# Patient Record
Sex: MALE | Race: WHITE | URBAN - METROPOLITAN AREA
[De-identification: names, ages, dates, MRNs, and addresses within clinical notes are randomized per-mention and may not be internally consistent; named-entity substitution may affect disease eponyms.]

---

## 2017-01-02 ENCOUNTER — EMERGENCY (EMERGENCY)
Facility: HOSPITAL | Age: 82
LOS: 0 days | Discharge: HOME | End: 2017-01-02
Admitting: EMERGENCY MEDICINE
Payer: MEDICARE

## 2017-01-02 DIAGNOSIS — I10 ESSENTIAL (PRIMARY) HYPERTENSION: ICD-10-CM

## 2017-01-02 DIAGNOSIS — E11.9 TYPE 2 DIABETES MELLITUS WITHOUT COMPLICATIONS: ICD-10-CM

## 2017-01-02 DIAGNOSIS — Z79.899 OTHER LONG TERM (CURRENT) DRUG THERAPY: ICD-10-CM

## 2017-01-02 DIAGNOSIS — N20.0 CALCULUS OF KIDNEY: ICD-10-CM

## 2017-01-02 DIAGNOSIS — E78.5 HYPERLIPIDEMIA, UNSPECIFIED: ICD-10-CM

## 2017-01-02 DIAGNOSIS — I25.10 ATHEROSCLEROTIC HEART DISEASE OF NATIVE CORONARY ARTERY WITHOUT ANGINA PECTORIS: ICD-10-CM

## 2017-01-02 DIAGNOSIS — Z79.4 LONG TERM (CURRENT) USE OF INSULIN: ICD-10-CM

## 2017-01-02 DIAGNOSIS — R10.32 LEFT LOWER QUADRANT PAIN: ICD-10-CM

## 2017-01-02 PROCEDURE — 74177 CT ABD & PELVIS W/CONTRAST: CPT | Mod: 26

## 2017-01-02 PROCEDURE — 99285 EMERGENCY DEPT VISIT HI MDM: CPT

## 2017-04-07 ENCOUNTER — EMERGENCY (EMERGENCY)
Facility: HOSPITAL | Age: 82
LOS: 0 days | Discharge: HOME | End: 2017-04-07
Attending: EMERGENCY MEDICINE | Admitting: EMERGENCY MEDICINE
Payer: MEDICARE

## 2017-04-07 VITALS — WEIGHT: 210.1 LBS | HEIGHT: 72 IN

## 2017-04-07 VITALS
RESPIRATION RATE: 16 BRPM | SYSTOLIC BLOOD PRESSURE: 162 MMHG | HEART RATE: 61 BPM | DIASTOLIC BLOOD PRESSURE: 72 MMHG | TEMPERATURE: 98 F | OXYGEN SATURATION: 98 %

## 2017-04-07 DIAGNOSIS — R10.9 UNSPECIFIED ABDOMINAL PAIN: ICD-10-CM

## 2017-04-07 DIAGNOSIS — E11.9 TYPE 2 DIABETES MELLITUS WITHOUT COMPLICATIONS: ICD-10-CM

## 2017-04-07 DIAGNOSIS — N23 UNSPECIFIED RENAL COLIC: ICD-10-CM

## 2017-04-07 DIAGNOSIS — I10 ESSENTIAL (PRIMARY) HYPERTENSION: ICD-10-CM

## 2017-04-07 LAB
ALBUMIN SERPL ELPH-MCNC: 3.2 G/DL — LOW (ref 3.3–5)
ALP SERPL-CCNC: 90 U/L — SIGNIFICANT CHANGE UP (ref 40–120)
ALT FLD-CCNC: 21 U/L — SIGNIFICANT CHANGE UP (ref 12–78)
ANION GAP SERPL CALC-SCNC: 10 MMOL/L — SIGNIFICANT CHANGE UP (ref 5–17)
APPEARANCE UR: CLEAR — SIGNIFICANT CHANGE UP
APTT BLD: 44.8 SEC — HIGH (ref 27.5–37.4)
AST SERPL-CCNC: 24 U/L — SIGNIFICANT CHANGE UP (ref 15–37)
BACTERIA # UR AUTO: (no result)
BASOPHILS # BLD AUTO: 0.1 K/UL — SIGNIFICANT CHANGE UP (ref 0–0.2)
BASOPHILS NFR BLD AUTO: 1.1 % — SIGNIFICANT CHANGE UP (ref 0–2)
BILIRUB SERPL-MCNC: 0.4 MG/DL — SIGNIFICANT CHANGE UP (ref 0.2–1.2)
BILIRUB UR-MCNC: NEGATIVE — SIGNIFICANT CHANGE UP
BUN SERPL-MCNC: 21 MG/DL — SIGNIFICANT CHANGE UP (ref 7–23)
CALCIUM SERPL-MCNC: 8.9 MG/DL — SIGNIFICANT CHANGE UP (ref 8.5–10.1)
CHLORIDE SERPL-SCNC: 106 MMOL/L — SIGNIFICANT CHANGE UP (ref 96–108)
CO2 SERPL-SCNC: 23 MMOL/L — SIGNIFICANT CHANGE UP (ref 22–31)
COLOR SPEC: YELLOW — SIGNIFICANT CHANGE UP
CREAT SERPL-MCNC: 1.41 MG/DL — HIGH (ref 0.5–1.3)
DIFF PNL FLD: (no result)
EOSINOPHIL # BLD AUTO: 0.1 K/UL — SIGNIFICANT CHANGE UP (ref 0–0.5)
EOSINOPHIL NFR BLD AUTO: 1.9 % — SIGNIFICANT CHANGE UP (ref 0–6)
GLUCOSE SERPL-MCNC: 307 MG/DL — HIGH (ref 70–99)
GLUCOSE UR QL: 1000 MG/DL
HCT VFR BLD CALC: 44.5 % — SIGNIFICANT CHANGE UP (ref 39–50)
HGB BLD-MCNC: 15.4 G/DL — SIGNIFICANT CHANGE UP (ref 13–17)
INR BLD: 2.47 RATIO — HIGH (ref 0.88–1.16)
KETONES UR-MCNC: NEGATIVE — SIGNIFICANT CHANGE UP
LEUKOCYTE ESTERASE UR-ACNC: NEGATIVE — SIGNIFICANT CHANGE UP
LYMPHOCYTES # BLD AUTO: 1.5 K/UL — SIGNIFICANT CHANGE UP (ref 1–3.3)
LYMPHOCYTES # BLD AUTO: 25.4 % — SIGNIFICANT CHANGE UP (ref 13–44)
MCHC RBC-ENTMCNC: 34.6 PG — HIGH (ref 27–34)
MCHC RBC-ENTMCNC: 34.7 GM/DL — SIGNIFICANT CHANGE UP (ref 32–36)
MCV RBC AUTO: 99.7 FL — SIGNIFICANT CHANGE UP (ref 80–100)
MONOCYTES # BLD AUTO: 0.7 K/UL — SIGNIFICANT CHANGE UP (ref 0–0.9)
MONOCYTES NFR BLD AUTO: 11.1 % — SIGNIFICANT CHANGE UP (ref 2–14)
NEUTROPHILS # BLD AUTO: 3.7 K/UL — SIGNIFICANT CHANGE UP (ref 1.8–7.4)
NEUTROPHILS NFR BLD AUTO: 60.6 % — SIGNIFICANT CHANGE UP (ref 43–77)
NITRITE UR-MCNC: NEGATIVE — SIGNIFICANT CHANGE UP
PH UR: 5 — SIGNIFICANT CHANGE UP (ref 4.8–8)
PLATELET # BLD AUTO: 183 K/UL — SIGNIFICANT CHANGE UP (ref 150–400)
POTASSIUM SERPL-MCNC: 4.5 MMOL/L — SIGNIFICANT CHANGE UP (ref 3.5–5.3)
POTASSIUM SERPL-SCNC: 4.5 MMOL/L — SIGNIFICANT CHANGE UP (ref 3.5–5.3)
PROT SERPL-MCNC: 7.1 GM/DL — SIGNIFICANT CHANGE UP (ref 6–8.3)
PROT UR-MCNC: 15 MG/DL
PROTHROM AB SERPL-ACNC: 27.2 SEC — HIGH (ref 9.8–12.7)
RBC # BLD: 4.47 M/UL — SIGNIFICANT CHANGE UP (ref 4.2–5.8)
RBC # FLD: 13.2 % — SIGNIFICANT CHANGE UP (ref 10.3–14.5)
RBC CASTS # UR COMP ASSIST: (no result) /HPF (ref 0–4)
SODIUM SERPL-SCNC: 139 MMOL/L — SIGNIFICANT CHANGE UP (ref 135–145)
SP GR SPEC: 1.01 — SIGNIFICANT CHANGE UP (ref 1.01–1.02)
UROBILINOGEN FLD QL: NEGATIVE MG/DL — SIGNIFICANT CHANGE UP
WBC # BLD: 6.1 K/UL — SIGNIFICANT CHANGE UP (ref 3.8–10.5)
WBC # FLD AUTO: 6.1 K/UL — SIGNIFICANT CHANGE UP (ref 3.8–10.5)
WBC UR QL: SIGNIFICANT CHANGE UP

## 2017-04-07 PROCEDURE — 99284 EMERGENCY DEPT VISIT MOD MDM: CPT

## 2017-04-07 PROCEDURE — 74176 CT ABD & PELVIS W/O CONTRAST: CPT | Mod: 26

## 2017-04-07 RX ORDER — KETOROLAC TROMETHAMINE 30 MG/ML
30 SYRINGE (ML) INJECTION ONCE
Qty: 0 | Refills: 0 | Status: DISCONTINUED | OUTPATIENT
Start: 2017-04-07 | End: 2017-04-07

## 2017-04-07 RX ORDER — OXYCODONE HYDROCHLORIDE 5 MG/1
1 TABLET ORAL
Qty: 16 | Refills: 0 | OUTPATIENT
Start: 2017-04-07 | End: 2017-04-11

## 2017-04-07 RX ORDER — TAMSULOSIN HYDROCHLORIDE 0.4 MG/1
1 CAPSULE ORAL
Qty: 30 | Refills: 0 | OUTPATIENT
Start: 2017-04-07 | End: 2017-05-07

## 2017-04-07 RX ORDER — SODIUM CHLORIDE 9 MG/ML
1000 INJECTION INTRAMUSCULAR; INTRAVENOUS; SUBCUTANEOUS ONCE
Qty: 0 | Refills: 0 | Status: COMPLETED | OUTPATIENT
Start: 2017-04-07 | End: 2017-04-07

## 2017-04-07 RX ORDER — SODIUM CHLORIDE 9 MG/ML
500 INJECTION INTRAMUSCULAR; INTRAVENOUS; SUBCUTANEOUS ONCE
Qty: 0 | Refills: 0 | Status: COMPLETED | OUTPATIENT
Start: 2017-04-07 | End: 2017-04-07

## 2017-04-07 RX ORDER — ACETAMINOPHEN 500 MG
1000 TABLET ORAL ONCE
Qty: 0 | Refills: 0 | Status: COMPLETED | OUTPATIENT
Start: 2017-04-07 | End: 2017-04-07

## 2017-04-07 RX ORDER — ONDANSETRON 8 MG/1
4 TABLET, FILM COATED ORAL ONCE
Qty: 0 | Refills: 0 | Status: COMPLETED | OUTPATIENT
Start: 2017-04-07 | End: 2017-04-07

## 2017-04-07 RX ADMIN — SODIUM CHLORIDE 1000 MILLILITER(S): 9 INJECTION INTRAMUSCULAR; INTRAVENOUS; SUBCUTANEOUS at 13:25

## 2017-04-07 RX ADMIN — Medication 30 MILLIGRAM(S): at 17:13

## 2017-04-07 RX ADMIN — ONDANSETRON 4 MILLIGRAM(S): 8 TABLET, FILM COATED ORAL at 14:10

## 2017-04-07 RX ADMIN — Medication 1000 MILLIGRAM(S): at 15:18

## 2017-04-07 RX ADMIN — Medication 400 MILLIGRAM(S): at 14:10

## 2017-04-07 RX ADMIN — SODIUM CHLORIDE 500 MILLILITER(S): 9 INJECTION INTRAMUSCULAR; INTRAVENOUS; SUBCUTANEOUS at 17:13

## 2017-04-07 NOTE — ED ADULT NURSE REASSESSMENT NOTE - NS ED NURSE REASSESS COMMENT FT1
Pt received alert and oriented x 4. Pt medicated as ordered and will monitor for relief.  Drinking po contrast at this time and awaiting ct scan.  Will cont to monitor.

## 2017-04-07 NOTE — ED PROVIDER NOTE - PROGRESS NOTE DETAILS
pt with non obstructing left 3mm stone, will give toradol as tylenol helped his pain before, pt declined morphine or dilaudid as the morphine gives him hiccups. will d/c tohome with flomax and oxycodone, pt will f/u with his urologist Dr. Oglesby

## 2017-04-07 NOTE — ED STATDOCS - NS ED MD SCRIBE ATTENDING SCRIBE SECTIONS
HISTORY OF PRESENT ILLNESS/RESULTS/DISPOSITION/PHYSICAL EXAM/REVIEW OF SYSTEMS/PROGRESS NOTE/PAST MEDICAL/SURGICAL/SOCIAL HISTORY/VITAL SIGNS( Pullset)

## 2017-04-07 NOTE — ED PROVIDER NOTE - NS ED MD SCRIBE ATTENDING SCRIBE SECTIONS
DISPOSITION/PHYSICAL EXAM/RESULTS/HISTORY OF PRESENT ILLNESS/PAST MEDICAL/SURGICAL/SOCIAL HISTORY/REVIEW OF SYSTEMS/PROGRESS NOTE

## 2017-04-07 NOTE — ED PROVIDER NOTE - DETAILS:
I, Reina Salinas, performed the initial face to face bedside interview with this patient regarding history of present illness, review of symptoms and relevant past medical, social and family history.  I completed an independent physical examination.  I was the initial provider who evaluated this patient. The history, relevant review of systems, past medical and surgical history, medical decision making, and physical examination was documented by the scribe in my presence and I attest to the accuracy of the documentation.

## 2017-04-07 NOTE — ED PROVIDER NOTE - CONSTITUTIONAL, MLM
normal... Well appearing, well nourished, awake, alert, oriented to person, place, time/situation and presenting mild discomfort.

## 2017-04-07 NOTE — ED PROVIDER NOTE - OBJECTIVE STATEMENT
86 y/o M with PMHx of HTN, DM s/p cholecystectomy (few years ago by Dr. Mishra) presents to the ED c/o sharp LLQ pain starting this morning upon wakening. Pt states that the pain is constant and rates as 10/10. Pt states that he had a small BM this morning, currently calm and denies dysuria, fever, CP, SOB, HA, fever, cough or any other acute c/o at this time. PMKAYLA Rizvi.

## 2017-04-07 NOTE — ED STATDOCS - OBJECTIVE STATEMENT
84 y/o male with PMHx of HTN, DM s/p cholecystectomy (few years ago by Dr. Mishra) presents to the ED c/o LLQ pain starting this morning. Rates pain as 10/10. Denies N/V. Small BM this morning. Denies dysuria, fever. Felt fine before this morning. Last colonoscopy few years ago was negative, per pt. PMD Dr. Rizvi

## 2017-04-07 NOTE — ED ADULT NURSE REASSESSMENT NOTE - NS ED NURSE REASSESS COMMENT FT1
Pt reports good relief of symptoms at this time. Resting more comfortably and awaiting ct scan at this time.

## 2017-04-07 NOTE — ED ADULT NURSE REASSESSMENT NOTE - NS ED NURSE REASSESS COMMENT FT1
Pt reports return of pain after temporary relief. Medicated as ordered and will monitor for relief prior to d/c at request.

## 2018-04-24 ENCOUNTER — EMERGENCY (EMERGENCY)
Facility: HOSPITAL | Age: 83
LOS: 0 days | Discharge: HOME | End: 2018-04-24
Attending: EMERGENCY MEDICINE | Admitting: EMERGENCY MEDICINE
Payer: MEDICARE

## 2018-04-24 VITALS
HEART RATE: 56 BPM | SYSTOLIC BLOOD PRESSURE: 166 MMHG | RESPIRATION RATE: 16 BRPM | DIASTOLIC BLOOD PRESSURE: 75 MMHG | OXYGEN SATURATION: 100 %

## 2018-04-24 VITALS — WEIGHT: 210.1 LBS | HEIGHT: 72 IN

## 2018-04-24 DIAGNOSIS — R10.31 RIGHT LOWER QUADRANT PAIN: ICD-10-CM

## 2018-04-24 DIAGNOSIS — I10 ESSENTIAL (PRIMARY) HYPERTENSION: ICD-10-CM

## 2018-04-24 DIAGNOSIS — Z87.442 PERSONAL HISTORY OF URINARY CALCULI: ICD-10-CM

## 2018-04-24 DIAGNOSIS — K57.50 DIVERTICULOSIS OF BOTH SMALL AND LARGE INTESTINE WITHOUT PERFORATION OR ABSCESS WITHOUT BLEEDING: ICD-10-CM

## 2018-04-24 DIAGNOSIS — E11.9 TYPE 2 DIABETES MELLITUS WITHOUT COMPLICATIONS: ICD-10-CM

## 2018-04-24 LAB
ALBUMIN SERPL ELPH-MCNC: 2.9 G/DL — LOW (ref 3.3–5)
ALP SERPL-CCNC: 77 U/L — SIGNIFICANT CHANGE UP (ref 40–120)
ALT FLD-CCNC: 19 U/L — SIGNIFICANT CHANGE UP (ref 12–78)
ANION GAP SERPL CALC-SCNC: 6 MMOL/L — SIGNIFICANT CHANGE UP (ref 5–17)
APPEARANCE UR: CLEAR — SIGNIFICANT CHANGE UP
APTT BLD: 37.7 SEC — HIGH (ref 27.5–37.4)
AST SERPL-CCNC: 12 U/L — LOW (ref 15–37)
BASOPHILS # BLD AUTO: 0.02 K/UL — SIGNIFICANT CHANGE UP (ref 0–0.2)
BASOPHILS NFR BLD AUTO: 0.3 % — SIGNIFICANT CHANGE UP (ref 0–2)
BILIRUB SERPL-MCNC: 0.4 MG/DL — SIGNIFICANT CHANGE UP (ref 0.2–1.2)
BILIRUB UR-MCNC: NEGATIVE — SIGNIFICANT CHANGE UP
BUN SERPL-MCNC: 26 MG/DL — HIGH (ref 7–23)
CALCIUM SERPL-MCNC: 8.7 MG/DL — SIGNIFICANT CHANGE UP (ref 8.5–10.1)
CHLORIDE SERPL-SCNC: 108 MMOL/L — SIGNIFICANT CHANGE UP (ref 96–108)
CO2 SERPL-SCNC: 27 MMOL/L — SIGNIFICANT CHANGE UP (ref 22–31)
COLOR SPEC: YELLOW — SIGNIFICANT CHANGE UP
CREAT SERPL-MCNC: 1.39 MG/DL — HIGH (ref 0.5–1.3)
DIFF PNL FLD: (no result)
EOSINOPHIL # BLD AUTO: 0.11 K/UL — SIGNIFICANT CHANGE UP (ref 0–0.5)
EOSINOPHIL NFR BLD AUTO: 1.7 % — SIGNIFICANT CHANGE UP (ref 0–6)
GLUCOSE SERPL-MCNC: 237 MG/DL — HIGH (ref 70–99)
GLUCOSE UR QL: 1000 MG/DL
HCT VFR BLD CALC: 41.2 % — SIGNIFICANT CHANGE UP (ref 39–50)
HGB BLD-MCNC: 13.9 G/DL — SIGNIFICANT CHANGE UP (ref 13–17)
IMM GRANULOCYTES NFR BLD AUTO: 0.3 % — SIGNIFICANT CHANGE UP (ref 0–1.5)
INR BLD: 2.69 RATIO — HIGH (ref 0.88–1.16)
KETONES UR-MCNC: NEGATIVE — SIGNIFICANT CHANGE UP
LACTATE SERPL-SCNC: 1.3 MMOL/L — SIGNIFICANT CHANGE UP (ref 0.7–2)
LEUKOCYTE ESTERASE UR-ACNC: NEGATIVE — SIGNIFICANT CHANGE UP
LIDOCAIN IGE QN: 31 U/L — LOW (ref 73–393)
LYMPHOCYTES # BLD AUTO: 0.97 K/UL — LOW (ref 1–3.3)
LYMPHOCYTES # BLD AUTO: 14.9 % — SIGNIFICANT CHANGE UP (ref 13–44)
MCHC RBC-ENTMCNC: 33.5 PG — SIGNIFICANT CHANGE UP (ref 27–34)
MCHC RBC-ENTMCNC: 33.7 GM/DL — SIGNIFICANT CHANGE UP (ref 32–36)
MCV RBC AUTO: 99.3 FL — SIGNIFICANT CHANGE UP (ref 80–100)
MONOCYTES # BLD AUTO: 0.59 K/UL — SIGNIFICANT CHANGE UP (ref 0–0.9)
MONOCYTES NFR BLD AUTO: 9 % — SIGNIFICANT CHANGE UP (ref 2–14)
NEUTROPHILS # BLD AUTO: 4.81 K/UL — SIGNIFICANT CHANGE UP (ref 1.8–7.4)
NEUTROPHILS NFR BLD AUTO: 73.8 % — SIGNIFICANT CHANGE UP (ref 43–77)
NITRITE UR-MCNC: NEGATIVE — SIGNIFICANT CHANGE UP
NRBC # BLD: 0 /100 WBCS — SIGNIFICANT CHANGE UP (ref 0–0)
PH UR: 6 — SIGNIFICANT CHANGE UP (ref 5–8)
PLATELET # BLD AUTO: 158 K/UL — SIGNIFICANT CHANGE UP (ref 150–400)
POTASSIUM SERPL-MCNC: 4.4 MMOL/L — SIGNIFICANT CHANGE UP (ref 3.5–5.3)
POTASSIUM SERPL-SCNC: 4.4 MMOL/L — SIGNIFICANT CHANGE UP (ref 3.5–5.3)
PROT SERPL-MCNC: 6.5 GM/DL — SIGNIFICANT CHANGE UP (ref 6–8.3)
PROT UR-MCNC: 30 MG/DL
PROTHROM AB SERPL-ACNC: 29.6 SEC — HIGH (ref 9.8–12.7)
RBC # BLD: 4.15 M/UL — LOW (ref 4.2–5.8)
RBC # FLD: 13.3 % — SIGNIFICANT CHANGE UP (ref 10.3–14.5)
SODIUM SERPL-SCNC: 141 MMOL/L — SIGNIFICANT CHANGE UP (ref 135–145)
SP GR SPEC: 1.01 — SIGNIFICANT CHANGE UP (ref 1.01–1.02)
UROBILINOGEN FLD QL: NEGATIVE MG/DL — SIGNIFICANT CHANGE UP
WBC # BLD: 6.52 K/UL — SIGNIFICANT CHANGE UP (ref 3.8–10.5)
WBC # FLD AUTO: 6.52 K/UL — SIGNIFICANT CHANGE UP (ref 3.8–10.5)

## 2018-04-24 PROCEDURE — 74177 CT ABD & PELVIS W/CONTRAST: CPT | Mod: 26

## 2018-04-24 PROCEDURE — 99285 EMERGENCY DEPT VISIT HI MDM: CPT

## 2018-04-24 RX ORDER — ONDANSETRON 8 MG/1
4 TABLET, FILM COATED ORAL ONCE
Qty: 0 | Refills: 0 | Status: COMPLETED | OUTPATIENT
Start: 2018-04-24 | End: 2018-04-24

## 2018-04-24 RX ORDER — SODIUM CHLORIDE 9 MG/ML
1000 INJECTION INTRAMUSCULAR; INTRAVENOUS; SUBCUTANEOUS ONCE
Qty: 0 | Refills: 0 | Status: COMPLETED | OUTPATIENT
Start: 2018-04-24 | End: 2018-04-24

## 2018-04-24 RX ORDER — ACETAMINOPHEN 500 MG
1000 TABLET ORAL ONCE
Qty: 0 | Refills: 0 | Status: COMPLETED | OUTPATIENT
Start: 2018-04-24 | End: 2018-04-24

## 2018-04-24 RX ADMIN — SODIUM CHLORIDE 1000 MILLILITER(S): 9 INJECTION INTRAMUSCULAR; INTRAVENOUS; SUBCUTANEOUS at 10:35

## 2018-04-24 NOTE — ED ADULT NURSE NOTE - OBJECTIVE STATEMENT
Patient comes to ED for abdominal pain that started about midnight last night. Pt reports nausea, no vomiting or diarrhea. Pt also reports back pain that has been on and off for 1-2 years. Pt is AxOx4. Pt denies any nausea at this time.

## 2018-04-24 NOTE — ED PROVIDER NOTE - MEDICAL DECISION MAKING DETAILS
86M with rlq pain, r/o appendicitis, nephrolithiasis, uti. check labs with ua, urine culture, CT a/p. pain control and zofran for nausea.

## 2018-04-24 NOTE — ED ADULT NURSE REASSESSMENT NOTE - NS ED NURSE REASSESS COMMENT FT1
patient ambulated to bathroom and tolerated PO intake with no complaints. VSS. denies any pain or discomfort at this time.

## 2018-04-24 NOTE — ED PROVIDER NOTE - OBJECTIVE STATEMENT
83M with hx of DM, HTN, CAD Renal stones, p/w RLQ pain. patient states pain is sharp, woke him up from his sleep at 2am. Pain had been constant since. (+) nausea and dry heaves, no vomiting. no dysuria. states pain is dissimilar to when he has had stones in the past.

## 2018-04-25 LAB
CULTURE RESULTS: SIGNIFICANT CHANGE UP
SPECIMEN SOURCE: SIGNIFICANT CHANGE UP

## 2018-09-01 ENCOUNTER — EMERGENCY (EMERGENCY)
Facility: HOSPITAL | Age: 83
LOS: 0 days | Discharge: HOME | End: 2018-09-02
Attending: EMERGENCY MEDICINE
Payer: MEDICARE

## 2018-09-01 VITALS
OXYGEN SATURATION: 98 % | RESPIRATION RATE: 17 BRPM | DIASTOLIC BLOOD PRESSURE: 89 MMHG | HEART RATE: 70 BPM | HEIGHT: 72 IN | TEMPERATURE: 98 F | SYSTOLIC BLOOD PRESSURE: 189 MMHG | WEIGHT: 205.03 LBS

## 2018-09-01 DIAGNOSIS — E11.9 TYPE 2 DIABETES MELLITUS WITHOUT COMPLICATIONS: ICD-10-CM

## 2018-09-01 DIAGNOSIS — E78.5 HYPERLIPIDEMIA, UNSPECIFIED: ICD-10-CM

## 2018-09-01 DIAGNOSIS — R10.31 RIGHT LOWER QUADRANT PAIN: ICD-10-CM

## 2018-09-01 DIAGNOSIS — N13.2 HYDRONEPHROSIS WITH RENAL AND URETERAL CALCULOUS OBSTRUCTION: ICD-10-CM

## 2018-09-01 DIAGNOSIS — I10 ESSENTIAL (PRIMARY) HYPERTENSION: ICD-10-CM

## 2018-09-01 LAB
APPEARANCE UR: CLEAR — SIGNIFICANT CHANGE UP
BASOPHILS # BLD AUTO: 0.01 K/UL — SIGNIFICANT CHANGE UP (ref 0–0.2)
BASOPHILS NFR BLD AUTO: 0.2 % — SIGNIFICANT CHANGE UP (ref 0–2)
BILIRUB UR-MCNC: NEGATIVE — SIGNIFICANT CHANGE UP
COLOR SPEC: YELLOW — SIGNIFICANT CHANGE UP
DIFF PNL FLD: ABNORMAL
EOSINOPHIL # BLD AUTO: 0.01 K/UL — SIGNIFICANT CHANGE UP (ref 0–0.5)
EOSINOPHIL NFR BLD AUTO: 0.2 % — SIGNIFICANT CHANGE UP (ref 0–6)
GLUCOSE UR QL: 1000 MG/DL
HCT VFR BLD CALC: 40.6 % — SIGNIFICANT CHANGE UP (ref 39–50)
HGB BLD-MCNC: 14 G/DL — SIGNIFICANT CHANGE UP (ref 13–17)
IMM GRANULOCYTES NFR BLD AUTO: 0.4 % — SIGNIFICANT CHANGE UP (ref 0–1.5)
KETONES UR-MCNC: NEGATIVE — SIGNIFICANT CHANGE UP
LEUKOCYTE ESTERASE UR-ACNC: ABNORMAL
LYMPHOCYTES # BLD AUTO: 0.51 K/UL — LOW (ref 1–3.3)
LYMPHOCYTES # BLD AUTO: 10.6 % — LOW (ref 13–44)
MCHC RBC-ENTMCNC: 33.5 PG — SIGNIFICANT CHANGE UP (ref 27–34)
MCHC RBC-ENTMCNC: 34.5 GM/DL — SIGNIFICANT CHANGE UP (ref 32–36)
MCV RBC AUTO: 97.1 FL — SIGNIFICANT CHANGE UP (ref 80–100)
MONOCYTES # BLD AUTO: 0.42 K/UL — SIGNIFICANT CHANGE UP (ref 0–0.9)
MONOCYTES NFR BLD AUTO: 8.8 % — SIGNIFICANT CHANGE UP (ref 2–14)
NEUTROPHILS # BLD AUTO: 3.82 K/UL — SIGNIFICANT CHANGE UP (ref 1.8–7.4)
NEUTROPHILS NFR BLD AUTO: 79.8 % — HIGH (ref 43–77)
NITRITE UR-MCNC: NEGATIVE — SIGNIFICANT CHANGE UP
PH UR: 5 — SIGNIFICANT CHANGE UP (ref 5–8)
PLATELET # BLD AUTO: 194 K/UL — SIGNIFICANT CHANGE UP (ref 150–400)
PROT UR-MCNC: 30 MG/DL
RBC # BLD: 4.18 M/UL — LOW (ref 4.2–5.8)
RBC # FLD: 13.6 % — SIGNIFICANT CHANGE UP (ref 10.3–14.5)
SP GR SPEC: 1.02 — SIGNIFICANT CHANGE UP (ref 1.01–1.02)
TYPE + AB SCN PNL BLD: SIGNIFICANT CHANGE UP
UROBILINOGEN FLD QL: NEGATIVE MG/DL — SIGNIFICANT CHANGE UP
WBC # BLD: 4.79 K/UL — SIGNIFICANT CHANGE UP (ref 3.8–10.5)
WBC # FLD AUTO: 4.79 K/UL — SIGNIFICANT CHANGE UP (ref 3.8–10.5)

## 2018-09-01 PROCEDURE — 99285 EMERGENCY DEPT VISIT HI MDM: CPT | Mod: 25

## 2018-09-01 PROCEDURE — 93010 ELECTROCARDIOGRAM REPORT: CPT

## 2018-09-01 RX ORDER — FAMOTIDINE 10 MG/ML
20 INJECTION INTRAVENOUS ONCE
Qty: 0 | Refills: 0 | Status: COMPLETED | OUTPATIENT
Start: 2018-09-01 | End: 2018-09-01

## 2018-09-01 RX ORDER — ONDANSETRON 8 MG/1
4 TABLET, FILM COATED ORAL ONCE
Qty: 0 | Refills: 0 | Status: COMPLETED | OUTPATIENT
Start: 2018-09-01 | End: 2018-09-01

## 2018-09-01 RX ORDER — SODIUM CHLORIDE 9 MG/ML
1000 INJECTION INTRAMUSCULAR; INTRAVENOUS; SUBCUTANEOUS ONCE
Qty: 0 | Refills: 0 | Status: COMPLETED | OUTPATIENT
Start: 2018-09-01 | End: 2018-09-01

## 2018-09-01 RX ADMIN — ONDANSETRON 4 MILLIGRAM(S): 8 TABLET, FILM COATED ORAL at 22:01

## 2018-09-01 RX ADMIN — SODIUM CHLORIDE 1000 MILLILITER(S): 9 INJECTION INTRAMUSCULAR; INTRAVENOUS; SUBCUTANEOUS at 21:58

## 2018-09-01 RX ADMIN — FAMOTIDINE 20 MILLIGRAM(S): 10 INJECTION INTRAVENOUS at 22:01

## 2018-09-01 NOTE — ED PROVIDER NOTE - PROGRESS NOTE DETAILS
85 y/o M with PMH of HTN, HLD, DM presents with RLQ pain which started today after consuming meal of spaghetti and meatballs. reports associated nausea and vomiting. Denies fever, chills, constipation, diarrhea, testicular pain. No change to color of stool. Not passing gas since onset of pain. Pt seen for similar in this ED in April 2018, had negative CT at that time. PE: NAD: HEENT: oropharynx clear. no submandibular/cervical lymphadenopathy. Cardiac: s1/s2, RRR. Lungs: CTAB. Abdomen; NBS x4, soft, +RLQ & LLQ tenderness. No CVAT. A/P: r/o appendicitis, diverticulitis. Labs, zofran, pepcid, IVF, CT abdomen/pelvis. Reassess. - Natanael Salazar PA-C AS:  received pt at shift change from Dr Wu pending CT.  CT shows kidney stone with hydro.  Patient continues to c/o flank pain and nausea.  Will give toradol and zofran.  Will d/c home with urology f/u

## 2018-09-01 NOTE — ED PROVIDER NOTE - PHYSICAL EXAMINATION
Constitutional: mild distress AAOx3  Eyes: PERRLA EOMI  Head: Normocephalic atraumatic  Mouth: MMM  Cardiac: regular rate   Resp: Lungs CTAB  GI: +TTP of RLQ and LLQ. No rebound.  Neuro: CN2-12 intact  Skin: No rashes Constitutional: mild distress AAOx3  Eyes: PERRLA EOMI  Head: Normocephalic atraumatic  Mouth: MMM  Cardiac: bradycardic  Resp: Lungs CTAB  GI: +TTP of RLQ and LLQ. No rebound.  Neuro: CN2-12 intact  Skin: No rashes

## 2018-09-01 NOTE — ED PROVIDER NOTE - OBJECTIVE STATEMENT
85 y/o male with a PMHx of HTN, HLD, and DM presents to the ED c/o abd pain. Pt states pain started late this afternoon shortly after eating. Pain is described as RLQ pain that is constant, rated 8/10 in severity, and associated with nausea. Pt states he had two episodes of vomiting and has had one BM since the pain started. Denies testicular pain or change in BMs. Pt states he has not been passing gas normally, cannot recall last time he did. No other acute complaints at time of eval.

## 2018-09-01 NOTE — ED PROVIDER NOTE - NS ED ROS FT
Constitutional: No fever or chills  Eyes: No visual changes  HEENT: No throat pain  CV: No chest pain  Resp: No SOB no cough  GI: +RLQ pain, +nausea, +vomiting. No diarrhea.  : No dysuria  MSK: No musculoskeletal pain  Skin: No rash  Neuro: No headache

## 2018-09-01 NOTE — ED PROVIDER NOTE - MEDICAL DECISION MAKING DETAILS
85 y/o M with a h/o presents to the ED with abd pain, N/V. Symptoms started earlier this afternoon. Pain is located in the RLQ and LLQ, constant and non-radiating. Exam with TTP in those same regions. Concern for diverticulitis vs. appendicitis. Will obtain labs, CT, and reassess.

## 2018-09-01 NOTE — ED PROVIDER NOTE - NS_ ATTENDINGSCRIBEDETAILS _ED_A_ED_FT
I, Eric Wu MD,  performed the initial face to face bedside interview with this patient regarding history of present illness, review of symptoms and relevant past medical, social and family history.  I completed an independent physical examination.  I was the initial provider who evaluated this patient.  The history, relevant review of systems, past medical and surgical history, medical decision making, and physical examination was documented by the scribe in my presence and I attest to the accuracy of the documentation.

## 2018-09-01 NOTE — ED ADULT NURSE NOTE - NSIMPLEMENTINTERV_GEN_ALL_ED
Implemented All Fall with Harm Risk Interventions:  [place] to call system. Call bell, personal items and telephone within reach. Instruct patient to call for assistance. Room bathroom lighting operational. Non-slip footwear when patient is off stretcher. Physically safe environment: no spills, clutter or unnecessary equipment. Stretcher in lowest position, wheels locked, appropriate side rails in place. Provide visual cue, wrist band, yellow gown, etc. Monitor gait and stability. Monitor for mental status changes and reorient to person, place, and time. Review medications for side effects contributing to fall risk. Reinforce activity limits and safety measures with patient and family. Provide visual clues: red socks.

## 2018-09-02 VITALS
TEMPERATURE: 97 F | RESPIRATION RATE: 18 BRPM | HEART RATE: 71 BPM | OXYGEN SATURATION: 97 % | DIASTOLIC BLOOD PRESSURE: 78 MMHG | SYSTOLIC BLOOD PRESSURE: 159 MMHG

## 2018-09-02 PROCEDURE — 74177 CT ABD & PELVIS W/CONTRAST: CPT | Mod: 26

## 2018-09-02 RX ORDER — TAMSULOSIN HYDROCHLORIDE 0.4 MG/1
1 CAPSULE ORAL
Qty: 7 | Refills: 0
Start: 2018-09-02

## 2018-09-02 RX ORDER — KETOROLAC TROMETHAMINE 30 MG/ML
15 SYRINGE (ML) INJECTION ONCE
Qty: 0 | Refills: 0 | Status: DISCONTINUED | OUTPATIENT
Start: 2018-09-02 | End: 2018-09-02

## 2018-09-02 RX ORDER — ONDANSETRON 8 MG/1
1 TABLET, FILM COATED ORAL
Qty: 6 | Refills: 0
Start: 2018-09-02

## 2018-09-02 RX ORDER — OXYCODONE HYDROCHLORIDE 5 MG/1
1 TABLET ORAL
Qty: 6 | Refills: 0
Start: 2018-09-02

## 2018-09-02 RX ORDER — ONDANSETRON 8 MG/1
4 TABLET, FILM COATED ORAL ONCE
Qty: 0 | Refills: 0 | Status: COMPLETED | OUTPATIENT
Start: 2018-09-02 | End: 2018-09-02

## 2018-09-02 RX ADMIN — ONDANSETRON 4 MILLIGRAM(S): 8 TABLET, FILM COATED ORAL at 02:14

## 2018-09-02 RX ADMIN — Medication 15 MILLIGRAM(S): at 02:14

## 2018-09-03 LAB
CULTURE RESULTS: SIGNIFICANT CHANGE UP
SPECIMEN SOURCE: SIGNIFICANT CHANGE UP

## 2018-09-29 ENCOUNTER — EMERGENCY (EMERGENCY)
Facility: HOSPITAL | Age: 83
LOS: 0 days | Discharge: HOME | End: 2018-09-29
Attending: STUDENT IN AN ORGANIZED HEALTH CARE EDUCATION/TRAINING PROGRAM | Admitting: STUDENT IN AN ORGANIZED HEALTH CARE EDUCATION/TRAINING PROGRAM
Payer: MEDICARE

## 2018-09-29 VITALS
HEART RATE: 111 BPM | TEMPERATURE: 98 F | DIASTOLIC BLOOD PRESSURE: 53 MMHG | SYSTOLIC BLOOD PRESSURE: 156 MMHG | RESPIRATION RATE: 18 BRPM | OXYGEN SATURATION: 100 %

## 2018-09-29 VITALS — WEIGHT: 199.96 LBS | HEIGHT: 72 IN

## 2018-09-29 DIAGNOSIS — E11.9 TYPE 2 DIABETES MELLITUS WITHOUT COMPLICATIONS: ICD-10-CM

## 2018-09-29 DIAGNOSIS — Z79.899 OTHER LONG TERM (CURRENT) DRUG THERAPY: ICD-10-CM

## 2018-09-29 DIAGNOSIS — Z87.442 PERSONAL HISTORY OF URINARY CALCULI: ICD-10-CM

## 2018-09-29 DIAGNOSIS — N23 UNSPECIFIED RENAL COLIC: ICD-10-CM

## 2018-09-29 DIAGNOSIS — E78.5 HYPERLIPIDEMIA, UNSPECIFIED: ICD-10-CM

## 2018-09-29 DIAGNOSIS — R10.31 RIGHT LOWER QUADRANT PAIN: ICD-10-CM

## 2018-09-29 DIAGNOSIS — I10 ESSENTIAL (PRIMARY) HYPERTENSION: ICD-10-CM

## 2018-09-29 PROCEDURE — 99284 EMERGENCY DEPT VISIT MOD MDM: CPT

## 2018-09-29 NOTE — ED STATDOCS - PROGRESS NOTE DETAILS
PA NOTE: Pt seen by intake physician and orders/plan reviewed. 87 y/o M presenting with R flank pain intermittent for a week. Patient was diagnosed with R 2mm UVJ stone x1mo ago here, was told to follow-up with urologist which he has not done. He states that he is still having intermittent pain in his R side of his back. Has not been 'as mobile at home after being diagnosed with kidney stone because of pain.' He denies any urinary retention, dysuria, hematuria, nausea, vomiting, fever, chills. Reports that he has not tried taking anything for pain because 'no one told him what he can take.' He states that he has a urologist to follow-up with, Dr. Oglesby, but has not tried to see him.   PE: GEN: Well Appearing, Nontoxic, NAD. HEENT: NC/AT, Symm Facies. PERRL, EOMI, MMM, posterior pharynx clear. CV: No JVD/Bruits or stridor;  +S1S2, RRR w/o m/g/r. RESP: CTAB w/o w/r/r. ABD: Soft, nt/nd, +BS. No guarding/rebound. No RUQ tender. Mild R mid paraspinal tenderness. No midline tenderness. EXT/MSK: No lower extremity edema or calf tenderness. WWP, palpable pulses. FROMx4. SKIN: No erythema, lesions or rash. Neuro: Grossly intact, AOX3 with normal speech, CN II-XII intact; Sensation intact, motor 5/5 throughout. Gait normal  A/P: Provide instructions on analgesia, follow-up with urology. No need for repeat imaging without any change in symptoms, no fevers, chills urinary symptoms. Stable for d/c.   -Pilar John PA-C

## 2018-09-29 NOTE — ED STATDOCS - ATTENDING CONTRIBUTION TO CARE
I, Diana Phillips DO,  performed the initial face to face bedside interview with this patient regarding history of present illness, review of symptoms and relevant past medical, social and family history.  I completed an independent physical examination.  I was the initial provider who evaluated this patient. I have signed out the follow up of any pending tests (i.e. labs, radiological studies) to the ACP.  I have communicated the patient’s plan of care and disposition with the ACP.  The history, relevant review of systems, past medical and surgical history, medical decision making, and physical examination was documented by the scribe in my presence and I attest to the accuracy of the documentation.

## 2018-09-29 NOTE — ED STATDOCS - OBJECTIVE STATEMENT
87 y/o M with a PMHx of kidney stones; last seen in the ED ~ one month ago for kidney stones; who presents to the ED c/o R sided flank pain. Pain is aggravates from sitting to the standing position and walking. Pt states he was seen about one month ago for kidney stones; similar Sx as before. Denies dysuria, hematuria, fever, N/V/D. Pt has not made an appointment with his Urologist. No medication for pain.

## 2018-12-18 ENCOUNTER — EMERGENCY (EMERGENCY)
Facility: HOSPITAL | Age: 83
LOS: 0 days | Discharge: HOME | End: 2018-12-18
Attending: STUDENT IN AN ORGANIZED HEALTH CARE EDUCATION/TRAINING PROGRAM | Admitting: STUDENT IN AN ORGANIZED HEALTH CARE EDUCATION/TRAINING PROGRAM
Payer: MEDICARE

## 2018-12-18 VITALS
DIASTOLIC BLOOD PRESSURE: 65 MMHG | RESPIRATION RATE: 17 BRPM | OXYGEN SATURATION: 99 % | HEART RATE: 60 BPM | SYSTOLIC BLOOD PRESSURE: 155 MMHG | TEMPERATURE: 98 F

## 2018-12-18 VITALS
HEART RATE: 56 BPM | RESPIRATION RATE: 18 BRPM | OXYGEN SATURATION: 97 % | TEMPERATURE: 97 F | DIASTOLIC BLOOD PRESSURE: 69 MMHG | HEIGHT: 72 IN | SYSTOLIC BLOOD PRESSURE: 185 MMHG | WEIGHT: 199.96 LBS

## 2018-12-18 DIAGNOSIS — N13.2 HYDRONEPHROSIS WITH RENAL AND URETERAL CALCULOUS OBSTRUCTION: ICD-10-CM

## 2018-12-18 DIAGNOSIS — R10.32 LEFT LOWER QUADRANT PAIN: ICD-10-CM

## 2018-12-18 DIAGNOSIS — I10 ESSENTIAL (PRIMARY) HYPERTENSION: ICD-10-CM

## 2018-12-18 DIAGNOSIS — K57.30 DIVERTICULOSIS OF LARGE INTESTINE WITHOUT PERFORATION OR ABSCESS WITHOUT BLEEDING: ICD-10-CM

## 2018-12-18 DIAGNOSIS — Z79.899 OTHER LONG TERM (CURRENT) DRUG THERAPY: ICD-10-CM

## 2018-12-18 DIAGNOSIS — E11.9 TYPE 2 DIABETES MELLITUS WITHOUT COMPLICATIONS: ICD-10-CM

## 2018-12-18 DIAGNOSIS — Z98.890 OTHER SPECIFIED POSTPROCEDURAL STATES: Chronic | ICD-10-CM

## 2018-12-18 DIAGNOSIS — E78.5 HYPERLIPIDEMIA, UNSPECIFIED: ICD-10-CM

## 2018-12-18 DIAGNOSIS — Z96.641 PRESENCE OF RIGHT ARTIFICIAL HIP JOINT: ICD-10-CM

## 2018-12-18 LAB
ALBUMIN SERPL ELPH-MCNC: 3.1 G/DL — LOW (ref 3.3–5)
ALP SERPL-CCNC: 79 U/L — SIGNIFICANT CHANGE UP (ref 40–120)
ALT FLD-CCNC: 25 U/L — SIGNIFICANT CHANGE UP (ref 12–78)
ANION GAP SERPL CALC-SCNC: 7 MMOL/L — SIGNIFICANT CHANGE UP (ref 5–17)
APPEARANCE UR: CLEAR — SIGNIFICANT CHANGE UP
APTT BLD: 35.9 SEC — SIGNIFICANT CHANGE UP (ref 27.5–36.3)
AST SERPL-CCNC: 23 U/L — SIGNIFICANT CHANGE UP (ref 15–37)
BACTERIA # UR AUTO: ABNORMAL
BASOPHILS # BLD AUTO: 0.01 K/UL — SIGNIFICANT CHANGE UP (ref 0–0.2)
BASOPHILS NFR BLD AUTO: 0.1 % — SIGNIFICANT CHANGE UP (ref 0–2)
BILIRUB SERPL-MCNC: 0.4 MG/DL — SIGNIFICANT CHANGE UP (ref 0.2–1.2)
BILIRUB UR-MCNC: NEGATIVE — SIGNIFICANT CHANGE UP
BUN SERPL-MCNC: 28 MG/DL — HIGH (ref 7–23)
CALCIUM SERPL-MCNC: 8.8 MG/DL — SIGNIFICANT CHANGE UP (ref 8.5–10.1)
CHLORIDE SERPL-SCNC: 109 MMOL/L — HIGH (ref 96–108)
CO2 SERPL-SCNC: 22 MMOL/L — SIGNIFICANT CHANGE UP (ref 22–31)
COLOR SPEC: YELLOW — SIGNIFICANT CHANGE UP
CREAT SERPL-MCNC: 1.46 MG/DL — HIGH (ref 0.5–1.3)
DIFF PNL FLD: ABNORMAL
EOSINOPHIL # BLD AUTO: 0 K/UL — SIGNIFICANT CHANGE UP (ref 0–0.5)
EOSINOPHIL NFR BLD AUTO: 0 % — SIGNIFICANT CHANGE UP (ref 0–6)
EPI CELLS # UR: SIGNIFICANT CHANGE UP
GLUCOSE SERPL-MCNC: 221 MG/DL — HIGH (ref 70–99)
GLUCOSE UR QL: 1000 MG/DL
HCT VFR BLD CALC: 44 % — SIGNIFICANT CHANGE UP (ref 39–50)
HGB BLD-MCNC: 14.8 G/DL — SIGNIFICANT CHANGE UP (ref 13–17)
IMM GRANULOCYTES NFR BLD AUTO: 0.2 % — SIGNIFICANT CHANGE UP (ref 0–1.5)
INR BLD: 1.98 RATIO — HIGH (ref 0.88–1.16)
KETONES UR-MCNC: ABNORMAL
LEUKOCYTE ESTERASE UR-ACNC: NEGATIVE — SIGNIFICANT CHANGE UP
LIDOCAIN IGE QN: 42 U/L — LOW (ref 73–393)
LYMPHOCYTES # BLD AUTO: 0.61 K/UL — LOW (ref 1–3.3)
LYMPHOCYTES # BLD AUTO: 7.2 % — LOW (ref 13–44)
MAGNESIUM SERPL-MCNC: 2.3 MG/DL — SIGNIFICANT CHANGE UP (ref 1.6–2.6)
MCHC RBC-ENTMCNC: 33.6 GM/DL — SIGNIFICANT CHANGE UP (ref 32–36)
MCHC RBC-ENTMCNC: 33.6 PG — SIGNIFICANT CHANGE UP (ref 27–34)
MCV RBC AUTO: 100 FL — SIGNIFICANT CHANGE UP (ref 80–100)
MONOCYTES # BLD AUTO: 0.48 K/UL — SIGNIFICANT CHANGE UP (ref 0–0.9)
MONOCYTES NFR BLD AUTO: 5.7 % — SIGNIFICANT CHANGE UP (ref 2–14)
NEUTROPHILS # BLD AUTO: 7.35 K/UL — SIGNIFICANT CHANGE UP (ref 1.8–7.4)
NEUTROPHILS NFR BLD AUTO: 86.8 % — HIGH (ref 43–77)
NITRITE UR-MCNC: NEGATIVE — SIGNIFICANT CHANGE UP
NRBC # BLD: 0 /100 WBCS — SIGNIFICANT CHANGE UP (ref 0–0)
PH UR: 5 — SIGNIFICANT CHANGE UP (ref 5–8)
PLATELET # BLD AUTO: 161 K/UL — SIGNIFICANT CHANGE UP (ref 150–400)
POTASSIUM SERPL-MCNC: 4.8 MMOL/L — SIGNIFICANT CHANGE UP (ref 3.5–5.3)
POTASSIUM SERPL-SCNC: 4.8 MMOL/L — SIGNIFICANT CHANGE UP (ref 3.5–5.3)
PROT SERPL-MCNC: 7 GM/DL — SIGNIFICANT CHANGE UP (ref 6–8.3)
PROT UR-MCNC: 30 MG/DL
PROTHROM AB SERPL-ACNC: 22.4 SEC — HIGH (ref 10–12.9)
RBC # BLD: 4.4 M/UL — SIGNIFICANT CHANGE UP (ref 4.2–5.8)
RBC # FLD: 13.7 % — SIGNIFICANT CHANGE UP (ref 10.3–14.5)
RBC CASTS # UR COMP ASSIST: >50 /HPF (ref 0–4)
SODIUM SERPL-SCNC: 138 MMOL/L — SIGNIFICANT CHANGE UP (ref 135–145)
SP GR SPEC: 1.02 — SIGNIFICANT CHANGE UP (ref 1.01–1.02)
UROBILINOGEN FLD QL: NEGATIVE MG/DL — SIGNIFICANT CHANGE UP
WBC # BLD: 8.47 K/UL — SIGNIFICANT CHANGE UP (ref 3.8–10.5)
WBC # FLD AUTO: 8.47 K/UL — SIGNIFICANT CHANGE UP (ref 3.8–10.5)
WBC UR QL: SIGNIFICANT CHANGE UP

## 2018-12-18 PROCEDURE — 99285 EMERGENCY DEPT VISIT HI MDM: CPT

## 2018-12-18 PROCEDURE — 74177 CT ABD & PELVIS W/CONTRAST: CPT | Mod: 26

## 2018-12-18 PROCEDURE — 93010 ELECTROCARDIOGRAM REPORT: CPT

## 2018-12-18 RX ORDER — ONDANSETRON 8 MG/1
4 TABLET, FILM COATED ORAL ONCE
Qty: 0 | Refills: 0 | Status: COMPLETED | OUTPATIENT
Start: 2018-12-18 | End: 2018-12-18

## 2018-12-18 RX ORDER — SODIUM CHLORIDE 9 MG/ML
1000 INJECTION INTRAMUSCULAR; INTRAVENOUS; SUBCUTANEOUS ONCE
Qty: 0 | Refills: 0 | Status: COMPLETED | OUTPATIENT
Start: 2018-12-18 | End: 2018-12-18

## 2018-12-18 RX ORDER — ACETAMINOPHEN 500 MG
975 TABLET ORAL ONCE
Qty: 0 | Refills: 0 | Status: COMPLETED | OUTPATIENT
Start: 2018-12-18 | End: 2018-12-18

## 2018-12-18 RX ORDER — ACETAMINOPHEN 500 MG
1000 TABLET ORAL ONCE
Qty: 0 | Refills: 0 | Status: DISCONTINUED | OUTPATIENT
Start: 2018-12-18 | End: 2018-12-18

## 2018-12-18 RX ORDER — TAMSULOSIN HYDROCHLORIDE 0.4 MG/1
1 CAPSULE ORAL
Qty: 10 | Refills: 0
Start: 2018-12-18

## 2018-12-18 RX ADMIN — ONDANSETRON 4 MILLIGRAM(S): 8 TABLET, FILM COATED ORAL at 18:11

## 2018-12-18 RX ADMIN — Medication 975 MILLIGRAM(S): at 18:11

## 2018-12-18 RX ADMIN — SODIUM CHLORIDE 1000 MILLILITER(S): 9 INJECTION INTRAMUSCULAR; INTRAVENOUS; SUBCUTANEOUS at 18:06

## 2018-12-18 RX ADMIN — SODIUM CHLORIDE 1000 MILLILITER(S): 9 INJECTION INTRAMUSCULAR; INTRAVENOUS; SUBCUTANEOUS at 19:11

## 2018-12-18 RX ADMIN — Medication 975 MILLIGRAM(S): at 18:41

## 2018-12-18 NOTE — ED PROVIDER NOTE - OBJECTIVE STATEMENT
Pt is an 87 y/o M, with PMHx of HTN, HLD, DM, and s/p left inguinal hernia repair, presenting to the ED with c/o lower abd pain since 1200 today. Pt describes the pain as a deep pain located in the LLQ, which has been constant and progressively worsening. Associated nausea and vomiting. Denies diarrhea, fever, urinary sxs, testicular pain, CP, and SOB. Did not take pain meds prior to arrival.

## 2018-12-18 NOTE — ED PROVIDER NOTE - PROGRESS NOTE DETAILS
87 yo male with a PMH of HTN, DM, L inguinal hernia repair presents with LLQ pain since noon today, constant, worsening, radiating to the back, 10/10 pain. LNBM was yesterday and had a small BM this morning. +n/vx2, NB. Denies f/c/sweating, diarrhea, cp, sob. CT, labs, UA. IVF. Reeval. R/o diverticulitis. -Silvestre Duron PA-C Alan DO: Patient feeling better at this time; urology f/u; instructed to return to ED for worsening pain or any other concerns.

## 2018-12-18 NOTE — ED ADULT NURSE NOTE - OBJECTIVE STATEMENT
Pt alert and oriented x3. PT presents with abd pain nausea vomiting. Denies fever chills. Pt also c/o constipation.

## 2018-12-18 NOTE — ED ADULT NURSE NOTE - NSIMPLEMENTINTERV_GEN_ALL_ED
Implemented All Fall Risk Interventions:  [place] to call system. Call bell, personal items and telephone within reach. Instruct patient to call for assistance. Room bathroom lighting operational. Non-slip footwear when patient is off stretcher. Physically safe environment: no spills, clutter or unnecessary equipment. Stretcher in lowest position, wheels locked, appropriate side rails in place. Provide visual cue, wrist band, yellow gown, etc. Monitor gait and stability. Monitor for mental status changes and reorient to person, place, and time. Review medications for side effects contributing to fall risk. Reinforce activity limits and safety measures with patient and family.

## 2018-12-20 LAB
CULTURE RESULTS: NO GROWTH — SIGNIFICANT CHANGE UP
SPECIMEN SOURCE: SIGNIFICANT CHANGE UP

## 2019-05-15 ENCOUNTER — EMERGENCY (EMERGENCY)
Facility: HOSPITAL | Age: 84
LOS: 1 days | Discharge: HOME | End: 2019-05-15
Attending: EMERGENCY MEDICINE | Admitting: EMERGENCY MEDICINE
Payer: MEDICARE

## 2019-05-15 VITALS — HEIGHT: 72 IN | WEIGHT: 199.08 LBS

## 2019-05-15 DIAGNOSIS — E11.9 TYPE 2 DIABETES MELLITUS WITHOUT COMPLICATIONS: ICD-10-CM

## 2019-05-15 DIAGNOSIS — R10.32 LEFT LOWER QUADRANT PAIN: ICD-10-CM

## 2019-05-15 DIAGNOSIS — I10 ESSENTIAL (PRIMARY) HYPERTENSION: ICD-10-CM

## 2019-05-15 DIAGNOSIS — Z88.5 ALLERGY STATUS TO NARCOTIC AGENT: ICD-10-CM

## 2019-05-15 DIAGNOSIS — Z87.19 PERSONAL HISTORY OF OTHER DISEASES OF THE DIGESTIVE SYSTEM: ICD-10-CM

## 2019-05-15 DIAGNOSIS — N23 UNSPECIFIED RENAL COLIC: ICD-10-CM

## 2019-05-15 DIAGNOSIS — Z98.890 OTHER SPECIFIED POSTPROCEDURAL STATES: Chronic | ICD-10-CM

## 2019-05-15 DIAGNOSIS — E78.5 HYPERLIPIDEMIA, UNSPECIFIED: ICD-10-CM

## 2019-05-15 LAB
BASOPHILS # BLD AUTO: 0.02 K/UL — SIGNIFICANT CHANGE UP (ref 0–0.2)
BASOPHILS NFR BLD AUTO: 0.3 % — SIGNIFICANT CHANGE UP (ref 0–2)
EOSINOPHIL # BLD AUTO: 0.05 K/UL — SIGNIFICANT CHANGE UP (ref 0–0.5)
EOSINOPHIL NFR BLD AUTO: 0.8 % — SIGNIFICANT CHANGE UP (ref 0–6)
HCT VFR BLD CALC: 43.3 % — SIGNIFICANT CHANGE UP (ref 39–50)
HGB BLD-MCNC: 14.5 G/DL — SIGNIFICANT CHANGE UP (ref 13–17)
IMM GRANULOCYTES NFR BLD AUTO: 0.2 % — SIGNIFICANT CHANGE UP (ref 0–1.5)
LACTATE SERPL-SCNC: 1.6 MMOL/L — SIGNIFICANT CHANGE UP (ref 0.7–2)
LYMPHOCYTES # BLD AUTO: 1.13 K/UL — SIGNIFICANT CHANGE UP (ref 1–3.3)
LYMPHOCYTES # BLD AUTO: 17 % — SIGNIFICANT CHANGE UP (ref 13–44)
MCHC RBC-ENTMCNC: 33.3 PG — SIGNIFICANT CHANGE UP (ref 27–34)
MCHC RBC-ENTMCNC: 33.5 GM/DL — SIGNIFICANT CHANGE UP (ref 32–36)
MCV RBC AUTO: 99.3 FL — SIGNIFICANT CHANGE UP (ref 80–100)
MONOCYTES # BLD AUTO: 0.7 K/UL — SIGNIFICANT CHANGE UP (ref 0–0.9)
MONOCYTES NFR BLD AUTO: 10.6 % — SIGNIFICANT CHANGE UP (ref 2–14)
NEUTROPHILS # BLD AUTO: 4.72 K/UL — SIGNIFICANT CHANGE UP (ref 1.8–7.4)
NEUTROPHILS NFR BLD AUTO: 71.1 % — SIGNIFICANT CHANGE UP (ref 43–77)
PLATELET # BLD AUTO: 179 K/UL — SIGNIFICANT CHANGE UP (ref 150–400)
RBC # BLD: 4.36 M/UL — SIGNIFICANT CHANGE UP (ref 4.2–5.8)
RBC # FLD: 13.3 % — SIGNIFICANT CHANGE UP (ref 10.3–14.5)
WBC # BLD: 6.63 K/UL — SIGNIFICANT CHANGE UP (ref 3.8–10.5)
WBC # FLD AUTO: 6.63 K/UL — SIGNIFICANT CHANGE UP (ref 3.8–10.5)

## 2019-05-15 PROCEDURE — 99285 EMERGENCY DEPT VISIT HI MDM: CPT | Mod: 25

## 2019-05-15 RX ORDER — SODIUM CHLORIDE 9 MG/ML
1000 INJECTION INTRAMUSCULAR; INTRAVENOUS; SUBCUTANEOUS ONCE
Refills: 0 | Status: COMPLETED | OUTPATIENT
Start: 2019-05-15 | End: 2019-05-15

## 2019-05-15 RX ORDER — ONDANSETRON 8 MG/1
4 TABLET, FILM COATED ORAL ONCE
Refills: 0 | Status: COMPLETED | OUTPATIENT
Start: 2019-05-15 | End: 2019-05-15

## 2019-05-15 RX ADMIN — SODIUM CHLORIDE 1000 MILLILITER(S): 9 INJECTION INTRAMUSCULAR; INTRAVENOUS; SUBCUTANEOUS at 23:50

## 2019-05-15 NOTE — ED PROVIDER NOTE - OBJECTIVE STATEMENT
88 yo male ambulatory to er pw llq abdominal pain with vomiting nbnb. Pt states he saw his dr lynn, dr zhang for falling down stairs no loc and had a ct of the head that was negative. then during the day he developed llq pain and then started to vomit. pt has ho remote hernia surgery . 88 yo male ambulatory to er pw llq abdominal pain with vomiting nbnb. Pt states he saw his dr today, dr zhang for falling down stairs no loc and had a ct of the head that was negative. then during the day he developed llq pain and then started to vomit. pt has ho remote hernia surgery  and ho kidney stones.

## 2019-05-15 NOTE — ED ADULT NURSE NOTE - NSIMPLEMENTINTERV_GEN_ALL_ED
Implemented All Fall with Harm Risk Interventions:  [place] to call system. Call bell, personal items and telephone within reach. Instruct patient to call for assistance. Room bathroom lighting operational. Non-slip footwear when patient is off stretcher. Physically safe environment: no spills, clutter or unnecessary equipment. Stretcher in lowest position, wheels locked, appropriate side rails in place. Provide visual cue, wrist band, yellow gown, etc. Monitor gait and stability. Monitor for mental status changes and reorient to person, place, and time. Review medications for side effects contributing to fall risk. Reinforce activity limits and safety measures with patient and family. Provide visual clues: red socks.

## 2019-05-15 NOTE — ED PROVIDER NOTE - PROGRESS NOTE DETAILS
pt with kidney stones, has been sleeping will let sleep until the mornining, pt comfortable, ua negative for infection. NIA Heller DO

## 2019-05-15 NOTE — ED PROVIDER NOTE - CARE PROVIDER_API CALL
Jayy Acharya)  Urology  [address], [address] [place] [address]  Phone: [phone]  Fax: [phone]  Follow Up Time:

## 2019-05-15 NOTE — ED ADULT NURSE NOTE - CHIEF COMPLAINT QUOTE
abdominal pain for past couple hours with nausea and vomited once earlier. no bowel movement for 12 hours.

## 2019-05-16 VITALS
TEMPERATURE: 99 F | DIASTOLIC BLOOD PRESSURE: 55 MMHG | OXYGEN SATURATION: 95 % | SYSTOLIC BLOOD PRESSURE: 124 MMHG | HEART RATE: 66 BPM | RESPIRATION RATE: 19 BRPM

## 2019-05-16 LAB
ALBUMIN SERPL ELPH-MCNC: 3.1 G/DL — LOW (ref 3.3–5)
ALP SERPL-CCNC: 86 U/L — SIGNIFICANT CHANGE UP (ref 40–120)
ALT FLD-CCNC: 30 U/L — SIGNIFICANT CHANGE UP (ref 12–78)
ANION GAP SERPL CALC-SCNC: 6 MMOL/L — SIGNIFICANT CHANGE UP (ref 5–17)
APPEARANCE UR: CLEAR — SIGNIFICANT CHANGE UP
AST SERPL-CCNC: 11 U/L — LOW (ref 15–37)
BACTERIA # UR AUTO: ABNORMAL
BILIRUB SERPL-MCNC: 0.3 MG/DL — SIGNIFICANT CHANGE UP (ref 0.2–1.2)
BILIRUB UR-MCNC: NEGATIVE — SIGNIFICANT CHANGE UP
BLD GP AB SCN SERPL QL: SIGNIFICANT CHANGE UP
BUN SERPL-MCNC: 27 MG/DL — HIGH (ref 7–23)
CALCIUM SERPL-MCNC: 8.7 MG/DL — SIGNIFICANT CHANGE UP (ref 8.5–10.1)
CHLORIDE SERPL-SCNC: 107 MMOL/L — SIGNIFICANT CHANGE UP (ref 96–108)
CO2 SERPL-SCNC: 25 MMOL/L — SIGNIFICANT CHANGE UP (ref 22–31)
COLOR SPEC: YELLOW — SIGNIFICANT CHANGE UP
COMMENT - URINE: SIGNIFICANT CHANGE UP
COMMENT - URINE: SIGNIFICANT CHANGE UP
CREAT SERPL-MCNC: 1.48 MG/DL — HIGH (ref 0.5–1.3)
DIFF PNL FLD: ABNORMAL
EPI CELLS # UR: SIGNIFICANT CHANGE UP
GLUCOSE SERPL-MCNC: 264 MG/DL — HIGH (ref 70–99)
GLUCOSE UR QL: 1000 MG/DL
KETONES UR-MCNC: NEGATIVE — SIGNIFICANT CHANGE UP
LEUKOCYTE ESTERASE UR-ACNC: NEGATIVE — SIGNIFICANT CHANGE UP
LIDOCAIN IGE QN: 36 U/L — LOW (ref 73–393)
NITRITE UR-MCNC: NEGATIVE — SIGNIFICANT CHANGE UP
PH UR: 5 — SIGNIFICANT CHANGE UP (ref 5–8)
POTASSIUM SERPL-MCNC: 4.7 MMOL/L — SIGNIFICANT CHANGE UP (ref 3.5–5.3)
POTASSIUM SERPL-SCNC: 4.7 MMOL/L — SIGNIFICANT CHANGE UP (ref 3.5–5.3)
PROT SERPL-MCNC: 6.6 GM/DL — SIGNIFICANT CHANGE UP (ref 6–8.3)
PROT UR-MCNC: 15 MG/DL
RBC CASTS # UR COMP ASSIST: ABNORMAL /HPF (ref 0–4)
SODIUM SERPL-SCNC: 138 MMOL/L — SIGNIFICANT CHANGE UP (ref 135–145)
SP GR SPEC: 1.02 — SIGNIFICANT CHANGE UP (ref 1.01–1.02)
TROPONIN I SERPL-MCNC: <0.015 NG/ML — SIGNIFICANT CHANGE UP (ref 0.01–0.04)
TYPE + AB SCN PNL BLD: SIGNIFICANT CHANGE UP
UROBILINOGEN FLD QL: NEGATIVE MG/DL — SIGNIFICANT CHANGE UP
WBC UR QL: SIGNIFICANT CHANGE UP

## 2019-05-16 PROCEDURE — 93010 ELECTROCARDIOGRAM REPORT: CPT

## 2019-05-16 PROCEDURE — 74177 CT ABD & PELVIS W/CONTRAST: CPT | Mod: 26

## 2019-05-16 RX ORDER — KETOROLAC TROMETHAMINE 30 MG/ML
30 SYRINGE (ML) INJECTION ONCE
Refills: 0 | Status: DISCONTINUED | OUTPATIENT
Start: 2019-05-16 | End: 2019-05-16

## 2019-05-16 RX ORDER — OXYCODONE HYDROCHLORIDE 5 MG/1
1 TABLET ORAL
Qty: 12 | Refills: 0
Start: 2019-05-16 | End: 2019-05-18

## 2019-05-16 RX ORDER — TAMSULOSIN HYDROCHLORIDE 0.4 MG/1
1 CAPSULE ORAL
Qty: 14 | Refills: 0
Start: 2019-05-16 | End: 2019-05-29

## 2019-05-16 RX ORDER — ONDANSETRON 8 MG/1
1 TABLET, FILM COATED ORAL
Qty: 12 | Refills: 0
Start: 2019-05-16 | End: 2019-05-18

## 2019-05-16 RX ADMIN — SODIUM CHLORIDE 1000 MILLILITER(S): 9 INJECTION INTRAMUSCULAR; INTRAVENOUS; SUBCUTANEOUS at 00:55

## 2019-05-16 RX ADMIN — Medication 30 MILLIGRAM(S): at 03:43

## 2019-05-16 RX ADMIN — Medication 30 MILLIGRAM(S): at 03:58

## 2019-05-16 NOTE — ED ADULT NURSE REASSESSMENT NOTE - NS ED NURSE REASSESS COMMENT FT1
Received report from Jeni JALLOH. Received pt awake A&Ox3. Pt is pending CT. Denies abd pain, nausea, vomiting at this time. Will reassess. Safety/fall precautions

## 2019-11-15 NOTE — ED PROVIDER NOTE - NS_EDPROVIDERDISPOUSERTYPE_ED_A_ED
Adequate: hears normal conversation without difficulty
Attending Attestation (For Attendings USE Only)...

## 2020-08-09 ENCOUNTER — EMERGENCY (EMERGENCY)
Facility: HOSPITAL | Age: 85
LOS: 0 days | Discharge: HOME | End: 2020-08-09
Attending: EMERGENCY MEDICINE
Payer: MEDICARE

## 2020-08-09 VITALS
RESPIRATION RATE: 18 BRPM | TEMPERATURE: 99 F | SYSTOLIC BLOOD PRESSURE: 190 MMHG | WEIGHT: 190.04 LBS | HEIGHT: 72 IN | OXYGEN SATURATION: 98 % | DIASTOLIC BLOOD PRESSURE: 81 MMHG | HEART RATE: 59 BPM

## 2020-08-09 DIAGNOSIS — N13.2 HYDRONEPHROSIS WITH RENAL AND URETERAL CALCULOUS OBSTRUCTION: ICD-10-CM

## 2020-08-09 DIAGNOSIS — R11.2 NAUSEA WITH VOMITING, UNSPECIFIED: ICD-10-CM

## 2020-08-09 DIAGNOSIS — Z98.890 OTHER SPECIFIED POSTPROCEDURAL STATES: Chronic | ICD-10-CM

## 2020-08-09 DIAGNOSIS — E11.9 TYPE 2 DIABETES MELLITUS WITHOUT COMPLICATIONS: ICD-10-CM

## 2020-08-09 DIAGNOSIS — I10 ESSENTIAL (PRIMARY) HYPERTENSION: ICD-10-CM

## 2020-08-09 DIAGNOSIS — Z87.442 PERSONAL HISTORY OF URINARY CALCULI: ICD-10-CM

## 2020-08-09 DIAGNOSIS — E78.5 HYPERLIPIDEMIA, UNSPECIFIED: ICD-10-CM

## 2020-08-09 DIAGNOSIS — Z88.5 ALLERGY STATUS TO NARCOTIC AGENT: ICD-10-CM

## 2020-08-09 DIAGNOSIS — R10.32 LEFT LOWER QUADRANT PAIN: ICD-10-CM

## 2020-08-09 LAB
ALBUMIN SERPL ELPH-MCNC: 2.8 G/DL — LOW (ref 3.3–5)
ALP SERPL-CCNC: 80 U/L — SIGNIFICANT CHANGE UP (ref 40–120)
ALT FLD-CCNC: 18 U/L — SIGNIFICANT CHANGE UP (ref 12–78)
ANION GAP SERPL CALC-SCNC: 7 MMOL/L — SIGNIFICANT CHANGE UP (ref 5–17)
APPEARANCE UR: ABNORMAL
APTT BLD: 25.1 SEC — LOW (ref 27.5–35.5)
AST SERPL-CCNC: 20 U/L — SIGNIFICANT CHANGE UP (ref 15–37)
BASOPHILS # BLD AUTO: 0.02 K/UL — SIGNIFICANT CHANGE UP (ref 0–0.2)
BASOPHILS NFR BLD AUTO: 0.2 % — SIGNIFICANT CHANGE UP (ref 0–2)
BILIRUB SERPL-MCNC: 0.3 MG/DL — SIGNIFICANT CHANGE UP (ref 0.2–1.2)
BILIRUB UR-MCNC: NEGATIVE — SIGNIFICANT CHANGE UP
BUN SERPL-MCNC: 32 MG/DL — HIGH (ref 7–23)
CALCIUM SERPL-MCNC: 8.3 MG/DL — LOW (ref 8.5–10.1)
CHLORIDE SERPL-SCNC: 110 MMOL/L — HIGH (ref 96–108)
CO2 SERPL-SCNC: 22 MMOL/L — SIGNIFICANT CHANGE UP (ref 22–31)
COLOR SPEC: YELLOW — SIGNIFICANT CHANGE UP
CREAT SERPL-MCNC: 1.5 MG/DL — HIGH (ref 0.5–1.3)
DIFF PNL FLD: ABNORMAL
EOSINOPHIL # BLD AUTO: 0.01 K/UL — SIGNIFICANT CHANGE UP (ref 0–0.5)
EOSINOPHIL NFR BLD AUTO: 0.1 % — SIGNIFICANT CHANGE UP (ref 0–6)
GLUCOSE SERPL-MCNC: 226 MG/DL — HIGH (ref 70–99)
GLUCOSE UR QL: 1000 MG/DL
HCT VFR BLD CALC: 41.9 % — SIGNIFICANT CHANGE UP (ref 39–50)
HGB BLD-MCNC: 13.6 G/DL — SIGNIFICANT CHANGE UP (ref 13–17)
IMM GRANULOCYTES NFR BLD AUTO: 0.3 % — SIGNIFICANT CHANGE UP (ref 0–1.5)
INR BLD: 1.14 RATIO — SIGNIFICANT CHANGE UP (ref 0.88–1.16)
KETONES UR-MCNC: ABNORMAL
LEUKOCYTE ESTERASE UR-ACNC: NEGATIVE — SIGNIFICANT CHANGE UP
LIDOCAIN IGE QN: 28 U/L — LOW (ref 73–393)
LYMPHOCYTES # BLD AUTO: 0.68 K/UL — LOW (ref 1–3.3)
LYMPHOCYTES # BLD AUTO: 7.2 % — LOW (ref 13–44)
MCHC RBC-ENTMCNC: 32.5 GM/DL — SIGNIFICANT CHANGE UP (ref 32–36)
MCHC RBC-ENTMCNC: 32.6 PG — SIGNIFICANT CHANGE UP (ref 27–34)
MCV RBC AUTO: 100.5 FL — HIGH (ref 80–100)
MONOCYTES # BLD AUTO: 0.76 K/UL — SIGNIFICANT CHANGE UP (ref 0–0.9)
MONOCYTES NFR BLD AUTO: 8.1 % — SIGNIFICANT CHANGE UP (ref 2–14)
NEUTROPHILS # BLD AUTO: 7.92 K/UL — HIGH (ref 1.8–7.4)
NEUTROPHILS NFR BLD AUTO: 84.1 % — HIGH (ref 43–77)
NITRITE UR-MCNC: NEGATIVE — SIGNIFICANT CHANGE UP
PH UR: 5 — SIGNIFICANT CHANGE UP (ref 5–8)
PLATELET # BLD AUTO: 179 K/UL — SIGNIFICANT CHANGE UP (ref 150–400)
POTASSIUM SERPL-MCNC: 4.7 MMOL/L — SIGNIFICANT CHANGE UP (ref 3.5–5.3)
POTASSIUM SERPL-SCNC: 4.7 MMOL/L — SIGNIFICANT CHANGE UP (ref 3.5–5.3)
PROT SERPL-MCNC: 6.8 GM/DL — SIGNIFICANT CHANGE UP (ref 6–8.3)
PROT UR-MCNC: 15 MG/DL
PROTHROM AB SERPL-ACNC: 13.3 SEC — SIGNIFICANT CHANGE UP (ref 10.6–13.6)
RBC # BLD: 4.17 M/UL — LOW (ref 4.2–5.8)
RBC # FLD: 14.1 % — SIGNIFICANT CHANGE UP (ref 10.3–14.5)
SODIUM SERPL-SCNC: 139 MMOL/L — SIGNIFICANT CHANGE UP (ref 135–145)
SP GR SPEC: 1.02 — SIGNIFICANT CHANGE UP (ref 1.01–1.02)
UROBILINOGEN FLD QL: NEGATIVE MG/DL — SIGNIFICANT CHANGE UP
WBC # BLD: 9.42 K/UL — SIGNIFICANT CHANGE UP (ref 3.8–10.5)
WBC # FLD AUTO: 9.42 K/UL — SIGNIFICANT CHANGE UP (ref 3.8–10.5)

## 2020-08-09 PROCEDURE — 83690 ASSAY OF LIPASE: CPT

## 2020-08-09 PROCEDURE — 80053 COMPREHEN METABOLIC PANEL: CPT

## 2020-08-09 PROCEDURE — 81001 URINALYSIS AUTO W/SCOPE: CPT

## 2020-08-09 PROCEDURE — 85610 PROTHROMBIN TIME: CPT

## 2020-08-09 PROCEDURE — 85025 COMPLETE CBC W/AUTO DIFF WBC: CPT

## 2020-08-09 PROCEDURE — 96375 TX/PRO/DX INJ NEW DRUG ADDON: CPT

## 2020-08-09 PROCEDURE — 74176 CT ABD & PELVIS W/O CONTRAST: CPT | Mod: 26

## 2020-08-09 PROCEDURE — 85730 THROMBOPLASTIN TIME PARTIAL: CPT

## 2020-08-09 PROCEDURE — 99285 EMERGENCY DEPT VISIT HI MDM: CPT

## 2020-08-09 PROCEDURE — 99284 EMERGENCY DEPT VISIT MOD MDM: CPT | Mod: 25

## 2020-08-09 PROCEDURE — 87086 URINE CULTURE/COLONY COUNT: CPT

## 2020-08-09 PROCEDURE — 74176 CT ABD & PELVIS W/O CONTRAST: CPT

## 2020-08-09 PROCEDURE — 96374 THER/PROPH/DIAG INJ IV PUSH: CPT

## 2020-08-09 PROCEDURE — 36415 COLL VENOUS BLD VENIPUNCTURE: CPT

## 2020-08-09 RX ORDER — SODIUM CHLORIDE 9 MG/ML
1000 INJECTION INTRAMUSCULAR; INTRAVENOUS; SUBCUTANEOUS ONCE
Refills: 0 | Status: COMPLETED | OUTPATIENT
Start: 2020-08-09 | End: 2020-08-09

## 2020-08-09 RX ORDER — ONDANSETRON 8 MG/1
4 TABLET, FILM COATED ORAL ONCE
Refills: 0 | Status: COMPLETED | OUTPATIENT
Start: 2020-08-09 | End: 2020-08-09

## 2020-08-09 RX ORDER — KETOROLAC TROMETHAMINE 30 MG/ML
15 SYRINGE (ML) INJECTION ONCE
Refills: 0 | Status: DISCONTINUED | OUTPATIENT
Start: 2020-08-09 | End: 2020-08-09

## 2020-08-09 RX ORDER — TRAMADOL HYDROCHLORIDE 50 MG/1
1 TABLET ORAL
Qty: 10 | Refills: 0
Start: 2020-08-09

## 2020-08-09 RX ADMIN — Medication 15 MILLIGRAM(S): at 09:57

## 2020-08-09 RX ADMIN — SODIUM CHLORIDE 1000 MILLILITER(S): 9 INJECTION INTRAMUSCULAR; INTRAVENOUS; SUBCUTANEOUS at 09:57

## 2020-08-09 RX ADMIN — ONDANSETRON 4 MILLIGRAM(S): 8 TABLET, FILM COATED ORAL at 09:57

## 2020-08-09 NOTE — ED ADULT TRIAGE NOTE - CHIEF COMPLAINT QUOTE
pt a/ox3, BIBEMS from home, c/o "waking up at 0400am this morning with acute onset of LLQ pain radiating to flank/back and vomiting". pt reports hx of kidney stones. pt denies pain meds PTA. pt denies CP/sob, dizziness, fever, chills, diarrhea, recent sick contacts.

## 2020-08-09 NOTE — ED PROVIDER NOTE - OBJECTIVE STATEMENT
89 y/o male with PMHx of HTN, HLD, DM, kidney stones, hernia presents to the ED BIBEMS from home c/o LLQ abd pain. Pt reports pain as waking him up at 4am, and has been constant since. Pt endorses +n/v. Pt denies diarrhea. Pt denies Hx of diverticulitis. No other complaints at this time. Allergies: Morphine.

## 2020-08-09 NOTE — ED PROVIDER NOTE - NSFOLLOWUPINSTRUCTIONS_ED_ALL_ED_FT
please follow up with your urologist dr rodgers or dr joyner. return to ed for worsening symptoms at any time

## 2020-08-09 NOTE — ED PROVIDER NOTE - PROGRESS NOTE DETAILS
pt peggy, offered admission but no pain, would like to go home and f/u with his urologist dr rodgers

## 2020-08-09 NOTE — ED PROVIDER NOTE - PATIENT PORTAL LINK FT
You can access the FollowMyHealth Patient Portal offered by [facility] by registering at the following website: [email or web address]. By joining [facility]’s FollowMyHealth portal, you will also be able to view your health information using other applications (apps) compatible with our system.

## 2020-08-09 NOTE — ED PROVIDER NOTE - CARE PROVIDER_API CALL
Zechariah Kaminski  UROLOGY  [address] [place] [address]  Phone: [phone]  Fax: [phone]  Follow Up Time: 1-3 Days

## 2020-08-09 NOTE — ED PROVIDER NOTE - CLINICAL SUMMARY MEDICAL DECISION MAKING FREE TEXT BOX
89 y/o male with acute pain, vomiting, concern for diverticulitis, less likely kidney stone. will obtain ct, blood work, give pain meds and reassess.

## 2020-08-09 NOTE — ED PROVIDER NOTE - CONSTITUTIONAL, MLM
normal... Well appearing, awake, alert, oriented to person, place, time/situation. +elderly male in mild distress

## 2020-08-10 LAB
CULTURE RESULTS: SIGNIFICANT CHANGE UP
SPECIMEN SOURCE: SIGNIFICANT CHANGE UP

## 2020-08-13 ENCOUNTER — INPATIENT (INPATIENT)
Facility: HOSPITAL | Age: 85
LOS: 0 days | Discharge: HOME HEALTH | DRG: 660 | End: 2020-08-14
Attending: INTERNAL MEDICINE | Admitting: FAMILY MEDICINE
Payer: MEDICARE

## 2020-08-13 ENCOUNTER — RESULT REVIEW (OUTPATIENT)
Age: 85
End: 2020-08-13

## 2020-08-13 VITALS — WEIGHT: 199.96 LBS | HEIGHT: 72 IN

## 2020-08-13 DIAGNOSIS — N13.2 HYDRONEPHROSIS WITH RENAL AND URETERAL CALCULOUS OBSTRUCTION: ICD-10-CM

## 2020-08-13 DIAGNOSIS — Z98.890 OTHER SPECIFIED POSTPROCEDURAL STATES: Chronic | ICD-10-CM

## 2020-08-13 DIAGNOSIS — N23 UNSPECIFIED RENAL COLIC: ICD-10-CM

## 2020-08-13 PROBLEM — K46.9 UNSPECIFIED ABDOMINAL HERNIA WITHOUT OBSTRUCTION OR GANGRENE: Chronic | Status: ACTIVE | Noted: 2020-08-09

## 2020-08-13 PROBLEM — N20.0 CALCULUS OF KIDNEY: Chronic | Status: ACTIVE | Noted: 2020-08-09

## 2020-08-13 LAB
APPEARANCE UR: CLEAR — SIGNIFICANT CHANGE UP
APTT BLD: 28.9 SEC — SIGNIFICANT CHANGE UP (ref 27.5–35.5)
BASOPHILS # BLD AUTO: 0.02 K/UL — SIGNIFICANT CHANGE UP (ref 0–0.2)
BASOPHILS NFR BLD AUTO: 0.3 % — SIGNIFICANT CHANGE UP (ref 0–2)
BILIRUB UR-MCNC: NEGATIVE — SIGNIFICANT CHANGE UP
COLOR SPEC: YELLOW — SIGNIFICANT CHANGE UP
DIFF PNL FLD: ABNORMAL
EOSINOPHIL # BLD AUTO: 0.09 K/UL — SIGNIFICANT CHANGE UP (ref 0–0.5)
EOSINOPHIL NFR BLD AUTO: 1.3 % — SIGNIFICANT CHANGE UP (ref 0–6)
GLUCOSE UR QL: 1000 MG/DL
HCT VFR BLD CALC: 36.3 % — LOW (ref 39–50)
HGB BLD-MCNC: 12.2 G/DL — LOW (ref 13–17)
IMM GRANULOCYTES NFR BLD AUTO: 0.4 % — SIGNIFICANT CHANGE UP (ref 0–1.5)
INR BLD: 1.46 RATIO — HIGH (ref 0.88–1.16)
KETONES UR-MCNC: ABNORMAL
LEUKOCYTE ESTERASE UR-ACNC: NEGATIVE — SIGNIFICANT CHANGE UP
LYMPHOCYTES # BLD AUTO: 0.92 K/UL — LOW (ref 1–3.3)
LYMPHOCYTES # BLD AUTO: 13.2 % — SIGNIFICANT CHANGE UP (ref 13–44)
MCHC RBC-ENTMCNC: 33.2 PG — SIGNIFICANT CHANGE UP (ref 27–34)
MCHC RBC-ENTMCNC: 33.6 GM/DL — SIGNIFICANT CHANGE UP (ref 32–36)
MCV RBC AUTO: 98.9 FL — SIGNIFICANT CHANGE UP (ref 80–100)
MONOCYTES # BLD AUTO: 1 K/UL — HIGH (ref 0–0.9)
MONOCYTES NFR BLD AUTO: 14.4 % — HIGH (ref 2–14)
NEUTROPHILS # BLD AUTO: 4.9 K/UL — SIGNIFICANT CHANGE UP (ref 1.8–7.4)
NEUTROPHILS NFR BLD AUTO: 70.4 % — SIGNIFICANT CHANGE UP (ref 43–77)
NITRITE UR-MCNC: NEGATIVE — SIGNIFICANT CHANGE UP
PH UR: 5 — SIGNIFICANT CHANGE UP (ref 5–8)
PLATELET # BLD AUTO: 183 K/UL — SIGNIFICANT CHANGE UP (ref 150–400)
PROT UR-MCNC: 30 MG/DL
PROTHROM AB SERPL-ACNC: 16.8 SEC — HIGH (ref 10.6–13.6)
RBC # BLD: 3.67 M/UL — LOW (ref 4.2–5.8)
RBC # FLD: 14 % — SIGNIFICANT CHANGE UP (ref 10.3–14.5)
SARS-COV-2 RNA SPEC QL NAA+PROBE: SIGNIFICANT CHANGE UP
SP GR SPEC: 1.01 — SIGNIFICANT CHANGE UP (ref 1.01–1.02)
UROBILINOGEN FLD QL: NEGATIVE MG/DL — SIGNIFICANT CHANGE UP
WBC # BLD: 6.96 K/UL — SIGNIFICANT CHANGE UP (ref 3.8–10.5)
WBC # FLD AUTO: 6.96 K/UL — SIGNIFICANT CHANGE UP (ref 3.8–10.5)

## 2020-08-13 PROCEDURE — 72125 CT NECK SPINE W/O DYE: CPT

## 2020-08-13 PROCEDURE — 87040 BLOOD CULTURE FOR BACTERIA: CPT

## 2020-08-13 PROCEDURE — 74176 CT ABD & PELVIS W/O CONTRAST: CPT | Mod: 26

## 2020-08-13 PROCEDURE — 86901 BLOOD TYPING SEROLOGIC RH(D): CPT

## 2020-08-13 PROCEDURE — 87635 SARS-COV-2 COVID-19 AMP PRB: CPT

## 2020-08-13 PROCEDURE — 72125 CT NECK SPINE W/O DYE: CPT | Mod: 26

## 2020-08-13 PROCEDURE — 86769 SARS-COV-2 COVID-19 ANTIBODY: CPT

## 2020-08-13 PROCEDURE — 80048 BASIC METABOLIC PNL TOTAL CA: CPT

## 2020-08-13 PROCEDURE — 36415 COLL VENOUS BLD VENIPUNCTURE: CPT

## 2020-08-13 PROCEDURE — 83036 HEMOGLOBIN GLYCOSYLATED A1C: CPT

## 2020-08-13 PROCEDURE — 73130 X-RAY EXAM OF HAND: CPT | Mod: 26,RT

## 2020-08-13 PROCEDURE — 93010 ELECTROCARDIOGRAM REPORT: CPT

## 2020-08-13 PROCEDURE — 97162 PT EVAL MOD COMPLEX 30 MIN: CPT | Mod: GP

## 2020-08-13 PROCEDURE — 88300 SURGICAL PATH GROSS: CPT | Mod: 26

## 2020-08-13 PROCEDURE — 70450 CT HEAD/BRAIN W/O DYE: CPT | Mod: 26

## 2020-08-13 PROCEDURE — 86850 RBC ANTIBODY SCREEN: CPT

## 2020-08-13 PROCEDURE — 82962 GLUCOSE BLOOD TEST: CPT

## 2020-08-13 PROCEDURE — 70450 CT HEAD/BRAIN W/O DYE: CPT

## 2020-08-13 PROCEDURE — 72131 CT LUMBAR SPINE W/O DYE: CPT

## 2020-08-13 PROCEDURE — C2617: CPT

## 2020-08-13 PROCEDURE — 85730 THROMBOPLASTIN TIME PARTIAL: CPT

## 2020-08-13 PROCEDURE — 76000 FLUOROSCOPY <1 HR PHYS/QHP: CPT

## 2020-08-13 PROCEDURE — 97530 THERAPEUTIC ACTIVITIES: CPT | Mod: GP

## 2020-08-13 PROCEDURE — 72131 CT LUMBAR SPINE W/O DYE: CPT | Mod: 26

## 2020-08-13 PROCEDURE — 73130 X-RAY EXAM OF HAND: CPT | Mod: RT

## 2020-08-13 PROCEDURE — 99222 1ST HOSP IP/OBS MODERATE 55: CPT

## 2020-08-13 PROCEDURE — 93005 ELECTROCARDIOGRAM TRACING: CPT

## 2020-08-13 PROCEDURE — 86900 BLOOD TYPING SEROLOGIC ABO: CPT

## 2020-08-13 PROCEDURE — 85610 PROTHROMBIN TIME: CPT

## 2020-08-13 PROCEDURE — 71045 X-RAY EXAM CHEST 1 VIEW: CPT | Mod: 26

## 2020-08-13 PROCEDURE — 85027 COMPLETE CBC AUTOMATED: CPT

## 2020-08-13 PROCEDURE — 97116 GAIT TRAINING THERAPY: CPT | Mod: GP

## 2020-08-13 RX ORDER — INSULIN LISPRO 100/ML
VIAL (ML) SUBCUTANEOUS
Refills: 0 | Status: DISCONTINUED | OUTPATIENT
Start: 2020-08-13 | End: 2020-08-14

## 2020-08-13 RX ORDER — ACETAMINOPHEN 500 MG
1000 TABLET ORAL ONCE
Refills: 0 | Status: COMPLETED | OUTPATIENT
Start: 2020-08-13 | End: 2020-08-13

## 2020-08-13 RX ORDER — APIXABAN 2.5 MG/1
5 TABLET, FILM COATED ORAL
Refills: 0 | Status: DISCONTINUED | OUTPATIENT
Start: 2020-08-13 | End: 2020-08-14

## 2020-08-13 RX ORDER — ONDANSETRON 8 MG/1
4 TABLET, FILM COATED ORAL ONCE
Refills: 0 | Status: DISCONTINUED | OUTPATIENT
Start: 2020-08-13 | End: 2020-08-14

## 2020-08-13 RX ORDER — DEXTROSE 50 % IN WATER 50 %
12.5 SYRINGE (ML) INTRAVENOUS ONCE
Refills: 0 | Status: DISCONTINUED | OUTPATIENT
Start: 2020-08-13 | End: 2020-08-14

## 2020-08-13 RX ORDER — DEXTROSE 50 % IN WATER 50 %
25 SYRINGE (ML) INTRAVENOUS ONCE
Refills: 0 | Status: DISCONTINUED | OUTPATIENT
Start: 2020-08-13 | End: 2020-08-14

## 2020-08-13 RX ORDER — CEFTRIAXONE 500 MG/1
1000 INJECTION, POWDER, FOR SOLUTION INTRAMUSCULAR; INTRAVENOUS ONCE
Refills: 0 | Status: DISCONTINUED | OUTPATIENT
Start: 2020-08-13 | End: 2020-08-13

## 2020-08-13 RX ORDER — SODIUM CHLORIDE 9 MG/ML
1000 INJECTION, SOLUTION INTRAVENOUS
Refills: 0 | Status: DISCONTINUED | OUTPATIENT
Start: 2020-08-13 | End: 2020-08-14

## 2020-08-13 RX ORDER — OXYCODONE HYDROCHLORIDE 5 MG/1
10 TABLET ORAL ONCE
Refills: 0 | Status: DISCONTINUED | OUTPATIENT
Start: 2020-08-13 | End: 2020-08-13

## 2020-08-13 RX ORDER — INSULIN GLARGINE 100 [IU]/ML
5 INJECTION, SOLUTION SUBCUTANEOUS AT BEDTIME
Refills: 0 | Status: DISCONTINUED | OUTPATIENT
Start: 2020-08-13 | End: 2020-08-14

## 2020-08-13 RX ORDER — SODIUM CHLORIDE 9 MG/ML
1000 INJECTION, SOLUTION INTRAVENOUS
Refills: 0 | Status: DISCONTINUED | OUTPATIENT
Start: 2020-08-13 | End: 2020-08-13

## 2020-08-13 RX ORDER — GLUCAGON INJECTION, SOLUTION 0.5 MG/.1ML
1 INJECTION, SOLUTION SUBCUTANEOUS ONCE
Refills: 0 | Status: DISCONTINUED | OUTPATIENT
Start: 2020-08-13 | End: 2020-08-14

## 2020-08-13 RX ORDER — ONDANSETRON 8 MG/1
4 TABLET, FILM COATED ORAL ONCE
Refills: 0 | Status: DISCONTINUED | OUTPATIENT
Start: 2020-08-13 | End: 2020-08-13

## 2020-08-13 RX ORDER — PANTOPRAZOLE SODIUM 20 MG/1
40 TABLET, DELAYED RELEASE ORAL DAILY
Refills: 0 | Status: DISCONTINUED | OUTPATIENT
Start: 2020-08-13 | End: 2020-08-14

## 2020-08-13 RX ORDER — ACETAMINOPHEN 500 MG
650 TABLET ORAL ONCE
Refills: 0 | Status: COMPLETED | OUTPATIENT
Start: 2020-08-13 | End: 2020-08-14

## 2020-08-13 RX ORDER — METOPROLOL TARTRATE 50 MG
25 TABLET ORAL DAILY
Refills: 0 | Status: DISCONTINUED | OUTPATIENT
Start: 2020-08-13 | End: 2020-08-14

## 2020-08-13 RX ORDER — TAMSULOSIN HYDROCHLORIDE 0.4 MG/1
0.4 CAPSULE ORAL AT BEDTIME
Refills: 0 | Status: DISCONTINUED | OUTPATIENT
Start: 2020-08-13 | End: 2020-08-14

## 2020-08-13 RX ORDER — DEXTROSE 50 % IN WATER 50 %
15 SYRINGE (ML) INTRAVENOUS ONCE
Refills: 0 | Status: DISCONTINUED | OUTPATIENT
Start: 2020-08-13 | End: 2020-08-14

## 2020-08-13 RX ORDER — FENTANYL CITRATE 50 UG/ML
50 INJECTION INTRAVENOUS
Refills: 0 | Status: DISCONTINUED | OUTPATIENT
Start: 2020-08-13 | End: 2020-08-13

## 2020-08-13 RX ORDER — LOPERAMIDE HCL 2 MG
2 TABLET ORAL EVERY 4 HOURS
Refills: 0 | Status: DISCONTINUED | OUTPATIENT
Start: 2020-08-13 | End: 2020-08-14

## 2020-08-13 RX ORDER — CEFTRIAXONE 500 MG/1
1000 INJECTION, POWDER, FOR SOLUTION INTRAMUSCULAR; INTRAVENOUS ONCE
Refills: 0 | Status: COMPLETED | OUTPATIENT
Start: 2020-08-13 | End: 2020-08-13

## 2020-08-13 RX ORDER — FINASTERIDE 5 MG/1
5 TABLET, FILM COATED ORAL DAILY
Refills: 0 | Status: DISCONTINUED | OUTPATIENT
Start: 2020-08-13 | End: 2020-08-14

## 2020-08-13 RX ADMIN — Medication 1000 MILLIGRAM(S): at 21:24

## 2020-08-13 RX ADMIN — FENTANYL CITRATE 50 MICROGRAM(S): 50 INJECTION INTRAVENOUS at 19:15

## 2020-08-13 RX ADMIN — APIXABAN 5 MILLIGRAM(S): 2.5 TABLET, FILM COATED ORAL at 21:09

## 2020-08-13 RX ADMIN — CEFTRIAXONE 1000 MILLIGRAM(S): 500 INJECTION, POWDER, FOR SOLUTION INTRAMUSCULAR; INTRAVENOUS at 17:24

## 2020-08-13 RX ADMIN — FINASTERIDE 5 MILLIGRAM(S): 5 TABLET, FILM COATED ORAL at 20:04

## 2020-08-13 RX ADMIN — Medication 400 MILLIGRAM(S): at 21:09

## 2020-08-13 RX ADMIN — TAMSULOSIN HYDROCHLORIDE 0.4 MILLIGRAM(S): 0.4 CAPSULE ORAL at 21:09

## 2020-08-13 RX ADMIN — Medication 25 MILLIGRAM(S): at 20:04

## 2020-08-13 RX ADMIN — PANTOPRAZOLE SODIUM 40 MILLIGRAM(S): 20 TABLET, DELAYED RELEASE ORAL at 20:03

## 2020-08-13 NOTE — ASU DISCHARGE PLAN (ADULT/PEDIATRIC) - CARE PROVIDER_API CALL
Natanael Oglesby)  Urology  [address] [place] [address]  Phone: [phone]  Fax: [phone]  Follow Up Time:

## 2020-08-13 NOTE — BRIEF OPERATIVE NOTE - NSICDXBRIEFPREOP_GEN_ALL_CORE_FT
PRE-OP DIAGNOSIS:  Bladder stones 13-Aug-2020 19:01:11  Natanael Oglesby  Ureteral stone 13-Aug-2020 19:00:55  Natanael Oglesby

## 2020-08-13 NOTE — PROGRESS NOTE ADULT - PROBLEM SELECTOR PLAN 1
For cysto and placement of left JJ stent for obstructing left ureteral stones causing pt significant pain, as well as inability to eat and drink.

## 2020-08-13 NOTE — H&P ADULT - NEUROLOGICAL DETAILS
sensation intact/cranial nerves intact/alert and oriented x 3/normal strength/responds to pain/responds to verbal commands/deep reflexes intact

## 2020-08-13 NOTE — ED ADULT NURSE REASSESSMENT NOTE - NS ED NURSE REASSESS COMMENT FT1
patient hard stick, unable to obtain blood work, phlebotomy called to draw labs and blood. awaiting phlebotomy. patient awaiting blood culture draw to receive IV antibiotics. patient safety maintained. patient instructed to call using the tcall bell before getting out of bed. will continue to monitor.

## 2020-08-13 NOTE — BRIEF OPERATIVE NOTE - NSICDXBRIEFPOSTOP_GEN_ALL_CORE_FT
POST-OP DIAGNOSIS:  Ureteral stone 13-Aug-2020 19:01:38  Natanael Oglesby  Bladder stones 13-Aug-2020 19:01:27  Natanael Oglesby

## 2020-08-13 NOTE — H&P ADULT - ASSESSMENT
89 yo Male presented with Left groin pain.    A/P:    1.  Left Ureteric stone  Left Hydronephrosis  -s/p Cystoscope. s/p Left Ureter stent  -follow Urology consult  -continue Flomax and Finasteride    2.  Fall  -keep on fall precaution  -follow PT eval    3.  Pleural effusion  -follow Pulmonary consult    4.  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  DM  -on ISS  -follow Dxt    6.  GERD  -on PPI    7.  DVT ppx: on Eliquis    8.  Code status: Full code. 87 yo Male presented with Left groin pain.    A/P:    1.  Left Ureteric stone  Left Hydronephrosis  -s/p Cystoscope. s/p Left Ureter stent  -follow Urology consult  -continue Flomax and Finasteride    2.  ANJANA on CKD stage 3  -hold losartan  -on IVF  -follow Cr    3.  Fall  -keep on fall precaution  -follow PT eval    4.  Pleural effusion  -follow Pulmonary consult    5.  h/o Thromboembolism  -on Eliquis    6.  DM  -on ISS  -follow Dxt    7.  GERD  -on PPI    8.  DVT ppx: on Eliquis    9.  Code status: Full code.

## 2020-08-13 NOTE — H&P ADULT - NSICDXPASTMEDICALHX_GEN_ALL_CORE_FT
PAST MEDICAL HISTORY:  DM (diabetes mellitus)     Hernia     HLD (hyperlipidemia)     HTN (hypertension)     Kidney stone

## 2020-08-13 NOTE — ED ADULT NURSE NOTE - NSIMPLEMENTINTERV_GEN_ALL_ED
Implemented All Universal Safety Interventions:  [place] to call system. Call bell, personal items and telephone within reach. Instruct patient to call for assistance. Room bathroom lighting operational. Non-slip footwear when patient is off stretcher. Physically safe environment: no spills, clutter or unnecessary equipment. Stretcher in lowest position, wheels locked, appropriate side rails in place. Implemented All Fall Risk Interventions:  [place] to call system. Call bell, personal items and telephone within reach. Instruct patient to call for assistance. Room bathroom lighting operational. Non-slip footwear when patient is off stretcher. Physically safe environment: no spills, clutter or unnecessary equipment. Stretcher in lowest position, wheels locked, appropriate side rails in place. Provide visual cue, wrist band, yellow gown, etc. Monitor gait and stability. Monitor for mental status changes and reorient to person, place, and time. Review medications for side effects contributing to fall risk. Reinforce activity limits and safety measures with patient and family. Implemented All Fall with Harm Risk Interventions:  [place] to call system. Call bell, personal items and telephone within reach. Instruct patient to call for assistance. Room bathroom lighting operational. Non-slip footwear when patient is off stretcher. Physically safe environment: no spills, clutter or unnecessary equipment. Stretcher in lowest position, wheels locked, appropriate side rails in place. Provide visual cue, wrist band, yellow gown, etc. Monitor gait and stability. Monitor for mental status changes and reorient to person, place, and time. Review medications for side effects contributing to fall risk. Reinforce activity limits and safety measures with patient and family. Provide visual clues: red socks.

## 2020-08-13 NOTE — PATIENT PROFILE ADULT - VISION (WITH CORRECTIVE LENSES IF THE PATIENT USUALLY WEARS THEM):
Partially impaired: cannot see medication labels or newsprint, but can see obstacles in path, and the surrounding layout; can count fingers at arm's length/1 pair of glasses

## 2020-08-13 NOTE — PROGRESS NOTE ADULT - SUBJECTIVE AND OBJECTIVE BOX
CHIEF COMPLAINT: 88 M with multiple left sided stones including large bladder stones. Pt seen ER 2 days ago and was seen in follow up in office. Pt with continued flank pain and inability to eat.  Therefore is admitted for cysto and placement of left JJ for obstructing left ureteral stones. Stones  were not seen clearly on outpatient KUB, therefore may be comprised of uric acid.    HISTORY OF PRESENT ILLNESS: No reported prior hisotry of stones. Pt seen by cardiologist Dr. Granger this am regarding medical clearance.    PAST MEDICAL & SURGICAL HISTORY:  Hernia  Kidney stone  HLD (hyperlipidemia)  HTN (hypertension)  DM (diabetes mellitus)  H/O left inguinal hernia repair      REVIEW OF SYSTEMS:Same previous  MEDICATIONS  (STANDING):    MEDICATIONS  (PRN):      PE: Left cvat and suprapubic discomfort    Allergies    morphine (Other)    Intolerances        SOCIAL HISTORY:Same previous    FAMILY HISTORY:  No pertinent family history in first degree relatives      Vital Signs Last 24 Hrs  T(C): 36.7 (13 Aug 2020 11:10), Max: 36.7 (13 Aug 2020 11:10)  T(F): 98 (13 Aug 2020 11:10), Max: 98 (13 Aug 2020 11:10)  HR: 68 (13 Aug 2020 11:10) (68 - 68)  BP: 127/58 (13 Aug 2020 11:10) (127/58 - 127/58)  BP(mean): --  RR: 17 (13 Aug 2020 11:10) (17 - 17)  SpO2: 98% (13 Aug 2020 11:10) (98% - 98%)    PHYSICAL EXAM:Same previous    LABS:                        12.2   6.96  )-----------( 183      ( 13 Aug 2020 11:55 )             36.3             Urinalysis Basic - ( 13 Aug 2020 11:55 )    Color: Yellow / Appearance: Clear / S.015 / pH: x  Gluc: x / Ketone: Trace  / Bili: Negative / Urobili: Negative mg/dL   Blood: x / Protein: 30 mg/dL / Nitrite: Negative   Leuk Esterase: Negative / RBC: 11-25 /HPF / WBC 3-5   Sq Epi: x / Non Sq Epi: x / Bacteria: Few      Urine Culture:     RADIOLOGY & ADDITIONAL STUDIES:

## 2020-08-13 NOTE — H&P ADULT - NSHPPHYSICALEXAM_GEN_ALL_CORE
Vital Signs Last 24 Hrs  T(C): 36.7 (13 Aug 2020 23:52), Max: 36.8 (13 Aug 2020 22:04)  T(F): 98.1 (13 Aug 2020 23:52), Max: 98.2 (13 Aug 2020 22:04)  HR: 60 (13 Aug 2020 23:52) (60 - 77)  BP: 150/53 (13 Aug 2020 23:52) (127/58 - 172/64)  BP(mean): 93 (13 Aug 2020 16:25) (93 - 93)  RR: 18 (13 Aug 2020 23:52) (14 - 20)  SpO2: 96% (13 Aug 2020 23:52) (93% - 99%)

## 2020-08-13 NOTE — ED STATDOCS - ATTENDING CONTRIBUTION TO CARE
I, Yvan Dela Cruz, performed the initial face to face bedside interview with this patient regarding history of present illness, review of symptoms and relevant past medical, social and family history.  I completed an independent physical examination.  I was the initial provider who evaluated this patient. I have signed out the follow up of any pending tests (i.e. labs, radiological studies) to the ACP.  I have communicated the patient’s plan of care and disposition with the ACP.  The history, relevant review of systems, past medical and surgical history, medical decision making, and physical examination was documented by the scribe in my presence and I attest to the accuracy of the documentation.

## 2020-08-13 NOTE — H&P ADULT - HISTORY OF PRESENT ILLNESS
88 YOM with pmhx of kidney stones, hernia, HLD, HTN, DM presents to the ED c/o groin pain. Pt states that he is currently not in any pain. The last time he's been in pain was this morning. States that the pain has been gradual been increasing. Endorses constant urination. +vomiting last night x1 Pt states that he has decreased appetite and decreased bowel movements. +back pain. Pt was seen in  on 8/9,. Mild to moderate hydronephrosis on CT. and saw . Had an XR done, found 8mm kidney stones. 87 yo Male with pmhx of kidney stones, hernia, HLD, HTN, DM presents to the ED with complain of groin pain. The last time he's been in pain was this morning. States that the pain has been gradual been increasing. Endorses constant urination. He also had +vomiting last night x1 Patient states that he has decreased appetite and decreased bowel movements due to pain. Patient was seen in  on 8/9 for kidney stone also. He was found to have Mild to moderate hydronephrosis on CT outside of the . and saw . Had an XR done, found 8mm kidney stones. He was sent to hospital for further management. He also had a fall at home yesterday. But he had no LOC. No new extremity weakness.

## 2020-08-13 NOTE — ED STATDOCS - CARE PLAN
Principal Discharge DX:	Ureteral stone with hydronephrosis  Secondary Diagnosis:	Fall, initial encounter  Secondary Diagnosis:	Traumatic injury of head, initial encounter  Secondary Diagnosis:	Low back pain

## 2020-08-13 NOTE — ED STATDOCS - OBJECTIVE STATEMENT
OM with pmhx of kidney stones, hernia, HLD, HTN, DM presents to the ED c/o groin pain. Pt states that he is currently not in any pain. The last time he's been in pain was this morning. States that the pain has been gradual been increasing. Endorses constant urination. +vomiting last night x1 Pt states that he has decreased appetite and decreased bowel movements. +back pain. Pt was seen in  on 8/9,. Mild to moderate hydronephrosis on CT. and saw . Had an XR done, found 8mm kidney stones. 88 YOM with pmhx of kidney stones, hernia, HLD, HTN, DM presents to the ED c/o groin pain. Pt states that he is currently not in any pain. The last time he's been in pain was this morning. States that the pain has been gradual been increasing. Endorses constant urination. +vomiting last night x1 Pt states that he has decreased appetite and decreased bowel movements. +back pain. Pt was seen in  on 8/9,. Mild to moderate hydronephrosis on CT. and saw . Had an XR done, found 8mm kidney stones.

## 2020-08-13 NOTE — ED ADULT NURSE REASSESSMENT NOTE - NS ED NURSE REASSESS COMMENT FT1
patient's belongings brought to security. patient changed into gown and socks. patient stable, in no acute distress. OR transport at bedside to take patient upstairs.

## 2020-08-14 ENCOUNTER — TRANSCRIPTION ENCOUNTER (OUTPATIENT)
Age: 85
End: 2020-08-14

## 2020-08-14 VITALS
OXYGEN SATURATION: 95 % | DIASTOLIC BLOOD PRESSURE: 59 MMHG | TEMPERATURE: 98 F | SYSTOLIC BLOOD PRESSURE: 142 MMHG | HEART RATE: 75 BPM | RESPIRATION RATE: 18 BRPM

## 2020-08-14 LAB
A1C WITH ESTIMATED AVERAGE GLUCOSE RESULT: 8.5 % — HIGH (ref 4–5.6)
ANION GAP SERPL CALC-SCNC: 9 MMOL/L — SIGNIFICANT CHANGE UP (ref 5–17)
BUN SERPL-MCNC: 21 MG/DL — SIGNIFICANT CHANGE UP (ref 7–23)
CALCIUM SERPL-MCNC: 8.3 MG/DL — LOW (ref 8.5–10.1)
CHLORIDE SERPL-SCNC: 109 MMOL/L — HIGH (ref 96–108)
CO2 SERPL-SCNC: 23 MMOL/L — SIGNIFICANT CHANGE UP (ref 22–31)
CREAT SERPL-MCNC: 1.36 MG/DL — HIGH (ref 0.5–1.3)
CULTURE RESULTS: SIGNIFICANT CHANGE UP
ESTIMATED AVERAGE GLUCOSE: 197 MG/DL — HIGH (ref 68–114)
GLUCOSE SERPL-MCNC: 156 MG/DL — HIGH (ref 70–99)
HCT VFR BLD CALC: 32.6 % — LOW (ref 39–50)
HGB BLD-MCNC: 10.9 G/DL — LOW (ref 13–17)
MCHC RBC-ENTMCNC: 32.6 PG — SIGNIFICANT CHANGE UP (ref 27–34)
MCHC RBC-ENTMCNC: 33.4 GM/DL — SIGNIFICANT CHANGE UP (ref 32–36)
MCV RBC AUTO: 97.6 FL — SIGNIFICANT CHANGE UP (ref 80–100)
PLATELET # BLD AUTO: 165 K/UL — SIGNIFICANT CHANGE UP (ref 150–400)
POTASSIUM SERPL-MCNC: 3.9 MMOL/L — SIGNIFICANT CHANGE UP (ref 3.5–5.3)
POTASSIUM SERPL-SCNC: 3.9 MMOL/L — SIGNIFICANT CHANGE UP (ref 3.5–5.3)
RBC # BLD: 3.34 M/UL — LOW (ref 4.2–5.8)
RBC # FLD: 13.7 % — SIGNIFICANT CHANGE UP (ref 10.3–14.5)
SARS-COV-2 IGG SERPL QL IA: NEGATIVE — SIGNIFICANT CHANGE UP
SARS-COV-2 IGM SERPL IA-ACNC: <3.8 AU/ML — SIGNIFICANT CHANGE UP
SODIUM SERPL-SCNC: 141 MMOL/L — SIGNIFICANT CHANGE UP (ref 135–145)
SPECIMEN SOURCE: SIGNIFICANT CHANGE UP
WBC # BLD: 5.46 K/UL — SIGNIFICANT CHANGE UP (ref 3.8–10.5)
WBC # FLD AUTO: 5.46 K/UL — SIGNIFICANT CHANGE UP (ref 3.8–10.5)

## 2020-08-14 PROCEDURE — 99239 HOSP IP/OBS DSCHRG MGMT >30: CPT

## 2020-08-14 RX ORDER — LOPERAMIDE HCL 2 MG
1 TABLET ORAL
Qty: 0 | Refills: 0 | DISCHARGE

## 2020-08-14 RX ORDER — TRAMADOL HYDROCHLORIDE 50 MG/1
50 TABLET ORAL EVERY 6 HOURS
Refills: 0 | Status: DISCONTINUED | OUTPATIENT
Start: 2020-08-14 | End: 2020-08-14

## 2020-08-14 RX ORDER — LANOLIN ALCOHOL/MO/W.PET/CERES
5 CREAM (GRAM) TOPICAL ONCE
Refills: 0 | Status: COMPLETED | OUTPATIENT
Start: 2020-08-14 | End: 2020-08-14

## 2020-08-14 RX ORDER — DICLOFENAC SODIUM 30 MG/G
1 GEL TOPICAL
Qty: 0 | Refills: 0 | DISCHARGE

## 2020-08-14 RX ORDER — TRAMADOL HYDROCHLORIDE 50 MG/1
1 TABLET ORAL
Qty: 20 | Refills: 0
Start: 2020-08-14 | End: 2020-08-18

## 2020-08-14 RX ADMIN — INSULIN GLARGINE 5 UNIT(S): 100 INJECTION, SOLUTION SUBCUTANEOUS at 00:21

## 2020-08-14 RX ADMIN — TRAMADOL HYDROCHLORIDE 50 MILLIGRAM(S): 50 TABLET ORAL at 11:15

## 2020-08-14 RX ADMIN — APIXABAN 5 MILLIGRAM(S): 2.5 TABLET, FILM COATED ORAL at 10:28

## 2020-08-14 RX ADMIN — Medication 5 MILLIGRAM(S): at 01:52

## 2020-08-14 RX ADMIN — Medication 650 MILLIGRAM(S): at 08:26

## 2020-08-14 RX ADMIN — Medication 2: at 12:39

## 2020-08-14 RX ADMIN — PANTOPRAZOLE SODIUM 40 MILLIGRAM(S): 20 TABLET, DELAYED RELEASE ORAL at 10:28

## 2020-08-14 RX ADMIN — Medication 25 MILLIGRAM(S): at 10:28

## 2020-08-14 RX ADMIN — Medication 1: at 08:25

## 2020-08-14 RX ADMIN — TRAMADOL HYDROCHLORIDE 50 MILLIGRAM(S): 50 TABLET ORAL at 10:29

## 2020-08-14 RX ADMIN — Medication 650 MILLIGRAM(S): at 09:10

## 2020-08-14 RX ADMIN — FINASTERIDE 5 MILLIGRAM(S): 5 TABLET, FILM COATED ORAL at 10:27

## 2020-08-14 NOTE — PROGRESS NOTE ADULT - SUBJECTIVE AND OBJECTIVE BOX
PCP    Patient is a 88y old  Male who presents with a chief complaint of complain of abdominal pain (13 Aug 2020 23:26)        HPI:  89 yo Male with pmhx of kidney stones, hernia, HLD, HTN, DM presents to the ED with complain of groin pain. The last time he's been in pain was this morning. States that the pain has been gradual been increasing. Endorses constant urination. He also had +vomiting last night x1 Patient states that he has decreased appetite and decreased bowel movements due to pain. Patient was seen in  on  for kidney stone also. He was found to have Mild to moderate hydronephrosis on CT outside of the . and saw . Had an XR done, found 8mm kidney stones. He was sent to hospital for further management. He also had a fall at home yesterday. But he had no LOC. No new extremity weakness. (13 Aug 2020 23:26)      Review of system- Rest of the review of system are normal excpet mentioned in HPI    SUBJECTIVE & OBJECTIVE:  No new complaint    T(C): 36.6 (20 @ 08:58), Max: 36.8 (20 @ 22:04)  HR: 75 (20 @ 08:58) (60 - 77)  BP: 142/59 (20 @ 08:58) (127/58 - 172/64)  RR: 18 (20 @ 08:58) (14 - 20)  SpO2: 95% (20 @ 08:58) (93% - 99%)  Wt(kg): --          PHYSICAL EXAM:    GENERAL: NAD, well-groomed, well-developed  HEAD:  Atraumatic, Normocephalic  EYES: EOMI, PERRLA, conjunctiva and sclera clear  ENMT: Moist mucous membranes  NECK: Supple, No JVD  NERVOUS SYSTEM:  Alert & Oriented X3, Motor Strength 5/5 B/L upper and lower extremities; DTRs 2+ intact and symmetric  CHEST/LUNG: Clear to auscultation bilaterally; No rales, rhonchi, wheezing, or rubs  HEART: Regular rate and rhythm; No murmurs, rubs, or gallops  ABDOMEN: Soft, Nontender, Nondistended; Bowel sounds present  GENITOURINARY- Voiding, no palpable bladder  EXTREMITIES:  2+ Peripheral Pulses, No clubbing, cyanosis, or edema  MUSCULOSKELTAL- No muscle tenderness, Muscle tone normal, No joint tenderness, no Joint swelling, Joint range of motion-normal  SKIN-no rash, no lesion                    LABS:                        10.9   5.46  )-----------( 165      ( 14 Aug 2020 08:04 )             32.6     08-14    141  |  109<H>  |  21  ----------------------------<  156<H>  3.9   |  23  |  1.36<H>    Ca    8.3<L>      14 Aug 2020 08:04    TPro  7.1  /  Alb  2.7<L>  /  TBili  0.5  /  DBili  x   /  AST  9<L>  /  ALT  13  /  AlkPhos  67  08-13    PT/INR - ( 13 Aug 2020 17:10 )   PT: 16.8 sec;   INR: 1.46 ratio         PTT - ( 13 Aug 2020 17:10 )  PTT:28.9 sec  Urinalysis Basic - ( 13 Aug 2020 11:55 )    Color: Yellow / Appearance: Clear / S.015 / pH: x  Gluc: x / Ketone: Trace  / Bili: Negative / Urobili: Negative mg/dL   Blood: x / Protein: 30 mg/dL / Nitrite: Negative   Leuk Esterase: Negative / RBC: 11-25 /HPF / WBC 3-5   Sq Epi: x / Non Sq Epi: x / Bacteria: Few        CAPILLARY BLOOD GLUCOSE      POCT Blood Glucose.: 167 mg/dL (14 Aug 2020 07:51)  POCT Blood Glucose.: 168 mg/dL (14 Aug 2020 00:20)      CAPILLARY BLOOD GLUCOSE      POCT Blood Glucose.: 167 mg/dL (14 Aug 2020 07:51)  POCT Blood Glucose.: 168 mg/dL (14 Aug 2020 00:20)    CAPILLARY BLOOD GLUCOSE      POCT Blood Glucose.: 167 mg/dL (14 Aug 2020 07:51)            RECENT CULTURES:      RADIOLOGY & ADDITIONAL TESTS:        apixaban 5 milliGRAM(s) Oral two times a day  dextrose 40% Gel 15 Gram(s) Oral once PRN  dextrose 5%. 1000 milliLiter(s) IV Continuous <Continuous>  dextrose 50% Injectable 12.5 Gram(s) IV Push once  dextrose 50% Injectable 25 Gram(s) IV Push once  dextrose 50% Injectable 25 Gram(s) IV Push once  finasteride 5 milliGRAM(s) Oral daily  glucagon  Injectable 1 milliGRAM(s) IntraMuscular once PRN  insulin glargine Injectable (LANTUS) 5 Unit(s) SubCutaneous at bedtime  insulin lispro (HumaLOG) corrective regimen sliding scale   SubCutaneous three times a day before meals  loperamide 2 milliGRAM(s) Oral every 4 hours PRN  metoprolol succinate ER 25 milliGRAM(s) Oral daily  ondansetron Injectable 4 milliGRAM(s) IV Push once PRN  pantoprazole    Tablet 40 milliGRAM(s) Oral daily  tamsulosin 0.4 milliGRAM(s) Oral at bedtime  traMADol 50 milliGRAM(s) Oral every 6 hours PRN PCP    Patient is a 88y old  Male who presents with a chief complaint of complain of abdominal pain (13 Aug 2020 23:26)        HPI:  87 yo Male with pmhx of kidney stones, hernia, HLD, HTN, DM presents to the ED with complain of groin pain. The last time he's been in pain was this morning. States that the pain has been gradual been increasing. Endorses constant urination. He also had +vomiting last night x1 Patient states that he has decreased appetite and decreased bowel movements due to pain. Patient was seen in  on  for kidney stone also. He was found to have Mild to moderate hydronephrosis on CT outside of the . and saw . Had an XR done, found 8mm kidney stones. He was sent to hospital for further management. He also had a fall at home yesterday. But he had no LOC. No new extremity weakness. (13 Aug 2020 23:26)      Review of system- Rest of the review of system are normal excpet mentioned in HPI    SUBJECTIVE & OBJECTIVE:  No new complaint    T(C): 36.6 (20 @ 08:58), Max: 36.8 (20 @ 22:04)  HR: 75 (20 @ 08:58) (60 - 77)  BP: 142/59 (20 @ 08:58) (127/58 - 172/64)  RR: 18 (20 @ 08:58) (14 - 20)  SpO2: 95% (20 @ 08:58) (93% - 99%)  Wt(kg): --          PHYSICAL EXAM:    GENERAL: NAD, well-groomed, well-developed  HEAD:  Atraumatic, Normocephalic  EYES: EOMI, PERRLA, conjunctiva and sclera clear  ENMT: Moist mucous membranes  NECK: Supple, No JVD  NERVOUS SYSTEM:  Alert & Oriented X3, Motor Strength 5/5 B/L upper and lower extremities; DTRs 2+ intact and symmetric  CHEST/LUNG: Clear to auscultation bilaterally; No rales, rhonchi, wheezing, or rubs  HEART: Regular rate and rhythm; No murmurs, rubs, or gallops  ABDOMEN: Soft, Nontender, Nondistended; Bowel sounds present  GENITOURINARY- Voiding, no palpable bladder  EXTREMITIES:  2+ Peripheral Pulses, No clubbing, cyanosis, or edema  MUSCULOSKELTAL- No muscle tenderness, Muscle tone normal, No joint tenderness, no Joint swelling, Joint range of motion-normal  SKIN-no rash, no lesion                    LABS:                        10.9   5.46  )-----------( 165      ( 14 Aug 2020 08:04 )             32.6     08-14    141  |  109<H>  |  21  ----------------------------<  156<H>  3.9   |  23  |  1.36<H>    Ca    8.3<L>      14 Aug 2020 08:04    TPro  7.1  /  Alb  2.7<L>  /  TBili  0.5  /  DBili  x   /  AST  9<L>  /  ALT  13  /  AlkPhos  67  08-13    PT/INR - ( 13 Aug 2020 17:10 )   PT: 16.8 sec;   INR: 1.46 ratio         PTT - ( 13 Aug 2020 17:10 )  PTT:28.9 sec  Urinalysis Basic - ( 13 Aug 2020 11:55 )    Color: Yellow / Appearance: Clear / S.015 / pH: x  Gluc: x / Ketone: Trace  / Bili: Negative / Urobili: Negative mg/dL   Blood: x / Protein: 30 mg/dL / Nitrite: Negative   Leuk Esterase: Negative / RBC: 11-25 /HPF / WBC 3-5   Sq Epi: x / Non Sq Epi: x / Bacteria: Few        CAPILLARY BLOOD GLUCOSE      POCT Blood Glucose.: 167 mg/dL (14 Aug 2020 07:51)  POCT Blood Glucose.: 168 mg/dL (14 Aug 2020 00:20)      CAPILLARY BLOOD GLUCOSE      POCT Blood Glucose.: 167 mg/dL (14 Aug 2020 07:51)  POCT Blood Glucose.: 168 mg/dL (14 Aug 2020 00:20)    CAPILLARY BLOOD GLUCOSE      POCT Blood Glucose.: 167 mg/dL (14 Aug 2020 07:51)            RECENT CULTURES:      RADIOLOGY & ADDITIONAL TESTS:  EXAM:  CT CERVICAL SPINE                          EXAM:  CT BRAIN                            IMPRESSION:    CT head: No acute intracranial hemorrhage or mass effect.    CT cervical spine:  1.  No evidence for acute displaced fracture or malalignment.  2.  2 cm left thyroid nodule.  3.  Left pleural effusion.            apixaban 5 milliGRAM(s) Oral two times a day  dextrose 40% Gel 15 Gram(s) Oral once PRN  dextrose 5%. 1000 milliLiter(s) IV Continuous <Continuous>  dextrose 50% Injectable 12.5 Gram(s) IV Push once  dextrose 50% Injectable 25 Gram(s) IV Push once  dextrose 50% Injectable 25 Gram(s) IV Push once  finasteride 5 milliGRAM(s) Oral daily  glucagon  Injectable 1 milliGRAM(s) IntraMuscular once PRN  insulin glargine Injectable (LANTUS) 5 Unit(s) SubCutaneous at bedtime  insulin lispro (HumaLOG) corrective regimen sliding scale   SubCutaneous three times a day before meals  loperamide 2 milliGRAM(s) Oral every 4 hours PRN  metoprolol succinate ER 25 milliGRAM(s) Oral daily  ondansetron Injectable 4 milliGRAM(s) IV Push once PRN  pantoprazole    Tablet 40 milliGRAM(s) Oral daily  tamsulosin 0.4 milliGRAM(s) Oral at bedtime  traMADol 50 milliGRAM(s) Oral every 6 hours PRN      87 yo Male presented with Left groin pain.    A/P:    1.  Left Ureteric stone  Left Hydronephrosis  -s/p Cystoscope. s/p Left Ureter stent  -follow Urology consult  -continue Flomax and Finasteride    2.  ANJANA on CKD stage 3  -obstructive  CR improving 1.36 now  ok to resume Losartan as out pt  -on IVF      3. urinary retention   str cathed x 2  place almaraz  f/u with URO within 1 week        Fall  -keep on fall precaution  -follow PT eval: appreciated: ambs with rolling walker  recommends home PT    4.  Pleural effusion  -follow Pulmonary consult as out pt    5.  afib  -on Eliquis    6.  DM  -on ISS      7.  GERD  -on PPI    8.  DVT ppx: on Eliquis    9.  Code status: Full code.     Dispo: home with [facility] for Nursing and home PT c/c: abd pain    HPI:  87 yo Male with pmhx of kidney stones, hernia, HLD, HTN, DM presented to ED with groin pain. He also had a fall prior to admission.  He was admitted with symptomatic left ureteral stone/hydro.   Underwent cysto/stent placement with Dr. Oglesby. Was admitted to hospitalist service. Post procedure was unable to void. Eventually had a almaraz catheter placed. HE was also incidentally found to have small left pleural effusion. Seen by pulmonary and recommended outpt f/u.   He was seen by physical therapy and will be discharged home with home care. He will be moving to [facility] in sept.     : pt seen and examined this am. C/o back pain d/t fall. No sob/chest pain. Was able to ambulate. Unable to void fully on his own.     ROS: all 10 systems reviewed and is as above otherwise negative.     VITALS  T(C): 36.6 (20 @ 08:58), Max: 36.8 (20 @ 22:04)  HR: 75 (20 @ 08:58) (60 - 77)  BP: 142/59 (20 @ 08:58) (127/58 - 172/64)  RR: 18 (20 @ 08:58) (14 - 20)  SpO2: 95% (20 @ 08:58) (93% - 99%)    PHYSICAL EXAM:  GENERAL: NAD, well-groomed, well-developed  HEAD:  Atraumatic, Normocephalic  EYES: EOMI, PERRLA, conjunctiva and sclera clear  ENMT: Moist mucous membranes  NECK: Supple, No JVD  NERVOUS SYSTEM:  Alert & Oriented X3, non focal.   CHEST/LUNG: Clear to auscultation bilaterally  HEART: Regular rate and rhythm;   ABDOMEN: Soft, Nontender, Nondistended; Bowel sounds present  GENITOURINARY-+bladder fullness.  EXTREMITIES:  2+ Peripheral Pulses, No clubbing, cyanosis, or edema  MUSCULOSKELETAL- No muscle tenderness, Muscle tone normal, No joint tenderness, no Joint swelling, Joint range of motion-normal  SKIN-no rash, no lesion        LABS:                        10.9   5.46  )-----------( 165      ( 14 Aug 2020 08:04 )             32.6     08-14    141  |  109<H>  |  21  ----------------------------<  156<H>  3.9   |  23  |  1.36<H>    Ca    8.3<L>      14 Aug 2020 08:04    TPro  7.1  /  Alb  2.7<L>  /  TBili  0.5  /  DBili  x   /  AST  9<L>  /  ALT  13  /  AlkPhos  67  08-13    PT/INR - ( 13 Aug 2020 17:10 )   PT: 16.8 sec;   INR: 1.46 ratio         PTT - ( 13 Aug 2020 17:10 )  PTT:28.9 sec  Urinalysis Basic - ( 13 Aug 2020 11:55 )    Color: Yellow / Appearance: Clear / S.015 / pH: x  Gluc: x / Ketone: Trace  / Bili: Negative / Urobili: Negative mg/dL   Blood: x / Protein: 30 mg/dL / Nitrite: Negative   Leuk Esterase: Negative / RBC: 11-25 /HPF / WBC 3-5   Sq Epi: x / Non Sq Epi: x / Bacteria: Few    RADIOLOGY & ADDITIONAL TESTS:  EXAM:  CT CERVICAL SPINE                          EXAM:  CT BRAIN                            IMPRESSION:    CT head: No acute intracranial hemorrhage or mass effect.    CT cervical spine:  1.  No evidence for acute displaced fracture or malalignment.  2.  2 cm left thyroid nodule.  3.  Left pleural effusion.    MEDICATIONS  (STANDING):  apixaban 5 milliGRAM(s) Oral two times a day  dextrose 5%. 1000 milliLiter(s) (50 mL/Hr) IV Continuous <Continuous>  dextrose 50% Injectable 12.5 Gram(s) IV Push once  dextrose 50% Injectable 25 Gram(s) IV Push once  dextrose 50% Injectable 25 Gram(s) IV Push once  finasteride 5 milliGRAM(s) Oral daily  insulin glargine Injectable (LANTUS) 5 Unit(s) SubCutaneous at bedtime  insulin lispro (HumaLOG) corrective regimen sliding scale   SubCutaneous three times a day before meals  metoprolol succinate ER 25 milliGRAM(s) Oral daily  pantoprazole    Tablet 40 milliGRAM(s) Oral daily  tamsulosin 0.4 milliGRAM(s) Oral at bedtime    MEDICATIONS  (PRN):  dextrose 40% Gel 15 Gram(s) Oral once PRN Blood Glucose LESS THAN 70 milliGRAM(s)/deciliter  glucagon  Injectable 1 milliGRAM(s) IntraMuscular once PRN Glucose LESS THAN 70 milligrams/deciliter  loperamide 2 milliGRAM(s) Oral every 4 hours PRN for diarrhea  ondansetron Injectable 4 milliGRAM(s) IV Push once PRN Nausea and/or Vomiting  traMADol 50 milliGRAM(s) Oral every 6 hours PRN Moderate Pain (4 - 6)    A/P: 87 yo Male, pmh as above a/w:      1.  Left Ureteric stone  Left Hydronephrosis  -s/p Cystoscope. s/p Left Ureter stent  -almaraz placed for urinary retention.   -continue Flomax and Finasteride  -outpt f/u with urology.     2. ANJANA on CKD stage 3  -obstructive  CR improving 1.36 now  ok to resume Losartan as out pt  -on IVF    3. mechanical fall  -seen by physical therapy  -recommending home with home PT    4. small Left Pleural effusion  -follow Pulmonary as outpt    5. Afib  -on Eliquis    6. DMII:  -on ISS    7. GERD  -on PPI    8. DVT ppx: on Eliquis    9. Code status: Full code.     Dispo: home with [facility] for Nursing and home PT

## 2020-08-14 NOTE — PHYSICAL THERAPY INITIAL EVALUATION ADULT - PERTINENT HX OF CURRENT PROBLEM, REHAB EVAL
89 yo Male with pmhx of kidney stones, hernia, HLD, HTN, DM presents to the ED with complain of groin pain. The last time he's been in pain was this morning. States that the pain has been gradual been increasing. Endorses constant urination. He also had +vomiting last night x1 Patient states that he has decreased appetite and decreased bowel movements due to pain. Patient was seen in  on 8/9 for kidney stone, discharged home, but found to have 8mm kidney stone outside of , sent back to hosp

## 2020-08-14 NOTE — CONSULT NOTE ADULT - SUBJECTIVE AND OBJECTIVE BOX
Patient is a 88y old  Male who presents with a chief complaint of complain of abdominal pain (14 Aug 2020 11:52)      HPI:  87 yo Male with pmhx of kidney stones, hernia, HLD, HTN, DM presents to the ED with complain of groin pain. The last time he's been in pain was this morning. States that the pain has been gradual been increasing. Endorses constant urination. He also had +vomiting last night x1 Patient states that he has decreased appetite and decreased bowel movements due to pain. Patient was seen in  on  for kidney stone also. He was found to have Mild to moderate hydronephrosis on CT outside of the . and saw . Had an XR done, found 8mm kidney stones. He was sent to hospital for further management. He also had a fall at home yesterday. But he had no LOC. No new extremity weakness. (13 Aug 2020 23:26)      PAST MEDICAL & SURGICAL HISTORY:  Hernia  Kidney stone  HLD (hyperlipidemia)  HTN (hypertension)  DM (diabetes mellitus)  H/O left inguinal hernia repair      PREVIOUS DIAGNOSTIC TESTING:      MEDICATIONS  (STANDING):  apixaban 5 milliGRAM(s) Oral two times a day  dextrose 5%. 1000 milliLiter(s) (50 mL/Hr) IV Continuous <Continuous>  dextrose 50% Injectable 12.5 Gram(s) IV Push once  dextrose 50% Injectable 25 Gram(s) IV Push once  dextrose 50% Injectable 25 Gram(s) IV Push once  finasteride 5 milliGRAM(s) Oral daily  insulin glargine Injectable (LANTUS) 5 Unit(s) SubCutaneous at bedtime  insulin lispro (HumaLOG) corrective regimen sliding scale   SubCutaneous three times a day before meals  metoprolol succinate ER 25 milliGRAM(s) Oral daily  pantoprazole    Tablet 40 milliGRAM(s) Oral daily  tamsulosin 0.4 milliGRAM(s) Oral at bedtime    MEDICATIONS  (PRN):  dextrose 40% Gel 15 Gram(s) Oral once PRN Blood Glucose LESS THAN 70 milliGRAM(s)/deciliter  glucagon  Injectable 1 milliGRAM(s) IntraMuscular once PRN Glucose LESS THAN 70 milligrams/deciliter  loperamide 2 milliGRAM(s) Oral every 4 hours PRN for diarrhea  ondansetron Injectable 4 milliGRAM(s) IV Push once PRN Nausea and/or Vomiting  traMADol 50 milliGRAM(s) Oral every 6 hours PRN Moderate Pain (4 - 6)      FAMILY HISTORY:  No pertinent family history in first degree relatives      SOCIAL HISTORY:  ***    REVIEW OF SYSTEM:  denies dyspnea    Vital Signs Last 24 Hrs  T(C): 36.6 (14 Aug 2020 08:58), Max: 36.8 (13 Aug 2020 22:04)  T(F): 97.8 (14 Aug 2020 08:58), Max: 98.2 (13 Aug 2020 22:04)  HR: 75 (14 Aug 2020 08:58) (60 - 77)  BP: 142/59 (14 Aug 2020 08:58) (142/59 - 172/64)  BP(mean): 81 (14 Aug 2020 08:58) (81 - 93)  RR: 18 (14 Aug 2020 08:58) (14 - 20)  SpO2: 95% (14 Aug 2020 08:58) (93% - 99%)    I&O's Summary    13 Aug 2020 07:01  -  14 Aug 2020 07:00  --------------------------------------------------------  IN: 500 mL / OUT: 800 mL / NET: -300 mL    14 Aug 2020 07:01  -  14 Aug 2020 15:38  --------------------------------------------------------  IN: 0 mL / OUT: 550 mL / NET: -550 mL      PHYSICAL EXAM  General Appearance: cooperative, no acute distress,   HEENT: PERRL, conjunctiva clear, EOM's intact, non injected pharynx, no exudate, TM   normal  Neck: Supple, , no adenopathy, thyroid: not enlarged, no carotid bruit or JVD  Back: Symmetric, no  tenderness,no soft tissue tenderness  Lungs: slightly diminished in the LLL  Heart: Regular rate and rhythm, S1, S2 normal, no murmur, rub or gallop  Abdomen: Soft, non-tender, bowel sounds active , no hepatosplenomegaly  Extremities: no cyanosis or edema, no joint swelling  Skin: Skin color, texture normal, no rashes   Neurologic: Alert and oriented X3 , cranial nerves intact, sensory and motor normal,    ECG:    LABS:                          10.9   5.46  )-----------( 165      ( 14 Aug 2020 08:04 )             32.6     08-14    141  |  109<H>  |  21  ----------------------------<  156<H>  3.9   |  23  |  1.36<H>    Ca    8.3<L>      14 Aug 2020 08:04    TPro  7.1  /  Alb  2.7<L>  /  TBili  0.5  /  DBili  x   /  AST  9<L>  /  ALT  13  /  AlkPhos  67  08-13            PT/INR - ( 13 Aug 2020 17:10 )   PT: 16.8 sec;   INR: 1.46 ratio         PTT - ( 13 Aug 2020 17:10 )  PTT:28.9 sec  Urinalysis Basic - ( 13 Aug 2020 11:55 )    Color: Yellow / Appearance: Clear / S.015 / pH: x  Gluc: x / Ketone: Trace  / Bili: Negative / Urobili: Negative mg/dL   Blood: x / Protein: 30 mg/dL / Nitrite: Negative   Leuk Esterase: Negative / RBC: 11-25 /HPF / WBC 3-5   Sq Epi: x / Non Sq Epi: x / Bacteria: Few            RADIOLOGY & ADDITIONAL STUDIES:

## 2020-08-14 NOTE — PHYSICAL THERAPY INITIAL EVALUATION ADULT - ADDITIONAL COMMENTS
The pt reports having 4 steps to enter his home with rails. The pt reports that he had a trip and fall 1 day prior to admission due to losing balance as he was turning around to sit.

## 2020-08-14 NOTE — PHYSICAL THERAPY INITIAL EVALUATION ADULT - DIAGNOSIS, PT EVAL
88 y.o. male s/p Left Ureteric Stone, Left Hydronephrosis, s/p cystoscope and s/p left Ureter stent.

## 2020-08-14 NOTE — DISCHARGE NOTE PROVIDER - PROVIDER TOKENS
PROVIDER:[TOKEN:[[id number]]] PROVIDER:[TOKEN:[[id number]]],PROVIDER:[TOKEN:[[id number]]]

## 2020-08-14 NOTE — DISCHARGE NOTE NURSING/CASE MANAGEMENT/SOCIAL WORK - PATIENT PORTAL LINK FT
You can access the FollowMyHealth Patient Portal offered by [facility] by registering at the following website: [email or web address]. By joining [facility]’s FollowMyHealth portal, you will also be able to view your health information using other applications (apps) compatible with our system.

## 2020-08-14 NOTE — DISCHARGE NOTE PROVIDER - CARE PROVIDER_API CALL
Natanael Oglesby)  Urology  [address] [place] [address]  Phone: [phone]  Fax: [phone]  Follow Up Time: Natanael Oglesby)  Urology  [address] [place] [address]  Phone: [phone]  Fax: [phone]  Follow Up Time:     Héctor Frankel  INTERNAL MEDICINE  [address] [place] [address]  Phone: [phone]  Fax: [phone]  Follow Up Time:

## 2020-08-14 NOTE — DISCHARGE NOTE PROVIDER - HOSPITAL COURSE
89 yo Male with pmhx of kidney stones, hernia, HLD, HTN, DM presents to the ED with complain of left groin pain assoc with constant urination and N/V.. Patient was seen in  on 8/9 for kidney stone also. He was found to have Mild to moderate hydronephrosis on CT outside of the . and saw . Had an XR done, found 8mm kidney stones. He was sent to hospital for further management. He also had a fall at home yesterday. But he had no LOC. No new extremity weakness. 87 yo Male with pmhx of kidney stones, hernia, HLD, HTN, DM presents to the ED with complain of left groin pain assoc with constant urination and N/V.. Patient was seen in  on 8/9 for kidney stone also. He was found to have Mild to moderate hydronephrosis on CT outside of the . and saw , ON 8/13 Pt presented again to the ER with LLQ pain assoc with N/V, report Ct scan reported Moderate left hydroureteronephrosis with 3 stones at or near the ureterovesical junction measuring 3, 4 and 5 mm.  Seen By Dr Oglesby who performed a cytoscopy with ureteral Stent.  Pain has improved.  PT with BPH on flomax and Proscar, pt needed to Be straight cathed x 2 however urinary retention persisted, almaraz placed.  Pt to f/u with Urology with in 1 week.  PT with ANJANA due to obstruction, now resolving. Pt with small Pulm effusion noted on CXR, asymptomatic,             EXAM:  CT ABDOMEN AND PELVIS                                  PROCEDURE DATE:  08/13/2020                                      10.9     5.46  )-----------( 165      ( 14 Aug 2020 08:04 )               32.6             08-14        141  |  109<H>  |  21    ----------------------------<  156<H>    3.9   |  23  |  1.36<H>        Ca    8.3<L>      14 Aug 2020 08:04        TPro  7.1  /  Alb  2.7<L>  /  TBili  0.5  /  DBili  x   /  AST  9<L>  /  ALT  13  /  AlkPhos  67  08-13            PT/INR - ( 13 Aug 2020 17:10 )   PT: 16.8 sec;   INR: 1.46 ratio               PTT - ( 13 Aug 2020 17:10 )  PTT:28.9 sec                INTERPRETATION:  CLINICAL INFORMATION: Left lower quadrant pain . 8 mm distal ureteral stone.        IMPRESSION:        Moderate left hydroureteronephrosis with 3 stones at or near the ureterovesical junction measuring 3, 4 and 5 mm.            Final Diagnosis        #Renal Calculi placement of Stent, hydronephrosis    #Urinary retention    # ANJANA secondary to obstruction    resolving        PUlm effusion    Asymptomatic    F/U with Pulm as out pt        Afib    Cont Eliquis 89 yo Male with pmhx of kidney stones, hernia, HLD, HTN, DM presents to the ED with complain of left groin pain assoc with constant urination and N/V.. Patient was seen in  on 8/9 for kidney stone also. He was found to have Mild to moderate hydronephrosis on CT outside of the . and saw , ON 8/13 Pt presented again to the ER with LLQ pain assoc with N/V, report Ct scan reported Moderate left hydroureteronephrosis with 3 stones at or near the ureterovesical junction measuring 3, 4 and 5 mm.  Seen By Dr Oglesby who performed a cytoscopy with ureteral Stent.  Pain has improved.  PT with BPH on flomax and Proscar, pt needed to Be straight cathed x 2 however urinary retention persisted, almaraz placed.  Pt to f/u with Urology with in 1 week.  PT with ANJANA due to obstruction, now resolving. Pt with small Pulm effusion noted on CXR, asymptomatic,             EXAM:  CT ABDOMEN AND PELVIS                                  PROCEDURE DATE:  08/13/2020                                      10.9     5.46  )-----------( 165      ( 14 Aug 2020 08:04 )               32.6             08-14        141  |  109<H>  |  21    ----------------------------<  156<H>    3.9   |  23  |  1.36<H>        Ca    8.3<L>      14 Aug 2020 08:04        TPro  7.1  /  Alb  2.7<L>  /  TBili  0.5  /  DBili  x   /  AST  9<L>  /  ALT  13  /  AlkPhos  67  08-13            PT/INR - ( 13 Aug 2020 17:10 )   PT: 16.8 sec;   INR: 1.46 ratio               PTT - ( 13 Aug 2020 17:10 )  PTT:28.9 sec                INTERPRETATION:  CLINICAL INFORMATION: Left lower quadrant pain . 8 mm distal ureteral stone.        IMPRESSION:        Moderate left hydroureteronephrosis with 3 stones at or near the ureterovesical junction measuring 3, 4 and 5 mm.            Final Diagnosis        #Renal Calculi placement of Stent, hydronephrosis    #Urinary retention    # ANJANA secondary to obstruction    resolving        PUlm effusion    Asymptomatic    F/U with Pulm as out pt        Afib    Cont Eliquis            Time for D/C 1 hour    D/W pt    Discharge summary to be faxed to PCP 87 yo Male with pmhx of kidney stones, hernia, HLD, HTN, DM presented to the ED with complain of left groin pain assoc with constant urination and N/V.. Patient was seen in  on 8/9 for kidney stone also. He was found to have Mild to moderate hydronephrosis on CT outside of the . and saw , ON 8/13 Pt presented again to the ER with LLQ pain assoc with N/V,  Ct scan reported Moderate left hydroureteronephrosis with 3 stones at or near the ureterovesical junction measuring 3, 4 and 5 mm.  Seen By Dr Oglesby who performed a cytoscopy with ureteral Stent.  Pain has improved.  PT with BPH on flomax and Proscar, pt needed to Be straight cathed x 2 however urinary retention persisted, almaraz placed.  Pt to f/u with Urology with in 1 week.  PT with ANJANA due to obstruction, now resolving. Pt with small Pleural effusion noted on CXR, asymptomatic, Seen by pulmonary and will f/u as outpt.        For physical exam please see progress note from 8/14.                                    10.9     5.46  )-----------( 165      ( 14 Aug 2020 08:04 )               32.6             08-14        141  |  109<H>  |  21    ----------------------------<  156<H>    3.9   |  23  |  1.36<H>        Ca    8.3<L>      14 Aug 2020 08:04        TPro  7.1  /  Alb  2.7<L>  /  TBili  0.5  /  DBili  x   /  AST  9<L>  /  ALT  13  /  AlkPhos  67  08-13            PT/INR - ( 13 Aug 2020 17:10 )   PT: 16.8 sec;   INR: 1.46 ratio               PTT - ( 13 Aug 2020 17:10 )  PTT:28.9 sec        CT a/p:    IMPRESSION:    Moderate left hydroureteronephrosis with 3 stones at or near the ureterovesical junction measuring 3, 4 and 5 mm.        Final Diagnosis    #Renal Calculi placement of Stent, hydronephrosis    #Urinary retention    # ANJANA secondary to obstruction    resolving    #Pulm effusion-oupt f/u    F/U with Pulm as out pt    # Afib        Time for D/C 1 hour    D/W pt    Discharge summary to be faxed to PCP

## 2020-08-14 NOTE — PHYSICAL THERAPY INITIAL EVALUATION ADULT - GENERAL OBSERVATIONS, REHAB EVAL
The pt was pleasant and cooperative, received on 2N, supine and eager to participate in PT evaluation.  bilateral SCDs in place.

## 2020-08-14 NOTE — DISCHARGE NOTE PROVIDER - NSDCCPCAREPLAN_GEN_ALL_CORE_FT
PRINCIPAL DISCHARGE DIAGNOSIS  Diagnosis: Ureteral stone with hydronephrosis  Assessment and Plan of Treatment: increase fluids  follow up with Urology within 1 week  return  to ER for any Fevers > 101.5  Call Urology for any Blood noted in urine      SECONDARY DISCHARGE DIAGNOSES  Diagnosis: Urinary retention with incomplete bladder emptying  Assessment and Plan of Treatment: continue flomax  continue Proscar  Kaminski to leg bag  Follow up with urology PRINCIPAL DISCHARGE DIAGNOSIS  Diagnosis: Ureteral stone with hydronephrosis  Assessment and Plan of Treatment: increase fluids  follow up with Urology within 1 week  return  to ER for any Fevers > 101.5  Call Urology for any Blood noted in urine      SECONDARY DISCHARGE DIAGNOSES  Diagnosis: Pleural effusion  Assessment and Plan of Treatment: follow up with pulmonary as advised.    Diagnosis: Urinary retention with incomplete bladder emptying  Assessment and Plan of Treatment: continue flomax  continue Proscar  Kaminski to leg bag  Follow up with urology

## 2020-08-14 NOTE — PROGRESS NOTE ADULT - ATTENDING COMMENTS
pt seen and examined this am with NP Ciarra Dotson. Agree with above documentation with changes made where appropriate.

## 2020-08-14 NOTE — CONSULT NOTE ADULT - ASSESSMENT
87 yo Male with pmhx of kidney stones, hernia, HLD, HTN, DM presents to the ED with complain of groin pain. The last time he's been in pain was this morning. States that the pain has been gradual been increasing. Endorses constant urination. He also had +vomiting last night x1 Patient states that he has decreased appetite and decreased bowel movements due to pain. Patient was seen in  on 8/9 for kidney stone also. He was found to have Mild to moderate hydronephrosis on CT outside of the . and saw . Had an XR done, found 8mm kidney stones. He was sent to hospital for further management. He also had a fall at home yesterday. But he had no LOC. No new extremity weakness.   Reviewed CXR  Small left pleural effusion/atelectatic area   This is likely post operative  Will recommend Incentive spirometer, mobilize as tolerated  Follow up outpatient  Plan discussed with nursing staff, Urology and patient.

## 2020-08-16 ENCOUNTER — EMERGENCY (EMERGENCY)
Facility: HOSPITAL | Age: 85
LOS: 0 days | Discharge: HOME | End: 2020-08-17
Attending: EMERGENCY MEDICINE
Payer: MEDICARE

## 2020-08-16 VITALS
OXYGEN SATURATION: 95 % | SYSTOLIC BLOOD PRESSURE: 156 MMHG | DIASTOLIC BLOOD PRESSURE: 60 MMHG | TEMPERATURE: 99 F | RESPIRATION RATE: 17 BRPM | WEIGHT: 184.97 LBS | HEART RATE: 72 BPM | HEIGHT: 72 IN

## 2020-08-16 DIAGNOSIS — R33.8 OTHER RETENTION OF URINE: ICD-10-CM

## 2020-08-16 DIAGNOSIS — Z98.890 OTHER SPECIFIED POSTPROCEDURAL STATES: Chronic | ICD-10-CM

## 2020-08-16 DIAGNOSIS — R26.81 UNSTEADINESS ON FEET: ICD-10-CM

## 2020-08-16 DIAGNOSIS — Z79.01 LONG TERM (CURRENT) USE OF ANTICOAGULANTS: ICD-10-CM

## 2020-08-16 DIAGNOSIS — Z88.5 ALLERGY STATUS TO NARCOTIC AGENT: ICD-10-CM

## 2020-08-16 DIAGNOSIS — Z79.4 LONG TERM (CURRENT) USE OF INSULIN: ICD-10-CM

## 2020-08-16 DIAGNOSIS — Z87.442 PERSONAL HISTORY OF URINARY CALCULI: ICD-10-CM

## 2020-08-16 DIAGNOSIS — E78.5 HYPERLIPIDEMIA, UNSPECIFIED: ICD-10-CM

## 2020-08-16 DIAGNOSIS — T83.028A DISPLACEMENT OF OTHER URINARY CATHETER, INITIAL ENCOUNTER: ICD-10-CM
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
## 2020-08-16 DIAGNOSIS — I10 ESSENTIAL (PRIMARY) HYPERTENSION: ICD-10-CM

## 2020-08-16 DIAGNOSIS — E11.9 TYPE 2 DIABETES MELLITUS WITHOUT COMPLICATIONS: ICD-10-CM

## 2020-08-16 DIAGNOSIS — Z46.6 ENCOUNTER FOR FITTING AND ADJUSTMENT OF URINARY DEVICE: ICD-10-CM

## 2020-08-16 LAB — SARS-COV-2 RNA SPEC QL NAA+PROBE: SIGNIFICANT CHANGE UP

## 2020-08-16 PROCEDURE — 99285 EMERGENCY DEPT VISIT HI MDM: CPT | Mod: 25

## 2020-08-16 PROCEDURE — 51702 INSERT TEMP BLADDER CATH: CPT

## 2020-08-16 PROCEDURE — 99285 EMERGENCY DEPT VISIT HI MDM: CPT

## 2020-08-16 PROCEDURE — 82962 GLUCOSE BLOOD TEST: CPT

## 2020-08-16 PROCEDURE — 87635 SARS-COV-2 COVID-19 AMP PRB: CPT

## 2020-08-16 RX ORDER — ACETAMINOPHEN 500 MG
1000 TABLET ORAL ONCE
Refills: 0 | Status: COMPLETED | OUTPATIENT
Start: 2020-08-16 | End: 2020-08-16

## 2020-08-16 RX ORDER — FINASTERIDE 5 MG/1
5 TABLET, FILM COATED ORAL DAILY
Refills: 0 | Status: DISCONTINUED | OUTPATIENT
Start: 2020-08-16 | End: 2020-08-17

## 2020-08-16 RX ORDER — METOPROLOL TARTRATE 50 MG
25 TABLET ORAL DAILY
Refills: 0 | Status: DISCONTINUED | OUTPATIENT
Start: 2020-08-16 | End: 2020-08-17

## 2020-08-16 RX ORDER — TAMSULOSIN HYDROCHLORIDE 0.4 MG/1
0.4 CAPSULE ORAL AT BEDTIME
Refills: 0 | Status: DISCONTINUED | OUTPATIENT
Start: 2020-08-16 | End: 2020-08-17

## 2020-08-16 RX ORDER — INSULIN GLARGINE 100 [IU]/ML
13 INJECTION, SOLUTION SUBCUTANEOUS ONCE
Refills: 0 | Status: COMPLETED | OUTPATIENT
Start: 2020-08-16 | End: 2020-08-16

## 2020-08-16 RX ORDER — OXYCODONE AND ACETAMINOPHEN 5; 325 MG/1; MG/1
1 TABLET ORAL EVERY 6 HOURS
Refills: 0 | Status: DISCONTINUED | OUTPATIENT
Start: 2020-08-16 | End: 2020-08-17

## 2020-08-16 RX ORDER — OXYCODONE HYDROCHLORIDE 5 MG/1
5 TABLET ORAL ONCE
Refills: 0 | Status: DISCONTINUED | OUTPATIENT
Start: 2020-08-16 | End: 2020-08-16

## 2020-08-16 RX ADMIN — OXYCODONE HYDROCHLORIDE 5 MILLIGRAM(S): 5 TABLET ORAL at 15:36

## 2020-08-16 RX ADMIN — Medication 25 MILLIGRAM(S): at 22:11

## 2020-08-16 RX ADMIN — FINASTERIDE 5 MILLIGRAM(S): 5 TABLET, FILM COATED ORAL at 22:11

## 2020-08-16 RX ADMIN — INSULIN GLARGINE 13 UNIT(S): 100 INJECTION, SOLUTION SUBCUTANEOUS at 22:12

## 2020-08-16 RX ADMIN — Medication 1000 MILLIGRAM(S): at 15:36

## 2020-08-16 RX ADMIN — OXYCODONE HYDROCHLORIDE 5 MILLIGRAM(S): 5 TABLET ORAL at 14:44

## 2020-08-16 RX ADMIN — Medication 1000 MILLIGRAM(S): at 14:44

## 2020-08-16 RX ADMIN — TAMSULOSIN HYDROCHLORIDE 0.4 MILLIGRAM(S): 0.4 CAPSULE ORAL at 22:11

## 2020-08-16 NOTE — ED ADULT NURSE NOTE - ED STAT RN HANDOFF DETAILS
handoff report taken from day RN Johann. pt. noted resting comfortably in stretcher. No distress noted, denies pain, safety maintained. EREN

## 2020-08-16 NOTE — ED ADULT NURSE NOTE - NSIMPLEMENTINTERV_GEN_ALL_ED
Surgery Progress Note Admit Date: 3/20/2019 Subjective:  
 
 Pt complains of poor appetite and would like to go home, sleeping poorly and does not like the food  Pts pain is minimal, resolved . No SOB. No CP. Pt is ambulating. Patient 's current diet is Regular. . Pt reports  no nausea and no vomiting. Pt reports no fever or chills Bowel Movements: Flatus and loose stool this AM  
 
 
 
Objective:  
 
Patient Vitals for the past 8 hrs: 
 BP Temp Pulse Resp SpO2 Weight 04/03/19 0806 146/61 98.2 °F (36.8 °C) 78 16 96 %   
04/03/19 0423      157 lb 6.4 oz (71.4 kg) No intake/output data recorded. 04/01 1901 - 04/03 0700 In: 840 [P.O.:540; I.V.:300] Out: 1195 [TWMGA:1226; Drains:20]ip Physical Exam: 
 
General: alert, cooperative, no distress, up in the chair, drinking contrast  
Cardiac: normal S1 and S2 Lungs: Normal chest wall and respirations. Clear to auscultation. Abdomen: soft, nondistended, normal bowel sounds, nontender, without guarding, without rebound, DEEPAK is think ss output, no purulent output, no succus Wounds:clean dressing right groin, incisions are clean and dry Neuro: alert, oriented x 3, no defects noted in general exam. 
Extremities: no edema CBC:  
Lab Results Component Value Date/Time WBC 7.9 04/03/2019 04:20 AM  
 RBC 3.27 (L) 04/03/2019 04:20 AM  
 HGB 10.0 (L) 04/03/2019 04:20 AM  
 HCT 31.8 (L) 04/03/2019 04:20 AM  
 PLATELET 769 (H) 82/08/5691 04:20 AM  
 
BMP:  
Lab Results Component Value Date/Time Glucose 97 04/01/2019 02:18 AM  
 Sodium 139 04/01/2019 02:18 AM  
 Potassium 3.1 (L) 04/01/2019 02:18 AM  
 Chloride 106 04/01/2019 02:18 AM  
 CO2 25 04/01/2019 02:18 AM  
 BUN 8 04/01/2019 02:18 AM  
 Creatinine 0.66 04/01/2019 02:18 AM  
 Calcium 8.7 04/01/2019 02:18 AM  
 
CMP: 
Lab Results Component Value Date/Time  Glucose 97 04/01/2019 02:18 AM  
 Sodium 139 04/01/2019 02:18 AM  
 Potassium 3.1 (L) 04/01/2019 02:18 AM  
 Chloride 106 04/01/2019 02:18 AM  
 CO2 25 04/01/2019 02:18 AM  
 BUN 8 04/01/2019 02:18 AM  
 Creatinine 0.66 04/01/2019 02:18 AM  
 Calcium 8.7 04/01/2019 02:18 AM  
 Anion gap 8 04/01/2019 02:18 AM  
 BUN/Creatinine ratio 12 04/01/2019 02:18 AM  
 Alk. phosphatase 58 03/21/2019 02:20 AM  
 Protein, total 6.2 (L) 03/21/2019 02:20 AM  
 Albumin 2.2 (L) 03/21/2019 02:20 AM  
 Globulin 4.0 03/21/2019 02:20 AM  
 A-G Ratio 0.6 (L) 03/21/2019 02:20 AM  
 
 
Radiology review: Ct pending Assessment:  
Pt is POD #7 s/p Procedure(s): DIAGNOSTIC LAPAROSCOPY WITH LYSIS OF ADHESIONS AND DRAINAGE OF PELVIC ABSCESS Plan:  
Diet: advance as tolerated Activity: walk in [place] Pain management GI and DVT prophylaxis Drains Deepak is ss. Meds: I offered her some benadryl prn sleep. Labs: no lab studies available for review at time of visit Antibiotics: Zosyn For interval CT scan today, if collections have resolved, ok for DC to home from surgical standpoint-- will need wound care, DEEPAK drain management per Dr. Mary Choudhary Encourage po--she is taking one Ensure daily and 1/4 of her meals, she just does not like the food \"maybe when I get home I can eat more\" abx per primary team  
Further plan per Dr. Mary Cooley Degree PA-C 
 
 
 
 
   
   
  
 
 
 
 
  
   
   
   
   
   
   
   
   
   
   
   
   
   
 
 
 
 
 Implemented All Universal Safety Interventions:  [place] to call system. Call bell, personal items and telephone within reach. Instruct patient to call for assistance. Room bathroom lighting operational. Non-slip footwear when patient is off stretcher. Physically safe environment: no spills, clutter or unnecessary equipment. Stretcher in lowest position, wheels locked, appropriate side rails in place.

## 2020-08-16 NOTE — ED PROVIDER NOTE - PHYSICAL EXAMINATION
*GEN: No acute distress, well appearing   *HEAD: Normocephalic, Atraumatic  *EYES/NOSE: b/l Pupils symmetric & Reactive to ligth, EOMI b/l  *THROAT: airway patent, moist mucous membranes  *NECK: Neck supple  *PULMONARY: No Respiratory distress, symmetric b/l chest rise  *CARDIAC: s1s2, regular rhythm   *ABDOMEN:  +Suprapubic tenderness, Non Distended, soft, no guarding, no rebound, no masses   *: no hematoma  *BACK: no CVA tenderness, No midline vertebral tenderness to palpation   *EXTREMITIES: symmetric pulses, 2+ DP & radial pulses, no cyanosis, no edema   *SKIN: no rash, no bruising   *NEUROLOGIC: alert,  full active & passive ROM in all 4 extremities,   *PSYCH: appropriate concern about symptoms, pleasant *GEN: No acute distress, well appearing , pt AAOx4  *HEAD: Normocephalic, Atraumatic  *EYES/NOSE: b/l Pupils symmetric & Reactive to ligth, EOMI b/l  *THROAT: airway patent, moist mucous membranes  *NECK: Neck supple  *PULMONARY: No Respiratory distress, symmetric b/l chest rise  *CARDIAC: s1s2, regular rhythm   *ABDOMEN:  +Suprapubic tenderness, Non Distended, soft, no guarding, no rebound, no masses   *: circumcised, no testicular mass nor ttp nor abnormal lie, cremastaric intact b/l; no rash; no blood at meatus  *BACK: no CVA tenderness, No midline vertebral tenderness to palpation   *EXTREMITIES: symmetric pulses, 2+ DP & radial pulses, no cyanosis, no edema   *SKIN: no rash, no bruising   *NEUROLOGIC: alert,  full active & passive ROM in all 4 extremities, unsteady gait  *PSYCH: appropriate concern about symptoms, pleasant

## 2020-08-16 NOTE — ED PROVIDER NOTE - CLINICAL SUMMARY MEDICAL DECISION MAKING FREE TEXT BOX
Catheter fell out, bladder with 615 mL of urine. Will replace Kaminski and d/c with outpatient urology follow-up. initial plan: Catheter fell out, bladder with 615 mL of urine. Will replace Kaminski and d/c with outpatient urology follow-up.  final plan: pt lives alone, son allan [phone] & daughter [phone] don't think pt is safe at home. pt has unsteady gait. pt was sent home w/ home care. CAMILLE Esparza saw pt & will try to get pt to [facility] tomorrow morning. will hold overnight in ED initial plan: Catheter fell out, bladder with 615 mL of urine. Will replace Kaminski and d/c with outpatient urology follow-up.  final plan: pt lives alone, son allan [phone] & daughter [phone] don't think pt is safe at home. pt has unsteady gait. pt was sent home w/ home care 2d ago but hasn't been set up. CAMILLE Esparza saw pt & will try to get pt to [facility] tomorrow morning. will hold overnight in ED for rehab placement

## 2020-08-16 NOTE — ED ADULT NURSE NOTE - OBJECTIVE STATEMENT
87 y/o M presents for a almaraz insertion. As per patient he has a procedure and a almaraz placed. Pt states he is not aware of when it fell out.

## 2020-08-16 NOTE — ED PROVIDER NOTE - SHIFT CHANGE DETAILS
CAMILLE Esparza saw pt & [facility]. CAMILLE Esparza saw pt & [facility]. will hold overnight in ED

## 2020-08-16 NOTE — ED PROVIDER NOTE - PATIENT PORTAL LINK FT
You can access the FollowMyHealth Patient Portal offered by [facility] by registering at the following website: [email or web address]. By joining [employer]’s FollowMyHealth portal, you will also be able to view your health information using other applications (apps) compatible with our system.

## 2020-08-16 NOTE — ED ADULT TRIAGE NOTE - CHIEF COMPLAINT QUOTE
pt from home, d/c yesterday from  with kidney stones. family found pt with almaraz catheter on floor.

## 2020-08-16 NOTE — ED PROVIDER NOTE - CARE PROVIDER_API CALL
Natanael Oglesby)  Urology  [address] [place] [address]  Phone: [phone]  Fax: [phone]  Follow Up Time:

## 2020-08-16 NOTE — ED PROVIDER NOTE - NSFOLLOWUPINSTRUCTIONS_ED_ALL_ED_FT
FOLLOW UP WITH PMD & UROLOGIST WITHIN 1-2DAYS, CALL TO MAKE APPOINTMENT  COME BACK TO ED IF YOUR CONDITION WORSENS OR IF YOU DEVELOP FEVER GREATER THAN 100.4F, CHEST PAIN,  SHORTNESS OF BREATH OR ANY OTHER SYMPTOMS CONCERNING TO YOU  TAKE TYLENOL (ACETAMINOPHEN) 650 MG EVERY 6 HOURS AS NEEDED FOR PAIN    IF YOU WERE PRESRCIBED ANY MEDICATIONS FROM TODAY'S VISIT, TAKE THEM AS DIRECTED

## 2020-08-16 NOTE — ED PROVIDER NOTE - NS ED ROS FT
Review of Systems:  	•	CONSTITUTIONAL: no fever +decreased appetite  	•	SKIN: no rash  	•	RESPIRATORY: no shortness of breath  	•	CARDIAC: no chest pain  	•	GI:  +abd pain, no nausea, no vomiting, no diarrhea  	•	GENITO-URINARY:  no dysuria  	•	MUSCULOSKELETAL:  +back pain  	•	NEUROLOGIC: no weakness  	•	ALLERGY: no rhinorrhea  	•	PSYSCHIATRIC: appropriate concern about symptoms

## 2020-08-16 NOTE — ED PROVIDER NOTE - PROGRESS NOTE DETAILS
Artur DA SILVA for ED attending Dr. Aguirre: POC US showing 615 mL of urine in bladder. Scribe Jaclyn Casale for attending Dr Aguirre: pt and family doesn't feel comfortable going back home I Jaclyn Casale attest that this documentation has been prepared under the direction and in the presence of Doctor Aguirre. Scribe Jaclyn Casale for attending Dr Aguirre: case management not there, will hold overnight for rehab placement. Pt has labs from 2 days ago. will get COVID swab. Pam: patient evaluated by SW/CM and physical therapy. will go to [facility] for rehab. will dc now.

## 2020-08-16 NOTE — ED PROVIDER NOTE - OBJECTIVE STATEMENT
Pertinent HPI/PMH/PSH/FHx/SHx and Review of Systems contained within  HPI: 88 M Patient p/w CC Kaminski catheter fell out x today, new onset. Pt had cystoscopy with ureteral stent placed on 08/13 due to 3 kidney stones. Pt then had a Kaminski catheter placed due to urinary retention. Pt presenting today because his Kaminski catheter fell out. Last urination was shortly before transportation to ED via EMS. Pt feels he was unable to completely void bladder. Presently pt feels urinary urgency. Pt also complaining of decreased appetite, mild abd pain and back pain.  PMH/PSH relevant for: DM, HTN, HLD, kidney stone  ROS negative for: fever, chest pain, SOB, nausea, vomiting, diarrhea, dysuria  FamilyHx and SocialHx not otherwise contributory Pertinent HPI/PMH/PSH/FHx/SHx and Review of Systems contained within  HPI: 88 M Patient p/w CC Kaminski catheter fell out x today, new onset. Pt had cystoscopy with ureteral stent placed on 08/13 due to 3 kidney stones. Pt then had a Kaminski catheter placed due to urinary retention. Pt presenting today because his Kaminski catheter fell out & he doesn't know how. . Last urination was shortly before transportation to ED via EMS. Pt feels he was unable to completely void bladder. Presently pt feels urinary urgency. Pt also complaining of decreased appetite, mild suprapubic abd pain   PMH/PSH relevant for: DM, HTN, HLD, kidney stone  ROS negative for: fever, chest pain, SOB, nausea, vomiting, diarrhea, dysuria  FamilyHx and SocialHx not otherwise contributory Pertinent HPI/PMH/PSH/FHx/SHx and Review of Systems contained within  HPI: 88 M bibems after Kaminski catheter spontaneously fell out x today, new onset. Pt had cystoscopy with ureteral stent placed on 08/13 due to 3 kidney stones. Pt then had a Kaminski catheter placed due to urinary retention. Pt presenting today because his Kaminski catheter fell out & he doesn't know how. . Last urination was shortly before transportation to ED via EMS. Pt feels he was unable to completely void bladder. Presently pt feels urinary urgency. Pt also complaining of decreased appetite, mild suprapubic abd pain   PMH/PSH relevant for: DM, HTN, HLD, kidney stone  ROS negative for: fever, chest pain, SOB, nausea, vomiting, diarrhea, dysuria  FamilyHx and SocialHx not otherwise contributory

## 2020-08-17 VITALS
DIASTOLIC BLOOD PRESSURE: 70 MMHG | SYSTOLIC BLOOD PRESSURE: 156 MMHG | RESPIRATION RATE: 17 BRPM | OXYGEN SATURATION: 100 % | HEART RATE: 70 BPM

## 2020-08-17 RX ORDER — VALSARTAN 80 MG/1
160 TABLET ORAL ONCE
Refills: 0 | Status: COMPLETED | OUTPATIENT
Start: 2020-08-17 | End: 2020-08-17

## 2020-08-17 RX ADMIN — Medication 25 MILLIGRAM(S): at 10:32

## 2020-08-17 RX ADMIN — VALSARTAN 160 MILLIGRAM(S): 80 TABLET ORAL at 14:12

## 2020-08-17 RX ADMIN — FINASTERIDE 5 MILLIGRAM(S): 5 TABLET, FILM COATED ORAL at 10:32

## 2020-08-17 NOTE — ED ADULT NURSE REASSESSMENT NOTE - NS ED NURSE REASSESS COMMENT FT1
pt eating breakfast, resting comfortably, awaiting social work dispo pt eating breakfast, resting comfortably, awaiting social work dispo, pt to see PT

## 2020-08-17 NOTE — ED ADULT NURSE REASSESSMENT NOTE - NS ED NURSE REASSESS COMMENT FT1
pt. noted resting comfortably in stretcher, no distress noted. denies pain/discomfort. safety maintained. WCTM.

## 2020-08-17 NOTE — ED ADULT NURSE REASSESSMENT NOTE - NS ED NURSE REASSESS COMMENT FT1
pt bp in 170s systolic, pt did not take valsartan since pt has been in Ed x20 hours. md nicholson spoke with anastasiya at [facility]- pt Is ok to go to facility.

## 2020-08-19 LAB
CULTURE RESULTS: SIGNIFICANT CHANGE UP
CULTURE RESULTS: SIGNIFICANT CHANGE UP
SPECIMEN SOURCE: SIGNIFICANT CHANGE UP
SPECIMEN SOURCE: SIGNIFICANT CHANGE UP

## 2020-08-20 DIAGNOSIS — E11.22 TYPE 2 DIABETES MELLITUS WITH DIABETIC CHRONIC KIDNEY DISEASE: ICD-10-CM

## 2020-08-20 DIAGNOSIS — K21.9 GASTRO-ESOPHAGEAL REFLUX DISEASE WITHOUT ESOPHAGITIS: ICD-10-CM

## 2020-08-20 DIAGNOSIS — Z98.890 OTHER SPECIFIED POSTPROCEDURAL STATES: ICD-10-CM

## 2020-08-20 DIAGNOSIS — I48.91 UNSPECIFIED ATRIAL FIBRILLATION: ICD-10-CM

## 2020-08-20 DIAGNOSIS — N17.9 ACUTE KIDNEY FAILURE, UNSPECIFIED: ICD-10-CM

## 2020-08-20 DIAGNOSIS — I12.9 HYPERTENSIVE CHRONIC KIDNEY DISEASE WITH STAGE 1 THROUGH STAGE 4 CHRONIC KIDNEY DISEASE, OR UNSPECIFIED CHRONIC KIDNEY DISEASE: ICD-10-CM

## 2020-08-20 DIAGNOSIS — Z79.4 LONG TERM (CURRENT) USE OF INSULIN: ICD-10-CM

## 2020-08-20 DIAGNOSIS — N13.2 HYDRONEPHROSIS WITH RENAL AND URETERAL CALCULOUS OBSTRUCTION: ICD-10-CM

## 2020-08-20 DIAGNOSIS — J90 PLEURAL EFFUSION, NOT ELSEWHERE CLASSIFIED: ICD-10-CM

## 2020-08-20 DIAGNOSIS — Z88.5 ALLERGY STATUS TO NARCOTIC AGENT: ICD-10-CM

## 2020-08-20 DIAGNOSIS — N18.3 CHRONIC KIDNEY DISEASE, STAGE 3 (MODERATE): ICD-10-CM

## 2020-08-20 DIAGNOSIS — E78.5 HYPERLIPIDEMIA, UNSPECIFIED: ICD-10-CM

## 2020-09-08 ENCOUNTER — OUTPATIENT (OUTPATIENT)
Dept: INPATIENT UNIT | Facility: HOSPITAL | Age: 85
LOS: 1 days | Discharge: HOME | End: 2020-09-08
Payer: MEDICARE

## 2020-09-08 ENCOUNTER — RESULT REVIEW (OUTPATIENT)
Age: 85
End: 2020-09-08

## 2020-09-08 VITALS
SYSTOLIC BLOOD PRESSURE: 146 MMHG | TEMPERATURE: 98 F | DIASTOLIC BLOOD PRESSURE: 55 MMHG | HEIGHT: 72 IN | HEART RATE: 67 BPM | OXYGEN SATURATION: 100 % | WEIGHT: 195.11 LBS | RESPIRATION RATE: 18 BRPM

## 2020-09-08 DIAGNOSIS — N20.0 CALCULUS OF KIDNEY: ICD-10-CM

## 2020-09-08 DIAGNOSIS — Z98.890 OTHER SPECIFIED POSTPROCEDURAL STATES: Chronic | ICD-10-CM

## 2020-09-08 LAB
APTT BLD: 30.1 SEC — SIGNIFICANT CHANGE UP (ref 27.5–35.5)
INR BLD: 1.07 RATIO — SIGNIFICANT CHANGE UP (ref 0.88–1.16)
PROTHROM AB SERPL-ACNC: 12.4 SEC — SIGNIFICANT CHANGE UP (ref 10.6–13.6)

## 2020-09-08 PROCEDURE — 36415 COLL VENOUS BLD VENIPUNCTURE: CPT

## 2020-09-08 PROCEDURE — C1769: CPT

## 2020-09-08 PROCEDURE — C1889: CPT

## 2020-09-08 PROCEDURE — 82365 CALCULUS SPECTROSCOPY: CPT

## 2020-09-08 PROCEDURE — 76000 FLUOROSCOPY <1 HR PHYS/QHP: CPT

## 2020-09-08 PROCEDURE — 82962 GLUCOSE BLOOD TEST: CPT

## 2020-09-08 PROCEDURE — 88300 SURGICAL PATH GROSS: CPT | Mod: 26

## 2020-09-08 PROCEDURE — 88300 SURGICAL PATH GROSS: CPT

## 2020-09-08 PROCEDURE — 85730 THROMBOPLASTIN TIME PARTIAL: CPT

## 2020-09-08 PROCEDURE — 85610 PROTHROMBIN TIME: CPT

## 2020-09-08 RX ORDER — TAMSULOSIN HYDROCHLORIDE 0.4 MG/1
0.4 CAPSULE ORAL AT BEDTIME
Refills: 0 | Status: DISCONTINUED | OUTPATIENT
Start: 2020-09-08 | End: 2020-09-09

## 2020-09-08 RX ORDER — TRAMADOL HYDROCHLORIDE 50 MG/1
50 TABLET ORAL EVERY 6 HOURS
Refills: 0 | Status: DISCONTINUED | OUTPATIENT
Start: 2020-09-08 | End: 2020-09-09

## 2020-09-08 RX ORDER — ONDANSETRON 8 MG/1
4 TABLET, FILM COATED ORAL ONCE
Refills: 0 | Status: DISCONTINUED | OUTPATIENT
Start: 2020-09-08 | End: 2020-09-08

## 2020-09-08 RX ORDER — PANTOPRAZOLE SODIUM 20 MG/1
40 TABLET, DELAYED RELEASE ORAL DAILY
Refills: 0 | Status: DISCONTINUED | OUTPATIENT
Start: 2020-09-08 | End: 2020-09-09

## 2020-09-08 RX ORDER — ACETAMINOPHEN 500 MG
1000 TABLET ORAL ONCE
Refills: 0 | Status: COMPLETED | OUTPATIENT
Start: 2020-09-08 | End: 2020-09-08

## 2020-09-08 RX ORDER — FENTANYL CITRATE 50 UG/ML
50 INJECTION INTRAVENOUS
Refills: 0 | Status: DISCONTINUED | OUTPATIENT
Start: 2020-09-08 | End: 2020-09-08

## 2020-09-08 RX ORDER — SODIUM CHLORIDE 9 MG/ML
1000 INJECTION, SOLUTION INTRAVENOUS
Refills: 0 | Status: DISCONTINUED | OUTPATIENT
Start: 2020-09-08 | End: 2020-09-08

## 2020-09-08 RX ORDER — METOPROLOL TARTRATE 50 MG
25 TABLET ORAL DAILY
Refills: 0 | Status: DISCONTINUED | OUTPATIENT
Start: 2020-09-08 | End: 2020-09-08

## 2020-09-08 RX ORDER — OXYCODONE HYDROCHLORIDE 5 MG/1
10 TABLET ORAL ONCE
Refills: 0 | Status: DISCONTINUED | OUTPATIENT
Start: 2020-09-08 | End: 2020-09-08

## 2020-09-08 RX ORDER — FINASTERIDE 5 MG/1
5 TABLET, FILM COATED ORAL DAILY
Refills: 0 | Status: DISCONTINUED | OUTPATIENT
Start: 2020-09-08 | End: 2020-09-09

## 2020-09-08 RX ORDER — METOPROLOL TARTRATE 50 MG
25 TABLET ORAL DAILY
Refills: 0 | Status: DISCONTINUED | OUTPATIENT
Start: 2020-09-08 | End: 2020-09-09

## 2020-09-08 RX ORDER — APIXABAN 2.5 MG/1
5 TABLET, FILM COATED ORAL
Refills: 0 | Status: DISCONTINUED | OUTPATIENT
Start: 2020-09-08 | End: 2020-09-09

## 2020-09-08 RX ADMIN — Medication 1000 MILLIGRAM(S): at 21:02

## 2020-09-08 RX ADMIN — APIXABAN 5 MILLIGRAM(S): 2.5 TABLET, FILM COATED ORAL at 22:21

## 2020-09-08 RX ADMIN — Medication 400 MILLIGRAM(S): at 18:15

## 2020-09-08 RX ADMIN — TAMSULOSIN HYDROCHLORIDE 0.4 MILLIGRAM(S): 0.4 CAPSULE ORAL at 22:21

## 2020-09-08 NOTE — ASU DISCHARGE PLAN (ADULT/PEDIATRIC) - CARE PROVIDER_API CALL
Natanael Oglesby)  Urology  [address] [place] [address]  Phone: [phone]  Fax: [phone]  Follow Up Time:

## 2020-09-08 NOTE — BRIEF OPERATIVE NOTE - OPERATION/FINDINGS
status post laser lithotripsy of (2) 1.5 cm bladder stones and left ureteroscopy for ureteral stones, removal of JJ stent

## 2020-09-08 NOTE — BRIEF OPERATIVE NOTE - NSICDXBRIEFPOSTOP_GEN_ALL_CORE_FT
POST-OP DIAGNOSIS:  Bladder stones 08-Sep-2020 18:03:26  Natanael Oglesby  Left ureteral stone 08-Sep-2020 18:03:08  Natanael Oglesby

## 2020-09-08 NOTE — BRIEF OPERATIVE NOTE - NSICDXBRIEFPROCEDURE_GEN_ALL_CORE_FT
PROCEDURES:  Cystoscopy with left ureteroscopy with extraction of calculus using stone retrieval basket 08-Sep-2020 18:02:17  Natanael Oglesby

## 2020-09-08 NOTE — BRIEF OPERATIVE NOTE - NSICDXBRIEFPREOP_GEN_ALL_CORE_FT
PRE-OP DIAGNOSIS:  Bladder stones 08-Sep-2020 18:02:54  Natanael Oglesby  Left ureteral stone 08-Sep-2020 18:02:40  Natnaael Oglesby

## 2020-09-09 VITALS
RESPIRATION RATE: 16 BRPM | OXYGEN SATURATION: 100 % | SYSTOLIC BLOOD PRESSURE: 144 MMHG | HEART RATE: 65 BPM | TEMPERATURE: 98 F | DIASTOLIC BLOOD PRESSURE: 52 MMHG

## 2020-09-09 RX ADMIN — Medication 25 MILLIGRAM(S): at 10:25

## 2020-09-09 RX ADMIN — FINASTERIDE 5 MILLIGRAM(S): 5 TABLET, FILM COATED ORAL at 10:25

## 2020-09-09 RX ADMIN — TRAMADOL HYDROCHLORIDE 50 MILLIGRAM(S): 50 TABLET ORAL at 14:08

## 2020-09-09 RX ADMIN — PANTOPRAZOLE SODIUM 40 MILLIGRAM(S): 20 TABLET, DELAYED RELEASE ORAL at 10:25

## 2020-09-09 RX ADMIN — APIXABAN 5 MILLIGRAM(S): 2.5 TABLET, FILM COATED ORAL at 10:25

## 2020-09-09 RX ADMIN — TRAMADOL HYDROCHLORIDE 50 MILLIGRAM(S): 50 TABLET ORAL at 14:38

## 2020-09-09 NOTE — CHART NOTE - NSCHARTNOTEFT_GEN_A_CORE
Called by nurse who stated that patient had to be straight catheterized earlier today for urinary retention.  He is due to void again at this time but only occasionally voiding 25ml of urine.  Current bladder scan = 448ml.  I called Dr. Oglebsy-- as per Dr. Oglesby, to place a almaraz catheter to gravity and discharge patient back NH with almaraz to gravity.  Dr. Oglesby will follow up with the patient next week and do a trial void then.  D/W Leslye, patient's nurse. Called by nurse who stated that patient had to be straight catheterized earlier today for urinary retention.  He is due to void again at this time but only occasionally voiding 25ml of urine.  Current bladder scan = 448ml.  I called Dr. Oglesby-- as per Dr. Oglesby, to place a almaraz catheter to gravity and discharge patient back NH with almaraz in place.  Dr. Oglesby will follow up with the patient next week and do a trial void then.  D/W Leslye, patient's nurse.

## 2020-09-16 DIAGNOSIS — Z79.4 LONG TERM (CURRENT) USE OF INSULIN: ICD-10-CM

## 2020-09-16 DIAGNOSIS — N40.1 BENIGN PROSTATIC HYPERPLASIA WITH LOWER URINARY TRACT SYMPTOMS: ICD-10-CM

## 2020-09-16 DIAGNOSIS — I10 ESSENTIAL (PRIMARY) HYPERTENSION: ICD-10-CM

## 2020-09-16 DIAGNOSIS — E11.22 TYPE 2 DIABETES MELLITUS WITH DIABETIC CHRONIC KIDNEY DISEASE: ICD-10-CM

## 2020-09-16 DIAGNOSIS — R33.9 RETENTION OF URINE, UNSPECIFIED: ICD-10-CM

## 2020-09-16 DIAGNOSIS — I48.91 UNSPECIFIED ATRIAL FIBRILLATION: ICD-10-CM

## 2020-09-16 DIAGNOSIS — N21.0 CALCULUS IN BLADDER: ICD-10-CM

## 2020-09-16 DIAGNOSIS — E78.5 HYPERLIPIDEMIA, UNSPECIFIED: ICD-10-CM

## 2020-09-16 DIAGNOSIS — I25.10 ATHEROSCLEROTIC HEART DISEASE OF NATIVE CORONARY ARTERY WITHOUT ANGINA PECTORIS: ICD-10-CM

## 2020-09-16 DIAGNOSIS — Z79.01 LONG TERM (CURRENT) USE OF ANTICOAGULANTS: ICD-10-CM

## 2020-12-30 ENCOUNTER — OUTPATIENT (OUTPATIENT)
Dept: EMERGENCY DEPT | Facility: HOSPITAL | Age: 85
LOS: 1 days | Discharge: NURSING, REHAB OR LONG-TERM CARE | End: 2020-12-30
Payer: MEDICARE

## 2020-12-30 VITALS
DIASTOLIC BLOOD PRESSURE: 83 MMHG | RESPIRATION RATE: 16 BRPM | OXYGEN SATURATION: 97 % | HEART RATE: 68 BPM | HEIGHT: 72 IN | WEIGHT: 199.96 LBS | SYSTOLIC BLOOD PRESSURE: 106 MMHG | TEMPERATURE: 98 F

## 2020-12-30 DIAGNOSIS — I12.9 HYPERTENSIVE CHRONIC KIDNEY DISEASE WITH STAGE 1 THROUGH STAGE 4 CHRONIC KIDNEY DISEASE, OR UNSPECIFIED CHRONIC KIDNEY DISEASE: ICD-10-CM

## 2020-12-30 DIAGNOSIS — N40.0 BENIGN PROSTATIC HYPERPLASIA WITHOUT LOWER URINARY TRACT SYMPTOMS: ICD-10-CM

## 2020-12-30 DIAGNOSIS — N18.30 CHRONIC KIDNEY DISEASE, STAGE 3 UNSPECIFIED: ICD-10-CM
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
## 2020-12-30 DIAGNOSIS — R09.02 HYPOXEMIA: ICD-10-CM

## 2020-12-30 DIAGNOSIS — T18.128A FOOD IN ESOPHAGUS CAUSING OTHER INJURY, INITIAL ENCOUNTER: ICD-10-CM

## 2020-12-30 DIAGNOSIS — E78.5 HYPERLIPIDEMIA, UNSPECIFIED: ICD-10-CM

## 2020-12-30 DIAGNOSIS — Z79.4 LONG TERM (CURRENT) USE OF INSULIN: ICD-10-CM
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
## 2020-12-30 DIAGNOSIS — Z87.442 PERSONAL HISTORY OF URINARY CALCULI: ICD-10-CM

## 2020-12-30 DIAGNOSIS — K21.9 GASTRO-ESOPHAGEAL REFLUX DISEASE WITHOUT ESOPHAGITIS: ICD-10-CM

## 2020-12-30 DIAGNOSIS — E11.22 TYPE 2 DIABETES MELLITUS WITH DIABETIC CHRONIC KIDNEY DISEASE: ICD-10-CM

## 2020-12-30 DIAGNOSIS — Z79.01 LONG TERM (CURRENT) USE OF ANTICOAGULANTS: ICD-10-CM

## 2020-12-30 DIAGNOSIS — Z98.890 OTHER SPECIFIED POSTPROCEDURAL STATES: Chronic | ICD-10-CM

## 2020-12-30 DIAGNOSIS — Z88.5 ALLERGY STATUS TO NARCOTIC AGENT: ICD-10-CM

## 2020-12-30 DIAGNOSIS — K20.80 OTHER ESOPHAGITIS WITHOUT BLEEDING: ICD-10-CM

## 2020-12-30 DIAGNOSIS — T17.208A UNSPECIFIED FOREIGN BODY IN PHARYNX CAUSING OTHER INJURY, INITIAL ENCOUNTER: ICD-10-CM

## 2020-12-30 LAB
ANION GAP SERPL CALC-SCNC: 4 MMOL/L — LOW (ref 5–17)
APTT BLD: 30.9 SEC — SIGNIFICANT CHANGE UP (ref 27.5–35.5)
BASOPHILS # BLD AUTO: 0.01 K/UL — SIGNIFICANT CHANGE UP (ref 0–0.2)
BASOPHILS NFR BLD AUTO: 0.1 % — SIGNIFICANT CHANGE UP (ref 0–2)
BUN SERPL-MCNC: 27 MG/DL — HIGH (ref 7–23)
CALCIUM SERPL-MCNC: 8.9 MG/DL — SIGNIFICANT CHANGE UP (ref 8.5–10.1)
CHLORIDE SERPL-SCNC: 111 MMOL/L — HIGH (ref 96–108)
CO2 SERPL-SCNC: 25 MMOL/L — SIGNIFICANT CHANGE UP (ref 22–31)
CREAT SERPL-MCNC: 1.45 MG/DL — HIGH (ref 0.5–1.3)
EOSINOPHIL # BLD AUTO: 0.03 K/UL — SIGNIFICANT CHANGE UP (ref 0–0.5)
EOSINOPHIL NFR BLD AUTO: 0.4 % — SIGNIFICANT CHANGE UP (ref 0–6)
GLUCOSE SERPL-MCNC: 283 MG/DL — HIGH (ref 70–99)
HCT VFR BLD CALC: 40.2 % — SIGNIFICANT CHANGE UP (ref 39–50)
HGB BLD-MCNC: 13.1 G/DL — SIGNIFICANT CHANGE UP (ref 13–17)
IMM GRANULOCYTES NFR BLD AUTO: 0.4 % — SIGNIFICANT CHANGE UP (ref 0–1.5)
INR BLD: 1.23 RATIO — HIGH (ref 0.88–1.16)
LYMPHOCYTES # BLD AUTO: 0.62 K/UL — LOW (ref 1–3.3)
LYMPHOCYTES # BLD AUTO: 8.7 % — LOW (ref 13–44)
MCHC RBC-ENTMCNC: 32.1 PG — SIGNIFICANT CHANGE UP (ref 27–34)
MCHC RBC-ENTMCNC: 32.6 GM/DL — SIGNIFICANT CHANGE UP (ref 32–36)
MCV RBC AUTO: 98.5 FL — SIGNIFICANT CHANGE UP (ref 80–100)
MONOCYTES # BLD AUTO: 0.54 K/UL — SIGNIFICANT CHANGE UP (ref 0–0.9)
MONOCYTES NFR BLD AUTO: 7.6 % — SIGNIFICANT CHANGE UP (ref 2–14)
NEUTROPHILS # BLD AUTO: 5.92 K/UL — SIGNIFICANT CHANGE UP (ref 1.8–7.4)
NEUTROPHILS NFR BLD AUTO: 82.8 % — HIGH (ref 43–77)
PLATELET # BLD AUTO: 202 K/UL — SIGNIFICANT CHANGE UP (ref 150–400)
POTASSIUM SERPL-MCNC: 4.3 MMOL/L — SIGNIFICANT CHANGE UP (ref 3.5–5.3)
POTASSIUM SERPL-SCNC: 4.3 MMOL/L — SIGNIFICANT CHANGE UP (ref 3.5–5.3)
PROTHROM AB SERPL-ACNC: 14.1 SEC — HIGH (ref 10.6–13.6)
RBC # BLD: 4.08 M/UL — LOW (ref 4.2–5.8)
RBC # FLD: 14 % — SIGNIFICANT CHANGE UP (ref 10.3–14.5)
SARS-COV-2 RNA SPEC QL NAA+PROBE: SIGNIFICANT CHANGE UP
SODIUM SERPL-SCNC: 140 MMOL/L — SIGNIFICANT CHANGE UP (ref 135–145)
WBC # BLD: 7.15 K/UL — SIGNIFICANT CHANGE UP (ref 3.8–10.5)
WBC # FLD AUTO: 7.15 K/UL — SIGNIFICANT CHANGE UP (ref 3.8–10.5)

## 2020-12-30 PROCEDURE — 85027 COMPLETE CBC AUTOMATED: CPT

## 2020-12-30 PROCEDURE — 82962 GLUCOSE BLOOD TEST: CPT

## 2020-12-30 PROCEDURE — 83036 HEMOGLOBIN GLYCOSYLATED A1C: CPT

## 2020-12-30 PROCEDURE — 36415 COLL VENOUS BLD VENIPUNCTURE: CPT

## 2020-12-30 PROCEDURE — 71045 X-RAY EXAM CHEST 1 VIEW: CPT | Mod: 26

## 2020-12-30 PROCEDURE — 86769 SARS-COV-2 COVID-19 ANTIBODY: CPT

## 2020-12-30 PROCEDURE — 99204 OFFICE O/P NEW MOD 45 MIN: CPT

## 2020-12-30 PROCEDURE — 93010 ELECTROCARDIOGRAM REPORT: CPT

## 2020-12-30 PROCEDURE — 80048 BASIC METABOLIC PNL TOTAL CA: CPT

## 2020-12-30 RX ORDER — SODIUM CHLORIDE 9 MG/ML
1000 INJECTION, SOLUTION INTRAVENOUS
Refills: 0 | Status: DISCONTINUED | OUTPATIENT
Start: 2020-12-30 | End: 2020-12-31

## 2020-12-30 RX ORDER — GLIMEPIRIDE 1 MG
2 TABLET ORAL
Qty: 0 | Refills: 0 | DISCHARGE

## 2020-12-30 RX ORDER — DEXTROSE 50 % IN WATER 50 %
15 SYRINGE (ML) INTRAVENOUS ONCE
Refills: 0 | Status: DISCONTINUED | OUTPATIENT
Start: 2020-12-30 | End: 2020-12-31

## 2020-12-30 RX ORDER — DEXTROSE 50 % IN WATER 50 %
12.5 SYRINGE (ML) INTRAVENOUS ONCE
Refills: 0 | Status: DISCONTINUED | OUTPATIENT
Start: 2020-12-30 | End: 2020-12-31

## 2020-12-30 RX ORDER — DEXTROSE 50 % IN WATER 50 %
25 SYRINGE (ML) INTRAVENOUS ONCE
Refills: 0 | Status: DISCONTINUED | OUTPATIENT
Start: 2020-12-30 | End: 2020-12-31

## 2020-12-30 RX ORDER — FINASTERIDE 5 MG/1
5 TABLET, FILM COATED ORAL DAILY
Refills: 0 | Status: DISCONTINUED | OUTPATIENT
Start: 2020-12-30 | End: 2020-12-31

## 2020-12-30 RX ORDER — INSULIN LISPRO 100/ML
VIAL (ML) SUBCUTANEOUS
Refills: 0 | Status: DISCONTINUED | OUTPATIENT
Start: 2020-12-30 | End: 2020-12-31

## 2020-12-30 RX ORDER — TAMSULOSIN HYDROCHLORIDE 0.4 MG/1
0.4 CAPSULE ORAL AT BEDTIME
Refills: 0 | Status: DISCONTINUED | OUTPATIENT
Start: 2020-12-30 | End: 2020-12-31

## 2020-12-30 RX ORDER — METOPROLOL TARTRATE 50 MG
25 TABLET ORAL DAILY
Refills: 0 | Status: DISCONTINUED | OUTPATIENT
Start: 2020-12-30 | End: 2020-12-31

## 2020-12-30 RX ORDER — TRAMADOL HYDROCHLORIDE 50 MG/1
50 TABLET ORAL EVERY 6 HOURS
Refills: 0 | Status: DISCONTINUED | OUTPATIENT
Start: 2020-12-30 | End: 2020-12-31

## 2020-12-30 RX ORDER — CEFEPIME 1 G/1
2000 INJECTION, POWDER, FOR SOLUTION INTRAMUSCULAR; INTRAVENOUS EVERY 8 HOURS
Refills: 0 | Status: DISCONTINUED | OUTPATIENT
Start: 2020-12-31 | End: 2020-12-31

## 2020-12-30 RX ORDER — CEFEPIME 1 G/1
2000 INJECTION, POWDER, FOR SOLUTION INTRAMUSCULAR; INTRAVENOUS ONCE
Refills: 0 | Status: COMPLETED | OUTPATIENT
Start: 2020-12-30 | End: 2020-12-30

## 2020-12-30 RX ORDER — SODIUM CHLORIDE 9 MG/ML
1000 INJECTION INTRAMUSCULAR; INTRAVENOUS; SUBCUTANEOUS ONCE
Refills: 0 | Status: COMPLETED | OUTPATIENT
Start: 2020-12-30 | End: 2020-12-30

## 2020-12-30 RX ORDER — APIXABAN 2.5 MG/1
5 TABLET, FILM COATED ORAL
Refills: 0 | Status: DISCONTINUED | OUTPATIENT
Start: 2020-12-30 | End: 2020-12-31

## 2020-12-30 RX ORDER — PANTOPRAZOLE SODIUM 20 MG/1
40 TABLET, DELAYED RELEASE ORAL
Refills: 0 | Status: DISCONTINUED | OUTPATIENT
Start: 2020-12-30 | End: 2020-12-31

## 2020-12-30 RX ORDER — GLUCAGON INJECTION, SOLUTION 0.5 MG/.1ML
1 INJECTION, SOLUTION SUBCUTANEOUS ONCE
Refills: 0 | Status: DISCONTINUED | OUTPATIENT
Start: 2020-12-30 | End: 2020-12-31

## 2020-12-30 RX ORDER — GLUCAGON INJECTION, SOLUTION 0.5 MG/.1ML
1 INJECTION, SOLUTION SUBCUTANEOUS ONCE
Refills: 0 | Status: COMPLETED | OUTPATIENT
Start: 2020-12-30 | End: 2020-12-30

## 2020-12-30 RX ORDER — INSULIN LISPRO 100/ML
0 VIAL (ML) SUBCUTANEOUS
Qty: 0 | Refills: 0 | DISCHARGE

## 2020-12-30 RX ORDER — VALSARTAN 80 MG/1
160 TABLET ORAL DAILY
Refills: 0 | Status: DISCONTINUED | OUTPATIENT
Start: 2020-12-30 | End: 2020-12-31

## 2020-12-30 RX ORDER — CEFEPIME 1 G/1
INJECTION, POWDER, FOR SOLUTION INTRAMUSCULAR; INTRAVENOUS
Refills: 0 | Status: DISCONTINUED | OUTPATIENT
Start: 2020-12-30 | End: 2020-12-31

## 2020-12-30 RX ORDER — INSULIN GLARGINE 100 [IU]/ML
13 INJECTION, SOLUTION SUBCUTANEOUS
Qty: 0 | Refills: 0 | DISCHARGE

## 2020-12-30 RX ADMIN — SODIUM CHLORIDE 1000 MILLILITER(S): 9 INJECTION INTRAMUSCULAR; INTRAVENOUS; SUBCUTANEOUS at 14:36

## 2020-12-30 RX ADMIN — GLUCAGON INJECTION, SOLUTION 1 MILLIGRAM(S): 0.5 INJECTION, SOLUTION SUBCUTANEOUS at 14:36

## 2020-12-30 RX ADMIN — CEFEPIME 100 MILLIGRAM(S): 1 INJECTION, POWDER, FOR SOLUTION INTRAMUSCULAR; INTRAVENOUS at 22:19

## 2020-12-30 RX ADMIN — Medication 4: at 22:19

## 2020-12-30 RX ADMIN — APIXABAN 5 MILLIGRAM(S): 2.5 TABLET, FILM COATED ORAL at 22:49

## 2020-12-30 NOTE — H&P ADULT - NSICDXFAMILYHX_GEN_ALL_CORE_FT
FAMILY HISTORY:  No pertinent family history in first degree relatives, pt cannot recall any history of disease

## 2020-12-30 NOTE — ED PROVIDER NOTE - OBJECTIVE STATEMENT
87 y/o male with a PMHx of HTN, DM, HLD, presents to the ED s/p choking on chicken approximately 1 hour ago. States it was hard to eat chicken because it was dry and felt it stuck and lodged in throat since then was not able to swallow and coughing significantly and feels a foreign body in mid chest. States this happened 1 time prior years ago. 87 y/o male with a PMHx of HTN, DM, HLD, presents to the ED BIBEMS from [facility] s/p choking on chicken approximately 1 hour ago. States it was hard to eat chicken because it was dry and felt it stuck and lodged in throat since then was not able to swallow and coughing significantly and feels a foreign body in mid chest. States this happened 1 time prior years ago.

## 2020-12-30 NOTE — ED ADULT NURSE NOTE - OBJECTIVE STATEMENT
Pt. presents to the ED from [facility] for foreign object in throat. Pt. states he was eating chicken about 1 hour ago and it got stuck in throat. No respiratory distress at this time.

## 2020-12-30 NOTE — ED PROVIDER NOTE - NS_ ATTENDINGSCRIBEDETAILS _ED_A_ED_FT
I, Philippe Rich MD,  performed the initial face to face bedside interview with this patient regarding history of present illness, review of symptoms and relevant past medical, social and family history.  I completed an independent physical examination.    The history, relevant review of systems, past medical and surgical history, medical decision making, and physical examination was documented by the scribe in my presence and I attest to the accuracy of the documentation.

## 2020-12-30 NOTE — ED ADULT TRIAGE NOTE - CHIEF COMPLAINT QUOTE
Pt. to the ED BIBA from [facility] for Foreign Object in Throat. Pt. states he was eating chicken and Hour ago and it got stuck in throat. No respiratory distress at this time. Hx. of HTN, GERD, BPH, DM, Kidney Disease, on Eliquis--

## 2020-12-30 NOTE — ED ADULT NURSE NOTE - BREATH SOUNDS, MLM
Clear General Sunscreen Counseling: I recommended a broad spectrum sunscreen with a SPF of 30 or higher. I explained that SPF 30 sunscreens block approximately 97 percent of the sun's harmful rays. Sunscreens should be applied at least 15 minutes prior to expected sun exposure and then every 2 hours after that as long as sun exposure continues. If swimming or exercising sunscreen should be reapplied every 45 minutes to an hour after getting wet or sweating. One ounce, or the equivalent of a shot glass full of sunscreen, is adequate to protect the skin not covered by a bathing suit. I also recommended a lip balm with a sunscreen as well. Sun protective clothing can be used in lieu of sunscreen but must be worn the entire time you are exposed to the sun. Detail Level: Generalized

## 2020-12-30 NOTE — H&P ADULT - HISTORY OF PRESENT ILLNESS
Pt is a 87 yo male with a pmh/o Hydronephrosis/calculous kidney, HTN, HLD, DMII, HTN kidney disease who presented to ED from [facility] after choking on a piece of chicken. On arrival pt noted to have abnormal CXR with possible consolidation and foreign object sensation at the time for over one hour.     Pt was admitted to GI service and underwent EGD where distal esophageal inflammation noted and food bolus/impaction advanced. Pt recovered in PACU and is now asymptomatic and saturating well on RA.

## 2020-12-30 NOTE — ED ADULT NURSE NOTE - CAS TRG GENERAL NORM CIRC DET
Strong peripheral pulses Complex Repair And Tissue Cultured Epidermal Autograft Text: The defect edges were debeveled with a #15 scalpel blade.  The primary defect was closed partially with a complex linear closure.  Given the location of the defect, shape of the defect and the proximity to free margins an tissue cultured epidermal autograft was deemed most appropriate to repair the remaining defect.  The graft was trimmed to fit the size of the remaining defect.  The graft was then placed in the primary defect, oriented appropriately, and sutured into place.

## 2020-12-30 NOTE — CONSULT NOTE ADULT - ASSESSMENT
Imp:  88 M with esophageal food impaction.    Rec:  EGD. Risks and benefits reviewed. All ? answered

## 2020-12-30 NOTE — ED PROVIDER NOTE - ENMT, MLM
Airway patent, Nasal mucosa clear. Throat has no vesicles, no oropharyngeal exudates and uvula is midline. Actively drooling and spitting out own secretions, able to speak in full sentences without coughing.

## 2020-12-30 NOTE — CONSULT NOTE ADULT - SUBJECTIVE AND OBJECTIVE BOX
HPI:  87 y/o male with a PMHx of HTN, DM, HLD, presents to the ED BIBEMS from [facility] s/p choking on chicken approximately 1 hour ago. States it was hard to eat chicken because it was dry and felt it stuck and lodged in throat since then was not able to swallow and coughing significantly and feels a foreign body in mid chest. States this happened 1 time prior years ago.    PAST MEDICAL & SURGICAL HISTORY:  Hernia    Kidney stone    HLD (hyperlipidemia)    HTN (hypertension)    DM (diabetes mellitus)    H/O left inguinal hernia repair        Home Medications:  Eliquis 5 mg oral tablet: 1 tab(s) orally 2 times a day (08 Sep 2020 12:12)  finasteride 5 mg oral tablet: 1 tab(s) orally once a day (08 Sep 2020 12:12)  glimepiride 1 mg oral tablet: 2 tab(s) orally once a day (08 Sep 2020 12:12)  HumaLOG 100 units/mL injectable solution: injectable twice a day (after a meal)  ****Sliding Scale**** (08 Sep 2020 12:12)  Lantus 100 units/mL subcutaneous solution: 13 unit(s) subcutaneous once a day (08 Sep 2020 12:12)  metoprolol succinate 25 mg oral tablet, extended release: 1 tab(s) orally once a day (08 Sep 2020 12:12)  omeprazole 40 mg oral delayed release capsule: 1 cap(s) orally once a day (08 Sep 2020 12:12)  Tradjenta 5 mg oral tablet: 1 tab(s) orally once a day (08 Sep 2020 12:12)  valsartan 160 mg oral tablet: 1 tab(s) orally once a day (08 Sep 2020 12:12)      MEDICATIONS  (STANDING):    MEDICATIONS  (PRN):      Allergies    morphine (Other)    Intolerances        SOCIAL HISTORY:    FAMILY HISTORY:  No pertinent family history in first degree relatives        ROS  As above  Otherwise unremarkable    Vital Signs Last 24 Hrs  T(C): 36.4 (30 Dec 2020 14:16), Max: 36.4 (30 Dec 2020 14:16)  T(F): 97.5 (30 Dec 2020 14:16), Max: 97.5 (30 Dec 2020 14:16)  HR: 68 (30 Dec 2020 14:16) (68 - 68)  BP: 106/83 (30 Dec 2020 14:16) (106/83 - 106/83)  BP(mean): --  RR: 16 (30 Dec 2020 14:16) (16 - 16)  SpO2: 97% (30 Dec 2020 14:16) (97% - 97%)    Constitutional: NAD, well-developed  Respiratory: CTAB  Cardiovascular: S1 and S2  Gastrointestinal: BS+, soft, NT/ND  Extremities: No peripheral edema  Psychiatric: Normal mood, normal affect  Skin: No rashes    LABS:                        13.1   7.15  )-----------( 202      ( 30 Dec 2020 14:25 )             40.2     12-30    140  |  111<H>  |  27<H>  ----------------------------<  283<H>  4.3   |  25  |  1.45<H>    Ca    8.9      30 Dec 2020 14:25      PT/INR - ( 30 Dec 2020 14:25 )   PT: 14.1 sec;   INR: 1.23 ratio         PTT - ( 30 Dec 2020 14:25 )  PTT:30.9 sec      RADIOLOGY & ADDITIONAL STUDIES:

## 2020-12-30 NOTE — H&P ADULT - ASSESSMENT
87 yo male admitted due to :    Food Impaction complicated by transient hypoxia concerning for aspiration pneumonia  admit to med surg  transfer to medical floor  modified diet  cont home meds  aspiration/fall precautions  cefepime started for broad coverage  cont apixaban for DVT ppx    DMII  ISS  hold oral medications  HbA1c  POC qAC/hs  Carb restriction     HTN/HLD  cont BB  cont valsartan    BPH/CKDIII  cont tamsulosin  cont finasteride  baseline Cr around 1.5  Strict i/o's

## 2020-12-31 ENCOUNTER — TRANSCRIPTION ENCOUNTER (OUTPATIENT)
Age: 85
End: 2020-12-31

## 2020-12-31 VITALS
TEMPERATURE: 99 F | HEART RATE: 73 BPM | SYSTOLIC BLOOD PRESSURE: 135 MMHG | RESPIRATION RATE: 18 BRPM | OXYGEN SATURATION: 96 % | DIASTOLIC BLOOD PRESSURE: 74 MMHG

## 2020-12-31 DIAGNOSIS — R19.8 OTHER SPECIFIED SYMPTOMS AND SIGNS INVOLVING THE DIGESTIVE SYSTEM AND ABDOMEN: Chronic | ICD-10-CM

## 2020-12-31 LAB
A1C WITH ESTIMATED AVERAGE GLUCOSE RESULT: 8.3 % — HIGH (ref 4–5.6)
ANION GAP SERPL CALC-SCNC: 5 MMOL/L — SIGNIFICANT CHANGE UP (ref 5–17)
BUN SERPL-MCNC: 23 MG/DL — SIGNIFICANT CHANGE UP (ref 7–23)
CALCIUM SERPL-MCNC: 8.5 MG/DL — SIGNIFICANT CHANGE UP (ref 8.5–10.1)
CHLORIDE SERPL-SCNC: 114 MMOL/L — HIGH (ref 96–108)
CO2 SERPL-SCNC: 22 MMOL/L — SIGNIFICANT CHANGE UP (ref 22–31)
CREAT SERPL-MCNC: 1.09 MG/DL — SIGNIFICANT CHANGE UP (ref 0.5–1.3)
ESTIMATED AVERAGE GLUCOSE: 192 MG/DL — HIGH (ref 68–114)
GLUCOSE SERPL-MCNC: 186 MG/DL — HIGH (ref 70–99)
HCT VFR BLD CALC: 35.2 % — LOW (ref 39–50)
HGB BLD-MCNC: 11.4 G/DL — LOW (ref 13–17)
MCHC RBC-ENTMCNC: 31.3 PG — SIGNIFICANT CHANGE UP (ref 27–34)
MCHC RBC-ENTMCNC: 32.4 GM/DL — SIGNIFICANT CHANGE UP (ref 32–36)
MCV RBC AUTO: 96.7 FL — SIGNIFICANT CHANGE UP (ref 80–100)
PLATELET # BLD AUTO: 165 K/UL — SIGNIFICANT CHANGE UP (ref 150–400)
POTASSIUM SERPL-MCNC: 3.7 MMOL/L — SIGNIFICANT CHANGE UP (ref 3.5–5.3)
POTASSIUM SERPL-SCNC: 3.7 MMOL/L — SIGNIFICANT CHANGE UP (ref 3.5–5.3)
RBC # BLD: 3.64 M/UL — LOW (ref 4.2–5.8)
RBC # FLD: 14 % — SIGNIFICANT CHANGE UP (ref 10.3–14.5)
SARS-COV-2 IGG SERPL QL IA: NEGATIVE — SIGNIFICANT CHANGE UP
SARS-COV-2 IGM SERPL IA-ACNC: 0.06 INDEX — SIGNIFICANT CHANGE UP
SODIUM SERPL-SCNC: 141 MMOL/L — SIGNIFICANT CHANGE UP (ref 135–145)
WBC # BLD: 7.14 K/UL — SIGNIFICANT CHANGE UP (ref 3.8–10.5)
WBC # FLD AUTO: 7.14 K/UL — SIGNIFICANT CHANGE UP (ref 3.8–10.5)

## 2020-12-31 PROCEDURE — 99239 HOSP IP/OBS DSCHRG MGMT >30: CPT

## 2020-12-31 RX ORDER — INFLUENZA VIRUS VACCINE 15; 15; 15; 15 UG/.5ML; UG/.5ML; UG/.5ML; UG/.5ML
0.5 SUSPENSION INTRAMUSCULAR ONCE
Refills: 0 | Status: DISCONTINUED | OUTPATIENT
Start: 2020-12-31 | End: 2020-12-31

## 2020-12-31 RX ORDER — BENZOCAINE AND MENTHOL 5; 1 G/100ML; G/100ML
1 LIQUID ORAL
Qty: 15 | Refills: 0
Start: 2020-12-31 | End: 2021-01-04

## 2020-12-31 RX ORDER — SODIUM CHLORIDE 9 MG/ML
1000 INJECTION, SOLUTION INTRAVENOUS
Refills: 0 | Status: DISCONTINUED | OUTPATIENT
Start: 2020-12-31 | End: 2020-12-31

## 2020-12-31 RX ORDER — BENZOCAINE AND MENTHOL 5; 1 G/100ML; G/100ML
1 LIQUID ORAL THREE TIMES A DAY
Refills: 0 | Status: DISCONTINUED | OUTPATIENT
Start: 2020-12-31 | End: 2020-12-31

## 2020-12-31 RX ADMIN — CEFEPIME 100 MILLIGRAM(S): 1 INJECTION, POWDER, FOR SOLUTION INTRAMUSCULAR; INTRAVENOUS at 05:05

## 2020-12-31 RX ADMIN — PANTOPRAZOLE SODIUM 40 MILLIGRAM(S): 20 TABLET, DELAYED RELEASE ORAL at 05:05

## 2020-12-31 RX ADMIN — Medication 2: at 08:37

## 2020-12-31 RX ADMIN — Medication 25 MILLIGRAM(S): at 10:52

## 2020-12-31 RX ADMIN — APIXABAN 5 MILLIGRAM(S): 2.5 TABLET, FILM COATED ORAL at 10:52

## 2020-12-31 RX ADMIN — CEFEPIME 100 MILLIGRAM(S): 1 INJECTION, POWDER, FOR SOLUTION INTRAMUSCULAR; INTRAVENOUS at 13:16

## 2020-12-31 RX ADMIN — VALSARTAN 160 MILLIGRAM(S): 80 TABLET ORAL at 10:52

## 2020-12-31 RX ADMIN — Medication 2: at 13:24

## 2020-12-31 RX ADMIN — FINASTERIDE 5 MILLIGRAM(S): 5 TABLET, FILM COATED ORAL at 10:52

## 2020-12-31 NOTE — PATIENT PROFILE ADULT - DO YOU NEED ADDITIONAL SERVICES TO MANAGE ANY OF THESE MEDICAL CONDITIONS AT HOME?
Pt started Lonsurf this week, He is calling to report mild nausea. He hasn't vomited but he is going to [place] in a few days for a week and wants something in case he needs it.  He is also reporting a fingernail size reddened area on his right chest between 2 resection sites. He noticed it 4-5 days ago. Bright red, not getting bigger or smaller. Doesn't hurt or itch.  Looks like the skin was scratched off. He doesn't know if he scratched himself in his sleep.  He sent a pic which was reviewed with Nicky.  Looks like dry skin. Plan is to keep area moist with Eucerin or Lubriderm. Zofran sent to [facility] in [place]. Pt was called back with plan.    no

## 2020-12-31 NOTE — DISCHARGE NOTE NURSING/CASE MANAGEMENT/SOCIAL WORK - PATIENT PORTAL LINK FT
You can access the FollowMyHealth Patient Portal offered by [facility] by registering at the following website: [email or web address]. By joining [facility]’s FollowMyHealth portal, you will also be able to view your health information using other applications (apps) compatible with our system.

## 2020-12-31 NOTE — PROGRESS NOTE ADULT - SUBJECTIVE AND OBJECTIVE BOX
EGD:  Food in esophagus, gently pushed into stomach  Inflammation in distal esophagus, likely from recent food impaction  O/W normal    Rec:  Admit for overnight obs as patient was mildly hypoxic prior to procedure and probably had aspiration pre-procedure.
Patient is a 88y old  Male who presents with a chief complaint of Food Impaction (30 Dec 2020 22:13)      Subective:  Feels good    PAST MEDICAL & SURGICAL HISTORY:  Hernia    Kidney stone    HLD (hyperlipidemia)    HTN (hypertension)    DM (diabetes mellitus)    Endoscopy finding  choked on piece of chiken-had upper endo with clearance of food    H/O left inguinal hernia repair        MEDICATIONS  (STANDING):  apixaban 5 milliGRAM(s) Oral two times a day  cefepime   IVPB      cefepime   IVPB 2000 milliGRAM(s) IV Intermittent every 8 hours  dextrose 40% Gel 15 Gram(s) Oral once  dextrose 5%. 1000 milliLiter(s) (50 mL/Hr) IV Continuous <Continuous>  dextrose 5%. 1000 milliLiter(s) (100 mL/Hr) IV Continuous <Continuous>  dextrose 50% Injectable 25 Gram(s) IV Push once  dextrose 50% Injectable 12.5 Gram(s) IV Push once  dextrose 50% Injectable 25 Gram(s) IV Push once  finasteride 5 milliGRAM(s) Oral daily  glucagon  Injectable 1 milliGRAM(s) IntraMuscular once  influenza   Vaccine 0.5 milliLiter(s) IntraMuscular once  insulin lispro (ADMELOG) corrective regimen sliding scale   SubCutaneous three times a day before meals  lactated ringers. 1000 milliLiter(s) (75 mL/Hr) IV Continuous <Continuous>  metoprolol succinate ER 25 milliGRAM(s) Oral daily  pantoprazole    Tablet 40 milliGRAM(s) Oral before breakfast  tamsulosin 0.4 milliGRAM(s) Oral at bedtime  valsartan 160 milliGRAM(s) Oral daily    MEDICATIONS  (PRN):  traMADol 50 milliGRAM(s) Oral every 6 hours PRN Severe Pain (7 - 10)      REVIEW OF SYSTEMS:    RESPIRATORY: No shortness of breath  CARDIOVASCULAR: No chest pain  All other review of systems is negative unless indicated above.    Vital Signs Last 24 Hrs  T(C): 36.9 (31 Dec 2020 01:23), Max: 36.9 (31 Dec 2020 01:23)  T(F): 98.4 (31 Dec 2020 01:23), Max: 98.4 (31 Dec 2020 01:23)  HR: 94 (31 Dec 2020 01:23) (65 - 94)  BP: 160/71 (31 Dec 2020 01:23) (106/83 - 163/69)  BP(mean): 94 (30 Dec 2020 16:44) (94 - 94)  RR: 18 (31 Dec 2020 01:23) (16 - 20)  SpO2: 97% (31 Dec 2020 01:23) (95% - 98%)    PHYSICAL EXAM:    Constitutional: NAD, well-developed  Respiratory: CTAB  Cardiovascular: S1 and S2, RRR  Gastrointestinal: BS+, soft, NT/ND  Extremities: No peripheral edema  Psychiatric: Normal mood, normal affect    LABS:                        13.1   7.15  )-----------( 202      ( 30 Dec 2020 14:25 )             40.2     12-30    140  |  111<H>  |  27<H>  ----------------------------<  283<H>  4.3   |  25  |  1.45<H>    Ca    8.9      30 Dec 2020 14:25      PT/INR - ( 30 Dec 2020 14:25 )   PT: 14.1 sec;   INR: 1.23 ratio         PTT - ( 30 Dec 2020 14:25 )  PTT:30.9 sec      RADIOLOGY & ADDITIONAL STUDIES:

## 2020-12-31 NOTE — DISCHARGE NOTE PROVIDER - HOSPITAL COURSE
HPI: Pt is a 87 yo male with a pmh/o Hydronephrosis/calculous kidney, HTN, HLD, DMII, HTN kidney disease who presented to ED from [facility] after choking on a piece of chicken. On arrival pt noted to have abnormal CXR with possible consolidation and foreign object sensation at the time for over one hour.       HOSPITAL COURSE:   Pt was admitted and underwent EGD where distal esophageal inflammation noted and food bolus/impaction advanced.   Pt recovered in PACU and is now asymptomatic and saturating well on RA. 87 yo male admitted due to :    Food Impaction complicated by transient hypoxia concerning for aspiration pneumonia  cont home meds  aspiration/fall precautions  cefepime started for broad coverage - switch to Augmentin for 5days as OP   Pt is noted to have a left pleural effusion now and on previous CXRs  recommend follow-up with pulm as OP - he has seen Dr Frankel in the past   cont apixaban  - this is home med - on it for afib? will continue - monitor H&H while on AC     DM  hold oral medications  HbA1c  POC qAC/hs  Carb restriction     HTN/HLD  cont BB  cont valsartan    BPH/CKDIII  cont tamsulosin  cont finasteride  baseline Cr around 1.5  Strict i/o's    OTHER DETAILS  12/31 - stable s/p procedure, some throat pain but able to tolerate po intake  PHYSICAL EXAM:  GENERAL: NAD, sititng up in bed   HEAD:  Atraumatic, Normocephalic  EYES: EOMI, PERRLA, normal sclera  ENT: Moist mucous membranes  NECK: Supple, No JVD, no nuchal rigidity  CHEST/LUNG: Clear to auscultation bilaterally; No rales, rhonchi, wheezing, or rubs. Unlabored respirations  HEART: Regular rate and rhythm; No murmurs, rubs, or gallops  ABDOMEN: Bowel sounds present; Soft, Nontender, Nondistended. No hepatomegaly  EXTREMITIES:  no pitting bilaterally  NERVOUS SYSTEM:  Alert & Oriented X3, speech clear. No focal motor or sensory deficits  MSK: FROM all 4 extremities, full and equal strength  SKIN: No rashes or lesions    LABS: All Labs Reviewed:                     11.4   7.14  )-----------( 165      ( 31 Dec 2020 07:49 )             35.2   141  |  114<H>  |  23  ----------------------------<  186<H>  3.7   |  22  |  1.09  PT/INR - ( 30 Dec 2020 14:25 )   PT: 14.1 sec;   INR: 1.23 ratio      RADIOLOGY/EKG:< from: Xray Chest 1 View-PORTABLE IMMEDIATE (Xray Chest 1 View-PORTABLE IMMEDIATE .) (12.30.20 @ 14:46) >  IMPRESSION:   No radiopaque foreign body.  LEFT lower lobe retrocardiac airspace consolidation and/or effusion..    time spent on discharge - 55 mins

## 2020-12-31 NOTE — PATIENT PROFILE ADULT - VISION (WITH CORRECTIVE LENSES IF THE PATIENT USUALLY WEARS THEM):
1 pair of glasses/Partially impaired: cannot see medication labels or newsprint, but can see obstacles in path, and the surrounding layout; can count fingers at arm's length

## 2020-12-31 NOTE — DISCHARGE NOTE PROVIDER - PROVIDER TOKENS
PROVIDER:[TOKEN:[[id number]],FOLLOWUP:[2 weeks]],FREE:[LAST:[Follow-up with your physician at the NH],PHONE:[(   )    -],FAX:[(   )    -],FOLLOWUP:[1-3 days]]

## 2020-12-31 NOTE — DISCHARGE NOTE PROVIDER - NSDCMRMEDTOKEN_GEN_ALL_CORE_FT
Alogliptin 12.5 mg oral tablet: 1 tab(s) orally once a day  amoxicillin-clavulanate 875 mg-125 mg oral tablet: 1 tab(s) orally every 12 hours for 5 days   Eliquis 5 mg oral tablet: 1 tab(s) orally 2 times a day  finasteride 5 mg oral tablet: 1 tab(s) orally once a day  Flomax 0.4 mg oral capsule: 1 cap(s) orally once a day   glimepiride 1 mg oral tablet: 2 tab(s) orally 2 times a day  menthol-benzocaine 3.6 mg-15 mg mucous membrane lozenge: 1 lozenge mucous membrane 3 times a day   metoprolol succinate 25 mg oral tablet, extended release: 1 tab(s) orally once a day  omeprazole 40 mg oral delayed release capsule: 1 cap(s) orally once a day  Tradjenta 5 mg oral tablet: 1 tab(s) orally once a day  traMADol 50 mg oral tablet: 1 tab(s) orally every 6 hours, As needed, Moderate Pain (4 - 6) MDD:200mg  valsartan 160 mg oral tablet: 1 tab(s) orally once a day

## 2020-12-31 NOTE — DISCHARGE NOTE PROVIDER - CARE PROVIDER_API CALL
Héctor Frankel  INTERNAL MEDICINE  [address] [place] [address]  Phone: [phone]  Fax: [phone]  Follow Up Time: 2 weeks    Follow-up with your physician at the NH,   Phone: (   )    -  Fax: (   )    -  Follow Up Time: 1-3 days

## 2020-12-31 NOTE — PROGRESS NOTE ADULT - ASSESSMENT
Imp:  Esophageal food impaction, doing well now    Rec:  Mechanical soft diet, potentially advance in a few weeks if OK  Still must be cautious long term with meat  D/C today if tolerates POs

## 2020-12-31 NOTE — DISCHARGE NOTE PROVIDER - NSDCCPCAREPLAN_GEN_ALL_CORE_FT
PRINCIPAL DISCHARGE DIAGNOSIS  Diagnosis: Foreign body in throat  Assessment and Plan of Treatment: s/p extraction. Suspected aspiration pneumonia, complete augmentin in 5 days. Take with food.

## 2021-02-22 ENCOUNTER — INPATIENT (INPATIENT)
Facility: HOSPITAL | Age: 86
LOS: 2 days | Discharge: NURSING, REHAB OR LONG-TERM CARE | DRG: 393 | End: 2021-02-25
Attending: INTERNAL MEDICINE | Admitting: HOSPITALIST
Payer: MEDICARE

## 2021-02-22 VITALS
TEMPERATURE: 97 F | OXYGEN SATURATION: 94 % | RESPIRATION RATE: 24 BRPM | SYSTOLIC BLOOD PRESSURE: 170 MMHG | WEIGHT: 315 LBS | DIASTOLIC BLOOD PRESSURE: 62 MMHG | HEART RATE: 96 BPM | HEIGHT: 72 IN

## 2021-02-22 DIAGNOSIS — R19.8 OTHER SPECIFIED SYMPTOMS AND SIGNS INVOLVING THE DIGESTIVE SYSTEM AND ABDOMEN: Chronic | ICD-10-CM

## 2021-02-22 DIAGNOSIS — Z98.890 OTHER SPECIFIED POSTPROCEDURAL STATES: Chronic | ICD-10-CM

## 2021-02-22 DIAGNOSIS — R09.89 OTHER SPECIFIED SYMPTOMS AND SIGNS INVOLVING THE CIRCULATORY AND RESPIRATORY SYSTEMS: ICD-10-CM

## 2021-02-22 LAB
ALBUMIN SERPL ELPH-MCNC: 3.1 G/DL — LOW (ref 3.3–5)
ALP SERPL-CCNC: 97 U/L — SIGNIFICANT CHANGE UP (ref 40–120)
ALT FLD-CCNC: 17 U/L — SIGNIFICANT CHANGE UP (ref 12–78)
ANION GAP SERPL CALC-SCNC: 7 MMOL/L — SIGNIFICANT CHANGE UP (ref 5–17)
APTT BLD: 34.3 SEC — SIGNIFICANT CHANGE UP (ref 27.5–35.5)
AST SERPL-CCNC: 16 U/L — SIGNIFICANT CHANGE UP (ref 15–37)
BASOPHILS # BLD AUTO: 0.02 K/UL — SIGNIFICANT CHANGE UP (ref 0–0.2)
BASOPHILS NFR BLD AUTO: 0.4 % — SIGNIFICANT CHANGE UP (ref 0–2)
BILIRUB SERPL-MCNC: 0.2 MG/DL — SIGNIFICANT CHANGE UP (ref 0.2–1.2)
BUN SERPL-MCNC: 28 MG/DL — HIGH (ref 7–23)
CALCIUM SERPL-MCNC: 9.1 MG/DL — SIGNIFICANT CHANGE UP (ref 8.5–10.1)
CHLORIDE SERPL-SCNC: 108 MMOL/L — SIGNIFICANT CHANGE UP (ref 96–108)
CO2 SERPL-SCNC: 27 MMOL/L — SIGNIFICANT CHANGE UP (ref 22–31)
CREAT SERPL-MCNC: 1.67 MG/DL — HIGH (ref 0.5–1.3)
EOSINOPHIL # BLD AUTO: 0.07 K/UL — SIGNIFICANT CHANGE UP (ref 0–0.5)
EOSINOPHIL NFR BLD AUTO: 1.4 % — SIGNIFICANT CHANGE UP (ref 0–6)
GLUCOSE SERPL-MCNC: 198 MG/DL — HIGH (ref 70–99)
HCT VFR BLD CALC: 38.5 % — LOW (ref 39–50)
HGB BLD-MCNC: 12.3 G/DL — LOW (ref 13–17)
IMM GRANULOCYTES NFR BLD AUTO: 0.4 % — SIGNIFICANT CHANGE UP (ref 0–1.5)
INR BLD: 1.12 RATIO — SIGNIFICANT CHANGE UP (ref 0.88–1.16)
LYMPHOCYTES # BLD AUTO: 0.69 K/UL — LOW (ref 1–3.3)
LYMPHOCYTES # BLD AUTO: 13.8 % — SIGNIFICANT CHANGE UP (ref 13–44)
MCHC RBC-ENTMCNC: 31.6 PG — SIGNIFICANT CHANGE UP (ref 27–34)
MCHC RBC-ENTMCNC: 31.9 GM/DL — LOW (ref 32–36)
MCV RBC AUTO: 99 FL — SIGNIFICANT CHANGE UP (ref 80–100)
MONOCYTES # BLD AUTO: 0.76 K/UL — SIGNIFICANT CHANGE UP (ref 0–0.9)
MONOCYTES NFR BLD AUTO: 15.2 % — HIGH (ref 2–14)
NEUTROPHILS # BLD AUTO: 3.45 K/UL — SIGNIFICANT CHANGE UP (ref 1.8–7.4)
NEUTROPHILS NFR BLD AUTO: 68.8 % — SIGNIFICANT CHANGE UP (ref 43–77)
PLATELET # BLD AUTO: 191 K/UL — SIGNIFICANT CHANGE UP (ref 150–400)
POTASSIUM SERPL-MCNC: 4.5 MMOL/L — SIGNIFICANT CHANGE UP (ref 3.5–5.3)
POTASSIUM SERPL-SCNC: 4.5 MMOL/L — SIGNIFICANT CHANGE UP (ref 3.5–5.3)
PROT SERPL-MCNC: 6.8 GM/DL — SIGNIFICANT CHANGE UP (ref 6–8.3)
PROTHROM AB SERPL-ACNC: 12.9 SEC — SIGNIFICANT CHANGE UP (ref 10.6–13.6)
RBC # BLD: 3.89 M/UL — LOW (ref 4.2–5.8)
RBC # FLD: 14.7 % — HIGH (ref 10.3–14.5)
SODIUM SERPL-SCNC: 142 MMOL/L — SIGNIFICANT CHANGE UP (ref 135–145)
WBC # BLD: 5.01 K/UL — SIGNIFICANT CHANGE UP (ref 3.8–10.5)
WBC # FLD AUTO: 5.01 K/UL — SIGNIFICANT CHANGE UP (ref 3.8–10.5)

## 2021-02-22 PROCEDURE — 82962 GLUCOSE BLOOD TEST: CPT

## 2021-02-22 PROCEDURE — U0005: CPT

## 2021-02-22 PROCEDURE — 80048 BASIC METABOLIC PNL TOTAL CA: CPT

## 2021-02-22 PROCEDURE — 85027 COMPLETE CBC AUTOMATED: CPT

## 2021-02-22 PROCEDURE — G0008: CPT

## 2021-02-22 PROCEDURE — 99223 1ST HOSP IP/OBS HIGH 75: CPT

## 2021-02-22 PROCEDURE — 71045 X-RAY EXAM CHEST 1 VIEW: CPT | Mod: 26

## 2021-02-22 PROCEDURE — 97162 PT EVAL MOD COMPLEX 30 MIN: CPT | Mod: GP

## 2021-02-22 PROCEDURE — U0003: CPT

## 2021-02-22 PROCEDURE — 85025 COMPLETE CBC W/AUTO DIFF WBC: CPT

## 2021-02-22 PROCEDURE — 90686 IIV4 VACC NO PRSV 0.5 ML IM: CPT

## 2021-02-22 PROCEDURE — 92610 EVALUATE SWALLOWING FUNCTION: CPT | Mod: GN

## 2021-02-22 PROCEDURE — C9113: CPT

## 2021-02-22 PROCEDURE — 97116 GAIT TRAINING THERAPY: CPT | Mod: GP

## 2021-02-22 PROCEDURE — 36415 COLL VENOUS BLD VENIPUNCTURE: CPT

## 2021-02-22 PROCEDURE — 93005 ELECTROCARDIOGRAM TRACING: CPT

## 2021-02-22 PROCEDURE — 86769 SARS-COV-2 COVID-19 ANTIBODY: CPT

## 2021-02-22 PROCEDURE — 93010 ELECTROCARDIOGRAM REPORT: CPT

## 2021-02-22 RX ORDER — SODIUM CHLORIDE 9 MG/ML
1000 INJECTION INTRAMUSCULAR; INTRAVENOUS; SUBCUTANEOUS ONCE
Refills: 0 | Status: COMPLETED | OUTPATIENT
Start: 2021-02-22 | End: 2021-02-22

## 2021-02-22 RX ORDER — GLUCAGON INJECTION, SOLUTION 0.5 MG/.1ML
1 INJECTION, SOLUTION SUBCUTANEOUS ONCE
Refills: 0 | Status: COMPLETED | OUTPATIENT
Start: 2021-02-22 | End: 2021-02-22

## 2021-02-22 RX ORDER — LIDOCAINE 4 G/100G
5 CREAM TOPICAL ONCE
Refills: 0 | Status: COMPLETED | OUTPATIENT
Start: 2021-02-22 | End: 2021-02-22

## 2021-02-22 RX ADMIN — GLUCAGON INJECTION, SOLUTION 1 MILLIGRAM(S): 0.5 INJECTION, SOLUTION SUBCUTANEOUS at 20:14

## 2021-02-22 RX ADMIN — SODIUM CHLORIDE 1000 MILLILITER(S): 9 INJECTION INTRAMUSCULAR; INTRAVENOUS; SUBCUTANEOUS at 21:15

## 2021-02-22 RX ADMIN — LIDOCAINE 5 MILLILITER(S): 4 CREAM TOPICAL at 21:43

## 2021-02-22 RX ADMIN — Medication 30 MILLILITER(S): at 21:43

## 2021-02-22 NOTE — ED ADULT NURSE NOTE - OBJECTIVE STATEMENT
Pt presents to the ED A+Ox4 stating that he swallowed a piece of meat with dinner and feels like it is "stuck". Pt feels like piece of beef is in chest and feels slight mid sternal pain with swallowing. Denies SOB and states his breathing "feels good". Pt coughing up phlegm. States that he recently choked on a piece of chicken and had to be "brought to the operating room" to get it out. No respiratory distress noted. Patient speaking in complete sentences. SpO2 97% on room air.

## 2021-02-22 NOTE — ED PROVIDER NOTE - PROGRESS NOTE DETAILS
Patient seen and evaluated in intake with ED Attending,  ED attending note and orders reviewed, will continue with patient follow up and care -Petty Worley PA-C pt feeling better, tolerating po. will dc to f/u with dr hubbard pt spit up gi cocktail and water, gi paged discussed with dr butt, pt tolerating secretions , asked for admission forendoscopy in AM

## 2021-02-22 NOTE — ED PROVIDER NOTE - NSFOLLOWUPINSTRUCTIONS_ED_ALL_ED_FT
PLEASE FOLLOW UP WITH DR THAKUR.  EAT SOFT FOODS UNTIL YOU FOLLOW UP WITH GASTROENTEROLOGY PLEASE FOLLOW UP WITH DR HUBBARD.  EAT SOFT FOODS UNTIL YOU FOLLOW UP WITH GASTROENTEROLOGY    please drink more fluids, your kidney function was elevated, follow up with your primary doctor or dr hubbard

## 2021-02-22 NOTE — ED PROVIDER NOTE - PHYSICAL EXAMINATION
Constitutional: NAD AAOx3  Eyes: PERRLA EOMI  Head: Normocephalic atraumatic  Throat: throat clear, uvula is midline.  Mouth: MMM  Cardiac: regular rate   Resp: Lungs CTAB  GI: Abd s/nt/nd, no rebound or guarding.  Neuro: awake, alert, moving all extremities, cranial nerves 2-12 intact, sensation intact, no dysmetria.  Skin: No rashes

## 2021-02-22 NOTE — ED PROVIDER NOTE - OBJECTIVE STATEMENT
89 y/o male with PMHx of HLD, HTN, DM, kidney stone, hernia presents to the ED c/o food stuck. Pt ate a piece of beef and feels that it is stuck in throat and upper chest area. Pt able to swallow. No drooling, SOB. Pt is able to speak full sentences. Pt was scoped by Dr. Guerrier last week for a piece of chicken stuck in throat.

## 2021-02-22 NOTE — ED ADULT TRIAGE NOTE - CHIEF COMPLAINT QUOTE
Pt presents to er with complaints of choking on beef, pt choked on chicken last week and needed an endoscopy, pt states the piece of meat came up but still feels like something is stuch, pt bringing up phlegm/wretching denies sob at this time, pt is from [facility].

## 2021-02-22 NOTE — ED ADULT NURSE NOTE - NURSING MUSC JOINTS
Patient Information     Patient Name MRN Sex Gilles Munoz III [id number] Male 1969      Progress Notes by Charlie Dubose MD at 2017  2:30 PM     Author:  Charlie Dubose MD Service:  (none) Author Type:  Physician     Filed:  2017  4:35 PM Encounter Date:  2017 Status:  Signed     :  Charlie Dubose MD (Physician)            See H&P         no pain, swelling or deformity of joints

## 2021-02-22 NOTE — ED PROVIDER NOTE - PATIENT PORTAL LINK FT
You can access the FollowMyHealth Patient Portal offered by [facility] by registering at the following website: [email or web address]. By joining [employer]’s FollowMyHealth portal, you will also be able to view your health information using other applications (apps) compatible with our system.

## 2021-02-22 NOTE — ED ADULT NURSE NOTE - CHIEF COMPLAINT QUOTE
Pt presents to er with complaints of choking on beef, pt choked on chicken last week and needed an endoscopy, pt states the piece of meat came up but still feels like something is stuch, pt bringing up phlegm/wretching denies sob at this time, pt is from [facility].

## 2021-02-22 NOTE — ED PROVIDER NOTE - CARE PLAN
Principal Discharge DX:	Globus sensation   Principal Discharge DX:	Globus sensation  Secondary Diagnosis:	Acute kidney injury

## 2021-02-22 NOTE — ED PROVIDER NOTE - PSH
Endoscopy finding  choked on piece of chiken-had upper endo with clearance of food  H/O left inguinal hernia repair

## 2021-02-22 NOTE — ED ADULT NURSE REASSESSMENT NOTE - NS ED NURSE REASSESS COMMENT FT1
After glucagon injection, patient states he "feels better" and that the "food went down". Pt to be discharged. Upon water trial, patient unable to swallow one sip of water and spit the water up. MD made aware.

## 2021-02-22 NOTE — ED PROVIDER NOTE - CARE PROVIDER_API CALL
Mark Guerrier (MD)  Gastroenterology; Internal Medicine  [address] [facility], [address] [place] [address]  Phone: [phone]  Fax: [phone]  Follow Up Time: 1-3 Days

## 2021-02-22 NOTE — ED PROVIDER NOTE - NS ED ROS FT
Constitutional: No fever or chills  Eyes: No visual changes  HEENT: +discomfort sensation in throat/ upper chest area.  CV: No chest pain  Resp: No SOB no cough  GI: No abd pain, nausea or vomiting  : No dysuria  MSK: No musculoskeletal pain  Skin: No rash  Neuro: No headache

## 2021-02-23 LAB
ANION GAP SERPL CALC-SCNC: 10 MMOL/L — SIGNIFICANT CHANGE UP (ref 5–17)
BASOPHILS # BLD AUTO: 0.02 K/UL — SIGNIFICANT CHANGE UP (ref 0–0.2)
BASOPHILS NFR BLD AUTO: 0.2 % — SIGNIFICANT CHANGE UP (ref 0–2)
BUN SERPL-MCNC: 25 MG/DL — HIGH (ref 7–23)
CALCIUM SERPL-MCNC: 8.8 MG/DL — SIGNIFICANT CHANGE UP (ref 8.5–10.1)
CHLORIDE SERPL-SCNC: 113 MMOL/L — HIGH (ref 96–108)
CO2 SERPL-SCNC: 21 MMOL/L — LOW (ref 22–31)
CREAT SERPL-MCNC: 1.21 MG/DL — SIGNIFICANT CHANGE UP (ref 0.5–1.3)
EOSINOPHIL # BLD AUTO: 0.01 K/UL — SIGNIFICANT CHANGE UP (ref 0–0.5)
EOSINOPHIL NFR BLD AUTO: 0.1 % — SIGNIFICANT CHANGE UP (ref 0–6)
GLUCOSE SERPL-MCNC: 188 MG/DL — HIGH (ref 70–99)
HCT VFR BLD CALC: 38.2 % — LOW (ref 39–50)
HGB BLD-MCNC: 12.4 G/DL — LOW (ref 13–17)
IMM GRANULOCYTES NFR BLD AUTO: 0.3 % — SIGNIFICANT CHANGE UP (ref 0–1.5)
LYMPHOCYTES # BLD AUTO: 0.84 K/UL — LOW (ref 1–3.3)
LYMPHOCYTES # BLD AUTO: 9.8 % — LOW (ref 13–44)
MCHC RBC-ENTMCNC: 31.8 PG — SIGNIFICANT CHANGE UP (ref 27–34)
MCHC RBC-ENTMCNC: 32.5 GM/DL — SIGNIFICANT CHANGE UP (ref 32–36)
MCV RBC AUTO: 97.9 FL — SIGNIFICANT CHANGE UP (ref 80–100)
MONOCYTES # BLD AUTO: 0.91 K/UL — HIGH (ref 0–0.9)
MONOCYTES NFR BLD AUTO: 10.6 % — SIGNIFICANT CHANGE UP (ref 2–14)
NEUTROPHILS # BLD AUTO: 6.78 K/UL — SIGNIFICANT CHANGE UP (ref 1.8–7.4)
NEUTROPHILS NFR BLD AUTO: 79 % — HIGH (ref 43–77)
PLATELET # BLD AUTO: 185 K/UL — SIGNIFICANT CHANGE UP (ref 150–400)
POTASSIUM SERPL-MCNC: 4.1 MMOL/L — SIGNIFICANT CHANGE UP (ref 3.5–5.3)
POTASSIUM SERPL-SCNC: 4.1 MMOL/L — SIGNIFICANT CHANGE UP (ref 3.5–5.3)
RBC # BLD: 3.9 M/UL — LOW (ref 4.2–5.8)
RBC # FLD: 14.6 % — HIGH (ref 10.3–14.5)
SARS-COV-2 IGG SERPL QL IA: POSITIVE
SARS-COV-2 IGM SERPL IA-ACNC: 19.2 AU/ML — HIGH
SARS-COV-2 RNA SPEC QL NAA+PROBE: SIGNIFICANT CHANGE UP
SODIUM SERPL-SCNC: 144 MMOL/L — SIGNIFICANT CHANGE UP (ref 135–145)
WBC # BLD: 8.59 K/UL — SIGNIFICANT CHANGE UP (ref 3.8–10.5)
WBC # FLD AUTO: 8.59 K/UL — SIGNIFICANT CHANGE UP (ref 3.8–10.5)

## 2021-02-23 PROCEDURE — 99233 SBSQ HOSP IP/OBS HIGH 50: CPT

## 2021-02-23 RX ORDER — INSULIN GLARGINE 100 [IU]/ML
5 INJECTION, SOLUTION SUBCUTANEOUS EVERY MORNING
Refills: 0 | Status: DISCONTINUED | OUTPATIENT
Start: 2021-02-23 | End: 2021-02-25

## 2021-02-23 RX ORDER — SODIUM CHLORIDE 9 MG/ML
1000 INJECTION, SOLUTION INTRAVENOUS
Refills: 0 | Status: DISCONTINUED | OUTPATIENT
Start: 2021-02-23 | End: 2021-02-23

## 2021-02-23 RX ORDER — SODIUM CHLORIDE 9 MG/ML
1000 INJECTION, SOLUTION INTRAVENOUS
Refills: 0 | Status: DISCONTINUED | OUTPATIENT
Start: 2021-02-23 | End: 2021-02-25

## 2021-02-23 RX ORDER — APIXABAN 2.5 MG/1
5 TABLET, FILM COATED ORAL
Refills: 0 | Status: DISCONTINUED | OUTPATIENT
Start: 2021-02-23 | End: 2021-02-25

## 2021-02-23 RX ORDER — FINASTERIDE 5 MG/1
5 TABLET, FILM COATED ORAL DAILY
Refills: 0 | Status: DISCONTINUED | OUTPATIENT
Start: 2021-02-23 | End: 2021-02-25

## 2021-02-23 RX ORDER — FENTANYL CITRATE 50 UG/ML
50 INJECTION INTRAVENOUS
Refills: 0 | Status: DISCONTINUED | OUTPATIENT
Start: 2021-02-23 | End: 2021-02-23

## 2021-02-23 RX ORDER — INFLUENZA VIRUS VACCINE 15; 15; 15; 15 UG/.5ML; UG/.5ML; UG/.5ML; UG/.5ML
0.5 SUSPENSION INTRAMUSCULAR ONCE
Refills: 0 | Status: COMPLETED | OUTPATIENT
Start: 2021-02-23 | End: 2021-02-24

## 2021-02-23 RX ORDER — METOPROLOL TARTRATE 50 MG
25 TABLET ORAL DAILY
Refills: 0 | Status: DISCONTINUED | OUTPATIENT
Start: 2021-02-23 | End: 2021-02-25

## 2021-02-23 RX ORDER — GLUCAGON INJECTION, SOLUTION 0.5 MG/.1ML
1 INJECTION, SOLUTION SUBCUTANEOUS ONCE
Refills: 0 | Status: DISCONTINUED | OUTPATIENT
Start: 2021-02-23 | End: 2021-02-25

## 2021-02-23 RX ORDER — INSULIN LISPRO 100/ML
2 VIAL (ML) SUBCUTANEOUS ONCE
Refills: 0 | Status: COMPLETED | OUTPATIENT
Start: 2021-02-23 | End: 2021-02-23

## 2021-02-23 RX ORDER — DEXTROSE 50 % IN WATER 50 %
12.5 SYRINGE (ML) INTRAVENOUS ONCE
Refills: 0 | Status: DISCONTINUED | OUTPATIENT
Start: 2021-02-23 | End: 2021-02-25

## 2021-02-23 RX ORDER — DEXTROSE 50 % IN WATER 50 %
25 SYRINGE (ML) INTRAVENOUS ONCE
Refills: 0 | Status: DISCONTINUED | OUTPATIENT
Start: 2021-02-23 | End: 2021-02-25

## 2021-02-23 RX ORDER — PANTOPRAZOLE SODIUM 20 MG/1
40 TABLET, DELAYED RELEASE ORAL
Refills: 0 | Status: DISCONTINUED | OUTPATIENT
Start: 2021-02-23 | End: 2021-02-25

## 2021-02-23 RX ORDER — INSULIN LISPRO 100/ML
VIAL (ML) SUBCUTANEOUS AT BEDTIME
Refills: 0 | Status: DISCONTINUED | OUTPATIENT
Start: 2021-02-23 | End: 2021-02-25

## 2021-02-23 RX ORDER — HEPARIN SODIUM 5000 [USP'U]/ML
5000 INJECTION INTRAVENOUS; SUBCUTANEOUS EVERY 12 HOURS
Refills: 0 | Status: DISCONTINUED | OUTPATIENT
Start: 2021-02-23 | End: 2021-02-23

## 2021-02-23 RX ORDER — INSULIN GLARGINE 100 [IU]/ML
5 INJECTION, SOLUTION SUBCUTANEOUS AT BEDTIME
Refills: 0 | Status: DISCONTINUED | OUTPATIENT
Start: 2021-02-23 | End: 2021-02-25

## 2021-02-23 RX ORDER — VALSARTAN 80 MG/1
160 TABLET ORAL DAILY
Refills: 0 | Status: DISCONTINUED | OUTPATIENT
Start: 2021-02-23 | End: 2021-02-25

## 2021-02-23 RX ORDER — OXYCODONE HYDROCHLORIDE 5 MG/1
5 TABLET ORAL ONCE
Refills: 0 | Status: DISCONTINUED | OUTPATIENT
Start: 2021-02-23 | End: 2021-02-23

## 2021-02-23 RX ORDER — INSULIN LISPRO 100/ML
VIAL (ML) SUBCUTANEOUS
Refills: 0 | Status: DISCONTINUED | OUTPATIENT
Start: 2021-02-23 | End: 2021-02-25

## 2021-02-23 RX ORDER — TRAMADOL HYDROCHLORIDE 50 MG/1
50 TABLET ORAL EVERY 6 HOURS
Refills: 0 | Status: DISCONTINUED | OUTPATIENT
Start: 2021-02-23 | End: 2021-02-25

## 2021-02-23 RX ORDER — ONDANSETRON 8 MG/1
4 TABLET, FILM COATED ORAL ONCE
Refills: 0 | Status: DISCONTINUED | OUTPATIENT
Start: 2021-02-23 | End: 2021-02-23

## 2021-02-23 RX ORDER — TAMSULOSIN HYDROCHLORIDE 0.4 MG/1
0.4 CAPSULE ORAL AT BEDTIME
Refills: 0 | Status: DISCONTINUED | OUTPATIENT
Start: 2021-02-23 | End: 2021-02-25

## 2021-02-23 RX ORDER — DEXTROSE 50 % IN WATER 50 %
15 SYRINGE (ML) INTRAVENOUS ONCE
Refills: 0 | Status: DISCONTINUED | OUTPATIENT
Start: 2021-02-23 | End: 2021-02-25

## 2021-02-23 RX ORDER — SODIUM CHLORIDE 9 MG/ML
1000 INJECTION INTRAMUSCULAR; INTRAVENOUS; SUBCUTANEOUS
Refills: 0 | Status: DISCONTINUED | OUTPATIENT
Start: 2021-02-23 | End: 2021-02-23

## 2021-02-23 RX ORDER — PANTOPRAZOLE SODIUM 20 MG/1
40 TABLET, DELAYED RELEASE ORAL DAILY
Refills: 0 | Status: DISCONTINUED | OUTPATIENT
Start: 2021-02-23 | End: 2021-02-23

## 2021-02-23 RX ORDER — ALOGLIPTIN 12.5 MG/1
1 TABLET, FILM COATED ORAL
Qty: 0 | Refills: 0 | DISCHARGE

## 2021-02-23 RX ORDER — HYDRALAZINE HCL 50 MG
5 TABLET ORAL
Refills: 0 | Status: DISCONTINUED | OUTPATIENT
Start: 2021-02-23 | End: 2021-02-23

## 2021-02-23 RX ADMIN — HEPARIN SODIUM 5000 UNIT(S): 5000 INJECTION INTRAVENOUS; SUBCUTANEOUS at 01:36

## 2021-02-23 RX ADMIN — TAMSULOSIN HYDROCHLORIDE 0.4 MILLIGRAM(S): 0.4 CAPSULE ORAL at 21:58

## 2021-02-23 RX ADMIN — Medication 2 UNIT(S): at 08:38

## 2021-02-23 RX ADMIN — Medication 5 MILLIGRAM(S): at 12:55

## 2021-02-23 RX ADMIN — INSULIN GLARGINE 5 UNIT(S): 100 INJECTION, SOLUTION SUBCUTANEOUS at 21:57

## 2021-02-23 RX ADMIN — TRAMADOL HYDROCHLORIDE 50 MILLIGRAM(S): 50 TABLET ORAL at 22:03

## 2021-02-23 RX ADMIN — Medication 5 MILLIGRAM(S): at 03:03

## 2021-02-23 RX ADMIN — PANTOPRAZOLE SODIUM 40 MILLIGRAM(S): 20 TABLET, DELAYED RELEASE ORAL at 12:55

## 2021-02-23 RX ADMIN — SODIUM CHLORIDE 75 MILLILITER(S): 9 INJECTION, SOLUTION INTRAVENOUS at 10:03

## 2021-02-23 RX ADMIN — Medication 25 MILLIGRAM(S): at 19:00

## 2021-02-23 RX ADMIN — SODIUM CHLORIDE 70 MILLILITER(S): 9 INJECTION INTRAMUSCULAR; INTRAVENOUS; SUBCUTANEOUS at 05:20

## 2021-02-23 RX ADMIN — Medication 2: at 22:02

## 2021-02-23 RX ADMIN — Medication 2: at 13:00

## 2021-02-23 RX ADMIN — APIXABAN 5 MILLIGRAM(S): 2.5 TABLET, FILM COATED ORAL at 21:58

## 2021-02-23 NOTE — H&P ADULT - HISTORY OF PRESENT ILLNESS
Pt is an 89 y/o male with PMHx of HLD, HTN, DM, kidney stone, hernia, prior endoscopy by Dr. Guerrier last week for piece of chicken in his throat who now presents to the ED c/o cough associated with a piece of beef stuck in his throat.  He reports he ate a piece of steak at dinner and  it is stuck in his throat and upper chest area. He reported he can  swallow and denies SOB,  no  drooling.    In the ED , pt was noted to be able to speak in full sentences.      Pt is an 87 y/o male with PMHx of HLD, HTN, DM, DVT hx on eliquis,  kidney stone, hernia, prior endoscopy by Dr. Guerrier last week for piece of chicken in his throat who now presents to the ED c/o cough associated with a piece of beef stuck in his throat.  He reports he ate a piece of steak at dinner and  it is stuck in his throat and upper chest area. He reported he can  swallow and denies SOB,  no  drooling.    In the ED , pt was noted to be able to speak in full sentences.     He denies resp complaints, no abd pain, no urinary complaints or rash.  Pt denies COVID-19 contacts and has had the COVID-19 vaccine.

## 2021-02-23 NOTE — PROGRESS NOTE ADULT - SUBJECTIVE AND OBJECTIVE BOX
Alpha1-Antitrypsin Test  WHY AM I HAVING THIS TEST?  This test looks for deficiency of the alpha1-antitrypsin (AAT) protein. Deficiencies of AAT are associated with early onset of a disease that damages the lungs (emphysema). It is also associated with cirrhosis and other liver diseases in children. AAT may be elevated due to inflammation, infection, or certain cancers.  This test checks for a variety of findings. Blood levels of AAT may help your health care provider to determine:  · If you are deficient in AAT.  · If your AAT deficiency is inherited or acquired.  · The nature of an inherited AAT deficiency.  Your health care provider may perform this test if you have a family history of emphysema.  WHAT KIND OF SAMPLE IS TAKEN?  A blood sample is required for this test. It is usually collected by inserting a needle into a vein.  HOW DO I PREPARE FOR THE TEST?  There is no preparation required for this test.  WHAT ARE THE REFERENCES RANGES?  Reference ranges are considered healthy ranges established after testing a large group of healthy people. Reference ranges may vary among different people, labs, and hospitals. It is your responsibility to obtain your test results. Ask the lab or department performing the test when and how you will get your results.  The reference range for alpha1-antitrypsin is:  ·  mg/dL or 0.85-2.13 g/L (SI units).  WHAT DO THE RESULTS MEAN?  Generally, increased levels of AAT may indicate:  · Inflammation.  · Stress.  · Infection, such as a thyroid infection.  Decreased levels of AAT may indicate:  · Early onset of emphysema.  · Cirrhosis in children.  · Low blood protein due to malnutrition, cancer, certain syndromes, or liver failure.  This test can produce many different results. Therefore, it is important to talk with your health care provider about your results, treatment options, and if necessary, the need for more tests. Talk with your health care provider if you have any  questions about your results.     This information is not intended to replace advice given to you by your health care provider. Make sure you discuss any questions you have with your health care provider.     Document Released: 01/09/2006 Document Revised: 01/08/2016 Document Reviewed: 06/19/2015  [employer] Interactive Patient Education ©2017 [employer]  Marfan Syndrome  Marfan syndrome is a connective tissue disorder. Connective tissue is found throughout your body, and it helps to support your tissues and body organs. Marfan syndrome most commonly affects your bones, joints, eyes, heart, and blood vessels. Marfan syndrome can lead to serious complications that are related to the heart, blood vessels, eyes, and lungs.  CAUSES  Marfan syndrome is caused by a gene mutation. The gene can be passed down from your parents (inherited), or the mutation can happen on its own.  RISK FACTORS  The main risk factor for Marfan syndrome is having a family history of Marfan syndrome.   SIGNS AND SYMPTOMS  Symptoms of Marfan syndrome may vary from mild to severe. Common signs and symptoms may include:  · Long arms and legs.  · Long, thin fingers.  · Curvature of the spine (scoliosis).  · Nearsightedness and other vision problems.  · A chest that sticks out (pectus carinatum) or looks sunken in (pectus excavatum).  · Flat feet.  · Crowded teeth.  · Stretch marks on the skin.  · Headaches or pain in the back, leg, or abdomen (due to an enlargement of the lining of the brain or spinal cord).  Severe signs and symptoms may include:  · Heart problems, especially with the large blood vessel that comes from the heart (aorta) and with the heart valves.  · Eye problems, including a dislocated lens, cataracts, glaucoma, and retinal detachment.  · The collapse of a lung (spontaneous pneumothorax).  DIAGNOSIS  Marfan syndrome may be diagnosed by:  · Medical history and physical exam.  · Blood tests, which often include genetic  EGD:  Food in distal esophagus, cleared    Rec:  Mechanical soft diet  NO MEAT!!!!!!!! testing.  · MRI and CT scans.  · Eye examination. This may include a slit-lamp examination. This checks to see if your eye lenses are out of place.  · Heart examination. This may include an:    Echocardiogram (ECG). This creates an image of your heart, its valves, and the largest artery that carries blood from your heart to your body (aorta).    Electrocardiogram. This checks your heart rate and rhythm.  TREATMENT  There is no cure for Marfan syndrome. Treatment will focus on managing your symptoms and preventing or treating any heart or vision conditions that you may have. This may include a team of health care providers. Treatment may involve:  · Medicines:    To lower your blood pressure.    To reduce the strain on your heart.    To reduce your pain.  · Eyeglasses, contact lenses, or eye surgery.  · Watching and monitoring for any heart changes. Heart surgery may be required.  · A back brace. If your scoliosis is severe, you may need surgery.  · Surgery to correct the formation of your chest.  · Placing a tube in your chest or doing surgery to treat a collapsed lung.  HOME CARE INSTRUCTIONS  · Take medicines only as directed by your health care provider.  · Keep all follow-up visits as directed by your health care provider. This is important.  · A support group for Marfan syndrome may help you to cope with any stress or issues that are related to your condition. Ask your health care provider for more information.  · Before you have any medical or dental procedure done, tell all of your health care providers including dentists if you have had heart valve surgery.  · Do not use any tobacco products, including cigarettes, chewing tobacco, or electronic cigarettes. If you need help quitting, ask your health care provider.  · If you are planning on getting pregnant, talk with your health care provider.  · Exercise as directed and as allowed by your health care provider. Do not participate in high-risk or contact  sports or strenuous activities, such as football or soccer.    · Wear your back brace, if necessary, as directed by your health care provider.  · Wear supportive shoes or inserts for your feet if directed by your health care provider.  SEEK MEDICAL CARE IF:  · You have new or worsening symptoms.  · Your legs are numb or painful.  · You have a headache.  · You have fatigue.  · You are snoring more than usual, or you are having difficulty sleeping.  SEEK IMMEDIATE MEDICAL CARE IF:  · You have sudden or severe pain in your chest, back, or abdomen.  · You have trouble breathing or have shortness of breath.  · You pass out.  · You have changes in your vision, such as bright flashes, blurred vision, or blindness.  · Your heart is beating rapidly or unevenly, or you have heart palpitations.  · You have sudden pain on one side of your body.  These symptoms may represent a serious problem that is an emergency. Do not wait to see if the symptoms will go away. Get medical help right away. Call your local emergency services (911 in the [place] Do not drive yourself to the hospital.     This information is not intended to replace advice given to you by your health care provider. Make sure you discuss any questions you have with your health care provider.     Document Released: 03/26/2002 Document Revised: 01/08/2016 Document Reviewed: 07/29/2015  [employer] Interactive Patient Education ©2017 [employer]

## 2021-02-23 NOTE — ED ADULT NURSE REASSESSMENT NOTE - NS ED NURSE REASSESS COMMENT FT1
Pt with . Pt states he normally takes lantus insulin at night but does not want to take it tonight due to the fact it may drop his sugar too low. Pt NPO after midnight.

## 2021-02-23 NOTE — H&P ADULT - NSICDXPASTSURGICALHX_GEN_ALL_CORE_FT
PAST SURGICAL HISTORY:  Endoscopy finding choked on piece of chiken-had upper endo with clearance of food    H/O left inguinal hernia repair

## 2021-02-23 NOTE — H&P ADULT - REASON FOR ADMISSION
Esophogeal dysphagia  Cough  Dehydration  ANJANA Esophogeal dysphagia  Cough w/food impaction  Dehydration  ANJANA

## 2021-02-23 NOTE — H&P ADULT - ASSESSMENT
Pt is admitted w/  Esophogeal dysphagia  Cough  Dehydration  ANJANA DDX ATN, pre-renal azotemia  Hx of DM and HTN  - s/p IVF 1 L NS  - hold valsartan  - GI consult Dr. oChen  - NPO for endoscopy in the AM by Dr. Cohen  - hold po meds tonight  - Insulin sliding scale as needed  - DVT prophylaxis: pt on eliquis  - IMPROVE VTE Individual Risk Assessment    RISK                                                                Points    [  ] Previous VTE                                                  3    [  ] Thrombophilia                                               2    [  ] Lower limb paralysis                                      2        (unable to hold up >15 seconds)      [  ] Current Cancer                                              2         (within 6 months)    [x  ] Immobilization > 24 hrs                                1    [  ] ICU/CCU stay > 24 hours                              1    [x  ] Age > 60                                                      1    IMPROVE VTE Score _____2____    IMPROVE Score 0-1: Low Risk, No VTE prophylaxis required for most patients, encourage ambulation.   IMPROVE Score 2-3: At risk, pharmacologic VTE prophylaxis is indicated for most patients (in the absence of a contraindication)  IMPROVE Score > or = 4: High Risk, pharmacologic VTE prophylaxis is indicated for most patients (in the absence of a contraindication)   Pt is admitted w/  Esophogeal dysphagia  Cough  Dehydration  ANJANA DDX ATN, pre-renal azotemia  Hx of DM and HTN  - s/p Maalox, glucagon and viscous lidocaine in the ED  - s/p IVF 1 L NS  - asp precautions  - hold valsartan  - GI consult Dr. Cohen  - NPO for endoscopy in the AM by Dr. Cohen  - hold po meds tonight  - Insulin sliding scale as needed  - DVT prophylaxis: pt on eliquis  - IMPROVE VTE Individual Risk Assessment    RISK                                                                Points    [  ] Previous VTE                                                  3    [  ] Thrombophilia                                               2    [  ] Lower limb paralysis                                      2        (unable to hold up >15 seconds)      [  ] Current Cancer                                              2         (within 6 months)    [x  ] Immobilization > 24 hrs                                1    [  ] ICU/CCU stay > 24 hours                              1    [x  ] Age > 60                                                      1    IMPROVE VTE Score _____2____    IMPROVE Score 0-1: Low Risk, No VTE prophylaxis required for most patients, encourage ambulation.   IMPROVE Score 2-3: At risk, pharmacologic VTE prophylaxis is indicated for most patients (in the absence of a contraindication)  IMPROVE Score > or = 4: High Risk, pharmacologic VTE prophylaxis is indicated for most patients (in the absence of a contraindication)   Pt is admitted w/  Esophogeal dysphagia  Food impaction w/Cough  Dehydration  ANJANA DDX ATN, pre-renal azotemia  Hx of DM and HTN  - s/p Maalox which pt vomited in ED, glucagon and viscous lidocaine in the ED  - s/p IVF 1 L NS  - asp precautions  - hold valsartan  - GI consult Dr. Cohen  - NPO for endoscopy in the AM by Dr. Cohen  - hold po meds tonight  - Insulin sliding scale as needed  - DVT prophylaxis: pt on BID eliquis for DVT hx.  Holding po medicine tonight, will give heparin Q12hr.  Resume eliquis tomorrow.   - IMPROVE VTE Individual Risk Assessment    RISK                                                                Points    [  ] Previous VTE                                                  3    [  ] Thrombophilia                                               2    [  ] Lower limb paralysis                                      2        (unable to hold up >15 seconds)      [  ] Current Cancer                                              2         (within 6 months)    [x  ] Immobilization > 24 hrs                                1    [  ] ICU/CCU stay > 24 hours                              1    [x  ] Age > 60                                                      1    IMPROVE VTE Score _____2____    IMPROVE Score 0-1: Low Risk, No VTE prophylaxis required for most patients, encourage ambulation.   IMPROVE Score 2-3: At risk, pharmacologic VTE prophylaxis is indicated for most patients (in the absence of a contraindication)  IMPROVE Score > or = 4: High Risk, pharmacologic VTE prophylaxis is indicated for most patients (in the absence of a contraindication)

## 2021-02-23 NOTE — CONSULT NOTE ADULT - ASSESSMENT
Imp:  Esophageal food impaction    Rec:  EGD in OR today  Risks and benefits reviewed with patient  Emphasized to patient that he can no longer eat meat.

## 2021-02-23 NOTE — PROGRESS NOTE ADULT - SUBJECTIVE AND OBJECTIVE BOX
c/c: food impaction      HPI:  Pt is an 89 y/o male with PMHx of HLD, HTN, DM, DVT hx on eliquis,  kidney stone, hernia, prior endoscopy by Dr. Guerrier last week for piece of chicken in his throat who now presents to the ED c/o cough associated with a piece of beef stuck in his throat.  He reports he ate a piece of steak at dinner and  it is stuck in his throat and upper chest area. He reported he can  swallow and denies SOB,  no  drooling.    In the ED , pt was noted to be able to speak in full sentences.   He was admitted for further mx.     .2/23: pt seen and examined after endoscopy. FEels better. Hasn't eaten yet. Requiring 2L nc  post procedure. Does not feel sob. No chest pain    Review of system- All 10 systems reviewed and is as per HPI otherwise negative.     VITALS  T(C): 36.8 (02-23-21 @ 12:27), Max: 36.9 (02-23-21 @ 09:53)  HR: 101 (02-23-21 @ 12:27) (72 - 101)  BP: 177/69 (02-23-21 @ 12:27) (120/80 - 187/63)  RR: 18 (02-23-21 @ 12:27) (14 - 24)  SpO2: 95% (02-23-21 @ 12:27) (94% - 100%)      PHYSICAL EXAM:    GENERAL: Comfortable, no acute distress  HEAD:  Atraumatic, Normocephalic  EYES: EOMI, PERRLA  HEENT: Moist mucous membranes  NECK: Supple, No JVD  NERVOUS SYSTEM:  Alert & Oriented X3, non focal  CHEST/LUNG: Clear to auscultation bilaterally, decrased bs at bases  HEART: Regular rate and rhythm  ABDOMEN: Soft, Nontender, Nondistended; Bowel sounds present  GENITOURINARY- Voiding, no palpable bladder  EXTREMITIES:  No clubbing, cyanosis, or edema  MUSCULOSKELETAL- No muscle tenderness, No joint tenderness  SKIN-no rash        LABS:                        12.4   8.59  )-----------( 185      ( 23 Feb 2021 07:39 )             38.2     02-23    144  |  113<H>  |  25<H>  ----------------------------<  188<H>  4.1   |  21<L>  |  1.21    Ca    8.8      23 Feb 2021 07:39    TPro  6.8  /  Alb  3.1<L>  /  TBili  0.2  /  DBili  x   /  AST  16  /  ALT  17  /  AlkPhos  97  02-22    PT/INR - ( 22 Feb 2021 20:08 )   PT: 12.9 sec;   INR: 1.12 ratio         PTT - ( 22 Feb 2021 20:08 )  PTT:34.3 sec          MEDS  aluminum hydroxide/magnesium hydroxide/simethicone Suspension 30 milliLiter(s) Oral every 4 hours PRN  dextrose 40% Gel 15 Gram(s) Oral once  dextrose 5%. 1000 milliLiter(s) IV Continuous <Continuous>  dextrose 5%. 1000 milliLiter(s) IV Continuous <Continuous>  dextrose 50% Injectable 25 Gram(s) IV Push once  dextrose 50% Injectable 12.5 Gram(s) IV Push once  dextrose 50% Injectable 25 Gram(s) IV Push once  glucagon  Injectable 1 milliGRAM(s) IntraMuscular once  heparin   Injectable 5000 Unit(s) SubCutaneous every 12 hours  hydrALAZINE Injectable 5 milliGRAM(s) IV Push four times a day  influenza   Vaccine 0.5 milliLiter(s) IntraMuscular once  insulin glargine Injectable (LANTUS) 5 Unit(s) SubCutaneous every morning  insulin glargine Injectable (LANTUS) 5 Unit(s) SubCutaneous at bedtime  insulin lispro (ADMELOG) corrective regimen sliding scale   SubCutaneous three times a day before meals  insulin lispro (ADMELOG) corrective regimen sliding scale   SubCutaneous at bedtime  pantoprazole  Injectable 40 milliGRAM(s) IV Push daily      ASSESSMENT AND PLAN:  88M, PMH as above a/w:    1. Food impaction:  -s/p removal via EGD  -advised not to eat meat anymore.   -mechanical soft diet.     2. Acute hypoxic resp failure:  -?d/t sedation   -keep nasal cannula for now.   -encourage incentive spirometry    3. HTN:  -resume arb, bb    4. HLD:  -not on statin    5. DMII:  -lantus/ss    6. h/o DVT:  -resume eliquis.     7. GERD:  -PPI    8. Dispo:  advance diet  dc to assisted living tomorrow if hypoxia resolved.             c/c: food impaction      HPI:  Pt is an 89 y/o male with PMHx of HLD, HTN, DM, DVT hx on eliquis,  kidney stone, hernia, prior endoscopy by Dr. Guerrier last week for piece of chicken in his throat who now presents to the ED c/o cough associated with a piece of beef stuck in his throat.  He reports he ate a piece of steak at dinner and  it is stuck in his throat and upper chest area. He reported he can  swallow and denies SOB,  no  drooling.    In the ED , pt was noted to be able to speak in full sentences.   He was admitted for further mx.     .2/23: pt seen and examined after endoscopy. FEels better. Hasn't eaten yet. Requiring 2L nc  post procedure. Does not feel sob. No chest pain    Review of system- All 10 systems reviewed and is as per HPI otherwise negative.     VITALS  T(C): 36.8 (02-23-21 @ 12:27), Max: 36.9 (02-23-21 @ 09:53)  HR: 101 (02-23-21 @ 12:27) (72 - 101)  BP: 177/69 (02-23-21 @ 12:27) (120/80 - 187/63)  RR: 18 (02-23-21 @ 12:27) (14 - 24)  SpO2: 95% (02-23-21 @ 12:27) (94% - 100%)      PHYSICAL EXAM:    GENERAL: Comfortable, no acute distress  HEAD:  Atraumatic, Normocephalic  EYES: EOMI, PERRLA  HEENT: Moist mucous membranes  NECK: Supple, No JVD  NERVOUS SYSTEM:  Alert & Oriented X3, non focal  CHEST/LUNG: Clear to auscultation bilaterally, decrased bs at bases  HEART: Regular rate and rhythm  ABDOMEN: Soft, Nontender, Nondistended; Bowel sounds present  GENITOURINARY- Voiding, no palpable bladder  EXTREMITIES:  No clubbing, cyanosis, or edema  MUSCULOSKELETAL- No muscle tenderness, No joint tenderness  SKIN-no rash        LABS:                        12.4   8.59  )-----------( 185      ( 23 Feb 2021 07:39 )             38.2     02-23    144  |  113<H>  |  25<H>  ----------------------------<  188<H>  4.1   |  21<L>  |  1.21    Ca    8.8      23 Feb 2021 07:39    TPro  6.8  /  Alb  3.1<L>  /  TBili  0.2  /  DBili  x   /  AST  16  /  ALT  17  /  AlkPhos  97  02-22    PT/INR - ( 22 Feb 2021 20:08 )   PT: 12.9 sec;   INR: 1.12 ratio         PTT - ( 22 Feb 2021 20:08 )  PTT:34.3 sec          MEDS  aluminum hydroxide/magnesium hydroxide/simethicone Suspension 30 milliLiter(s) Oral every 4 hours PRN  dextrose 40% Gel 15 Gram(s) Oral once  dextrose 5%. 1000 milliLiter(s) IV Continuous <Continuous>  dextrose 5%. 1000 milliLiter(s) IV Continuous <Continuous>  dextrose 50% Injectable 25 Gram(s) IV Push once  dextrose 50% Injectable 12.5 Gram(s) IV Push once  dextrose 50% Injectable 25 Gram(s) IV Push once  glucagon  Injectable 1 milliGRAM(s) IntraMuscular once  heparin   Injectable 5000 Unit(s) SubCutaneous every 12 hours  hydrALAZINE Injectable 5 milliGRAM(s) IV Push four times a day  influenza   Vaccine 0.5 milliLiter(s) IntraMuscular once  insulin glargine Injectable (LANTUS) 5 Unit(s) SubCutaneous every morning  insulin glargine Injectable (LANTUS) 5 Unit(s) SubCutaneous at bedtime  insulin lispro (ADMELOG) corrective regimen sliding scale   SubCutaneous three times a day before meals  insulin lispro (ADMELOG) corrective regimen sliding scale   SubCutaneous at bedtime  pantoprazole  Injectable 40 milliGRAM(s) IV Push daily      ASSESSMENT AND PLAN:  88M, PMH as above a/w:    1. Food impaction:  -s/p removal via EGD  -advised not to eat meat anymore.   -mechanical soft diet.     2. Acute hypoxic resp failure:  -?d/t sedation   -keep nasal cannula for now.   -encourage incentive spirometry    3. ANJANA:  -prerenal  -resolved    4. HTN:  -resume arb, bb    5. HLD:  -not on statin    6. DMII:  -lantus/ss    7. h/o DVT:  -resume eliquis.     8. GERD:  -PPI    9. Dispo:  advance diet  dc to assisted living tomorrow if hypoxia resolved.

## 2021-02-23 NOTE — H&P ADULT - NSHPPHYSICALEXAM_GEN_ALL_CORE
ICU Vital Signs Last 24 Hrs    T(F): 98 (22 Feb 2021 23:30), Max: 98 (22 Feb 2021 23:30)  HR: 72 (22 Feb 2021 23:30) (72 - 96)  BP: 159/74 (22 Feb 2021 23:30) (159/74 - 170/62)    RR: 20 (22 Feb 2021 23:30) (20 - 24)  SpO2: 95% (22 Feb 2021 23:30) (94% - 95%)

## 2021-02-24 LAB — SARS-COV-2 RNA SPEC QL NAA+PROBE: SIGNIFICANT CHANGE UP

## 2021-02-24 PROCEDURE — 99232 SBSQ HOSP IP/OBS MODERATE 35: CPT

## 2021-02-24 PROCEDURE — 93010 ELECTROCARDIOGRAM REPORT: CPT

## 2021-02-24 RX ADMIN — Medication 4: at 18:52

## 2021-02-24 RX ADMIN — Medication 25 MILLIGRAM(S): at 11:10

## 2021-02-24 RX ADMIN — INSULIN GLARGINE 5 UNIT(S): 100 INJECTION, SOLUTION SUBCUTANEOUS at 08:44

## 2021-02-24 RX ADMIN — INSULIN GLARGINE 5 UNIT(S): 100 INJECTION, SOLUTION SUBCUTANEOUS at 22:22

## 2021-02-24 RX ADMIN — FINASTERIDE 5 MILLIGRAM(S): 5 TABLET, FILM COATED ORAL at 11:10

## 2021-02-24 RX ADMIN — INFLUENZA VIRUS VACCINE 0.5 MILLILITER(S): 15; 15; 15; 15 SUSPENSION INTRAMUSCULAR at 06:19

## 2021-02-24 RX ADMIN — APIXABAN 5 MILLIGRAM(S): 2.5 TABLET, FILM COATED ORAL at 22:23

## 2021-02-24 RX ADMIN — APIXABAN 5 MILLIGRAM(S): 2.5 TABLET, FILM COATED ORAL at 11:10

## 2021-02-24 RX ADMIN — PANTOPRAZOLE SODIUM 40 MILLIGRAM(S): 20 TABLET, DELAYED RELEASE ORAL at 06:19

## 2021-02-24 RX ADMIN — TAMSULOSIN HYDROCHLORIDE 0.4 MILLIGRAM(S): 0.4 CAPSULE ORAL at 22:23

## 2021-02-24 RX ADMIN — Medication 2: at 12:47

## 2021-02-24 RX ADMIN — VALSARTAN 160 MILLIGRAM(S): 80 TABLET ORAL at 11:11

## 2021-02-24 NOTE — PHYSICAL THERAPY INITIAL EVALUATION ADULT - DIAGNOSIS, PT EVAL
esophageal food impaction,dysphagia s/p EGD/disimpaction esophageal food impaction, dysphagia s/p EGD/disimpaction,lower back pain ,

## 2021-02-24 NOTE — SWALLOW BEDSIDE ASSESSMENT ADULT - NS SPL SWALLOW CLINIC TRIAL FT
Pt has oral-pharyngeal swallow skills within age-appropriate functional limits for regular consistency diet. .  Disc with pt eating precautions in this setting of esophageal dysphagia.  See above. Pt verbalized understanding.    Service will not folow.

## 2021-02-24 NOTE — PHYSICAL THERAPY INITIAL EVALUATION ADULT - ADDITIONAL COMMENTS
Patient/Caregiver provided printed discharge information.
pt has no stairs inside/outside assisted living facility,usually an independent ambulator without device but adds he has used a cane in the past

## 2021-02-24 NOTE — SWALLOW BEDSIDE ASSESSMENT ADULT - SWALLOW EVAL: RECOMMENDED DIET
maintain regular consistency Dm. regular liquids. Alternate liquid and solids, upright for meals and for 20-30 minutes after.  Swallow first , then talk. Do not rush eating

## 2021-02-24 NOTE — PHYSICAL THERAPY INITIAL EVALUATION ADULT - GENERAL OBSERVATIONS, REHAB EVAL
resting supine in bed,appears tall,wears eyeglasses,awake,alert,Ox4 ,reports feels pretty good today ,discussed with patient events leading to hospitalization

## 2021-02-24 NOTE — PROGRESS NOTE ADULT - SUBJECTIVE AND OBJECTIVE BOX
chief complaint of Esophogeal dysphagia  Cough w/food impaction  Dehydration  ANJANA     HPI:  Pt is an 89 y/o male with PMHx of HLD, HTN, DM, DVT hx on eliquis,  kidney stone, hernia, prior endoscopy by Dr. Guerrier last week for piece of chicken in his throat who now presents to the ED c/o cough associated with a piece of beef stuck in his throat.  He reports he ate a piece of steak at dinner and  it is stuck in his throat and upper chest area. He reported he can  swallow and denies SOB,  no  drooling.    In the ED , pt was noted to be able to speak in full sentences.   He denies resp complaints, no abd pain, no urinary complaints or rash.  Pt denies COVID-19 contacts and has had the COVID-19 vaccine.     2/24/21- no acute events.     Review of system- All 10 systems reviewed and is as per HPI otherwise negative.     T(C): 36.3 (02-24-21 @ 08:45), Max: 36.9 (02-24-21 @ 00:06)  HR: 68 (02-24-21 @ 11:08) (68 - 108)  BP: 131/50 (02-24-21 @ 11:08) (109/52 - 133/56)  RR: 18 (02-24-21 @ 11:08) (17 - 20)  SpO2: 96% (02-24-21 @ 11:08) (92% - 96%)  Wt(kg): --    LABS:                        12.4   8.59  )-----------( 185      ( 23 Feb 2021 07:39 )             38.2     144  |  113<H>  |  25<H>  ----------------------------<  188<H>  4.1   |  21<L>  |  1.21    Ca    8.8      23 Feb 2021 07:39    TPro  6.8  /  Alb  3.1<L>  /  TBili  0.2  /  DBili  x   /  AST  16  /  ALT  17  /  AlkPhos  97  02-22    PT/INR - ( 22 Feb 2021 20:08 )   PT: 12.9 sec;   INR: 1.12 ratio       PTT - ( 22 Feb 2021 20:08 )  PTT:34.3 sec    CAPILLARY BLOOD GLUCOSE  POCT Blood Glucose.: 187 mg/dL (24 Feb 2021 12:39)  POCT Blood Glucose.: 147 mg/dL (24 Feb 2021 07:43)  POCT Blood Glucose.: 295 mg/dL (23 Feb 2021 21:54)  POCT Blood Glucose.: 149 mg/dL (23 Feb 2021 17:53)    RADIOLOGY & ADDITIONAL TESTS:      PHYSICAL EXAM:  GENERAL: NAD, well-groomed, well-developed  HEAD:  Atraumatic, Normocephalic  EYES: EOMI, PERRLA, conjunctiva and sclera clear  HEENT: Moist mucous membranes  NECK: Supple, No JVD  NERVOUS SYSTEM:  Alert & Oriented X3, Motor Strength 5/5 B/L upper and lower extremities; DTRs 2+ intact and symmetric  CHEST/LUNG: Clear to auscultation bilaterally; No rales, rhonchi, wheezing, or rubs  HEART: Regular rate and rhythm; No murmurs, rubs, or gallops  ABDOMEN: Soft, Nontender, Nondistended; Bowel sounds present  GENITOURINARY- Voiding, no palpable bladder  EXTREMITIES:  2+ Peripheral Pulses, No clubbing, cyanosis, or edema  MUSCULOSKELTAL- No muscle tenderness, Muscle tone normal, No joint tenderness, no Joint swelling, Joint range of motion-normal  SKIN-no rash, no lesion  CNS- alert, oriented X3, non focal     MEDICATIONS  (STANDING):  apixaban 5 milliGRAM(s) Oral two times a day  dextrose 40% Gel 15 Gram(s) Oral once  dextrose 5%. 1000 milliLiter(s) (50 mL/Hr) IV Continuous <Continuous>  dextrose 5%. 1000 milliLiter(s) (100 mL/Hr) IV Continuous <Continuous>  dextrose 50% Injectable 25 Gram(s) IV Push once  dextrose 50% Injectable 12.5 Gram(s) IV Push once  dextrose 50% Injectable 25 Gram(s) IV Push once  finasteride 5 milliGRAM(s) Oral daily  glucagon  Injectable 1 milliGRAM(s) IntraMuscular once  insulin glargine Injectable (LANTUS) 5 Unit(s) SubCutaneous every morning  insulin glargine Injectable (LANTUS) 5 Unit(s) SubCutaneous at bedtime  insulin lispro (ADMELOG) corrective regimen sliding scale   SubCutaneous three times a day before meals  insulin lispro (ADMELOG) corrective regimen sliding scale   SubCutaneous at bedtime  metoprolol succinate ER 25 milliGRAM(s) Oral daily  pantoprazole    Tablet 40 milliGRAM(s) Oral before breakfast  tamsulosin 0.4 milliGRAM(s) Oral at bedtime  valsartan 160 milliGRAM(s) Oral daily    MEDICATIONS  (PRN):  aluminum hydroxide/magnesium hydroxide/simethicone Suspension 30 milliLiter(s) Oral every 4 hours PRN Dyspepsia  traMADol 50 milliGRAM(s) Oral every 6 hours PRN Moderate Pain (4 - 6)    Assessment/Plan  #Esophogeal dysphagia  #Food impaction w/Cough  GI eval appreciated  S/p EGD- disimpaction resolved  Soft diet  Swallow eval    #Dehydration/ ARF resolved  Doubt [facility]    #HTN  Stable    #Diabetes  BGM with ISS    #COVID- repeat swab for placement    #DVT proph- Eliquis    #Dispo- PT eval, swallow eval. Assisted living vs [facility] tomorrow. D/w pt

## 2021-02-24 NOTE — PHYSICAL THERAPY INITIAL EVALUATION ADULT - GAIT DEVIATIONS NOTED, PT EVAL
mild occasional  stagger x1-2 episodes ,decreased arm swing,upper back hunched mildly (tall stature)

## 2021-02-24 NOTE — SWALLOW BEDSIDE ASSESSMENT ADULT - SLP GENERAL OBSERVATIONS
seated in his chair, eating his lunch:  immediately iverbally interactive.  appropriate, able to maintain a simple conversations. able to explain reason for admission. stated "I swallowed it but it got stuck, pointing to his sternum in verification of esophageal stasis, rather than food getting "stuck in his throat".

## 2021-02-24 NOTE — SWALLOW BEDSIDE ASSESSMENT ADULT - COMMENTS
89 y/o male with PMHx of HLD, HTN, DM, DVT hx on eliquis,  kidney stone, hernia, prior endoscopy by Dr. Guerrier last week for piece of chicken in his throat who now presents to the ED c/o cough associated with a piece of beef stuck in his throat.  He reports he ate a piece of steak at dinner and  it is stuck in his throat and upper chest area. He reported he can  swallow and denies SOB,  no  drooling.    In the ED , pt was noted to be able to speak in full sentences.   pt has esophagal dysphagia. service is asked to assess oral-phayrngeal function    He denies resp complaints, no abd pain, no urinary complaints or rash.  Pt denies COVID-19 contacts and has had the COVID-19 vaccine.

## 2021-02-24 NOTE — PHYSICAL THERAPY INITIAL EVALUATION ADULT - LIVES WITH, PROFILE
resident of [facility] in [place] resident of [facility] in [place] past 3 months,previously resided in [place] on [address] [place] [address] he reports

## 2021-02-24 NOTE — PHYSICAL THERAPY INITIAL EVALUATION ADULT - PERTINENT HX OF CURRENT PROBLEM, REHAB EVAL
recurrent food impaction in esophagus ,had EGD last week with Dr Guerrier for a stuck piece of chicken ,now c/o having a piece of steak stuck in esophagus

## 2021-02-24 NOTE — SWALLOW BEDSIDE ASSESSMENT ADULT - SWALLOW EVAL: DIAGNOSIS
oral-pharyngeal swallow function within normal age-appropriate limits for regular consistency diet.  Mild modifications in manner of eating, speed and eating behaviors to accommodate esophageal dysfunction. Disc with Pt. n.

## 2021-02-24 NOTE — SWALLOW BEDSIDE ASSESSMENT ADULT - SWALLOW EVAL: RECOMMENDED FEEDING/EATING TECHNIQUES
allow for swallow between intakes/alternate food with liquid/hard swallow w/ each bite or sip/maintain upright posture during/after eating for 30 mins/position upright (90 degrees)

## 2021-02-24 NOTE — PHYSICAL THERAPY INITIAL EVALUATION ADULT - LEVEL OF INDEPENDENCE: SIT/SUPINE, REHAB EVAL
preferred to lie down after encounter due to c/o back pain which limits standing/ambulation endurance to 15 mins per patient/contact guard/stand-by assist

## 2021-02-25 ENCOUNTER — TRANSCRIPTION ENCOUNTER (OUTPATIENT)
Age: 86
End: 2021-02-25

## 2021-02-25 VITALS
RESPIRATION RATE: 16 BRPM | HEART RATE: 79 BPM | TEMPERATURE: 98 F | SYSTOLIC BLOOD PRESSURE: 137 MMHG | OXYGEN SATURATION: 96 % | DIASTOLIC BLOOD PRESSURE: 64 MMHG

## 2021-02-25 LAB
ANION GAP SERPL CALC-SCNC: 6 MMOL/L — SIGNIFICANT CHANGE UP (ref 5–17)
BUN SERPL-MCNC: 26 MG/DL — HIGH (ref 7–23)
CALCIUM SERPL-MCNC: 8.8 MG/DL — SIGNIFICANT CHANGE UP (ref 8.5–10.1)
CHLORIDE SERPL-SCNC: 111 MMOL/L — HIGH (ref 96–108)
CO2 SERPL-SCNC: 25 MMOL/L — SIGNIFICANT CHANGE UP (ref 22–31)
CREAT SERPL-MCNC: 1.06 MG/DL — SIGNIFICANT CHANGE UP (ref 0.5–1.3)
GLUCOSE SERPL-MCNC: 165 MG/DL — HIGH (ref 70–99)
HCT VFR BLD CALC: 31.6 % — LOW (ref 39–50)
HGB BLD-MCNC: 10.3 G/DL — LOW (ref 13–17)
MCHC RBC-ENTMCNC: 31.7 PG — SIGNIFICANT CHANGE UP (ref 27–34)
MCHC RBC-ENTMCNC: 32.6 GM/DL — SIGNIFICANT CHANGE UP (ref 32–36)
MCV RBC AUTO: 97.2 FL — SIGNIFICANT CHANGE UP (ref 80–100)
PLATELET # BLD AUTO: 152 K/UL — SIGNIFICANT CHANGE UP (ref 150–400)
POTASSIUM SERPL-MCNC: 3.6 MMOL/L — SIGNIFICANT CHANGE UP (ref 3.5–5.3)
POTASSIUM SERPL-SCNC: 3.6 MMOL/L — SIGNIFICANT CHANGE UP (ref 3.5–5.3)
RBC # BLD: 3.25 M/UL — LOW (ref 4.2–5.8)
RBC # FLD: 14.7 % — HIGH (ref 10.3–14.5)
SODIUM SERPL-SCNC: 142 MMOL/L — SIGNIFICANT CHANGE UP (ref 135–145)
WBC # BLD: 8.49 K/UL — SIGNIFICANT CHANGE UP (ref 3.8–10.5)
WBC # FLD AUTO: 8.49 K/UL — SIGNIFICANT CHANGE UP (ref 3.8–10.5)

## 2021-02-25 PROCEDURE — 99239 HOSP IP/OBS DSCHRG MGMT >30: CPT

## 2021-02-25 RX ADMIN — Medication 2: at 08:35

## 2021-02-25 RX ADMIN — Medication 2: at 12:00

## 2021-02-25 RX ADMIN — VALSARTAN 160 MILLIGRAM(S): 80 TABLET ORAL at 11:06

## 2021-02-25 RX ADMIN — Medication 25 MILLIGRAM(S): at 11:06

## 2021-02-25 RX ADMIN — INSULIN GLARGINE 5 UNIT(S): 100 INJECTION, SOLUTION SUBCUTANEOUS at 11:06

## 2021-02-25 RX ADMIN — FINASTERIDE 5 MILLIGRAM(S): 5 TABLET, FILM COATED ORAL at 11:06

## 2021-02-25 RX ADMIN — APIXABAN 5 MILLIGRAM(S): 2.5 TABLET, FILM COATED ORAL at 11:06

## 2021-02-25 NOTE — PROGRESS NOTE ADULT - ASSESSMENT
Imp:  S/P EGD for esopahgeal food impaction    Rec:  Mechanical soft diet -- please avoid steak and poultry and other hard foods!!! d/w patient at length

## 2021-02-25 NOTE — DISCHARGE NOTE PROVIDER - HOSPITAL COURSE
Patient presented with esophageal food impaction 2 to piece of steak, he underwent EGD with disimpaction. Seen by swallow eval. Recommended  soft diet and avoid meats. Seen by PT- will need outpatient PT.

## 2021-02-25 NOTE — PROGRESS NOTE ADULT - SUBJECTIVE AND OBJECTIVE BOX
Patient is a 88y old  Male who presents with a chief complaint of Esophogeal dysphagia  Cough w/food impaction  Dehydration  ANJANA (24 Feb 2021 12:41)      Subective:  Feels good  [place] [facility]    PAST MEDICAL & SURGICAL HISTORY:  Hernia    Kidney stone    HLD (hyperlipidemia)    HTN (hypertension)    DM (diabetes mellitus)    Endoscopy finding  choked on piece of chiken-had upper endo with clearance of food    H/O left inguinal hernia repair        MEDICATIONS  (STANDING):  apixaban 5 milliGRAM(s) Oral two times a day  dextrose 40% Gel 15 Gram(s) Oral once  dextrose 5%. 1000 milliLiter(s) (50 mL/Hr) IV Continuous <Continuous>  dextrose 5%. 1000 milliLiter(s) (100 mL/Hr) IV Continuous <Continuous>  dextrose 50% Injectable 25 Gram(s) IV Push once  dextrose 50% Injectable 12.5 Gram(s) IV Push once  dextrose 50% Injectable 25 Gram(s) IV Push once  finasteride 5 milliGRAM(s) Oral daily  glucagon  Injectable 1 milliGRAM(s) IntraMuscular once  insulin glargine Injectable (LANTUS) 5 Unit(s) SubCutaneous every morning  insulin glargine Injectable (LANTUS) 5 Unit(s) SubCutaneous at bedtime  insulin lispro (ADMELOG) corrective regimen sliding scale   SubCutaneous three times a day before meals  insulin lispro (ADMELOG) corrective regimen sliding scale   SubCutaneous at bedtime  metoprolol succinate ER 25 milliGRAM(s) Oral daily  pantoprazole    Tablet 40 milliGRAM(s) Oral before breakfast  tamsulosin 0.4 milliGRAM(s) Oral at bedtime  valsartan 160 milliGRAM(s) Oral daily    MEDICATIONS  (PRN):  aluminum hydroxide/magnesium hydroxide/simethicone Suspension 30 milliLiter(s) Oral every 4 hours PRN Dyspepsia  traMADol 50 milliGRAM(s) Oral every 6 hours PRN Moderate Pain (4 - 6)      REVIEW OF SYSTEMS:    RESPIRATORY: No shortness of breath  CARDIOVASCULAR: No chest pain  All other review of systems is negative unless indicated above.    Vital Signs Last 24 Hrs  T(C): 36.8 (25 Feb 2021 00:05), Max: 36.8 (25 Feb 2021 00:05)  T(F): 98.2 (25 Feb 2021 00:05), Max: 98.2 (25 Feb 2021 00:05)  HR: 84 (25 Feb 2021 00:05) (68 - 92)  BP: 120/52 (25 Feb 2021 00:05) (120/52 - 168/66)  BP(mean): --  RR: 18 (25 Feb 2021 00:05) (18 - 18)  SpO2: 95% (25 Feb 2021 00:05) (95% - 96%)    PHYSICAL EXAM:    Constitutional: NAD, well-developed  Respiratory: CTAB  Cardiovascular: S1 and S2, RRR  Gastrointestinal: BS+, soft, NT/ND  Extremities: No peripheral edema  Psychiatric: Normal mood, normal affect    LABS:                        10.3   8.49  )-----------( 152      ( 25 Feb 2021 06:47 )             31.6     02-25    142  |  111<H>  |  26<H>  ----------------------------<  165<H>  3.6   |  25  |  1.06    Ca    8.8      25 Feb 2021 06:47            RADIOLOGY & ADDITIONAL STUDIES:

## 2021-02-25 NOTE — DISCHARGE NOTE NURSING/CASE MANAGEMENT/SOCIAL WORK - PATIENT PORTAL LINK FT
You can access the FollowMyHealth Patient Portal offered by [facility] by registering at the following website: [email or web address]. By joining [employer]’s FollowMyHealth portal, you will also be able to view your health information using other applications (apps) compatible with our system.

## 2021-02-25 NOTE — PROGRESS NOTE ADULT - REASON FOR ADMISSION
Esophogeal dysphagia  Cough w/food impaction  Dehydration  ANJANA

## 2021-02-25 NOTE — DISCHARGE NOTE PROVIDER - NSDCMRMEDTOKEN_GEN_ALL_CORE_FT
Eliquis 5 mg oral tablet: 1 tab(s) orally 2 times a day  finasteride 5 mg oral tablet: 1 tab(s) orally once a day  Flomax 0.4 mg oral capsule: 1 cap(s) orally once a day   glimepiride 1 mg oral tablet: 2 tab(s) orally 2 times a day  Lantus: 13 unit(s) subcutaneous once a day (at bedtime)  metoprolol succinate 25 mg oral tablet, extended release: 1 tab(s) orally once a day  omeprazole 40 mg oral delayed release capsule: 1 cap(s) orally once a day  Tradjenta 5 mg oral tablet: 1 tab(s) orally once a day  valsartan 160 mg oral tablet: 1 tab(s) orally once a day

## 2021-02-25 NOTE — DISCHARGE NOTE PROVIDER - NSDCCPCAREPLAN_GEN_ALL_CORE_FT
PRINCIPAL DISCHARGE DIAGNOSIS  Diagnosis: Globus sensation  Assessment and Plan of Treatment: S/p EGD with food disimpaction      SECONDARY DISCHARGE DIAGNOSES  Diagnosis: Acute kidney injury  Assessment and Plan of Treatment: resolved

## 2021-03-03 DIAGNOSIS — Z79.2 LONG TERM (CURRENT) USE OF ANTIBIOTICS: ICD-10-CM

## 2021-03-03 DIAGNOSIS — R13.19 OTHER DYSPHAGIA: ICD-10-CM

## 2021-03-03 DIAGNOSIS — Z79.899 OTHER LONG TERM (CURRENT) DRUG THERAPY: ICD-10-CM

## 2021-03-03 DIAGNOSIS — Z86.16 PERSONAL HISTORY OF COVID-19: ICD-10-CM

## 2021-03-03 DIAGNOSIS — E78.5 HYPERLIPIDEMIA, UNSPECIFIED: ICD-10-CM

## 2021-03-03 DIAGNOSIS — Z79.84 LONG TERM (CURRENT) USE OF ORAL HYPOGLYCEMIC DRUGS: ICD-10-CM

## 2021-03-03 DIAGNOSIS — N17.9 ACUTE KIDNEY FAILURE, UNSPECIFIED: ICD-10-CM

## 2021-03-03 DIAGNOSIS — Z79.01 LONG TERM (CURRENT) USE OF ANTICOAGULANTS: ICD-10-CM

## 2021-03-03 DIAGNOSIS — Z88.5 ALLERGY STATUS TO NARCOTIC AGENT: ICD-10-CM

## 2021-03-03 DIAGNOSIS — T18.128A FOOD IN ESOPHAGUS CAUSING OTHER INJURY, INITIAL ENCOUNTER: ICD-10-CM

## 2021-03-03 DIAGNOSIS — E11.9 TYPE 2 DIABETES MELLITUS WITHOUT COMPLICATIONS: ICD-10-CM

## 2021-03-03 DIAGNOSIS — Z87.442 PERSONAL HISTORY OF URINARY CALCULI: ICD-10-CM

## 2021-03-03 DIAGNOSIS — I10 ESSENTIAL (PRIMARY) HYPERTENSION: ICD-10-CM

## 2021-03-03 DIAGNOSIS — J96.01 ACUTE RESPIRATORY FAILURE WITH HYPOXIA: ICD-10-CM
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
## 2021-03-03 DIAGNOSIS — E86.0 DEHYDRATION: ICD-10-CM

## 2021-03-03 DIAGNOSIS — Z86.718 PERSONAL HISTORY OF OTHER VENOUS THROMBOSIS AND EMBOLISM: ICD-10-CM

## 2021-03-03 DIAGNOSIS — K31.7 POLYP OF STOMACH AND DUODENUM: ICD-10-CM

## 2021-04-23 ENCOUNTER — EMERGENCY (EMERGENCY)
Facility: HOSPITAL | Age: 86
LOS: 0 days | Discharge: HOME | End: 2021-04-24
Attending: EMERGENCY MEDICINE
Payer: MEDICARE

## 2021-04-23 VITALS
DIASTOLIC BLOOD PRESSURE: 70 MMHG | OXYGEN SATURATION: 100 % | HEART RATE: 64 BPM | WEIGHT: 169.98 LBS | TEMPERATURE: 97 F | SYSTOLIC BLOOD PRESSURE: 150 MMHG | RESPIRATION RATE: 18 BRPM | HEIGHT: 72 IN

## 2021-04-23 DIAGNOSIS — Z98.890 OTHER SPECIFIED POSTPROCEDURAL STATES: Chronic | ICD-10-CM

## 2021-04-23 DIAGNOSIS — Z79.4 LONG TERM (CURRENT) USE OF INSULIN: ICD-10-CM

## 2021-04-23 DIAGNOSIS — E78.5 HYPERLIPIDEMIA, UNSPECIFIED: ICD-10-CM

## 2021-04-23 DIAGNOSIS — N13.2 HYDRONEPHROSIS WITH RENAL AND URETERAL CALCULOUS OBSTRUCTION: ICD-10-CM

## 2021-04-23 DIAGNOSIS — Z87.442 PERSONAL HISTORY OF URINARY CALCULI: ICD-10-CM

## 2021-04-23 DIAGNOSIS — K46.9 UNSPECIFIED ABDOMINAL HERNIA WITHOUT OBSTRUCTION OR GANGRENE: ICD-10-CM
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
## 2021-04-23 DIAGNOSIS — S50.312A ABRASION OF LEFT ELBOW, INITIAL ENCOUNTER: ICD-10-CM

## 2021-04-23 DIAGNOSIS — Z88.5 ALLERGY STATUS TO NARCOTIC AGENT: ICD-10-CM
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
## 2021-04-23 DIAGNOSIS — I10 ESSENTIAL (PRIMARY) HYPERTENSION: ICD-10-CM

## 2021-04-23 DIAGNOSIS — Z79.01 LONG TERM (CURRENT) USE OF ANTICOAGULANTS: ICD-10-CM

## 2021-04-23 DIAGNOSIS — S20.20XA CONTUSION OF THORAX, UNSPECIFIED, INITIAL ENCOUNTER: ICD-10-CM

## 2021-04-23 DIAGNOSIS — R07.89 OTHER CHEST PAIN: ICD-10-CM

## 2021-04-23 DIAGNOSIS — E11.9 TYPE 2 DIABETES MELLITUS WITHOUT COMPLICATIONS: ICD-10-CM

## 2021-04-23 DIAGNOSIS — J90 PLEURAL EFFUSION, NOT ELSEWHERE CLASSIFIED: ICD-10-CM

## 2021-04-23 DIAGNOSIS — R19.8 OTHER SPECIFIED SYMPTOMS AND SIGNS INVOLVING THE DIGESTIVE SYSTEM AND ABDOMEN: Chronic | ICD-10-CM

## 2021-04-23 LAB
ALBUMIN SERPL ELPH-MCNC: 3 G/DL — LOW (ref 3.3–5)
ALP SERPL-CCNC: 93 U/L — SIGNIFICANT CHANGE UP (ref 40–120)
ALT FLD-CCNC: 13 U/L — SIGNIFICANT CHANGE UP (ref 12–78)
ANION GAP SERPL CALC-SCNC: 5 MMOL/L — SIGNIFICANT CHANGE UP (ref 5–17)
APPEARANCE UR: CLEAR — SIGNIFICANT CHANGE UP
APTT BLD: 31.7 SEC — SIGNIFICANT CHANGE UP (ref 27.5–35.5)
AST SERPL-CCNC: 9 U/L — LOW (ref 15–37)
BASOPHILS # BLD AUTO: 0.02 K/UL — SIGNIFICANT CHANGE UP (ref 0–0.2)
BASOPHILS NFR BLD AUTO: 0.4 % — SIGNIFICANT CHANGE UP (ref 0–2)
BILIRUB SERPL-MCNC: 0.2 MG/DL — SIGNIFICANT CHANGE UP (ref 0.2–1.2)
BILIRUB UR-MCNC: NEGATIVE — SIGNIFICANT CHANGE UP
BUN SERPL-MCNC: 22 MG/DL — SIGNIFICANT CHANGE UP (ref 7–23)
CALCIUM SERPL-MCNC: 9.2 MG/DL — SIGNIFICANT CHANGE UP (ref 8.5–10.1)
CHLORIDE SERPL-SCNC: 109 MMOL/L — HIGH (ref 96–108)
CO2 SERPL-SCNC: 27 MMOL/L — SIGNIFICANT CHANGE UP (ref 22–31)
COLOR SPEC: YELLOW — SIGNIFICANT CHANGE UP
CREAT SERPL-MCNC: 1.31 MG/DL — HIGH (ref 0.5–1.3)
DIFF PNL FLD: NEGATIVE — SIGNIFICANT CHANGE UP
EOSINOPHIL # BLD AUTO: 0.08 K/UL — SIGNIFICANT CHANGE UP (ref 0–0.5)
EOSINOPHIL NFR BLD AUTO: 1.5 % — SIGNIFICANT CHANGE UP (ref 0–6)
GLUCOSE SERPL-MCNC: 59 MG/DL — LOW (ref 70–99)
GLUCOSE UR QL: 1000 MG/DL
HCT VFR BLD CALC: 35.5 % — LOW (ref 39–50)
HGB BLD-MCNC: 11.3 G/DL — LOW (ref 13–17)
IMM GRANULOCYTES NFR BLD AUTO: 0.2 % — SIGNIFICANT CHANGE UP (ref 0–1.5)
INR BLD: 1.02 RATIO — SIGNIFICANT CHANGE UP (ref 0.88–1.16)
KETONES UR-MCNC: NEGATIVE — SIGNIFICANT CHANGE UP
LEUKOCYTE ESTERASE UR-ACNC: NEGATIVE — SIGNIFICANT CHANGE UP
LYMPHOCYTES # BLD AUTO: 1.17 K/UL — SIGNIFICANT CHANGE UP (ref 1–3.3)
LYMPHOCYTES # BLD AUTO: 22.3 % — SIGNIFICANT CHANGE UP (ref 13–44)
MCHC RBC-ENTMCNC: 30.3 PG — SIGNIFICANT CHANGE UP (ref 27–34)
MCHC RBC-ENTMCNC: 31.8 GM/DL — LOW (ref 32–36)
MCV RBC AUTO: 95.2 FL — SIGNIFICANT CHANGE UP (ref 80–100)
MONOCYTES # BLD AUTO: 0.75 K/UL — SIGNIFICANT CHANGE UP (ref 0–0.9)
MONOCYTES NFR BLD AUTO: 14.3 % — HIGH (ref 2–14)
NEUTROPHILS # BLD AUTO: 3.21 K/UL — SIGNIFICANT CHANGE UP (ref 1.8–7.4)
NEUTROPHILS NFR BLD AUTO: 61.3 % — SIGNIFICANT CHANGE UP (ref 43–77)
NITRITE UR-MCNC: NEGATIVE — SIGNIFICANT CHANGE UP
PH UR: 5 — SIGNIFICANT CHANGE UP (ref 5–8)
PLATELET # BLD AUTO: 224 K/UL — SIGNIFICANT CHANGE UP (ref 150–400)
POTASSIUM SERPL-MCNC: 4.4 MMOL/L — SIGNIFICANT CHANGE UP (ref 3.5–5.3)
POTASSIUM SERPL-SCNC: 4.4 MMOL/L — SIGNIFICANT CHANGE UP (ref 3.5–5.3)
PROT SERPL-MCNC: 6.7 GM/DL — SIGNIFICANT CHANGE UP (ref 6–8.3)
PROT UR-MCNC: NEGATIVE MG/DL — SIGNIFICANT CHANGE UP
PROTHROM AB SERPL-ACNC: 11.9 SEC — SIGNIFICANT CHANGE UP (ref 10.6–13.6)
RBC # BLD: 3.73 M/UL — LOW (ref 4.2–5.8)
RBC # FLD: 13.7 % — SIGNIFICANT CHANGE UP (ref 10.3–14.5)
SODIUM SERPL-SCNC: 141 MMOL/L — SIGNIFICANT CHANGE UP (ref 135–145)
SP GR SPEC: 1.01 — SIGNIFICANT CHANGE UP (ref 1.01–1.02)
TROPONIN I SERPL-MCNC: <0.015 NG/ML — SIGNIFICANT CHANGE UP (ref 0.01–0.04)
UROBILINOGEN FLD QL: NEGATIVE MG/DL — SIGNIFICANT CHANGE UP
WBC # BLD: 5.24 K/UL — SIGNIFICANT CHANGE UP (ref 3.8–10.5)
WBC # FLD AUTO: 5.24 K/UL — SIGNIFICANT CHANGE UP (ref 3.8–10.5)

## 2021-04-23 PROCEDURE — 71260 CT THORAX DX C+: CPT | Mod: 26

## 2021-04-23 PROCEDURE — 70450 CT HEAD/BRAIN W/O DYE: CPT | Mod: 26

## 2021-04-23 PROCEDURE — 72125 CT NECK SPINE W/O DYE: CPT | Mod: 26

## 2021-04-23 PROCEDURE — 99285 EMERGENCY DEPT VISIT HI MDM: CPT | Mod: GC

## 2021-04-23 PROCEDURE — 74177 CT ABD & PELVIS W/CONTRAST: CPT | Mod: 26

## 2021-04-23 PROCEDURE — 73590 X-RAY EXAM OF LOWER LEG: CPT | Mod: 26,RT

## 2021-04-23 PROCEDURE — 72170 X-RAY EXAM OF PELVIS: CPT | Mod: 26

## 2021-04-23 PROCEDURE — 93010 ELECTROCARDIOGRAM REPORT: CPT

## 2021-04-23 PROCEDURE — 71045 X-RAY EXAM CHEST 1 VIEW: CPT | Mod: 26

## 2021-04-23 RX ORDER — SODIUM CHLORIDE 9 MG/ML
1000 INJECTION INTRAMUSCULAR; INTRAVENOUS; SUBCUTANEOUS ONCE
Refills: 0 | Status: DISCONTINUED | OUTPATIENT
Start: 2021-04-23 | End: 2021-04-23

## 2021-04-23 RX ORDER — SODIUM CHLORIDE 9 MG/ML
500 INJECTION INTRAMUSCULAR; INTRAVENOUS; SUBCUTANEOUS ONCE
Refills: 0 | Status: COMPLETED | OUTPATIENT
Start: 2021-04-23 | End: 2021-04-23

## 2021-04-23 RX ADMIN — SODIUM CHLORIDE 500 MILLILITER(S): 9 INJECTION INTRAMUSCULAR; INTRAVENOUS; SUBCUTANEOUS at 19:00

## 2021-04-23 RX ADMIN — SODIUM CHLORIDE 500 MILLILITER(S): 9 INJECTION INTRAMUSCULAR; INTRAVENOUS; SUBCUTANEOUS at 20:00

## 2021-04-23 NOTE — ED PROVIDER NOTE - ATTENDING CONTRIBUTION TO CARE
Dr. Stanton: I have personally performed a face to face bedside history and physical examination of this patient. I have discussed the history, examination, review of systems, assessment and plan of management with the resident. I have reviewed the electronic medical record and amended it to reflect my history, review of systems, physical exam, assessment and plan.

## 2021-04-23 NOTE — ED PROVIDER NOTE - NSFOLLOWUPINSTRUCTIONS_ED_ALL_ED_FT
Return to the ER if you have new chest pain, shortness of breath, fevers, inability to eat or drink, or worsening of symptoms that brought you to the emergency room today.    Follow up with pulmonology for your pleural effusion found on your CT scan today. Follow up with your primary care physician in 1-2 days or as soon as possible. Return to the ER if you have new chest pain, shortness of breath, fevers, inability to eat or drink, or worsening of symptoms that brought you to the emergency room today.    Follow up with Evin Guzmán of thoracic surgery for your pleural effusion found on your CT scan today. Follow up with your primary care physician in 1-2 days or as soon as possible.    Take any home medications as previously directed.

## 2021-04-23 NOTE — ED ADULT TRIAGE NOTE - CHIEF COMPLAINT QUOTE
Pt biba s/p fall. Was found on ground 3hrs PTA. Unknown length of time on ground. Unknown headstrike. On eliquis. Endorses left rib, left back pain. abrasion to left eblow. AOx4. Trauma alert.

## 2021-04-23 NOTE — ED ADULT NURSE NOTE - OBJECTIVE STATEMENT
Patient is a 89 y/o male biba s/p fall [facility]. Pt denies LOC but not sure if he hit his head.  C/O left rib pain

## 2021-04-23 NOTE — ED ADULT NURSE NOTE - NSIMPLEMENTINTERV_GEN_ALL_ED
Implemented All Fall Risk Interventions:  [place] to call system. Call bell, personal items and telephone within reach. Instruct patient to call for assistance. Room bathroom lighting operational. Non-slip footwear when patient is off stretcher. Physically safe environment: no spills, clutter or unnecessary equipment. Stretcher in lowest position, wheels locked, appropriate side rails in place. Provide visual cue, wrist band, yellow gown, etc. Monitor gait and stability. Monitor for mental status changes and reorient to person, place, and time. Review medications for side effects contributing to fall risk. Reinforce activity limits and safety measures with patient and family.

## 2021-04-23 NOTE — ED PROVIDER NOTE - CARE PROVIDER_API CALL
Evin Guzmán)  Thoracic Surgery  [address] [place] [address]  Phone: [phone]  Fax: [phone]  Follow Up Time: Urgent

## 2021-04-23 NOTE — ED PROVIDER NOTE - NSFOLLOWUPCLINICS_GEN_ALL_ED_FT
[facility] Pulmonolgy and Sleep Medicine  Pulmonology  [address] [place] [address], [place] [address]  Phone: [phone]  Fax:

## 2021-04-23 NOTE — ED PROVIDER NOTE - PATIENT PORTAL LINK FT
You can access the FollowMyHealth Patient Portal offered by [facility] by registering at the following website: [email or web address]. By joining [employer]’s FollowMyHealth portal, you will also be able to view your health information using other applications (apps) compatible with our system.

## 2021-04-23 NOTE — ED PROVIDER NOTE - OBJECTIVE STATEMENT
88 y/o male with PMHx of hernia, kidney stones, HLD, HTN, DM presents to the ED BIBEMS from [facility] s/p trip and fall today. Pt states that he tripped over end table and fell, fall unwitnessed, reports possible head strike but denies LOC. Pt reports he was on the ground and had to be assisted to standing, unknown amount of time on ground. Pt currently c/o pain to left rib aggravated with deep breathing, left side of back, and abrasion to left elbow. Pt is on Eliquis. Denies HA, nausea, abd pain, cough, chills. No other complaints at this time.

## 2021-04-23 NOTE — ED PROVIDER NOTE - CONSTITUTIONAL, MLM
Older male laying in bed, no acute distress, normocephalic, atraumatic, non-toxic appearing, no respiratory discomfort. normal...

## 2021-04-23 NOTE — ED PROVIDER NOTE - MUSCULOSKELETAL, MLM
Spine appears normal, range of motion is not limited, +ttp musculoskeletal left lateral rib cage with no obvious deformity. Neck is supple, non-tender. Back non-tender. Pelvis non-tender. +RLE TTP, pt states it's chronic. MAEx4, no focal deformity, bilateral SLR 30 degrees

## 2021-04-23 NOTE — ED PROVIDER NOTE - CLINICAL SUMMARY MEDICAL DECISION MAKING FREE TEXT BOX
88 y/o male with pmhx of HTN, HLD, DM BIBA from facility s/p unwitnessed mechanical fall onto left side. Pt doubtful of head injury, no LOC. +painful pleuritic left chest wall pain, +LUQ abd TTP. PLAN: Trauma alert; pan-scan, X-ray right tib-fib, pelvis, chest. Trauma labs. Cautious IV fluids. Monitor observe reassesss.

## 2021-04-23 NOTE — ED PROVIDER NOTE - PROGRESS NOTE DETAILS
Seen by surgery, they think effusion is chronic and do not have objection to discharge and routine follow up. Pt denies SoB, is not hypoxic, not dyspneic, will dc w/ close pmd and pulm f/u. C Contino:  XRs no acute fx, + L pleural effusion.  Surgery consult appreciated: pleural effusion is chronic, atraumatic, no associated hypoxia.  No clinical indication for acute intervention.  Pt suitable for D/C & outpt Pulm/ Thoracic Sx f/u.

## 2021-04-23 NOTE — ED PROVIDER NOTE - CARE PLAN
Principal Discharge DX:	Chest pain  Secondary Diagnosis:	Pleural effusion   Principal Discharge DX:	Chest pain  Secondary Diagnosis:	Pleural effusion  Secondary Diagnosis:	Chest wall contusion, left, initial encounter

## 2021-04-24 VITALS
HEART RATE: 67 BPM | OXYGEN SATURATION: 100 % | TEMPERATURE: 98 F | DIASTOLIC BLOOD PRESSURE: 82 MMHG | SYSTOLIC BLOOD PRESSURE: 158 MMHG | RESPIRATION RATE: 17 BRPM

## 2021-04-24 LAB
CULTURE RESULTS: SIGNIFICANT CHANGE UP
SPECIMEN SOURCE: SIGNIFICANT CHANGE UP

## 2021-04-25 ENCOUNTER — INPATIENT (INPATIENT)
Facility: HOSPITAL | Age: 86
LOS: 5 days | Discharge: NURSING, REHAB OR LONG-TERM CARE | DRG: 186 | End: 2021-05-01
Attending: INTERNAL MEDICINE | Admitting: HOSPITALIST
Payer: MEDICARE

## 2021-04-25 VITALS
OXYGEN SATURATION: 97 % | TEMPERATURE: 98 F | RESPIRATION RATE: 18 BRPM | WEIGHT: 210.1 LBS | SYSTOLIC BLOOD PRESSURE: 155 MMHG | HEIGHT: 72 IN | DIASTOLIC BLOOD PRESSURE: 65 MMHG | HEART RATE: 81 BPM

## 2021-04-25 DIAGNOSIS — R19.8 OTHER SPECIFIED SYMPTOMS AND SIGNS INVOLVING THE DIGESTIVE SYSTEM AND ABDOMEN: Chronic | ICD-10-CM

## 2021-04-25 DIAGNOSIS — Z98.890 OTHER SPECIFIED POSTPROCEDURAL STATES: Chronic | ICD-10-CM

## 2021-04-25 DIAGNOSIS — Z87.81 PERSONAL HISTORY OF (HEALED) TRAUMATIC FRACTURE: Chronic | ICD-10-CM

## 2021-04-25 DIAGNOSIS — J90 PLEURAL EFFUSION, NOT ELSEWHERE CLASSIFIED: ICD-10-CM

## 2021-04-25 LAB
ALBUMIN SERPL ELPH-MCNC: 2.7 G/DL — LOW (ref 3.3–5)
ALP SERPL-CCNC: 98 U/L — SIGNIFICANT CHANGE UP (ref 40–120)
ALT FLD-CCNC: 15 U/L — SIGNIFICANT CHANGE UP (ref 12–78)
ANION GAP SERPL CALC-SCNC: 3 MMOL/L — LOW (ref 5–17)
APTT BLD: 32.7 SEC — SIGNIFICANT CHANGE UP (ref 27.5–35.5)
AST SERPL-CCNC: 11 U/L — LOW (ref 15–37)
BASOPHILS # BLD AUTO: 0.03 K/UL — SIGNIFICANT CHANGE UP (ref 0–0.2)
BASOPHILS NFR BLD AUTO: 0.6 % — SIGNIFICANT CHANGE UP (ref 0–2)
BILIRUB SERPL-MCNC: 0.2 MG/DL — SIGNIFICANT CHANGE UP (ref 0.2–1.2)
BUN SERPL-MCNC: 24 MG/DL — HIGH (ref 7–23)
CALCIUM SERPL-MCNC: 8.4 MG/DL — LOW (ref 8.5–10.1)
CHLORIDE SERPL-SCNC: 106 MMOL/L — SIGNIFICANT CHANGE UP (ref 96–108)
CO2 SERPL-SCNC: 27 MMOL/L — SIGNIFICANT CHANGE UP (ref 22–31)
CREAT SERPL-MCNC: 1.36 MG/DL — HIGH (ref 0.5–1.3)
EOSINOPHIL # BLD AUTO: 0.11 K/UL — SIGNIFICANT CHANGE UP (ref 0–0.5)
EOSINOPHIL NFR BLD AUTO: 2.3 % — SIGNIFICANT CHANGE UP (ref 0–6)
GLUCOSE SERPL-MCNC: 257 MG/DL — HIGH (ref 70–99)
HCT VFR BLD CALC: 32.7 % — LOW (ref 39–50)
HGB BLD-MCNC: 10.7 G/DL — LOW (ref 13–17)
IMM GRANULOCYTES NFR BLD AUTO: 0.2 % — SIGNIFICANT CHANGE UP (ref 0–1.5)
INR BLD: 1.18 RATIO — HIGH (ref 0.88–1.16)
LACTATE SERPL-SCNC: 1.3 MMOL/L — SIGNIFICANT CHANGE UP (ref 0.7–2)
LYMPHOCYTES # BLD AUTO: 1.1 K/UL — SIGNIFICANT CHANGE UP (ref 1–3.3)
LYMPHOCYTES # BLD AUTO: 23 % — SIGNIFICANT CHANGE UP (ref 13–44)
MCHC RBC-ENTMCNC: 31 PG — SIGNIFICANT CHANGE UP (ref 27–34)
MCHC RBC-ENTMCNC: 32.7 GM/DL — SIGNIFICANT CHANGE UP (ref 32–36)
MCV RBC AUTO: 94.8 FL — SIGNIFICANT CHANGE UP (ref 80–100)
MONOCYTES # BLD AUTO: 0.73 K/UL — SIGNIFICANT CHANGE UP (ref 0–0.9)
MONOCYTES NFR BLD AUTO: 15.2 % — HIGH (ref 2–14)
NEUTROPHILS # BLD AUTO: 2.81 K/UL — SIGNIFICANT CHANGE UP (ref 1.8–7.4)
NEUTROPHILS NFR BLD AUTO: 58.7 % — SIGNIFICANT CHANGE UP (ref 43–77)
NT-PROBNP SERPL-SCNC: 193 PG/ML — SIGNIFICANT CHANGE UP (ref 0–450)
PLATELET # BLD AUTO: 222 K/UL — SIGNIFICANT CHANGE UP (ref 150–400)
POTASSIUM SERPL-MCNC: 4.4 MMOL/L — SIGNIFICANT CHANGE UP (ref 3.5–5.3)
POTASSIUM SERPL-SCNC: 4.4 MMOL/L — SIGNIFICANT CHANGE UP (ref 3.5–5.3)
PROT SERPL-MCNC: 6.5 GM/DL — SIGNIFICANT CHANGE UP (ref 6–8.3)
PROTHROM AB SERPL-ACNC: 13.7 SEC — HIGH (ref 10.6–13.6)
RAPID RVP RESULT: SIGNIFICANT CHANGE UP
RBC # BLD: 3.45 M/UL — LOW (ref 4.2–5.8)
RBC # FLD: 14 % — SIGNIFICANT CHANGE UP (ref 10.3–14.5)
SARS-COV-2 RNA SPEC QL NAA+PROBE: SIGNIFICANT CHANGE UP
SODIUM SERPL-SCNC: 136 MMOL/L — SIGNIFICANT CHANGE UP (ref 135–145)
TROPONIN I SERPL-MCNC: <0.015 NG/ML — SIGNIFICANT CHANGE UP (ref 0.01–0.04)
WBC # BLD: 4.79 K/UL — SIGNIFICANT CHANGE UP (ref 3.8–10.5)
WBC # FLD AUTO: 4.79 K/UL — SIGNIFICANT CHANGE UP (ref 3.8–10.5)

## 2021-04-25 PROCEDURE — 83986 ASSAY PH BODY FLUID NOS: CPT

## 2021-04-25 PROCEDURE — 89051 BODY FLUID CELL COUNT: CPT

## 2021-04-25 PROCEDURE — 80202 ASSAY OF VANCOMYCIN: CPT

## 2021-04-25 PROCEDURE — 99285 EMERGENCY DEPT VISIT HI MDM: CPT | Mod: CS

## 2021-04-25 PROCEDURE — 88305 TISSUE EXAM BY PATHOLOGIST: CPT

## 2021-04-25 PROCEDURE — 84484 ASSAY OF TROPONIN QUANT: CPT

## 2021-04-25 PROCEDURE — 97162 PT EVAL MOD COMPLEX 30 MIN: CPT | Mod: GP

## 2021-04-25 PROCEDURE — 82962 GLUCOSE BLOOD TEST: CPT

## 2021-04-25 PROCEDURE — 97530 THERAPEUTIC ACTIVITIES: CPT | Mod: GP

## 2021-04-25 PROCEDURE — 88108 CYTOPATH CONCENTRATE TECH: CPT

## 2021-04-25 PROCEDURE — 71045 X-RAY EXAM CHEST 1 VIEW: CPT | Mod: 26

## 2021-04-25 PROCEDURE — 82042 OTHER SOURCE ALBUMIN QUAN EA: CPT

## 2021-04-25 PROCEDURE — 97116 GAIT TRAINING THERAPY: CPT | Mod: GP

## 2021-04-25 PROCEDURE — 85027 COMPLETE CBC AUTOMATED: CPT

## 2021-04-25 PROCEDURE — 99285 EMERGENCY DEPT VISIT HI MDM: CPT

## 2021-04-25 PROCEDURE — 36415 COLL VENOUS BLD VENIPUNCTURE: CPT

## 2021-04-25 PROCEDURE — 84157 ASSAY OF PROTEIN OTHER: CPT

## 2021-04-25 PROCEDURE — 83036 HEMOGLOBIN GLYCOSYLATED A1C: CPT

## 2021-04-25 PROCEDURE — 87075 CULTR BACTERIA EXCEPT BLOOD: CPT

## 2021-04-25 PROCEDURE — 83615 LACTATE (LD) (LDH) ENZYME: CPT

## 2021-04-25 PROCEDURE — 86769 SARS-COV-2 COVID-19 ANTIBODY: CPT

## 2021-04-25 PROCEDURE — 87040 BLOOD CULTURE FOR BACTERIA: CPT

## 2021-04-25 PROCEDURE — 87102 FUNGUS ISOLATION CULTURE: CPT

## 2021-04-25 PROCEDURE — 71250 CT THORAX DX C-: CPT

## 2021-04-25 PROCEDURE — 80048 BASIC METABOLIC PNL TOTAL CA: CPT

## 2021-04-25 PROCEDURE — 71045 X-RAY EXAM CHEST 1 VIEW: CPT

## 2021-04-25 PROCEDURE — 82945 GLUCOSE OTHER FLUID: CPT

## 2021-04-25 PROCEDURE — 80053 COMPREHEN METABOLIC PANEL: CPT

## 2021-04-25 PROCEDURE — 87070 CULTURE OTHR SPECIMN AEROBIC: CPT

## 2021-04-25 PROCEDURE — 87077 CULTURE AEROBIC IDENTIFY: CPT

## 2021-04-25 PROCEDURE — 71250 CT THORAX DX C-: CPT | Mod: 26

## 2021-04-25 PROCEDURE — 93010 ELECTROCARDIOGRAM REPORT: CPT

## 2021-04-25 PROCEDURE — 99222 1ST HOSP IP/OBS MODERATE 55: CPT | Mod: GC

## 2021-04-25 RX ORDER — FINASTERIDE 5 MG/1
5 TABLET, FILM COATED ORAL DAILY
Refills: 0 | Status: DISCONTINUED | OUTPATIENT
Start: 2021-04-25 | End: 2021-05-01

## 2021-04-25 RX ORDER — INSULIN LISPRO 100/ML
VIAL (ML) SUBCUTANEOUS
Refills: 0 | Status: DISCONTINUED | OUTPATIENT
Start: 2021-04-25 | End: 2021-05-01

## 2021-04-25 RX ORDER — PANTOPRAZOLE SODIUM 20 MG/1
40 TABLET, DELAYED RELEASE ORAL
Refills: 0 | Status: DISCONTINUED | OUTPATIENT
Start: 2021-04-25 | End: 2021-05-01

## 2021-04-25 RX ORDER — GLUCAGON INJECTION, SOLUTION 0.5 MG/.1ML
1 INJECTION, SOLUTION SUBCUTANEOUS ONCE
Refills: 0 | Status: DISCONTINUED | OUTPATIENT
Start: 2021-04-25 | End: 2021-05-01

## 2021-04-25 RX ORDER — INSULIN LISPRO 100/ML
VIAL (ML) SUBCUTANEOUS AT BEDTIME
Refills: 0 | Status: DISCONTINUED | OUTPATIENT
Start: 2021-04-25 | End: 2021-05-01

## 2021-04-25 RX ORDER — VALSARTAN 80 MG/1
160 TABLET ORAL DAILY
Refills: 0 | Status: DISCONTINUED | OUTPATIENT
Start: 2021-04-25 | End: 2021-05-01

## 2021-04-25 RX ORDER — ACETAMINOPHEN 500 MG
650 TABLET ORAL ONCE
Refills: 0 | Status: COMPLETED | OUTPATIENT
Start: 2021-04-25 | End: 2021-04-26

## 2021-04-25 RX ORDER — DEXTROSE 50 % IN WATER 50 %
15 SYRINGE (ML) INTRAVENOUS ONCE
Refills: 0 | Status: DISCONTINUED | OUTPATIENT
Start: 2021-04-25 | End: 2021-05-01

## 2021-04-25 RX ORDER — METOPROLOL TARTRATE 50 MG
25 TABLET ORAL DAILY
Refills: 0 | Status: DISCONTINUED | OUTPATIENT
Start: 2021-04-25 | End: 2021-05-01

## 2021-04-25 RX ORDER — DEXTROSE 50 % IN WATER 50 %
25 SYRINGE (ML) INTRAVENOUS ONCE
Refills: 0 | Status: DISCONTINUED | OUTPATIENT
Start: 2021-04-25 | End: 2021-05-01

## 2021-04-25 RX ORDER — TAMSULOSIN HYDROCHLORIDE 0.4 MG/1
0.4 CAPSULE ORAL AT BEDTIME
Refills: 0 | Status: DISCONTINUED | OUTPATIENT
Start: 2021-04-25 | End: 2021-05-01

## 2021-04-25 RX ORDER — INSULIN GLARGINE 100 [IU]/ML
13 INJECTION, SOLUTION SUBCUTANEOUS ONCE
Refills: 0 | Status: COMPLETED | OUTPATIENT
Start: 2021-04-25 | End: 2021-04-25

## 2021-04-25 RX ORDER — SODIUM CHLORIDE 9 MG/ML
1000 INJECTION, SOLUTION INTRAVENOUS
Refills: 0 | Status: DISCONTINUED | OUTPATIENT
Start: 2021-04-25 | End: 2021-05-01

## 2021-04-25 RX ORDER — INSULIN GLARGINE 100 [IU]/ML
13 INJECTION, SOLUTION SUBCUTANEOUS AT BEDTIME
Refills: 0 | Status: DISCONTINUED | OUTPATIENT
Start: 2021-04-25 | End: 2021-05-01

## 2021-04-25 RX ORDER — DEXTROSE 50 % IN WATER 50 %
12.5 SYRINGE (ML) INTRAVENOUS ONCE
Refills: 0 | Status: DISCONTINUED | OUTPATIENT
Start: 2021-04-25 | End: 2021-05-01

## 2021-04-25 RX ADMIN — TAMSULOSIN HYDROCHLORIDE 0.4 MILLIGRAM(S): 0.4 CAPSULE ORAL at 23:39

## 2021-04-25 RX ADMIN — INSULIN GLARGINE 13 UNIT(S): 100 INJECTION, SOLUTION SUBCUTANEOUS at 23:39

## 2021-04-25 NOTE — ED PROVIDER NOTE - ATTENDING CONTRIBUTION TO CARE
I, Raul Stanton MD, personally saw the patient with the PA, and completed the key components of the history and physical exam. I then discussed the management plan with the PA.

## 2021-04-25 NOTE — ED PROVIDER NOTE - ENMT, MLM
Airway patent, Nasal mucosa clear. Mouth with normal mucosa. Throat has no vesicles, no oropharyngeal exudates and uvula is midline. Oropharynx clear, MM mildly dry.

## 2021-04-25 NOTE — ED PROVIDER NOTE - PHYSICAL EXAMINATION
elderly white male no R dis no sentence shortening, nontoxic   pupils 2mm b/l equal conjunctiva pale eomi  elizabeth clear mm muldly dry   nml radial pulse

## 2021-04-25 NOTE — ED ADULT NURSE NOTE - OBJECTIVE STATEMENT
Pt presents to the ED A+Ox4 stating that he went sent in by [facility] because he was having left sided rib pain where he has a known pleural effusion. Denies SOB/chest pain at this time. O2 saturation 98% on room air. Speaking in full sentences with no distress noted.

## 2021-04-25 NOTE — H&P ADULT - NSHPREVIEWOFSYSTEMS_GEN_ALL_CORE
Review of Systems:  CONSTITUTIONAL: No weakness, fevers or chills  EYES/ENT: No visual changes; +sneezing, no vertigo or throat pain   NECK: No pain or stiffness  RESPIRATORY: + intermittent cough, no wheezing or hemoptysis; denies shortness of breath   CARDIOVASCULAR: denies chest pain or palpitations, however with +left rib pain on movement  GASTROINTESTINAL: No abdominal or epigastric pain. No nausea, vomiting, or hematemesis; No diarrhea or constipation.   GENITOURINARY: No dysuria, frequency or hematuria  NEUROLOGICAL: No numbness or weakness  SKIN: No itching, burning, rashes, or lesions   All other review of systems is negative unless indicated above

## 2021-04-25 NOTE — ED PROVIDER NOTE - SKIN, MLM
Skin normal color for race, warm, dry and intact. No evidence of rash. No tactile warmth, skin tear lateral aspect L elbow nonacute w/ associated abrasion, no evidence of infection

## 2021-04-25 NOTE — H&P ADULT - ASSESSMENT
88 yo male with h/o DM (on insulin), HTN, hiatal hernia, chronic L pleural effusion (of unknown cause), h/o kidney stones, R Tibial/fibular fracture repair with hardwood insertion 3-4yrs ago, CKD2-3, was on eliquis outpt, with recent mechanical fall without sustained fractures, brought from [facility] for L lung side pain and SOB, intermittent non-productive cough, without hemoptysis, and decreased appetite, with increasing L pleural effusion noted on CXR and CT. Pt currently denies SOB, with good O2sat on RA.    #Increasing L pleural effusion  - In the setting of recent fall, with unclear etiology for effusion  - ?hemothorax vs. other causes of effusion, cardiac-etiology unlikely given unilaterality and pro-BNP level of 190  - CT chest with IV contrast on 4/23 with reported large L pleural effusion with near complete collapse of LLL  - CT chest repeated today - final read pending  - Pt currently without hypoxia ,denies SOB  - Closely monitor vital signs  - May require tube thoracostomy  - Cardiothoracic surgery consult  - O2 supplementation via NC PRN for SOB or O2sat <92  - Monitor H/H with repeat CBC in the AM    #Acute renal injury on CKD  - BUN of 24/1.36 on admission  - renal stones of left renal pelvis with very mild hydronephrosis on CT A/P from 4/23 may be contributing  - Avoid nephrotoxic meds  - Trend BUN/Cr    #s/p Fall  - Appears to be mechanical fall in nature  - XR and CT images from 4/23 reviewed- no acute fractures, no reported rib fracture; uncertain aga of a compression fracture of L2 reported  - fall precaution  - Pain control PRN  - PT eval    #Diabetes  - On Lantus 13U bedtime outpt, tradjenta and glimepiride oupt - will hold PO DM meds while inpt  - Continue Lantus 13U bedtime  - FSS, ISS    #Protein calorie Malnutrition  - pt with poor appetite  - Albumin of 2.7  - Nutrition consult    #HTN   - BP acceptable, currently 155/65  - Continue outpt BP meds valsartan, metoprolol    #VTE ppx  - SCDs    IMPROVE VTE Individual Risk Assessment  RISK                                                                Points  [  ] Previous VTE                                                  3  [  ] Thrombophilia                                               2  [  ] Lower limb paralysis                                      2        (unable to hold up >15 seconds)    [  ] Current Cancer                                              2         (within 6 months)  [  ] Immobilization > 24 hrs                                1  [  ] ICU/CCU stay > 24 hours                              1  [ x ] Age > 60                                                      1  IMPROVE VTE Score ____1_____    IMPROVE Score 0-1: Low Risk, No VTE prophylaxis required for most patients, encourage ambulation.   IMPROVE Score 2-3: At risk, pharmacologic VTE prophylaxis is indicated for most patients (in the absence of a contraindication)  IMPROVE Score > or = 4: High Risk, pharmacologic VTE prophylaxis is indicated for most patients (in the absence of a contraindication)     89 yo male with h/o DM (on insulin), HTN, hiatal hernia, chronic L pleural effusion (of unknown cause), h/o kidney stones, R Tibial/fibular fracture repair with hardwood insertion 3-4yrs ago, CKD2-3, was on eliquis outpt, with recent mechanical fall without sustained fractures, brought from [facility] for L lung side pain and SOB, intermittent non-productive cough, without hemoptysis, and decreased appetite, with increasing L pleural effusion noted on CXR and CT. Pt currently denies SOB, with good O2sat on RA.    #Increasing L pleural effusion  - In the setting of recent fall, with unclear etiology for effusion  - ?hemothorax vs. other causes of effusion, on daily eliquis, cardiac-etiology unlikely given unilaterality and pro-BNP level of 190  - CT chest with IV contrast on 4/23 with reported large L pleural effusion with near complete collapse of LLL  - CT chest repeated today - final read pending  - Pt currently without hypoxia ,denies SOB  - Closely monitor vital signs  - Hold eliquis for now, also unclear reason for eliquis  - May require tube thoracostomy  - Cardiothoracic surgery consult  - O2 supplementation via NC PRN for SOB or O2sat <92  - Monitor H/H with repeat CBC in the AM    #Acute renal injury on CKD  - BUN of 24/1.36 on admission  - renal stones of left renal pelvis with very mild hydronephrosis on CT A/P from 4/23 may be contributing  - Avoid nephrotoxic meds  - Trend BUN/Cr    #s/p Fall  - Appears to be mechanical fall in nature  - XR and CT images from 4/23 reviewed- no acute fractures, no reported rib fracture; uncertain aga of a compression fracture of L2 reported  - fall precaution  - Pain control PRN  - PT eval    #Diabetes  - On Lantus 13U bedtime outpt, tradjenta and glimepiride oupt - will hold PO DM meds while inpt  - Continue Lantus 13U bedtime  - FSS, ISS    #Protein calorie Malnutrition  - pt with poor appetite  - Albumin of 2.7  - Nutrition consult    #HTN   - BP acceptable, currently 155/65  - Continue outpt BP meds valsartan, metoprolol    #VTE ppx  - SCDs    IMPROVE VTE Individual Risk Assessment  RISK                                                                Points  [  ] Previous VTE                                                  3  [  ] Thrombophilia                                               2  [  ] Lower limb paralysis                                      2        (unable to hold up >15 seconds)    [  ] Current Cancer                                              2         (within 6 months)  [  ] Immobilization > 24 hrs                                1  [  ] ICU/CCU stay > 24 hours                              1  [ x ] Age > 60                                                      1  IMPROVE VTE Score ____1_____    IMPROVE Score 0-1: Low Risk, No VTE prophylaxis required for most patients, encourage ambulation.   IMPROVE Score 2-3: At risk, pharmacologic VTE prophylaxis is indicated for most patients (in the absence of a contraindication)  IMPROVE Score > or = 4: High Risk, pharmacologic VTE prophylaxis is indicated for most patients (in the absence of a contraindication)     88 yo male with h/o DM (on insulin), HTN, hiatal hernia, chronic L pleural effusion (of unknown cause), h/o kidney stones, R Tibial/fibular fracture repair with hardwood insertion 3-4yrs ago, CKD2-3, was on eliquis outpt, with recent mechanical fall without sustained fractures, brought from [facility] for L lung side pain and SOB, pt also reports intermittent non-productive cough, without hemoptysis, and decreased appetite, with increasing L pleural effusion noted on CXR and CT. Pt currently denies SOB, with good O2sat on RA.    #Increasing L pleural effusion  - In the setting of recent fall, with unclear etiology for effusion  - ?hemothorax vs. other causes of effusion, on daily eliquis, cardiac-etiology unlikely given unilaterality and pro-BNP level of 190  - CT chest with IV contrast on 4/23 with reported large L pleural effusion with near complete collapse of LLL  - CT chest and CXR repeated today - final read pending  - Pt currently without hypoxia ,denies SOB  - Closely monitor vital signs  - May require tube thoracostomy  - Cardiothoracic surgery consult  - O2 supplementation via NC PRN for if SOB or O2sat <92  - Monitor H/H with repeat CBC in the AM    #Acute renal injury on CKD  - BUN of 24/1.36 on admission  - renal stones of left renal pelvis with very mild hydronephrosis on CT A/P from 4/23 may be contributing  - Avoid nephrotoxic meds  - Trend BUN/Cr    #s/p Fall  - Appears to be mechanical fall in nature  - XR and CT images from 4/23 reviewed- no acute fractures, no reported rib fracture; uncertain aga of a compression fracture of L2 reported  - fall precaution  - Pain control PRN  - PT eval    #Diabetes  - On Lantus 13U bedtime outpt, tradjenta and glimepiride oupt - will hold PO DM meds while inpt  - Continue Lantus 13U bedtime  - FSS, ISS    #Protein calorie Malnutrition  - pt with poor appetite  - Albumin of 2.7  - Nutrition consult    #HTN   - BP acceptable, currently 155/65  - Continue outpt BP meds valsartan, metoprolol    #VTE ppx  - SCDs    IMPROVE VTE Individual Risk Assessment  RISK                                                                Points  [  ] Previous VTE                                                  3  [  ] Thrombophilia                                               2  [  ] Lower limb paralysis                                      2        (unable to hold up >15 seconds)    [  ] Current Cancer                                              2         (within 6 months)  [  ] Immobilization > 24 hrs                                1  [  ] ICU/CCU stay > 24 hours                              1  [ x ] Age > 60                                                      1  IMPROVE VTE Score ____1_____    IMPROVE Score 0-1: Low Risk, No VTE prophylaxis required for most patients, encourage ambulation.   IMPROVE Score 2-3: At risk, pharmacologic VTE prophylaxis is indicated for most patients (in the absence of a contraindication)  IMPROVE Score > or = 4: High Risk, pharmacologic VTE prophylaxis is indicated for most patients (in the absence of a contraindication)

## 2021-04-25 NOTE — ED PROVIDER NOTE - CLINICAL SUMMARY MEDICAL DECISION MAKING FREE TEXT BOX
89 y/o male with a PMHx of hernia, kidney stone, HLD, DM, HTN on eliquis for unclear reason, +HHED evaluation x2 days ago for pleuritic L chest wall pain s/p trip and fall onto L side, w/u revealed no trauma, +chronic L pleural effusion worse htan prior radio imaging. Pt deemed stable for d/c w/o pt pulmonary/thoracic f/u this week, presents to the ED, BIBA from [facility] c/o SOB and L sided lung pain. Pt reports his pain worsens w/ movement but not w/ breathing. Pt in no respiratory distress, not hypoxic. Plan EKG, CXR, labs including blood cx, rvp/covid swab, bnp, serial troponin, expect medical admission w/ inpatient pulmonary and thoracic surgery consults in AM.

## 2021-04-25 NOTE — ED PROVIDER NOTE - PROGRESS NOTE DETAILS
CXR reviewed, effusion appears stable. Pt with continued pain. Will page thoracic surgery to discuss. - Natanael Salazar PA-C RODGER Stanton MD:  Pt not hypoxic, clinically stable.  Dr. Castellon aware of Med admission. D/w thoracic surgery. Requesting repeat CT chest. Will see patient upon admission. - Natanael Salazar PA-C

## 2021-04-25 NOTE — H&P ADULT - ATTENDING COMMENTS
CC:  Luisa Preston is here today for eye exam. Pt feels the near vision is a little weaker. She does not wear her glasses. Referring MD:     Medications, allergies, tobacco history, past medical, past surgical and pertinent family histories reviewed and updated where needed in the EMR. Ocular Medications:  none    Denies known Latex allergy or symptoms of Latex sensitivity. 89 year old male patient with narrative as previously stated           -Admit to Medsurg        # Acute Respiratory Failure   -pleural effusion likely contributing   -currently asymptomatic    -CT surgery to evaluate pt in the morning   -may benefit from Thoracentesis       # Ribcage pain    -likely secondary to recent falls   -pain control      # DM2   -ISS      # ANJANA on CKD  -gentle IVF hydration  -trend kidney function    #HTN   -on valsartan        # Anemia   -stable and at baseline        # DVT ppx   -on eliquis

## 2021-04-25 NOTE — ED PROVIDER NOTE - CONSTITUTIONAL, MLM
Elderly white male, no respiratory distress, no sentence shortening, non-toxic, awake, alert, oriented to person, place, time/situation and in no apparent distress. normal...

## 2021-04-25 NOTE — ED PROVIDER NOTE - OBJECTIVE STATEMENT
88 y/o male with a PMHx of hernia, kidney stone, HLD, DM, HTN on eliquis for unclear reason, +HHED evaluation x2 days ago for pleuritic L chest wall pain s/p trip and fall onto L side, w/u revealed no trauma, +chronic L pleural effusion worse htan prior radio imaging. Pt deemed stable for d/c w/o pt pulmonary/thoracic f/u this week, presents to the ED, BIBA from [facility] c/o SOB and L sided lung pain. Pt reports his pain worsens w/ movement but not w/ breathing. Denies any trouble sleeping, abd pain, fever, cough. PMD: Dr. Chu.

## 2021-04-25 NOTE — H&P ADULT - HISTORY OF PRESENT ILLNESS
89 yo male with h/o DM (on insulin), HTN, hiatal hernia, chronic L pleural effusion (of unknown cause), h/o kidney stones, R Tibial/fibular fracture repair with hardwood insertion 3-4yrs ago, CKD2-3, was on eliquis outpt, brought from [facility] for left lung pain and SOB. Pt recently seen and evaluated at -ED on 4/23/2021 following mechanical fall onto left side of body. Pt was discharged back to [facility] after negative workup for trauma with outpt pulm/thoracic advised. CT chest obtained at the time with worsening L pleural effusion compared to 2013. Pt denies actual SOB, however reports pain of the left rib cage with movement, states no pain when lying calmly without movements. He reports intermittent cough without sputum production or hemoptysis, +sneezing, decreased appetite in the past few days, denies fever or chills, sore throat or pain, abdominal sxs including n/v/d/c or nasal congestion.     In the ED, vital signs were stable with RR of 18, O2sat of 97% on RA, no fever.  CXR and CT chest were obtained with L pleural effusion noted, final reads pending. Trop neg x1, Pro-. CBC with H/H stable at 10/7/32.7, wbc wnl, lactate wnl. CMP with elevatd BUN/Cr, BG. 89 yo male with h/o DM (on insulin), HTN, hiatal hernia, chronic L pleural effusion (of unknown cause), h/o kidney stones, R Tibial/fibular fracture repair with hardwood insertion 3-4yrs ago, CKD2-3, was on eliquis outpt, brought from [facility] for left lung pain and SOB. Pt recently seen and evaluated at -ED on 4/23/2021 following mechanical fall onto left side of body. Pt was discharged back to [facility] after negative workup for trauma with outpt pulm/thoracic advised. CT chest obtained at the time with worsening L pleural effusion. Pt denies actual SOB, however reports pain of the left rib cage with movement, states no pain when lying calmly without movements. He reports intermittent cough without sputum production or hemoptysis, +sneezing, decreased appetite in the past few days, denies fever or chills, sore throat or pain, abdominal sxs including n/v/d/c or nasal congestion.     In the ED, vital signs were stable with RR of 18, O2sat of 97% on RA, no fever.  CXR and CT chest were obtained with L pleural effusion noted, final reads pending. Trop neg x1, Pro-. CBC with H/H stable at 10/7/32.7, wbc wnl, lactate wnl. CMP with elevatd BUN/Cr, BG.

## 2021-04-25 NOTE — H&P ADULT - NSHPPHYSICALEXAM_GEN_ALL_CORE
Vital Signs Last 24 Hrs  T(F): 97.7 (25 Apr 2021 20:03), Max: 97.7 (25 Apr 2021 20:03)  HR: 81 (25 Apr 2021 20:03) (81 - 81)  BP: 155/65 (25 Apr 2021 20:03) (155/65 - 155/65)  RR: 18 (25 Apr 2021 20:03) (18 - 18)  SpO2: 97% (25 Apr 2021 20:03) (97% - 97%)    Physical Exam:  Constitutional: pt is awake, alert, comfortable-appearing, in NAD  Neck: Soft and supple, No LAD, No JVD  Respiratory: diminished breath sounds of L middle and lower lungs fields, no wheezes, clear breath sounds of DESTIN and right lung field  Cardiovascular: normal s1/s2, RRR, no murmur  Gastrointestinal: soft, +bowel sounds, nondistended, nontender  Vascular: 2+ peripheral pulses  Neurological: A/O x 3, no focal deficits  Musculoskeletal: appropriately moves all extremities  Skin: superficial skin laceration and excoriation of L elbow area (from recent fall), no bruises of the trunk, back or L rib area

## 2021-04-25 NOTE — H&P ADULT - NSICDXPASTSURGICALHX_GEN_ALL_CORE_FT
PAST SURGICAL HISTORY:  Endoscopy finding choked on piece of chiken-had upper endo with clearance of food    H/O left inguinal hernia repair     History of tibial fracture R tibial fracture s/p revision with hardware placement ~3-4yrs ago, per pt

## 2021-04-25 NOTE — ED ADULT TRIAGE NOTE - CHIEF COMPLAINT QUOTE
Patient from [facility] complaining of SOB and left sided lung pain.  Fell 2 days ago had cxr that showed pleural effusion.

## 2021-04-26 ENCOUNTER — RESULT REVIEW (OUTPATIENT)
Age: 86
End: 2021-04-26

## 2021-04-26 LAB
ALBUMIN SERPL ELPH-MCNC: 2.3 G/DL — LOW (ref 3.3–5)
ALP SERPL-CCNC: 72 U/L — SIGNIFICANT CHANGE UP (ref 40–120)
ALT FLD-CCNC: 14 U/L — SIGNIFICANT CHANGE UP (ref 12–78)
ANION GAP SERPL CALC-SCNC: 4 MMOL/L — LOW (ref 5–17)
AST SERPL-CCNC: 15 U/L — SIGNIFICANT CHANGE UP (ref 15–37)
BILIRUB SERPL-MCNC: 0.3 MG/DL — SIGNIFICANT CHANGE UP (ref 0.2–1.2)
BUN SERPL-MCNC: 21 MG/DL — SIGNIFICANT CHANGE UP (ref 7–23)
CALCIUM SERPL-MCNC: 8 MG/DL — LOW (ref 8.5–10.1)
CHLORIDE SERPL-SCNC: 110 MMOL/L — HIGH (ref 96–108)
CO2 SERPL-SCNC: 25 MMOL/L — SIGNIFICANT CHANGE UP (ref 22–31)
COVID-19 SPIKE DOMAIN AB INTERP: POSITIVE
COVID-19 SPIKE DOMAIN ANTIBODY RESULT: 19.3 U/ML — HIGH
CREAT SERPL-MCNC: 1.05 MG/DL — SIGNIFICANT CHANGE UP (ref 0.5–1.3)
GLUCOSE SERPL-MCNC: 158 MG/DL — HIGH (ref 70–99)
HCT VFR BLD CALC: 31.1 % — LOW (ref 39–50)
HGB BLD-MCNC: 10.2 G/DL — LOW (ref 13–17)
MCHC RBC-ENTMCNC: 31.1 PG — SIGNIFICANT CHANGE UP (ref 27–34)
MCHC RBC-ENTMCNC: 32.8 GM/DL — SIGNIFICANT CHANGE UP (ref 32–36)
MCV RBC AUTO: 94.8 FL — SIGNIFICANT CHANGE UP (ref 80–100)
PH FLD: 7.43 — SIGNIFICANT CHANGE UP
PLATELET # BLD AUTO: 200 K/UL — SIGNIFICANT CHANGE UP (ref 150–400)
POTASSIUM SERPL-MCNC: 3.9 MMOL/L — SIGNIFICANT CHANGE UP (ref 3.5–5.3)
POTASSIUM SERPL-SCNC: 3.9 MMOL/L — SIGNIFICANT CHANGE UP (ref 3.5–5.3)
PROT SERPL-MCNC: 5.7 GM/DL — LOW (ref 6–8.3)
RBC # BLD: 3.28 M/UL — LOW (ref 4.2–5.8)
RBC # FLD: 14 % — SIGNIFICANT CHANGE UP (ref 10.3–14.5)
SARS-COV-2 IGG+IGM SERPL QL IA: 19.3 U/ML — HIGH
SARS-COV-2 IGG+IGM SERPL QL IA: POSITIVE
SODIUM SERPL-SCNC: 139 MMOL/L — SIGNIFICANT CHANGE UP (ref 135–145)
TROPONIN I SERPL-MCNC: <0.015 NG/ML — SIGNIFICANT CHANGE UP (ref 0.01–0.04)
WBC # BLD: 4.62 K/UL — SIGNIFICANT CHANGE UP (ref 3.8–10.5)
WBC # FLD AUTO: 4.62 K/UL — SIGNIFICANT CHANGE UP (ref 3.8–10.5)

## 2021-04-26 PROCEDURE — 88108 CYTOPATH CONCENTRATE TECH: CPT | Mod: 26

## 2021-04-26 PROCEDURE — 99232 SBSQ HOSP IP/OBS MODERATE 35: CPT | Mod: GC

## 2021-04-26 PROCEDURE — 71045 X-RAY EXAM CHEST 1 VIEW: CPT | Mod: 26

## 2021-04-26 PROCEDURE — 32557 INSERT CATH PLEURA W/ IMAGE: CPT

## 2021-04-26 PROCEDURE — 99223 1ST HOSP IP/OBS HIGH 75: CPT | Mod: 25

## 2021-04-26 PROCEDURE — 88305 TISSUE EXAM BY PATHOLOGIST: CPT | Mod: 26

## 2021-04-26 PROCEDURE — 99221 1ST HOSP IP/OBS SF/LOW 40: CPT

## 2021-04-26 RX ORDER — LIDOCAINE 4 G/100G
2 CREAM TOPICAL EVERY 24 HOURS
Refills: 0 | Status: DISCONTINUED | OUTPATIENT
Start: 2021-04-26 | End: 2021-05-01

## 2021-04-26 RX ORDER — OXYCODONE HYDROCHLORIDE 5 MG/1
5 TABLET ORAL ONCE
Refills: 0 | Status: DISCONTINUED | OUTPATIENT
Start: 2021-04-26 | End: 2021-04-26

## 2021-04-26 RX ORDER — IBUPROFEN 200 MG
600 TABLET ORAL EVERY 6 HOURS
Refills: 0 | Status: DISCONTINUED | OUTPATIENT
Start: 2021-04-26 | End: 2021-04-26

## 2021-04-26 RX ORDER — SODIUM CHLORIDE 9 MG/ML
1000 INJECTION INTRAMUSCULAR; INTRAVENOUS; SUBCUTANEOUS
Refills: 0 | Status: DISCONTINUED | OUTPATIENT
Start: 2021-04-26 | End: 2021-04-26

## 2021-04-26 RX ORDER — OXYCODONE HYDROCHLORIDE 5 MG/1
5 TABLET ORAL EVERY 6 HOURS
Refills: 0 | Status: DISCONTINUED | OUTPATIENT
Start: 2021-04-26 | End: 2021-05-01

## 2021-04-26 RX ORDER — ACETAMINOPHEN 500 MG
650 TABLET ORAL EVERY 4 HOURS
Refills: 0 | Status: DISCONTINUED | OUTPATIENT
Start: 2021-04-26 | End: 2021-05-01

## 2021-04-26 RX ORDER — APIXABAN 2.5 MG/1
5 TABLET, FILM COATED ORAL
Refills: 0 | Status: DISCONTINUED | OUTPATIENT
Start: 2021-04-26 | End: 2021-04-26

## 2021-04-26 RX ORDER — INSULIN LISPRO 100/ML
2 VIAL (ML) SUBCUTANEOUS
Refills: 0 | Status: DISCONTINUED | OUTPATIENT
Start: 2021-04-26 | End: 2021-04-28

## 2021-04-26 RX ORDER — APIXABAN 2.5 MG/1
5 TABLET, FILM COATED ORAL EVERY 12 HOURS
Refills: 0 | Status: DISCONTINUED | OUTPATIENT
Start: 2021-04-26 | End: 2021-05-01

## 2021-04-26 RX ADMIN — Medication 2 UNIT(S): at 17:18

## 2021-04-26 RX ADMIN — VALSARTAN 160 MILLIGRAM(S): 80 TABLET ORAL at 10:59

## 2021-04-26 RX ADMIN — Medication 600 MILLIGRAM(S): at 17:44

## 2021-04-26 RX ADMIN — OXYCODONE HYDROCHLORIDE 5 MILLIGRAM(S): 5 TABLET ORAL at 10:19

## 2021-04-26 RX ADMIN — INSULIN GLARGINE 13 UNIT(S): 100 INJECTION, SOLUTION SUBCUTANEOUS at 21:45

## 2021-04-26 RX ADMIN — Medication 650 MILLIGRAM(S): at 11:35

## 2021-04-26 RX ADMIN — OXYCODONE HYDROCHLORIDE 5 MILLIGRAM(S): 5 TABLET ORAL at 23:30

## 2021-04-26 RX ADMIN — Medication 3: at 17:18

## 2021-04-26 RX ADMIN — LIDOCAINE 2 PATCH: 4 CREAM TOPICAL at 21:54

## 2021-04-26 RX ADMIN — APIXABAN 5 MILLIGRAM(S): 2.5 TABLET, FILM COATED ORAL at 21:45

## 2021-04-26 RX ADMIN — OXYCODONE HYDROCHLORIDE 5 MILLIGRAM(S): 5 TABLET ORAL at 22:55

## 2021-04-26 RX ADMIN — Medication 650 MILLIGRAM(S): at 11:00

## 2021-04-26 RX ADMIN — Medication 3: at 12:44

## 2021-04-26 RX ADMIN — Medication 600 MILLIGRAM(S): at 18:38

## 2021-04-26 RX ADMIN — Medication 1: at 08:45

## 2021-04-26 RX ADMIN — FINASTERIDE 5 MILLIGRAM(S): 5 TABLET, FILM COATED ORAL at 10:59

## 2021-04-26 RX ADMIN — TAMSULOSIN HYDROCHLORIDE 0.4 MILLIGRAM(S): 0.4 CAPSULE ORAL at 21:45

## 2021-04-26 RX ADMIN — Medication 25 MILLIGRAM(S): at 10:59

## 2021-04-26 RX ADMIN — SODIUM CHLORIDE 75 MILLILITER(S): 9 INJECTION INTRAMUSCULAR; INTRAVENOUS; SUBCUTANEOUS at 01:04

## 2021-04-26 RX ADMIN — OXYCODONE HYDROCHLORIDE 5 MILLIGRAM(S): 5 TABLET ORAL at 18:25

## 2021-04-26 RX ADMIN — PANTOPRAZOLE SODIUM 40 MILLIGRAM(S): 20 TABLET, DELAYED RELEASE ORAL at 10:59

## 2021-04-26 RX ADMIN — LIDOCAINE 2 PATCH: 4 CREAM TOPICAL at 18:24

## 2021-04-26 NOTE — PROGRESS NOTE ADULT - ASSESSMENT
88 YO M with a PMH of DM, HTN, Hiatal hernia, left chronic pleural effusion. Right Tib/Fib fracture, CKD 2/3 with recent mechanical fall without sustained fractures, brought in from [facility] for Left sided lung pain and short of breath.    #Left Pleural Effusion, Expanding  - Recent fall,   - Possible Hemothorax  - CT Chest on 4/23 reported large Left pleural effusion, near collapse of Left Lower Lung  - CT Surgery Consult Appreciated, Chest Tube to drain?  - H/H within normal limits, Continue to monitor    #Mechanical Fall  - No acute fractures on imaging, Compression fracture, on L2 indeterminate age  - Fall precautions  - PT Consult appreciated  - Pain control    #ANJANA on CKD  - Elevated BUN, Continue to monitor  - Renal stones, Hydronephrosis seen on CT on 4/23  - Avoid nephrotoxic medications.     #DM  - Lantus 13 QHS  - ISS  - Diabetes Diet     #HTN  - Continue Valsartan, Metoprolol    #VTE PPX  - SCD    #Code Status  - Full Code    #Disposition  - Monitor Pleural effusion, discharge when improved.

## 2021-04-26 NOTE — PHYSICAL THERAPY INITIAL EVALUATION ADULT - GENERAL OBSERVATIONS, REHAB EVAL
pt received in bed on 1N. pt with c/o not sleeping well overnight, agreeable to PT with encouragement. post session pt left in bed per pt request, call bell in reach, bed alarm on, oriented to call bell system.

## 2021-04-26 NOTE — DIETITIAN INITIAL EVALUATION ADULT. - PERTINENT LABORATORY DATA
04-26    139  |  110<H>  |  21  ----------------------------<  158<H>  3.9   |  25  |  1.05    Ca    8.0<L>      26 Apr 2021 06:33    TPro  5.7<L>  /  Alb  2.3<L>  /  TBili  0.3  /  DBili  x   /  AST  15  /  ALT  14  /  AlkPhos  72  04-26    BMI: BMI (kg/m2): 28.2 (04-26-21 @ 00:33)  HbA1c: A1C with Estimated Average Glucose Result: 8.3 % (12-31-20 @ 07:49)    Glucose: POCT Blood Glucose.: 255 mg/dL (04-26-21 @ 12:09)    BP: 139/58 (04-26-21 @ 09:37) (138/83 - 155/65)  Lipid Panel:     CAPILLARY BLOOD GLUCOSE

## 2021-04-26 NOTE — DIETITIAN INITIAL EVALUATION ADULT. - PERTINENT MEDS FT
MEDICATIONS  (STANDING):  dextrose 40% Gel 15 Gram(s) Oral once  dextrose 5%. 1000 milliLiter(s) (50 mL/Hr) IV Continuous <Continuous>  dextrose 5%. 1000 milliLiter(s) (100 mL/Hr) IV Continuous <Continuous>  dextrose 50% Injectable 25 Gram(s) IV Push once  dextrose 50% Injectable 12.5 Gram(s) IV Push once  dextrose 50% Injectable 25 Gram(s) IV Push once  finasteride 5 milliGRAM(s) Oral daily  glucagon  Injectable 1 milliGRAM(s) IntraMuscular once  insulin glargine Injectable (LANTUS) 13 Unit(s) SubCutaneous at bedtime  insulin lispro (ADMELOG) corrective regimen sliding scale   SubCutaneous three times a day before meals  insulin lispro (ADMELOG) corrective regimen sliding scale   SubCutaneous at bedtime  metoprolol succinate ER 25 milliGRAM(s) Oral daily  pantoprazole    Tablet 40 milliGRAM(s) Oral before breakfast  tamsulosin 0.4 milliGRAM(s) Oral at bedtime  valsartan 160 milliGRAM(s) Oral daily    MEDICATIONS  (PRN):  acetaminophen   Tablet .. 650 milliGRAM(s) Oral every 4 hours PRN Mild Pain (1 - 3)  ibuprofen  Tablet. 600 milliGRAM(s) Oral every 6 hours PRN Moderate Pain (4 - 6)

## 2021-04-26 NOTE — CONSULT NOTE ADULT - ATTENDING COMMENTS
Patient seen and examined. Ct chest reviewed. Appears to be a chronic effusion (present about one year ago) and increasing in size.   Exudative effusion by lights criteria. He states he has never had thoracentesis or chest tube in the past. This does not appear to be a parapneumonic effusion etc. His legs have pitting edema. This may be likely due to systemic issue. For full workup would recommend ECHO.   Will likely take the tube out once he has completely drained. No plans for more intervention such as a pleurX catheter, given that this is the first intervention he has had. He cannot tell if the draining the effusion has made his chest pain better.     Will cont to follow along with you.

## 2021-04-26 NOTE — CONSULT NOTE ADULT - SUBJECTIVE AND OBJECTIVE BOX
Surgeon: Israel    Consult requesting by: Joann     HISTORY OF PRESENT ILLNESS:  89 yo male with h/o DM (on insulin), HTN, hiatal hernia, chronic L pleural effusion (of unknown cause), h/o kidney stones, R Tibial/fibular fracture repair with hardwood insertion 3-4yrs ago, CKD2-3, was on eliquis outpt, brought from [facility] for left lung pain and SOB. Pt recently seen and evaluated at -ED on 4/23/2021 following mechanical fall onto left side of body. Pt was discharged back to [facility] after negative workup for trauma with outpt pulm/thoracic advised. CT chest obtained at the time with worsening L pleural effusion. Pt denies actual SOB, however reports pain of the left rib cage with movement, states no pain when lying calmly without movements. He reports intermittent cough without sputum production or hemoptysis, +sneezing, decreased appetite in the past few days, denies fever or chills, sore throat or pain, abdominal sxs including n/v/d/c or nasal congestion.     In the ED, vital signs were stable with RR of 18, O2sat of 97% on RA, no fever.  CXR and CT chest were obtained with L pleural effusion noted, final reads pending. Trop neg x1, Pro-. CBC with H/H stable at 10/7/32.7, wbc wnl, lactate wnl. CMP with elevatd BUN/Cr, BG.  (H&P)     Pt seen at bedside. Very pleasant 89 y/o M. pt reports left sided thoracic/flank pain and SOB.  CXR + moderate pleural effusion. Plan for CT insertion today     PAST MEDICAL & SURGICAL HISTORY:  DM (diabetes mellitus)    HTN (hypertension)    HLD (hyperlipidemia)    Kidney stone    Hernia    H/O left inguinal hernia repair    Endoscopy finding  choked on piece of chiken-had upper endo with clearance of food    History of tibial fracture  R tibial fracture s/p revision with hardware placement ~3-4yrs ago, per pt        MEDICATIONS  (STANDING):  apixaban 5 milliGRAM(s) Oral every 12 hours  dextrose 40% Gel 15 Gram(s) Oral once  dextrose 5%. 1000 milliLiter(s) (50 mL/Hr) IV Continuous <Continuous>  dextrose 5%. 1000 milliLiter(s) (100 mL/Hr) IV Continuous <Continuous>  dextrose 50% Injectable 25 Gram(s) IV Push once  dextrose 50% Injectable 12.5 Gram(s) IV Push once  dextrose 50% Injectable 25 Gram(s) IV Push once  finasteride 5 milliGRAM(s) Oral daily  glucagon  Injectable 1 milliGRAM(s) IntraMuscular once  insulin glargine Injectable (LANTUS) 13 Unit(s) SubCutaneous at bedtime  insulin lispro (ADMELOG) corrective regimen sliding scale   SubCutaneous three times a day before meals  insulin lispro (ADMELOG) corrective regimen sliding scale   SubCutaneous at bedtime  insulin lispro Injectable (ADMELOG) 2 Unit(s) SubCutaneous three times a day before meals  metoprolol succinate ER 25 milliGRAM(s) Oral daily  pantoprazole    Tablet 40 milliGRAM(s) Oral before breakfast  tamsulosin 0.4 milliGRAM(s) Oral at bedtime  valsartan 160 milliGRAM(s) Oral daily    MEDICATIONS  (PRN):  acetaminophen   Tablet .. 650 milliGRAM(s) Oral every 4 hours PRN Mild Pain (1 - 3)  ibuprofen  Tablet. 600 milliGRAM(s) Oral every 6 hours PRN Moderate Pain (4 - 6)    Antiplatelet therapy:    eliquis                        Last dose/amt: current     Allergies    morphine (Other)    Intolerances        SOCIAL HISTORY:  Smoker: [ ] Yes  [ x] No        PACK YEARS:                         WHEN QUIT?  ETOH use: [ ] Yes  [x ] No              FREQUENCY / QUANTITY:  Ilicit Drug use:  [ ] Yes  [ x] No  Occupation:  Live with: at the [employer]   Assisted device use: walker     FAMILY HISTORY:  No pertinent family history in first degree relatives  pt cannot recall any history of disease        Review of Systems  CONSTITUTIONAL:  Fevers[ ] chills[ ] sweats[ ] fatigue[ ] weight loss[ ] weight gain [ ]                                     NEGATIVE [x ]   NEURO:  parathesias[ ] seizures [ ]  syncope [ ]  confusion [ ]                                                                                NEGATIVE[x ]   EYES: glasses[ ]  blurry vision[ ]  discharge[ ] pain[ ] glaucoma [ ]                                                                          NEGATIVE[ x]   ENMT:  difficulty hearing [ ]  vertigo[ ]  dysphagia[ ] epistaxis[ ] recent dental work [ ]                                    NEGATIVE[x ]   CV:  chest pain[ ] palpitations[ ] OCHOA [x ] diaphoresis [ ]                                                                                           NEGATIVE[ ]   RESPIRATORY:  wheezing[ ] SOB[x ] cough [ ] sputum[ ] hemoptysis[ ]                                                                  NEGATIVE[ ]   GI:  nausea[ ]  vomiting [ ]  diarrhea[ ] constipation [ ] melena [ ]                                                                      NEGATIVE[ x]   : hematuria[ ]  dysuria[ ] urgency[ ] incontinence[ ]                                                                                            NEGATIVE[ x]   MUSCULOSKELETAL  arthritis[ ]  joint swelling [ ] muscle weakness [ ]                                                                NEGATIVE[x ]   SKIN/BREAST:  rash[ ] itching [ ]  hair loss[ ] masses[ ]                                                                                              NEGATIVE[x ]   PSYCH:  dementia [ ] depression [ ] anxiety[ ]                                                                                                               NEGATIVE[ x]   HEME/LYMPH:  bruises easily[ ] enlarged lymph nodes[ ] tender lymph nodes[ ]                                               NEGATIVE[ x]   ENDOCRINE:  cold intolerance[ ] heat intolerance[ ] polydipsia[ ]                                                                          NEGATIVE[x ]     PHYSICAL EXAM  Vital Signs Last 24 Hrs  T(C): 36.9 (26 Apr 2021 15:10), Max: 37.2 (25 Apr 2021 23:42)  T(F): 98.4 (26 Apr 2021 15:10), Max: 98.9 (25 Apr 2021 23:42)  HR: 62 (26 Apr 2021 15:10) (62 - 81)  BP: 121/54 (26 Apr 2021 15:10) (121/54 - 155/65)  BP(mean): 92 (25 Apr 2021 23:42) (92 - 92)  RR: 18 (26 Apr 2021 15:10) (18 - 18)  SpO2: 96% (26 Apr 2021 15:10) (95% - 100%)    CONSTITUTIONAL:                                                                          WNL[x ]   Neuro: WNL[x ] Normal exam oriented to person/place & time with no focal motor or sensory  deficits. Other                     Eyes: WNL[x ]   Normal exam of conjunctiva & lids, pupils equally reactive. Other     ENT: WNL[ x]    Normal exam of nasal/oral mucosa with absence of cyanosis. Other  Neck: WNL[x ]  Normal exam of jugular veins, trachea & thyroid. Other  Chest: WNL[ ] Normal lung exam with good air movement absence of wheezes, rales, or rhonchi: Other       decreased Lt Base                                                                           CV:  Auscultation: normal [ ] S3[ ] S4[ ] Irregular [ ] Rub[ ] Clicks[ ]    Murmurs none:[ ]systolic [x ]  diastolic [ ] holosystolic [ ]  Carotids: No Bruits[x ] Other                 Abdominal Aorta: normal [x ] nonpalpable[ ]Other                                                                                      GI:           WNL[x ] Normal exam of abdomen, liver & spleen with no noted masses or tenderness. Other                                                                                                        Extremities: WNL[ ] Normal no evidence of cyanosis or deformity Edema: none[ ]trace[ ]1+[x ]2+[ ]3+[ ]4+[ ]  Lower Extremity Pulses: Right[2+ ] Left[2+ ]Varicosities[ ]  SKIN :WNL[ x] Normal exam to inspection & palation. Other:                                                          LABS:                        10.2   4.62  )-----------( 200      ( 26 Apr 2021 06:33 )             31.1     04-26    139  |  110<H>  |  21  ----------------------------<  158<H>  3.9   |  25  |  1.05    Ca    8.0<L>      26 Apr 2021 06:33    TPro  5.7<L>  /  Alb  2.3<L>  /  TBili  0.3  /  DBili  x   /  AST  15  /  ALT  14  /  AlkPhos  72  04-26    PT/INR - ( 25 Apr 2021 20:53 )   PT: 13.7 sec;   INR: 1.18 ratio         PTT - ( 25 Apr 2021 20:53 )  PTT:32.7 sec    CARDIAC MARKERS ( 25 Apr 2021 23:20 )  <0.015 ng/mL / x     / x     / x     / x      CARDIAC MARKERS ( 25 Apr 2021 20:53 )  <0.015 ng/mL / x     / x     / x     / x          < from: CT Chest No Cont (04.25.21 @ 22:20) >    PROCEDURE:  CT of the Chest was performed.  Sagittal and coronal reformats were performed.    FINDINGS:    LUNGS AND AIRWAYS: Patent central airways.  Opacity at the posterior aspect of the left lower lobe along the major fissure, likely atelectasis and atelectasis at the posterior aspect of the right lower lobe. Also see below.  PLEURA: Moderate right pleural effusion, slightly decreased in size since4/23/2021 with compressive atelectasis in the left lower lobe.  MEDIASTINUM AND MARIA ELENA: No lymphadenopathy. Moderate hiatal hernia.  VESSELS: Thoracic aorta normal in caliber with mild calcified plaque. Coronary artery calcifications.  HEART: Heart size is normal. No pericardial effusion. Small amount of pericardial fluid.  CHEST WALL AND LOWER NECK: No enlarged axillary lymph nodes. 1.6 x 0.9 cm left thyroid lobe nodule.  VISUALIZED UPPER ABDOMEN: Cholecystectomy. Nonobstructing calculi in the left kidney. Fatty infiltration of the pancreas.  BONES: Compression fracture of L2 of indeterminate age, unchanged since 4/23/2021. Old left 10th rib fracture.    IMPRESSION:  Moderate left pleural effusion, slightly decreased in size since 4/23/2021 with compressive atelectasis in the left lower lobe.    < end of copied text >

## 2021-04-26 NOTE — PROVIDER CONTACT NOTE (OTHER) - SITUATION
increased large left pleural effusion, possible hemothorax    left message with ans service for dr to see pt in the morning

## 2021-04-26 NOTE — DIETITIAN INITIAL EVALUATION ADULT. - PHYSICAL ASSESSMENT ORBITAL
Patient Education   Personalized Prevention Plan  You are due for the preventive services outlined below.  Your care team is available to assist you in scheduling these services.  If you have already completed any of these items, please share that information with your care team to update in your medical record.  Health Maintenance Due   Topic Date Due     Zoster (Shingles) Vaccine (2 of 3) 12/19/2012     Discuss Advance Care Planning  01/11/2018     Annual Wellness Visit  08/13/2020     FALL RISK ASSESSMENT  08/13/2020     Flu Vaccine (1) 09/01/2020     Depression Assessment  10/27/2020        Patient Education   Personalized Prevention Plan  You are due for the preventive services outlined below.  Your care team is available to assist you in scheduling these services.  If you have already completed any of these items, please share that information with your care team to update in your medical record.  Health Maintenance Due   Topic Date Due     Zoster (Shingles) Vaccine (2 of 3) 12/19/2012     FALL RISK ASSESSMENT  08/13/2020     Your Health Risk Assessment indicates you feel you are not in good health    A healthy lifestyle helps keep the body fit and the mind alert. It helps protect you from disease, helps you fight disease, and helps prevent chronic disease (disease that doesn't go away) from getting worse. This is important as you get older and begin to notice twinges in muscles and joints and a decline in the strength and stamina you once took for granted. A healthy lifestyle includes good healthcare, good nutrition, weight control, recreation, and regular exercise. Avoid harmful substances and do what you can to keep safe. Another part of a healthy lifestyle is stay mentally active and socially involved.    Good healthcare     Have a wellness visit every year.     If you have new symptoms, let us know right away. Don't wait until the next checkup.     Take medicines exactly as prescribed and keep your  medicines in a safe place. Tell us if your medicine causes problems.   Healthy diet and weight control     Eat 3 or 4 small, nutritious, low-fat, high-fiber meals a day. Include a variety of fruits, vegetables, and whole-grain foods.     Make sure you get enough calcium in your diet. Calcium, vitamin D, and exercise help prevent osteoporosis (bone thinning).     If you live alone, try eating with others when you can. That way you get a good meal and have company while you eat it.     Try to keep a healthy weight. If you eat more calories than your body uses for energy, it will be stored as fat and you will gain weight.     Recreation   Recreation is not limited to sports and team events. It includes any activity that provides relaxation, interest, enjoyment, and exercise. Recreation provides an outlet for physical, mental, and social energy. It can give a sense of worth and achievement. It can help you stay healthy.    Mental Exercise and Social Involvement  Mental and emotional health is as important as physical health. Keep in touch with friends and family. Stay as active as possible. Continue to learn and challenge yourself.   Things you can do to stay mentally active are:    Learn something new, like a foreign language or musical instrument.     Play SCRABBLE or do crossword puzzles. If you cannot find people to play these games with you at home, you can play them with others on your computer through the Internet.     Join a games club--anything from card games to chess or checkers or lawn bowling.     Start a new hobby.     Go back to school.     Volunteer.     Read.   Keep up with world events.    Exercise for a Healthier Heart     Exercise with a friend. When activity is fun, you're more likely to stick with it.   You may wonder how you can improve the health of your heart. If you re thinking about exercise, you re on the right track. You don t need to become an athlete. But you do need a certain amount of  brisk exercise to help strengthen your heart. If you have been diagnosed with a heart condition, your healthcare provider may advise exercise to help stabilize your condition. To help make exercise a habit, choose safe, fun activities.   Before you start  Check with your healthcare provider before starting an exercise program. This is especially important if you have not been active for a while. It's also important if you have a long-term (chronic) health problem such as heart disease, diabetes, or obesity. Or if you are at high risk for having these problems.   Why exercise?  Exercising regularly offers many healthy rewards. It can help you do all of the following:    Improve your blood cholesterol level to help prevent further heart trouble    Lower your blood pressure to help prevent a stroke or heart attack    Control diabetes, or reduce your risk of getting this disease    Improve your heart and lung function    Reach and stay at a healthy weight    Make your muscles stronger so you can stay active    Prevent falls and fractures by slowing the loss of bone mass (osteoporosis)    Manage stress better    Reduce your blood pressure    Improve your sense of self and your body image  Exercise tips      Ease into your routine. Set small goals. Then build on them. If you are not sure what your activity level should be, talk with your healthcare provider first before starting an exercise routine.    Exercise on most days. Aim for a total of 150 minutes (2 hours and 30 minutes) or more of moderate-intensity aerobic activity each week. Or 75 minutes (1 hour and 15 minutes) or more of vigorous-intensity aerobic activity each week. Or try for a combination of both. Moderate activity means that you breathe heavier and your heart rate increases but you can still talk. Think about doing 40 minutes of moderate exercise, 3 to 4 times a week. For best results, activity should last for about 40 minutes to lower blood pressure and  cholesterol. It's OK to work up to the 40-minute period over time. Examples of moderate-intensity activity are walking 1 mile in 15 minutes. Or doing 30 to 45 minutes of yard work.    Step up your daily activity level.  Along with your exercise program, try being more active the whole day. Walk instead of drive. Or park further away so that you take more steps each day. Do more household tasks or yard work. You may not be able to meet the advised mount of physical activity. But doing some moderate- or vigorous-intensity aerobic activity can help reduce your risk for heart disease. Your healthcare provider can help you figure out what is best for you.    Choose 1 or more activities you enjoy.  Walking is one of the easiest things you can do. You can also try swimming, riding a bike, dancing, or taking an exercise class.    When to call your healthcare provider  Call your healthcare provider if you have any of these:     Chest pain or feel dizzy or lightheaded    Burning, tightness, pressure, or heaviness in your chest, neck, shoulders, back, or arms    Abnormal shortness of breath    More joint or muscle pain    A very fast or irregular heartbeat (palpitations)  [employer] last reviewed this educational content on 7/1/2019 2000-2020 [employer]. [address] [place] [address], [place] [address]. All rights reserved. This information is not intended as a substitute for professional medical care. Always follow your healthcare professional's instructions.          Signs of Hearing Loss      Hearing much better with one ear can be a sign of hearing loss.   Hearing loss is a problem shared by many people. In fact, it is one of the most common health problems, particularly as people age. Most people age 65 and older have some hearing loss. By age 80, almost everyone does. Hearing loss often occurs slowly over the years. So you may not realize your hearing has gotten worse.  Have your hearing checked  Call your  healthcare provider if you:    Have to strain to hear normal conversation    Have to watch other people s faces very carefully to follow what they re saying    Need to ask people to repeat what they ve said    Often misunderstand what people are saying    Turn the volume of the television or radio up so high that others complain    Feel that people are mumbling when they re talking to you    Find that the effort to hear leaves you feeling tired and irritated    Notice, when using the phone, that you hear better with one ear than the other  [employer] last reviewed this educational content on 1/1/2020 2000-2020 [employer]. [address] [place] [id number]. All rights reserved. This information is not intended as a substitute for professional medical care. Always follow your healthcare professional's instructions.          Urinary Incontinence, Female (Adult)   Urinary incontinence means loss of bladder control. This problem affects many women, especially as they get older. If you have incontinence, you may be embarrassed to ask for help. But know that this problem can be treated.   Types of Incontinence  There are different types of incontinence. Two of the main types are described here. You can have more than one type.     Stress incontinence. With this type, urine leaks when pressure (stress) is put on the bladder. This may happen when you cough, sneeze, or laugh. Stress incontinence most often occurs because the pelvic floor muscles that support the bladder and urethra are weak. This can happen after pregnancy and vaginal childbirth or a hysterectomy. It can also be due to excess body weight or hormone changes.    Urge incontinence (also called overactive bladder). With this type, a sudden urge to urinate is felt often. This may happen even though there may not be much urine in the bladder. The need to urinate often during the night is common. Urge incontinence most often occurs because of  bladder spasms. This may be due to bladder irritation or infection. Damage to bladder nerves or pelvic muscles, constipation, and certain medicines can also lead to urge incontinence.  Treatment depends on the cause. Further evaluation is needed to find the type you have. This will likely include an exam and certain tests. Based on the results, you and your healthcare provider can then plan treatment. Until a diagnosis is made, the home care tips below can help ease symptoms.   Home care    Do pelvic floor muscle exercises, if they are prescribed. The pelvic floor muscles help support the bladder and urethra. Many women find that their symptoms improve when doing special exercises that strengthen these muscles. To do the exercises, contract the muscles you would use to stop your stream of urine. But do this when you re not urinating. Hold for 10 seconds, then relax. Repeat 10 to 20 times in a row, at least 3 times a day. Your healthcare provider may give you other instructions for how to do the exercises and how often.    Keep a bladder diary. This helps track how often and how much you urinate over a set period of time. Bring this diary with you to your next visit with the provider. The information can help your provider learn more about your bladder problem.    Lose weight, if advised to by your provider. Extra weight puts pressure on the bladder. Your provider can help you create a weight-loss plan that s right for you. This may include exercising more and making certain diet changes.    Don't have foods and drinks that may irritate the bladder. These can include alcohol and caffeinated drinks.    Quit smoking. Smoking and other tobacco use can lead to a long-term (chronic) cough that strains the pelvic floor muscles. Smoking may also damage the bladder and urethra. Talk with your provider about treatments or methods you can use to quit smoking.    If drinking large amounts of fluid makes you have symptoms, you  may be advised to limit your fluid intake. You may also be advised to drink most of your fluids during the day and to limit fluids at night.    If you re worried about urine leakage or accidents, you may wear absorbent pads to catch urine. Change the pads often. This helps reduce discomfort. It may also reduce the risk of skin or bladder infections.    Follow-up care  Follow up with your healthcare provider, or as directed. It may take some to find the right treatment for your problem. But healthy lifestyle changes can be made right away. These include such things as exercising on a regular basis, eating a healthy diet, losing weight (if needed), and quitting smoking. Your treatment plan may include special therapies or medicines. Certain procedures or surgery may also be options. Talk about any questions you have with your provider.   When to seek medical advice  Call the healthcare provider right away if any of these occur:    Fever of 100.4 F (38 C) or higher, or as directed by your provider    Bladder pain or fullness    Belly swelling    Nausea or vomiting    Back pain    Weakness, dizziness, or fainting  [employer] last reviewed this educational content on 1/1/2020 2000-2020 [employer]. [address] [place] [address]. All rights reserved. This information is not intended as a substitute for professional medical care. Always follow your healthcare professional's instructions.        Your Health Risk Assessment indicates you feel you are not in good emotional health.    Recreation   Recreation is not limited to sports and team events. It includes any activity that provides relaxation, interest, enjoyment, and exercise. Recreation provides an outlet for physical, mental, and social energy. It can give a sense of worth and achievement. It can help you stay healthy.    Mental Exercise and Social Involvement  Mental and emotional health is as important as physical health. Keep in touch  with friends and family. Stay as active as possible. Continue to learn and challenge yourself.   Things you can do to stay mentally active are:    Learn something new, like a foreign language or musical instrument.     Play SCRABBLE or do crossword puzzles. If you cannot find people to play these games with you at home, you can play them with others on your computer through the Internet.     Join a games club--anything from card games to chess or checkers or lawn bowling.     Start a new hobby.     Go back to school.     Volunteer.     Read.   Keep up with world events.    Depression and Suicide in Older Adults    Nearly 2 million older Americans have some type of depression. Sadly, some of them even take their own lives. Yet depression among older adults is often ignored. Learn the warning signs. You may help spare a loved one needless pain. You may also save a life.  What is depression?  Depression is a serious illness that affects the way you think and feel. It is not a normal part of aging, nor is it a sign of weakness, a character flaw, or something you can snap out of. Most people with depression need treatment to get better. The most common symptom is a feeling of deep sadness. People who are depressed also may seem tired and listless. And nothing seems to give them pleasure. It s normal to grieve or be sad sometimes. But sadness lessens or passes with time. Depression rarely goes away or improves on its own. Other symptoms of depression are:    Sleeping more or less than normal    Eating more or less than normal    Having headaches, stomachaches, or other pains that don t go away    Feeling nervous,  empty,  or worthless    Crying a great deal    Thinking or talking about suicide or death    Feeling confused or forgetful  What causes it?  The causes of depression aren t fully known. But, it is thought to result from a complex interaction of biochemistry, genetics, environmental factors, and personality.  Certain chemicals in the brain play a role. Depression does run in families. And life stresses can also trigger depression in some people. Older adults often face many stressors, such as death of friends or a spouse, health problems, and financial concerns.  How you can help  Often, depressed people may not want to ask for help. When they do, they may be ignored. Or, they may receive the wrong treatment. You can help by showing parents and older friends love and support. If they seem depressed, help them find the right treatment. Talk to your doctor. Or contact a local mental health center, social service agency, or hospital. With modern treatment, no one has to suffer from depression.  If your older friend or family member agrees, you can be an advocate for him or her in the healthcare setting. Many times, older adults have other chronic illnesses such as diabetes, heart disease, or cancer that can cause symptoms of depression. Medicine side effects can also contribute to certain behaviors and feelings. It is important that the older adult's healthcare provider listens and sorts out the causes of any symptoms of depression and makes referrals to mental health specialists when needed. Untreated depression can result in misdiagnosis, including brain disorders such as dementia and Alzheimer's. If the health professional does not take the issue of depression seriously, ask your family member or friend to consider finding another provider.  Resources    National Suicide Prevention Lifeline (crisis hotline)368-124-OSZV (3051)    National [place] of Mental Esahuy847-564-9477jaj.[facility].nih.gov    National [place] on Mental Xkbecgt664-388-9047wia.neha.org    Mental Health Drhguoz885-772-0076kpa.[facility].org    National Suicide Cepmnhf185-699-5787 (800-SUICIDE)   [employer] last reviewed this educational content on 2/1/2017 2000-2020 [employer]. [address] [facility], [place] [address]. All rights reserved.  This information is not intended as a substitute for professional medical care. Always follow your healthcare professional's instructions.            none

## 2021-04-26 NOTE — PHYSICAL THERAPY INITIAL EVALUATION ADULT - PERTINENT HX OF CURRENT PROBLEM, REHAB EVAL
89M brought from [facility] for left lung pain and SOB. Pt recently seen and evaluated at -ED on 4/23/2021 following mechanical fall onto left side of body.

## 2021-04-26 NOTE — CONSULT NOTE ADULT - ASSESSMENT
This is an 89 y/o M with a persitant left Pleural effusion that has increased in size since last admission.  Plan for Pigtail insertion today and will send fluid for cytology and cultures    PLAN  left Pigtail placement today   CXR in am   pain mgmt   cont care as per primary team  Pleural studies P  DW Thoracic team in am rounds

## 2021-04-26 NOTE — PATIENT PROFILE ADULT - NSPROMEDSADMININFO_GEN_A_NUR
BIBA s/p un-wittnessed fall. Patients super found patient on the floor. Denies hitting head, LOC, neck pain, patient stated he tripped and fell. +Wound to right shin, not bleeding, serosanguinous drainage and redness noted. Patient lives at home by self. Denies HA, numbness or tingling to extremities, fever/chills, N/V/D, CP, SOB. Patient is A&O x3.
no concerns

## 2021-04-26 NOTE — DIETITIAN INITIAL EVALUATION ADULT. - CHIEF COMPLAINT
The patient is a 89y Male complaining of shortness of breath. no obvious fractures on xray - will d/c with incentive spirometer

## 2021-04-26 NOTE — DIETITIAN INITIAL EVALUATION ADULT. - ENERGY INTAKE
Fair (>50%) Pt reports decrease in po intake over the past few days however can not provide much detail.

## 2021-04-26 NOTE — DIETITIAN INITIAL EVALUATION ADULT. - ORAL INTAKE PTA/DIET HISTORY
Pt reports decreased appetite over the past few days r/t not feeling well however has difficulty providing details. Pt reports normally he has 3 meals/day but portions provided at [facility] are very big and he only eats 50% of meals. Pt denies snacking between meals or using oral nutrition supplements. Pt reports that he is not on a therapeutic diet at [facility].

## 2021-04-26 NOTE — DIETITIAN INITIAL EVALUATION ADULT. - OTHER INFO
89 yo male with h/o DM (on insulin), HTN, hiatal hernia, chronic L pleural effusion (of unknown cause), h/o kidney stones, R Tibial/fibular fracture repair with hardwood insertion 3-4yrs ago, CKD2-3, was on eliquis outpt, brought from [facility] for left lung pain and SOB. Pt recently seen and evaluated at -ED on 4/23/2021 following mechanical fall onto left side of body. Pt was discharged back to [facility] after negative workup for trauma with outpt pulm/thoracic advised. CT chest obtained at the time with worsening L pleural effusion. Pt denies actual SOB, however reports pain of the left rib cage with movement, states no pain when lying calmly without movements. He reports intermittent cough without sputum production or hemoptysis, +sneezing, decreased appetite in the past few days, denies fever or chills, sore throat or pain, abdominal sxs including n/v/d/c or nasal congestion.   In the ED, vital signs were stable with RR of 18, O2sat of 97% on RA, no fever.  CXR and CT chest were obtained with L pleural effusion noted, final reads pending. Trop neg x1, Pro-. CBC with H/H stable at 10/7/32.7, wbc wnl, lactate wnl. CMP with elevatd BUN/Cr, BG.    RD consulted for assessment. Pt is being managed for left pleural effusion from [facility]. Pt denies difficulty feeding himself or chewing however c/o difficulty swallowing when he does not chew enough. Noted hx of pt choked on chicken and had clearance w/ endo. Encourage pt to take time w/ foods and chew foods thoroughly. Encourage pt to order extra gravies/sauces to make sure food is moist enough. Pt verbalized understanding. Pt w/ hx of DM, noted hyperglycemia. Attempted to provide diet education for consistent cho diet however pt refused. Pt allowed RD to bring written diet education to leave at bedside. Contact information provided. No current c/o n/v/c/d; reports last BM this AM. No report of food allergies.

## 2021-04-26 NOTE — PROGRESS NOTE ADULT - SUBJECTIVE AND OBJECTIVE BOX
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 Recent ED visit at , for mechanical fall on 4/23, CT Chest at that time showed worsening Left pleural effusion. Patient denies fever, chills, abdominal pain, nausea, vomiting, diarrhea Shortness of breath, but reports pain on movement of the left rib cage.     89y YO Male With a PMH of  PAST MEDICAL & SURGICAL HISTORY:  DM (diabetes mellitus)    HTN (hypertension)    HLD (hyperlipidemia)    Kidney stone    Hernia    H/O left inguinal hernia repair    Endoscopy finding  choked on piece of chiken-had upper endo with clearance of food    History of tibial fracture  R tibial fracture s/p revision with hardware placement ~3-4yrs ago, per pt    Patient is a 89y old  Male who presents with a chief complaint of Left pleural effusion (25 Apr 2021 23:10)      Subjective: Patient was seen and examined by me this morning at bedside.     REVIEW OF SYSTEMS:  CONSTITUTIONAL: No weakness, fevers or chills  EYES/ENT: No visual changes;  No vertigo or throat pain   NECK: No pain or stiffness  RESPIRATORY: No cough, wheezing, hemoptysis; No shortness of breath  CARDIOVASCULAR: No chest pain or palpitations  GASTROINTESTINAL: No abdominal or epigastric pain. No nausea, vomiting; No diarrhea or constipation.   GENITOURINARY: No dysuria, frequency or hematuria  NEUROLOGICAL: No numbness or weakness  SKIN: No itching, burning, rashes, or lesions     PHYSICAL EXAM:  Vital Signs Last 24 Hrs  T(C): 36.7 (26 Apr 2021 09:37), Max: 37.2 (25 Apr 2021 23:42)  T(F): 98.1 (26 Apr 2021 09:37), Max: 98.9 (25 Apr 2021 23:42)  HR: 80 (26 Apr 2021 09:37) (62 - 81)  BP: 139/58 (26 Apr 2021 09:37) (138/83 - 155/65)  BP(mean): 92 (25 Apr 2021 23:42) (92 - 92)  RR: 18 (26 Apr 2021 09:37) (18 - 18)  SpO2: 95% (26 Apr 2021 09:37) (95% - 100%)    Constitutional: Pt lying in bed, awake and alert, NAD  HEENT: EOMI, normal hearing, moist mucous membranes  Neck: Soft and supple, no JVD  Respiratory: CTABL, No wheezing, rales or rhonchi  Cardiovascular: S1S2+, RRR, no M/G/R  Gastrointestinal: BS+, soft, NT/ND, no guarding, no rebound  Extremities: No peripheral edema  Vascular: 2+ peripheral pulses  Neurological: AAOx3, no focal deficits  Musculoskeletal: 5/5 strength b/l upper and lower extremities  Skin: No rashes    MEDICATIONS:  MEDICATIONS  (STANDING):  dextrose 40% Gel 15 Gram(s) Oral once  dextrose 5%. 1000 milliLiter(s) (50 mL/Hr) IV Continuous <Continuous>  dextrose 5%. 1000 milliLiter(s) (100 mL/Hr) IV Continuous <Continuous>  dextrose 50% Injectable 25 Gram(s) IV Push once  dextrose 50% Injectable 12.5 Gram(s) IV Push once  dextrose 50% Injectable 25 Gram(s) IV Push once  finasteride 5 milliGRAM(s) Oral daily  glucagon  Injectable 1 milliGRAM(s) IntraMuscular once  insulin glargine Injectable (LANTUS) 13 Unit(s) SubCutaneous at bedtime  insulin lispro (ADMELOG) corrective regimen sliding scale   SubCutaneous three times a day before meals  insulin lispro (ADMELOG) corrective regimen sliding scale   SubCutaneous at bedtime  metoprolol succinate ER 25 milliGRAM(s) Oral daily  pantoprazole    Tablet 40 milliGRAM(s) Oral before breakfast  tamsulosin 0.4 milliGRAM(s) Oral at bedtime  valsartan 160 milliGRAM(s) Oral daily    MEDICATIONS  (PRN):  acetaminophen   Tablet .. 650 milliGRAM(s) Oral every 4 hours PRN Mild Pain (1 - 3)  ibuprofen  Tablet. 600 milliGRAM(s) Oral every 6 hours PRN Moderate Pain (4 - 6)      LABS: All Labs Reviewed:                        10.2   4.62  )-----------( 200      ( 26 Apr 2021 06:33 )             31.1     04-26    139  |  110<H>  |  21  ----------------------------<  158<H>  3.9   |  25  |  1.05      Ca    8.0<L>      26 Apr 2021 06:33    TPro  5.7<L>  /  Alb  2.3<L>  /  TBili  0.3  /  DBili  x   /  AST  15  /  ALT  14  /  AlkPhos  72  04-26    PT/INR - ( 25 Apr 2021 20:53 )   PT: 13.7 sec;   INR: 1.18 ratio         PTT - ( 25 Apr 2021 20:53 )  PTT:32.7 sec  CARDIAC MARKERS ( 25 Apr 2021 23:20 )  <0.015 ng/mL / x     / x     / x     / x      CARDIAC MARKERS ( 25 Apr 2021 20:53 )  <0.015 ng/mL / x     / x     / x     / x          Blood Culture: 04-23 @ 19:19  Organism --  Gram Stain Blood -- Gram Stain --  Specimen Source .Urine None  Culture-Blood --      I&O's Summary    CAPILLARY BLOOD GLUCOSE      POCT Blood Glucose.: 255 mg/dL (26 Apr 2021 12:09)  POCT Blood Glucose.: 165 mg/dL (26 Apr 2021 08:10)  POCT Blood Glucose.: 236 mg/dL (25 Apr 2021 23:18)      RADIOLOGY/EKG:  < from: CT Chest No Cont (04.25.21 @ 22:20) >  IMPRESSION:  Moderate left pleural effusion, slightly decreased in size since 4/23/2021 with compressive atelectasis in the left lower lobe.            THOM CLINTON MD; Attending Radiologist  This document has been electronically signed. Apr 26 2021  8:21AM    < end of copied text >  < from: Xray Chest 1 View- PORTABLE-Urgent (Xray Chest 1 View- PORTABLE-Urgent .) (04.25.21 @ 21:12) >  IMPRESSION: Persistent moderate left base effusion.            MAGDALENA FERNANDEZ MD; Attending Radiologist  This document has been electronically signed. Apr 26 2021 11:36AM    < end of copied text >

## 2021-04-26 NOTE — DIETITIAN INITIAL EVALUATION ADULT. - ADD RECOMMEND
1. change diet to consistent cho / DASH diet 2. add glucerna BID 3. check a1c and monitor poct BG 4. weekly wt 5. encourage pt to chew foods well and maintain aspiration precautions 6. add MVI w/minerals daily

## 2021-04-27 LAB
-  COAGULASE NEGATIVE STAPHYLOCOCCUS, METHICILLIN RESISTANT: SIGNIFICANT CHANGE UP
A1C WITH ESTIMATED AVERAGE GLUCOSE RESULT: 8.7 % — HIGH (ref 4–5.6)
ADD ON TEST-SPECIMEN IN LAB: SIGNIFICANT CHANGE UP
ALBUMIN FLD-MCNC: 2.2 G/DL — SIGNIFICANT CHANGE UP
ANION GAP SERPL CALC-SCNC: 6 MMOL/L — SIGNIFICANT CHANGE UP (ref 5–17)
B PERT IGG+IGM PNL SER: CLEAR — SIGNIFICANT CHANGE UP
BUN SERPL-MCNC: 25 MG/DL — HIGH (ref 7–23)
CALCIUM SERPL-MCNC: 8.6 MG/DL — SIGNIFICANT CHANGE UP (ref 8.5–10.1)
CHLORIDE SERPL-SCNC: 111 MMOL/L — HIGH (ref 96–108)
CO2 SERPL-SCNC: 25 MMOL/L — SIGNIFICANT CHANGE UP (ref 22–31)
COLOR FLD: YELLOW — SIGNIFICANT CHANGE UP
CREAT SERPL-MCNC: 1.23 MG/DL — SIGNIFICANT CHANGE UP (ref 0.5–1.3)
ESTIMATED AVERAGE GLUCOSE: 203 MG/DL — HIGH (ref 68–114)
FLUID INTAKE SUBSTANCE CLASS: SIGNIFICANT CHANGE UP
FLUID SEGMENTED GRANULOCYTES: 4 % — SIGNIFICANT CHANGE UP
GLUCOSE FLD-MCNC: 239 MG/DL — SIGNIFICANT CHANGE UP
GLUCOSE SERPL-MCNC: 134 MG/DL — HIGH (ref 70–99)
GRAM STN FLD: SIGNIFICANT CHANGE UP
GRAM STN FLD: SIGNIFICANT CHANGE UP
HCT VFR BLD CALC: 31.7 % — LOW (ref 39–50)
HGB BLD-MCNC: 10.3 G/DL — LOW (ref 13–17)
LDH SERPL L TO P-CCNC: 66 U/L — SIGNIFICANT CHANGE UP
LYMPHOCYTES # FLD: 56 % — SIGNIFICANT CHANGE UP
MCHC RBC-ENTMCNC: 30.8 PG — SIGNIFICANT CHANGE UP (ref 27–34)
MCHC RBC-ENTMCNC: 32.5 GM/DL — SIGNIFICANT CHANGE UP (ref 32–36)
MCV RBC AUTO: 94.9 FL — SIGNIFICANT CHANGE UP (ref 80–100)
MESOTHL CELL # FLD: 2 % — SIGNIFICANT CHANGE UP
METHOD TYPE: SIGNIFICANT CHANGE UP
MONOS+MACROS # FLD: 38 % — SIGNIFICANT CHANGE UP
PLATELET # BLD AUTO: 198 K/UL — SIGNIFICANT CHANGE UP (ref 150–400)
POTASSIUM SERPL-MCNC: 4 MMOL/L — SIGNIFICANT CHANGE UP (ref 3.5–5.3)
POTASSIUM SERPL-SCNC: 4 MMOL/L — SIGNIFICANT CHANGE UP (ref 3.5–5.3)
PROT FLD-MCNC: 4 G/DL — SIGNIFICANT CHANGE UP
RBC # BLD: 3.34 M/UL — LOW (ref 4.2–5.8)
RBC # FLD: 14.2 % — SIGNIFICANT CHANGE UP (ref 10.3–14.5)
RCV VOL RI: 69 /UL — HIGH (ref 0–0)
SODIUM SERPL-SCNC: 142 MMOL/L — SIGNIFICANT CHANGE UP (ref 135–145)
SPECIMEN SOURCE: SIGNIFICANT CHANGE UP
TOTAL NUCLEATED CELL COUNT, BODY FLUID: 424 /UL — SIGNIFICANT CHANGE UP
TUBE TYPE: SIGNIFICANT CHANGE UP
WBC # BLD: 5.22 K/UL — SIGNIFICANT CHANGE UP (ref 3.8–10.5)
WBC # FLD AUTO: 5.22 K/UL — SIGNIFICANT CHANGE UP (ref 3.8–10.5)

## 2021-04-27 PROCEDURE — 99232 SBSQ HOSP IP/OBS MODERATE 35: CPT | Mod: GC

## 2021-04-27 PROCEDURE — 71045 X-RAY EXAM CHEST 1 VIEW: CPT | Mod: 26

## 2021-04-27 PROCEDURE — 99232 SBSQ HOSP IP/OBS MODERATE 35: CPT

## 2021-04-27 RX ORDER — GLIMEPIRIDE 1 MG
2 TABLET ORAL
Qty: 0 | Refills: 0 | DISCHARGE

## 2021-04-27 RX ADMIN — VALSARTAN 160 MILLIGRAM(S): 80 TABLET ORAL at 09:52

## 2021-04-27 RX ADMIN — LIDOCAINE 2 PATCH: 4 CREAM TOPICAL at 06:10

## 2021-04-27 RX ADMIN — Medication 2: at 17:37

## 2021-04-27 RX ADMIN — INSULIN GLARGINE 13 UNIT(S): 100 INJECTION, SOLUTION SUBCUTANEOUS at 20:53

## 2021-04-27 RX ADMIN — Medication 2 UNIT(S): at 08:41

## 2021-04-27 RX ADMIN — APIXABAN 5 MILLIGRAM(S): 2.5 TABLET, FILM COATED ORAL at 20:53

## 2021-04-27 RX ADMIN — OXYCODONE HYDROCHLORIDE 5 MILLIGRAM(S): 5 TABLET ORAL at 06:30

## 2021-04-27 RX ADMIN — APIXABAN 5 MILLIGRAM(S): 2.5 TABLET, FILM COATED ORAL at 09:52

## 2021-04-27 RX ADMIN — OXYCODONE HYDROCHLORIDE 5 MILLIGRAM(S): 5 TABLET ORAL at 06:03

## 2021-04-27 RX ADMIN — Medication 25 MILLIGRAM(S): at 09:52

## 2021-04-27 RX ADMIN — Medication 2 UNIT(S): at 12:50

## 2021-04-27 RX ADMIN — FINASTERIDE 5 MILLIGRAM(S): 5 TABLET, FILM COATED ORAL at 09:52

## 2021-04-27 RX ADMIN — Medication 3: at 12:49

## 2021-04-27 RX ADMIN — TAMSULOSIN HYDROCHLORIDE 0.4 MILLIGRAM(S): 0.4 CAPSULE ORAL at 20:52

## 2021-04-27 RX ADMIN — PANTOPRAZOLE SODIUM 40 MILLIGRAM(S): 20 TABLET, DELAYED RELEASE ORAL at 06:04

## 2021-04-27 RX ADMIN — Medication 2 UNIT(S): at 17:36

## 2021-04-27 NOTE — CONSULT NOTE ADULT - SUBJECTIVE AND OBJECTIVE BOX
Patient is a 89y old  Male who presents with a chief complaint of Left pleural effusion   HPI:  88 yo male with PMHx of DM (on insulin), HTN, hiatal hernia, chronic L pleural effusion (of unknown cause),  kidney stones, R Tibial/fibular fracture repair with hardwood insertion 3-4yrs ago, CKD2-3, on eliquis outpt. Admitted for Lt Pleural Effusion on 4/25. Pt brought from [facility] for left lung pain and SOB. Of note pt was seen in -ED on 4/23/2021 following mechanical fall onto left side of body. Pt was discharged back to [facility] after negative workup for trauma with outpt pulm/thoracic advised. CT chest obtained at the time with worsening L pleural effusion. Pt denies  SOB, however reports pain of the left rib cage with movement, states no pain when lying calmly without movements. Reports intermittent cough without sputum production or hemoptysis, decreased appetite in the past few days, denies fever or chills, sore throat or pain, abdominal sxs including n/v/d/c or nasal congestion. CT on 2/25 demonstrated L pleural effusion, drained on 2/26 with Pigtail placement, obtained 1000mL of serosanguinous fluid. Pt also found with + BC growing gram + Cocci in clusters. Pt currently not on IV abx.     PMH: as above  PSH: as above    Meds: per reconciliation sheet, noted below  MEDICATIONS  (STANDING):  apixaban 5 milliGRAM(s) Oral every 12 hours  dextrose 40% Gel 15 Gram(s) Oral once  dextrose 5%. 1000 milliLiter(s) (50 mL/Hr) IV Continuous <Continuous>  dextrose 5%. 1000 milliLiter(s) (100 mL/Hr) IV Continuous <Continuous>  dextrose 50% Injectable 25 Gram(s) IV Push once  dextrose 50% Injectable 12.5 Gram(s) IV Push once  dextrose 50% Injectable 25 Gram(s) IV Push once  finasteride 5 milliGRAM(s) Oral daily  glucagon  Injectable 1 milliGRAM(s) IntraMuscular once  insulin glargine Injectable (LANTUS) 13 Unit(s) SubCutaneous at bedtime  insulin lispro (ADMELOG) corrective regimen sliding scale   SubCutaneous three times a day before meals  insulin lispro (ADMELOG) corrective regimen sliding scale   SubCutaneous at bedtime  insulin lispro Injectable (ADMELOG) 2 Unit(s) SubCutaneous three times a day before meals  lidocaine   Patch 2 Patch Transdermal every 24 hours  metoprolol succinate ER 25 milliGRAM(s) Oral daily  pantoprazole    Tablet 40 milliGRAM(s) Oral before breakfast  tamsulosin 0.4 milliGRAM(s) Oral at bedtime  valsartan 160 milliGRAM(s) Oral daily    MEDICATIONS  (PRN):  acetaminophen   Tablet .. 650 milliGRAM(s) Oral every 4 hours PRN Mild Pain (1 - 3)  oxyCODONE    IR 5 milliGRAM(s) Oral every 6 hours PRN Moderate Pain (4 - 6)    Allergies    morphine (Other)    Intolerances      Social: no smoking, no alcohol, no illegal drugs; no recent travel, no exposure to TB  FAMILY HISTORY:  No pertinent family history in first degree relatives  pt cannot recall any history of disease  No history of premature cardiovascular disease in first degree relatives    ROS: the patient denies fever, no chills, no HA, no seizures, no dizziness, no sore throat, no nasal congestion, no blurry vision, no CP, no palpitations, no SOB, + cough, no abdominal pain, no diarrhea, no N/V, no dysuria, no leg pain, no claudication, no rash, + Lft Rib Pain, no rectal pain or bleeding, no night sweats  All other systems reviewed and are negative    Vital Signs Last 24 Hrs  T(C): 36.8 (27 Apr 2021 09:16), Max: 36.9 (26 Apr 2021 15:10)  T(F): 98.3 (27 Apr 2021 09:16), Max: 98.4 (26 Apr 2021 15:10)  HR: 81 (27 Apr 2021 09:16) (62 - 81)  BP: 112/56 (27 Apr 2021 09:16) (111/51 - 121/54)  BP(mean): --  RR: 18 (27 Apr 2021 09:16) (18 - 18)  SpO2: 96% (27 Apr 2021 09:16) (96% - 96%)      PE:  Constitutional:  No acute distress  HEENT: NC/AT, EOMI, PERRLA, conjunctivae clear; ears and nose atraumatic; pharynx benign  Neck: supple; thyroid not palpable  Back: no tenderness  Respiratory: respiratory effort normal; clear to auscultation  Cardiovascular: S1S2 regular, no murmurs  Abdomen: soft, not tender, not distended, positive BS; no liver or spleen organomegaly  Genitourinary: no suprapubic tenderness  Lymphatic: no LN palpable  Musculoskeletal: no muscle tenderness, no joint swelling or tenderness  Extremities: + 2 peripheral pulses, no edema   Neurological/ Psychiatric: AxOx3, judgement and insight normal; moving all extremities  Skin: Superficial skin laceration and excoriation of L elbow area (from recent fall), no bruises of the trunk, back or L rib area         Labs: all available labs reviewed                        10.3   5.22  )-----------( 198      ( 27 Apr 2021 07:44 )             31.7     04-27    142  |  111<H>  |  25<H>  ----------------------------<  134<H>  4.0   |  25  |  1.23    Ca    8.6      27 Apr 2021 07:44    TPro  5.7<L>  /  Alb  2.3<L>  /  TBili  0.3  /  DBili  x   /  AST  15  /  ALT  14  /  AlkPhos  72  04-26     LIVER FUNCTIONS - ( 26 Apr 2021 06:33 )  Alb: 2.3 g/dL / Pro: 5.7 gm/dL / ALK PHOS: 72 U/L / ALT: 14 U/L / AST: 15 U/L / GGT: x                 Culture - Fungal, Body Fluid (collected 26 Apr 2021 14:30)  Source: .Body Fluid Pleural Fluid  Preliminary Report (27 Apr 2021 07:57):    Testing in progress    Culture - Body Fluid with Gram Stain (collected 26 Apr 2021 14:30)  Source: .Body Fluid Pleural Fluid  Gram Stain (27 Apr 2021 02:16):    polymorphonuclear leukocytes seen    No organisms seen    by cytocentrifuge    Culture - Blood (collected 25 Apr 2021 20:53)  Source: .Blood None  Gram Stain (27 Apr 2021 07:49):    Growth in anaerobic bottle: Gram Positive Cocci in Clusters  Preliminary Report (27 Apr 2021 07:49):    Growth in anaerobic bottle: Gram Positive Cocci in Clusters    ***Blood Panel PCR results on this specimen are available    approximately 3 hours after the Gram stain result.***    Gram stain, PCR, and/or culture results may not always    correspond due todifference in methodologies.    ************************************************************    This PCR assay was performed by multiplex PCR. This    Assay tests for 66 bacterial and resistance gene targets.    Please refer to the [facility] [employer] testdirectory    at [email or web address] for details.  Organism: Blood Culture PCR (27 Apr 2021 09:55)  Organism: Blood Culture PCR (27 Apr 2021 09:55)    Culture - Blood (collected 25 Apr 2021 20:53)  Source: .Blood None  Preliminary Report (26 Apr 2021 23:01):    No growth to date.      COVID NotDetec(04-25 @ 20:53)    Radiology: all available radiological tests reviewed    < from: Xray Chest 1 View- PORTABLE-Urgent (Xray Chest 1 View- PORTABLE-Urgent .) (04.26.21 @ 15:23) >  IMPRESSION:  Smaller left effusion.  < end of copied text >      < from: CT Chest No Cont (04.25.21 @ 22:20) >  FINDINGS:  LUNGS AND AIRWAYS: Patent central airways.  Opacity at the posterior aspect of the left lower lobe along the major fissure, likely atelectasis and atelectasis at the posterior aspect of the right lower lobe. Also see below.  PLEURA: Moderate right pleural effusion, slightly decreased in size since4/23/2021 with compressive atelectasis in the left lower lobe.  MEDIASTINUM AND MARIA ELENA: No lymphadenopathy. Moderate hiatal hernia.  VESSELS: Thoracic aorta normal in caliber with mild calcified plaque. Coronary artery calcifications.  HEART: Heart size is normal. No pericardial effusion. Small amount of pericardial fluid.  CHEST WALL AND LOWER NECK: No enlarged axillary lymph nodes. 1.6 x 0.9 cm left thyroid lobe nodule.  VISUALIZED UPPER ABDOMEN: Cholecystectomy. Nonobstructing calculi in the left kidney. Fatty infiltration of the pancreas.  BONES: Compression fracture of L2 of indeterminate age, unchanged since 4/23/2021. Old left 10th rib fracture.  IMPRESSION:  Moderate left pleural effusion, slightly decreased in size since 4/23/2021 with compressive atelectasis in the left lower lobe.  < end of copied text >      Advanced directives addressed: full resuscitation Patient is a 89y old  Male who presents with a chief complaint of Left pleural effusion     HPI:  89 yo male with h/o DM (on insulin), HTN, hiatal hernia, chronic L pleural effusion (of unknown cause), kidney stones, R Tibial/fibular fracture repair with hardwood insertion 3-4yrs ago, CKD2-3, on eliquis outpt. Admitted for Lt Pleural Effusion on 4/25. Pt brought from [facility] for left lung pain and SOB. Of note pt was seen in -ED on 4/23/2021 following mechanical fall onto left side of body. Pt was discharged back to [facility] after negative workup for trauma with outpt pulm/thoracic advised. CT chest obtained at the time with worsening L pleural effusion. Pt denies  SOB, however reports pain of the left rib cage with movement, states no pain when lying calmly without movements. Reports intermittent cough without sputum production or hemoptysis, decreased appetite in the past few days, denies fever or chills, sore throat or pain, abdominal sxs including n/v/d/c or nasal congestion. CT on 2/25 demonstrated L pleural effusion, drained on 2/26 with Pigtail placement, obtained 1000mL of serosanguinous fluid. Pt also found with + BC growing gram + Cocci in clusters. Pt currently not on IV abx.     PMH: as above  PSH: as above    Meds: per reconciliation sheet, noted below  MEDICATIONS  (STANDING):  apixaban 5 milliGRAM(s) Oral every 12 hours  dextrose 40% Gel 15 Gram(s) Oral once  dextrose 5%. 1000 milliLiter(s) (50 mL/Hr) IV Continuous <Continuous>  dextrose 5%. 1000 milliLiter(s) (100 mL/Hr) IV Continuous <Continuous>  dextrose 50% Injectable 25 Gram(s) IV Push once  dextrose 50% Injectable 12.5 Gram(s) IV Push once  dextrose 50% Injectable 25 Gram(s) IV Push once  finasteride 5 milliGRAM(s) Oral daily  glucagon  Injectable 1 milliGRAM(s) IntraMuscular once  insulin glargine Injectable (LANTUS) 13 Unit(s) SubCutaneous at bedtime  insulin lispro (ADMELOG) corrective regimen sliding scale   SubCutaneous three times a day before meals  insulin lispro (ADMELOG) corrective regimen sliding scale   SubCutaneous at bedtime  insulin lispro Injectable (ADMELOG) 2 Unit(s) SubCutaneous three times a day before meals  lidocaine   Patch 2 Patch Transdermal every 24 hours  metoprolol succinate ER 25 milliGRAM(s) Oral daily  pantoprazole    Tablet 40 milliGRAM(s) Oral before breakfast  tamsulosin 0.4 milliGRAM(s) Oral at bedtime  valsartan 160 milliGRAM(s) Oral daily    MEDICATIONS  (PRN):  acetaminophen   Tablet .. 650 milliGRAM(s) Oral every 4 hours PRN Mild Pain (1 - 3)  oxyCODONE    IR 5 milliGRAM(s) Oral every 6 hours PRN Moderate Pain (4 - 6)    Allergies    morphine (Other)    Intolerances      Social: no smoking, no alcohol, no illegal drugs; no recent travel, no exposure to TB  FAMILY HISTORY:  No pertinent family history in first degree relatives  pt cannot recall any history of disease  No history of premature cardiovascular disease in first degree relatives    ROS: the patient denies fever, no chills, no HA, no seizures, no dizziness, no sore throat, no nasal congestion, no blurry vision, no CP, no palpitations, no SOB, + cough, no abdominal pain, no diarrhea, no N/V, no dysuria, no leg pain, no claudication, no rash, + Lft Rib Pain, no rectal pain or bleeding, no night sweats  All other systems reviewed and are negative    Vital Signs Last 24 Hrs  T(C): 36.8 (27 Apr 2021 09:16), Max: 36.9 (26 Apr 2021 15:10)  T(F): 98.3 (27 Apr 2021 09:16), Max: 98.4 (26 Apr 2021 15:10)  HR: 81 (27 Apr 2021 09:16) (62 - 81)  BP: 112/56 (27 Apr 2021 09:16) (111/51 - 121/54)  BP(mean): --  RR: 18 (27 Apr 2021 09:16) (18 - 18)  SpO2: 96% (27 Apr 2021 09:16) (96% - 96%)      PE:  Constitutional:  No acute distress  HEENT: NC/AT, EOMI, PERRLA, conjunctivae clear; ears and nose atraumatic; pharynx benign  Neck: supple; thyroid not palpable  Back: no tenderness  Respiratory: respiratory effort normal; clear to auscultation  Cardiovascular: S1S2 regular, no murmurs  Abdomen: soft, not tender, not distended, positive BS; no liver or spleen organomegaly  Genitourinary: no suprapubic tenderness  Lymphatic: no LN palpable  Musculoskeletal: no muscle tenderness, no joint swelling or tenderness  Extremities: + 2 peripheral pulses, no edema   Neurological/ Psychiatric: AxOx3, judgement and insight normal; moving all extremities  Skin: Superficial skin laceration and excoriation of L elbow area (from recent fall), no bruises of the trunk, back or L rib area         Labs: all available labs reviewed                        10.3   5.22  )-----------( 198      ( 27 Apr 2021 07:44 )             31.7     04-27    142  |  111<H>  |  25<H>  ----------------------------<  134<H>  4.0   |  25  |  1.23    Ca    8.6      27 Apr 2021 07:44    TPro  5.7<L>  /  Alb  2.3<L>  /  TBili  0.3  /  DBili  x   /  AST  15  /  ALT  14  /  AlkPhos  72  04-26     LIVER FUNCTIONS - ( 26 Apr 2021 06:33 )  Alb: 2.3 g/dL / Pro: 5.7 gm/dL / ALK PHOS: 72 U/L / ALT: 14 U/L / AST: 15 U/L / GGT: x                 Culture - Fungal, Body Fluid (collected 26 Apr 2021 14:30)  Source: .Body Fluid Pleural Fluid  Preliminary Report (27 Apr 2021 07:57):    Testing in progress    Culture - Body Fluid with Gram Stain (collected 26 Apr 2021 14:30)  Source: .Body Fluid Pleural Fluid  Gram Stain (27 Apr 2021 02:16):    polymorphonuclear leukocytes seen    No organisms seen    by cytocentrifuge    Culture - Blood (collected 25 Apr 2021 20:53)  Source: .Blood None  Gram Stain (27 Apr 2021 07:49):    Growth in anaerobic bottle: Gram Positive Cocci in Clusters  Preliminary Report (27 Apr 2021 07:49):    Growth in anaerobic bottle: Gram Positive Cocci in Clusters  Organism: Blood Culture PCR (27 Apr 2021 09:55)    Culture - Blood (collected 25 Apr 2021 20:53)  Source: .Blood None  Preliminary Report (26 Apr 2021 23:01):    No growth to date.      COVID [facility](04-25 @ 20:53)    Radiology: all available radiological tests reviewed    < from: Xray Chest 1 View- PORTABLE-Urgent (Xray Chest 1 View- PORTABLE-Urgent .) (04.26.21 @ 15:23) >  IMPRESSION:  Smaller left effusion.  < end of copied text >      < from: CT Chest No Cont (04.25.21 @ 22:20) >  FINDINGS:  LUNGS AND AIRWAYS: Patent central airways.  Opacity at the posterior aspect of the left lower lobe along the major fissure, likely atelectasis and atelectasis at the posterior aspect of the right lower lobe. Also see below.  PLEURA: Moderate right pleural effusion, slightly decreased in size since4/23/2021 with compressive atelectasis in the left lower lobe.  MEDIASTINUM AND MARIA ELENA: No lymphadenopathy. Moderate hiatal hernia.  VESSELS: Thoracic aorta normal in caliber with mild calcified plaque. Coronary artery calcifications.  HEART: Heart size is normal. No pericardial effusion. Small amount of pericardial fluid.  CHEST WALL AND LOWER NECK: No enlarged axillary lymph nodes. 1.6 x 0.9 cm left thyroid lobe nodule.  VISUALIZED UPPER ABDOMEN: Cholecystectomy. Nonobstructing calculi in the left kidney. Fatty infiltration of the pancreas.  BONES: Compression fracture of L2 of indeterminate age, unchanged since 4/23/2021. Old left 10th rib fracture.  IMPRESSION:  Moderate left pleural effusion, slightly decreased in size since 4/23/2021 with compressive atelectasis in the left lower lobe.  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 Of note pt was seen in -ED on 4/23/2021 following mechanical fall onto left side of body. Pt was discharged back to [facility] after negative workup for trauma with outpt pulm/thoracic advised. CT chest obtained at the time with worsening L pleural effusion. Pt reports pain of the left rib cage with movement, states no pain when lying calmly without movements. Reports intermittent cough without sputum production or hemoptysis, decreased appetite in the past few days, denies fever or chills. CT on 2/25 demonstrated L pleural effusion, drained on 2/26 with Pigtail placement, obtained 1000mL of serosanguinous fluid. Pt was reported with BC growing gram + Cocci in clusters.   Concern about an infectious process was raised.     PMH: as above  PSH: as above    Meds: per reconciliation sheet, noted below  MEDICATIONS  (STANDING):  apixaban 5 milliGRAM(s) Oral every 12 hours  dextrose 40% Gel 15 Gram(s) Oral once  dextrose 5%. 1000 milliLiter(s) (50 mL/Hr) IV Continuous <Continuous>  dextrose 5%. 1000 milliLiter(s) (100 mL/Hr) IV Continuous <Continuous>  dextrose 50% Injectable 25 Gram(s) IV Push once  dextrose 50% Injectable 12.5 Gram(s) IV Push once  dextrose 50% Injectable 25 Gram(s) IV Push once  finasteride 5 milliGRAM(s) Oral daily  glucagon  Injectable 1 milliGRAM(s) IntraMuscular once  insulin glargine Injectable (LANTUS) 13 Unit(s) SubCutaneous at bedtime  insulin lispro (ADMELOG) corrective regimen sliding scale   SubCutaneous three times a day before meals  insulin lispro (ADMELOG) corrective regimen sliding scale   SubCutaneous at bedtime  insulin lispro Injectable (ADMELOG) 2 Unit(s) SubCutaneous three times a day before meals  lidocaine   Patch 2 Patch Transdermal every 24 hours  metoprolol succinate ER 25 milliGRAM(s) Oral daily  pantoprazole    Tablet 40 milliGRAM(s) Oral before breakfast  tamsulosin 0.4 milliGRAM(s) Oral at bedtime  valsartan 160 milliGRAM(s) Oral daily    MEDICATIONS  (PRN):  acetaminophen   Tablet .. 650 milliGRAM(s) Oral every 4 hours PRN Mild Pain (1 - 3)  oxyCODONE    IR 5 milliGRAM(s) Oral every 6 hours PRN Moderate Pain (4 - 6)    Allergies    morphine (Other)    Intolerances      Social: no smoking, no alcohol, no illegal drugs; no recent travel, no exposure to TB  FAMILY HISTORY:  No pertinent family history in first degree relatives  pt cannot recall any history of disease  No history of premature cardiovascular disease in first degree relatives    ROS: the patient denies fever, no chills, no HA, no seizures, no dizziness, no sore throat, no nasal congestion, no blurry vision, no CP, no palpitations, no SOB, + cough, no abdominal pain, no diarrhea, no N/V, no dysuria, no leg pain, no claudication, no rash, + Lft Rib Pain, no rectal pain or bleeding, no night sweats  All other systems reviewed and are negative    Vital Signs Last 24 Hrs  T(C): 36.8 (27 Apr 2021 09:16), Max: 36.9 (26 Apr 2021 15:10)  T(F): 98.3 (27 Apr 2021 09:16), Max: 98.4 (26 Apr 2021 15:10)  HR: 81 (27 Apr 2021 09:16) (62 - 81)  BP: 112/56 (27 Apr 2021 09:16) (111/51 - 121/54)  BP(mean): --  RR: 18 (27 Apr 2021 09:16) (18 - 18)  SpO2: 96% (27 Apr 2021 09:16) (96% - 96%)      PE:  Constitutional:  No acute distress  HEENT: NC/AT, EOMI, PERRLA, conjunctivae clear; ears and nose atraumatic; pharynx benign  Neck: supple; thyroid not palpable  Back: no tenderness  Respiratory: respiratory effort normal; crackles at left base  left side chest tube  Cardiovascular: S1S2 regular, no murmurs  Abdomen: soft, not tender, not distended, positive BS; no liver or spleen organomegaly  Genitourinary: no suprapubic tenderness  Lymphatic: no LN palpable  Musculoskeletal: no muscle tenderness, no joint swelling or tenderness  Extremities: + 2 peripheral pulses, 2+ edema   Neurological/ Psychiatric: AxOx3, judgement and insight normal; moving all extremities  Skin: Superficial skin laceration and excoriation of L elbow area (from recent fall), no bruises of the trunk, back or L rib area         Labs: all available labs reviewed                        10.3   5.22  )-----------( 198      ( 27 Apr 2021 07:44 )             31.7     04-27    142  |  111<H>  |  25<H>  ----------------------------<  134<H>  4.0   |  25  |  1.23    Ca    8.6      27 Apr 2021 07:44    TPro  5.7<L>  /  Alb  2.3<L>  /  TBili  0.3  /  DBili  x   /  AST  15  /  ALT  14  /  AlkPhos  72  04-26     LIVER FUNCTIONS - ( 26 Apr 2021 06:33 )  Alb: 2.3 g/dL / Pro: 5.7 gm/dL / ALK PHOS: 72 U/L / ALT: 14 U/L / AST: 15 U/L / GGT: x                 Culture - Fungal, Body Fluid (collected 26 Apr 2021 14:30)  Source: .Body Fluid Pleural Fluid  Preliminary Report (27 Apr 2021 07:57):    Testing in progress    Culture - Body Fluid with Gram Stain (collected 26 Apr 2021 14:30)  Source: .Body Fluid Pleural Fluid  Gram Stain (27 Apr 2021 02:16):    polymorphonuclear leukocytes seen    No organisms seen    by cytocentrifuge    Culture - Blood (collected 25 Apr 2021 20:53)  Source: .Blood None  Gram Stain (27 Apr 2021 07:49):    Growth in anaerobic bottle: Gram Positive Cocci in Clusters  Preliminary Report (27 Apr 2021 07:49):    Growth in anaerobic bottle: Gram Positive Cocci in Clusters  Organism: Blood Culture PCR (27 Apr 2021 09:55)    Culture - Blood (collected 25 Apr 2021 20:53)  Source: .Blood None  Preliminary Report (26 Apr 2021 23:01):    No growth to date.      COVID NotDetec(04-25 @ 20:53)    Radiology: all available radiological tests reviewed    < from: Xray Chest 1 View- PORTABLE-Urgent (Xray Chest 1 View- PORTABLE-Urgent .) (04.26.21 @ 15:23) >  IMPRESSION:  Smaller left effusion.  < end of copied text >      < from: CT Chest No Cont (04.25.21 @ 22:20) >  FINDINGS:  LUNGS AND AIRWAYS: Patent central airways.  Opacity at the posterior aspect of the left lower lobe along the major fissure, likely atelectasis and atelectasis at the posterior aspect of the right lower lobe. Also see below.  PLEURA: Moderate right pleural effusion, slightly decreased in size since4/23/2021 with compressive atelectasis in the left lower lobe.  MEDIASTINUM AND MARIA ELENA: No lymphadenopathy. Moderate hiatal hernia.  VESSELS: Thoracic aorta normal in caliber with mild calcified plaque. Coronary artery calcifications.  HEART: Heart size is normal. No pericardial effusion. Small amount of pericardial fluid.  CHEST WALL AND LOWER NECK: No enlarged axillary lymph nodes. 1.6 x 0.9 cm left thyroid lobe nodule.  VISUALIZED UPPER ABDOMEN: Cholecystectomy. Nonobstructing calculi in the left kidney. Fatty infiltration of the pancreas.  BONES: Compression fracture of L2 of indeterminate age, unchanged since 4/23/2021. Old left 10th rib fracture.  IMPRESSION:  Moderate left pleural effusion, slightly decreased in size since 4/23/2021 with compressive atelectasis in the left lower lobe.  < end of copied text >      Advanced directives addressed: full resuscitation

## 2021-04-27 NOTE — PROGRESS NOTE ADULT - SUBJECTIVE AND OBJECTIVE BOX
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 Recent ED visit at , for mechanical fall on 4/23, CT Chest at that time showed worsening Left pleural effusion. Patient denies fever, chills, abdominal pain, nausea, vomiting, diarrhea Shortness of breath, but reports pain on movement of the left rib cage.     Subjective: Patient was seen and examined by me this morning at bedside. Patient does not have any complaints at this time.     REVIEW OF SYSTEMS:    CONSTITUTIONAL: No weakness, fevers or chills  EYES/ENT: No visual changes;  No vertigo or throat pain   NECK: No pain or stiffness  RESPIRATORY: No cough, wheezing, hemoptysis; No shortness of breath  CARDIOVASCULAR: No chest pain or palpitations  GASTROINTESTINAL: No abdominal or epigastric pain. No nausea, vomiting; No diarrhea or constipation.   GENITOURINARY: No dysuria, frequency or hematuria  NEUROLOGICAL: No numbness or weakness  SKIN: No itching, rashes      PHYSICAL EXAM:  Vital Signs Last 24 Hrs  T(C): 36.8 (27 Apr 2021 09:16), Max: 36.9 (26 Apr 2021 15:10)  T(F): 98.3 (27 Apr 2021 09:16), Max: 98.4 (26 Apr 2021 15:10)  HR: 81 (27 Apr 2021 09:16) (62 - 81)  BP: 112/56 (27 Apr 2021 09:16) (111/51 - 139/58)  BP(mean): --  RR: 18 (27 Apr 2021 09:16) (18 - 18)  SpO2: 96% (27 Apr 2021 09:16) (95% - 96%)    Constitutional: Pt lying in bed, awake and alert, NAD  HEENT: EOMI, normal hearing, moist mucous membranes  Neck: Soft and supple, no JVD  Respiratory: CTABL, No wheezing, rales or rhonchi  Cardiovascular: S1S2+, RRR, no M/G/R  Gastrointestinal: BS+, soft, NT/ND, no guarding, no rebound  Extremities: No peripheral edema  Vascular: 2+ peripheral pulses  Neurological: AAOx3, no focal deficits  Musculoskeletal: 5/5 strength b/l upper and lower extremities  Skin: No rashes      MEDICATIONS  (STANDING):  apixaban 5 milliGRAM(s) Oral every 12 hours  dextrose 40% Gel 15 Gram(s) Oral once  dextrose 5%. 1000 milliLiter(s) (50 mL/Hr) IV Continuous <Continuous>  dextrose 5%. 1000 milliLiter(s) (100 mL/Hr) IV Continuous <Continuous>  dextrose 50% Injectable 25 Gram(s) IV Push once  dextrose 50% Injectable 12.5 Gram(s) IV Push once  dextrose 50% Injectable 25 Gram(s) IV Push once  finasteride 5 milliGRAM(s) Oral daily  glucagon  Injectable 1 milliGRAM(s) IntraMuscular once  insulin glargine Injectable (LANTUS) 13 Unit(s) SubCutaneous at bedtime  insulin lispro (ADMELOG) corrective regimen sliding scale   SubCutaneous three times a day before meals  insulin lispro (ADMELOG) corrective regimen sliding scale   SubCutaneous at bedtime  insulin lispro Injectable (ADMELOG) 2 Unit(s) SubCutaneous three times a day before meals  lidocaine   Patch 2 Patch Transdermal every 24 hours  metoprolol succinate ER 25 milliGRAM(s) Oral daily  pantoprazole    Tablet 40 milliGRAM(s) Oral before breakfast  tamsulosin 0.4 milliGRAM(s) Oral at bedtime  valsartan 160 milliGRAM(s) Oral daily    MEDICATIONS  (PRN):  acetaminophen   Tablet .. 650 milliGRAM(s) Oral every 4 hours PRN Mild Pain (1 - 3)  oxyCODONE    IR 5 milliGRAM(s) Oral every 6 hours PRN Moderate Pain (4 - 6)      LABS: All Labs Reviewed:                        10.3   5.22  )-----------( 198      ( 27 Apr 2021 07:44 )             31.7   04-27    142  |  111<H>  |  25<H>  ----------------------------<  134<H>  4.0   |  25  |  1.23    Ca    8.6      27 Apr 2021 07:44    TPro  5.7<L>  /  Alb  2.3<L>  /  TBili  0.3  /  DBili  x   /  AST  15  /  ALT  14  /  AlkPhos  72  04-26    PT/INR - ( 25 Apr 2021 20:53 )   PT: 13.7 sec;   INR: 1.18 ratio         PTT - ( 25 Apr 2021 20:53 )  PTT:32.7 sec  CARDIAC MARKERS ( 25 Apr 2021 23:20 )  <0.015 ng/mL / x     / x     / x     / x      CARDIAC MARKERS ( 25 Apr 2021 20:53 )  <0.015 ng/mL / x     / x     / x     / x          Blood Culture: 04-23 @ 19:19  Organism --  Gram Stain Blood -- Gram Stain --  Specimen Source .Urine None  Culture-Blood --      I&O's Summary    CAPILLARY BLOOD GLUCOSE      POCT Blood Glucose.: 144 mg/dL (27 Apr 2021 07:57)  POCT Blood Glucose.: 153 mg/dL (26 Apr 2021 21:14)  POCT Blood Glucose.: 279 mg/dL (26 Apr 2021 17:02)  POCT Blood Glucose.: 255 mg/dL (26 Apr 2021 12:09)      RADIOLOGY/EKG:  < from: CT Chest No Cont (04.25.21 @ 22:20) >  IMPRESSION:  Moderate left pleural effusion, slightly decreased in size since 4/23/2021 with compressive atelectasis in the left lower lobe.            THOM CLINTON MD; Attending Radiologist  This document has been electronically signed. Apr 26 2021  8:21AM    < end of copied text >  < from: Xray Chest 1 View- PORTABLE-Urgent (Xray Chest 1 View- PORTABLE-Urgent .) (04.25.21 @ 21:12) >  IMPRESSION: Persistent moderate left base effusion.            MAGDALENA FERNANDEZ MD; Attending Radiologist  This document has been electronically signed. Apr 26 2021 11:36AM    < end of copied text >  < from: Xray Chest 1 View- PORTABLE-Urgent (Xray Chest 1 View- PORTABLE-Urgent .) (04.26.21 @ 15:23) >  IMPRESSION:  Smaller left effusion.    Thank you for the courtesy of this referral.            GASPER DE LA PAZ MD; Attending Interventional Radiologist  This document has been electronically signed. 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 Recent ED visit at , for mechanical fall on 4/23, CT Chest at that time showed worsening Left pleural effusion. Patient denies fever, chills, abdominal pain, nausea, vomiting, diarrhea Shortness of breath, but reports pain on movement of the left rib cage.     Subjective: Patient was seen and examined by me this morning at bedside. Patient does not have any complaints at this time. Patient had 1L fluid drained from his lung on 4/26    REVIEW OF SYSTEMS:    CONSTITUTIONAL: No weakness, fevers or chills  EYES/ENT: No visual changes;  No vertigo or throat pain   NECK: No pain or stiffness  RESPIRATORY: No cough, wheezing, hemoptysis; No shortness of breath  CARDIOVASCULAR: No chest pain or palpitations  GASTROINTESTINAL: No abdominal or epigastric pain. No nausea, vomiting; No diarrhea or constipation.   GENITOURINARY: No dysuria, frequency or hematuria  NEUROLOGICAL: No numbness or weakness  SKIN: No itching, rashes    PHYSICAL EXAM:  Vital Signs Last 24 Hrs  T(C): 36.8 (27 Apr 2021 09:16), Max: 36.9 (26 Apr 2021 15:10)  T(F): 98.3 (27 Apr 2021 09:16), Max: 98.4 (26 Apr 2021 15:10)  HR: 81 (27 Apr 2021 09:16) (62 - 81)  BP: 112/56 (27 Apr 2021 09:16) (111/51 - 139/58)  BP(mean): --  RR: 18 (27 Apr 2021 09:16) (18 - 18)  SpO2: 96% (27 Apr 2021 09:16) (95% - 96%)    Constitutional: Pt lying in bed, awake and alert, NAD  HEENT: EOMI, normal hearing, moist mucous membranes  Neck: Soft and supple, no JVD  Respiratory: CTABL, No wheezing, rales or rhonchi  Cardiovascular: S1S2+, RRR, no M/G/R  Gastrointestinal: BS+, soft, NT/ND, no guarding, no rebound  Extremities: No peripheral edema  Vascular: 2+ peripheral pulses  Neurological: AAOx3, no focal deficits  Musculoskeletal: 5/5 strength b/l upper and lower extremities  Skin: No rashes    MEDICATIONS  (STANDING):  apixaban 5 milliGRAM(s) Oral every 12 hours  dextrose 40% Gel 15 Gram(s) Oral once  dextrose 5%. 1000 milliLiter(s) (50 mL/Hr) IV Continuous <Continuous>  dextrose 5%. 1000 milliLiter(s) (100 mL/Hr) IV Continuous <Continuous>  dextrose 50% Injectable 25 Gram(s) IV Push once  dextrose 50% Injectable 12.5 Gram(s) IV Push once  dextrose 50% Injectable 25 Gram(s) IV Push once  finasteride 5 milliGRAM(s) Oral daily  glucagon  Injectable 1 milliGRAM(s) IntraMuscular once  insulin glargine Injectable (LANTUS) 13 Unit(s) SubCutaneous at bedtime  insulin lispro (ADMELOG) corrective regimen sliding scale   SubCutaneous three times a day before meals  insulin lispro (ADMELOG) corrective regimen sliding scale   SubCutaneous at bedtime  insulin lispro Injectable (ADMELOG) 2 Unit(s) SubCutaneous three times a day before meals  lidocaine   Patch 2 Patch Transdermal every 24 hours  metoprolol succinate ER 25 milliGRAM(s) Oral daily  pantoprazole    Tablet 40 milliGRAM(s) Oral before breakfast  tamsulosin 0.4 milliGRAM(s) Oral at bedtime  valsartan 160 milliGRAM(s) Oral daily    MEDICATIONS  (PRN):  acetaminophen   Tablet .. 650 milliGRAM(s) Oral every 4 hours PRN Mild Pain (1 - 3)  oxyCODONE    IR 5 milliGRAM(s) Oral every 6 hours PRN Moderate Pain (4 - 6)    LABS: All Labs Reviewed:                        10.3   5.22  )-----------( 198      ( 27 Apr 2021 07:44 )             31.7   04-27    142  |  111<H>  |  25<H>  ----------------------------<  134<H>  4.0   |  25  |  1.23    Ca    8.6      27 Apr 2021 07:44    TPro  5.7<L>  /  Alb  2.3<L>  /  TBili  0.3  /  DBili  x   /  AST  15  /  ALT  14  /  AlkPhos  72  04-26    PT/INR - ( 25 Apr 2021 20:53 )   PT: 13.7 sec;   INR: 1.18 ratio         PTT - ( 25 Apr 2021 20:53 )  PTT:32.7 sec  CARDIAC MARKERS ( 25 Apr 2021 23:20 )  <0.015 ng/mL / x     / x     / x     / x      CARDIAC MARKERS ( 25 Apr 2021 20:53 )  <0.015 ng/mL / x     / x     / x     / x          Blood Culture: 04-23 @ 19:19  Organism --  Gram Stain Blood -- Gram Stain --  Specimen Source .Urine None  Culture-Blood --      I&O's Summary    26 Apr 2021 07:01  -  27 Apr 2021 07:00  --------------------------------------------------------  IN: 0 mL / OUT: 2775 mL / NET: -2775 mL          RADIOLOGY/EKG:  < from: CT Chest No Cont (04.25.21 @ 22:20) >  IMPRESSION:  Moderate left pleural effusion, slightly decreased in size since 4/23/2021 with compressive atelectasis in the left lower lobe.            THOM CLINTON MD; Attending Radiologist  This document has been electronically signed. Apr 26 2021  8:21AM    < end of copied text >  < from: Xray Chest 1 View- PORTABLE-Urgent (Xray Chest 1 View- PORTABLE-Urgent .) (04.25.21 @ 21:12) >  IMPRESSION: Persistent moderate left base effusion.            MAGDALENA FERNANDEZ MD; Attending Radiologist  This document has been electronically signed. Apr 26 2021 11:36AM    < end of copied text >  < from: Xray Chest 1 View- PORTABLE-Urgent (Xray Chest 1 View- PORTABLE-Urgent .) (04.26.21 @ 15:23) >  IMPRESSION:  Smaller left effusion.    Thank you for the courtesy of this referral.            GASPER DE LA PAZ MD; Attending Interventional Radiologist  This document has been electronically signed. Apr 27 2021  7:45AM    < end of copied text >

## 2021-04-27 NOTE — PROGRESS NOTE ADULT - ASSESSMENT
88 YO M with a PMH of DM, HTN, Hiatal hernia, left chronic pleural effusion. Right Tib/Fib fracture, CKD 2/3 with recent mechanical fall without sustained fractures, brought in from [facility] for Left sided lung pain and short of breath.    #Left Pleural Effusion, Expanding  - Recent mechanical fall,   - CT Chest on 4/23 reported large Left pleural effusion, near collapse of Left Lower Lung  - CT Surgery Consult Appreciated, Chest Tube Drained 4/26. 1L of non bloody fluid drained.   - H/H within normal limits, Continue to monitor    #Mechanical Fall  - No acute fractures on imaging, Compression fracture, on L2 indeterminate age  - Fall precautions  - PT Consult appreciated  - Pain control    #Blood Cultures Positive   - Anarobic Bottle, Gram Positive Cocci in Clusters  - Infectious Disease Consult Appreciated  - Not on Antibiotics Currently    #ANJANA on CKD  - Elevated BUN, Continue to monitor  - Renal stones, Hydronephrosis seen on CT on 4/23  - Avoid nephrotoxic medications.     #DM  - Lantus 13 QHS  - 2 U Admelog Premeal added  - ISS  - Diabetes Diet     #HTN  - Continue Valsartan, Metoprolol    #VTE PPX  - SCD    #Code Status  - Full Code    #Disposition  - Monitor Pleural effusion, discharge when improved. Pending ID Consult  89 YO M with a PMH of DM, HTN, Hiatal hernia, left chronic pleural effusion. Right Tib/Fib fracture, CKD 2/3 with recent mechanical fall without sustained fractures, brought in from [facility] for Left sided lung pain and short of breath.    #Left Pleural Effusion, Expanding  - Recent mechanical fall,   - CT Chest on 4/23 reported large Left pleural effusion, near collapse of Left Lower Lung  - CT Surgery Consult Appreciated, Chest Tube Drained 4/26. 1L of non bloody fluid drained.   - H/H within normal limits, Continue to monitor    #Mechanical Fall  - No acute fractures on imaging, Compression fracture, on L2 indeterminate age  - Fall precautions  - PT Consult appreciated  - Pain control    #Blood Cultures Positive   - Anarobic Bottle, Gram Positive Cocci in Clusters  - Infectious Disease Consult Appreciated  - Not on Antibiotics Currently, Continue to monitor off Antibiotics    #ANJANA on CKD  - Elevated BUN, Continue to monitor  - Renal stones, Hydronephrosis seen on CT on 4/23  - Avoid nephrotoxic medications.     #DM  - Lantus 13 QHS  - 2 U Admelog Premeal added  - ISS  - Diabetes Diet     #HTN  - Continue Valsartan, Metoprolol    #VTE PPX  - Eliquis 5 BID    #Code Status  - Full Code    #Disposition  - Monitor Pleural effusion, discharge when improved.

## 2021-04-27 NOTE — CONSULT NOTE ADULT - ASSESSMENT
88 yo male with PMHx of DM (on insulin), HTN, hiatal hernia, chronic L pleural effusion (of unknown cause),  kidney stones, R Tibial/fibular fracture repair with hardwood insertion 3-4yrs ago, CKD2-3, on eliquis outpt. Admitted for Lt Pleural Effusion on 4/25. Pt brought from [facility] for left lung pain and SOB. Of note pt was seen in -ED on 4/23/2021 following mechanical fall onto left side of body. Pt was discharged back to [facility] after negative workup for trauma with outpt pulm/thoracic advised. CT chest obtained at the time with worsening L pleural effusion. Pt denies  SOB, however reports pain of the left rib cage with movement, states no pain when lying calmly without movements. Reports intermittent cough without sputum production or hemoptysis, decreased appetite in the past few days, denies fever or chills, sore throat or pain, abdominal sxs including n/v/d/c or nasal congestion. CT on 2/25 demonstrated L pleural effusion, drained on 2/26 with Pigtail placement, obtained 1000mL of serosanguinous fluid. Pt also found with + BC growing gram + Cocci in clusters. Pt currently not on IV abx.  89 y/o male with h/o DM (on insulin), HTN, hiatal hernia, chronic L pleural effusion (of unknown cause), kidney stones, R Tibial/fibular fracture repair with hardwood insertion 3-4yrs ago, CKD2-3, on eliquis was admitted on 4/25 for left chest pain and SOB. Of note pt was seen in -ED on 4/23/2021 following mechanical fall onto left side of body. Pt was discharged back to [facility] after negative workup for trauma with outpt pulm/thoracic advised. CT chest obtained at the time with worsening L pleural effusion. Pt reports pain of the left rib cage with movement, states no pain when lying calmly without movements. Reports intermittent cough without sputum production or hemoptysis, decreased appetite in the past few days, denies fever or chills. CT on 2/25 demonstrated L pleural effusion, drained on 2/26 with Pigtail placement, obtained 1000mL of serosanguinous fluid. Pt was reported with BC growing gram + Cocci in clusters.     1. Left pleural effusion s/p drainage. L2 compression fracture. CRF stage 3.  -bacteremia with GPC in clusters in one bottle only - probable contaminant  -no clinical signs of sepsis  -pleural fluid analysis reviewed; doubt empyema  -f/u cultures  -would observe off abx therapy at this time  -old chart reviewed to assess prior cultures  -monitor temps  -f/u CBC  -supportive care  2. Other issues:   -care per medicine

## 2021-04-27 NOTE — PROGRESS NOTE ADULT - SUBJECTIVE AND OBJECTIVE BOX
Subjective:  pt in bed NAD no issues overnight. CT in place and draining serosang fluid     T(C): 36.8 (04-27-21 @ 09:16), Max: 36.9 (04-26-21 @ 15:10)  HR: 81 (04-27-21 @ 09:16) (62 - 81)  BP: 112/56 (04-27-21 @ 09:16) (111/51 - 121/54)  ABP: --  ABP(mean): --  RR: 18 (04-27-21 @ 09:16) (18 - 18)  SpO2: 96% (04-27-21 @ 09:16) (96% - 96%) RA   Wt(kg): --  CVP(mm Hg): --  CO: --  CI: --  PA: --                                              Tele: SR     CHEST TUBE:  2000cc     per 24 hours    AIR LEAKS:  [ ] YES [ x] NO          04-27    142  |  111<H>  |  25<H>  ----------------------------<  134<H>  4.0   |  25  |  1.23    Ca    8.6      27 Apr 2021 07:44    TPro  5.7<L>  /  Alb  2.3<L>  /  TBili  0.3  /  DBili  x   /  AST  15  /  ALT  14  /  AlkPhos  72  04-26                               10.3   5.22  )-----------( 198      ( 27 Apr 2021 07:44 )             31.7        PT/INR - ( 25 Apr 2021 20:53 )   PT: 13.7 sec;   INR: 1.18 ratio         PTT - ( 25 Apr 2021 20:53 )  PTT:32.7 sec         CAPILLARY BLOOD GLUCOSE      POCT Blood Glucose.: 144 mg/dL (27 Apr 2021 07:57)  POCT Blood Glucose.: 153 mg/dL (26 Apr 2021 21:14)  POCT Blood Glucose.: 279 mg/dL (26 Apr 2021 17:02)  POCT Blood Glucose.: 255 mg/dL (26 Apr 2021 12:09)           CXR:  < from: Xray Chest 1 View- PORTABLE-Urgent (Xray Chest 1 View- PORTABLE-Urgent .) (04.26.21 @ 15:23) >  Frontal expiratory view of the chest shows the heart to be similar in size. Left pigtail catheter has been placed.    The lungs show clear right lung with reduction of left effusion and there is no evidence of pneumothorax nor right pleural effusion.    IMPRESSION:  Smaller left effusion.    Thank you for the courtesy of this referral.    < end of copied text >          EXAM  Neuro: Alert Awake NAD   Pulm:  Decreased at bases b/l , clear b/l   CV: RRR S1 S2   Abd:  soft NT ND + BS   Extremities: trace edema b/l LE         Assessment:  89yMale    with PAST MEDICAL & SURGICAL HISTORY:  DM (diabetes mellitus)    HTN (hypertension)    HLD (hyperlipidemia)    Kidney stone    Hernia    H/O left inguinal hernia repair    Endoscopy finding  choked on piece of chiken-had upper endo with clearance of food    History of tibial fracture  R tibial fracture s/p revision with hardware placement ~3-4yrs ago, per pt          Plan:

## 2021-04-27 NOTE — CONSULT NOTE ADULT - CONSULT REASON
Left pleural effusion
89M w/ DM and chronic L pleural effusion presenting w/ increased pleural effusion s/p diagnostic/therapeutic tap. Now growing gram positive cocci in clusters on blood culture. Consult for vancomycin use.

## 2021-04-27 NOTE — PROGRESS NOTE ADULT - ASSESSMENT
This is an 89 y/o M with a persitant left Pleural effusion that has increased in size since last admission.  Plan for Pigtail insertion today and will send fluid for cytology and cultures    PLAN  Maintain CT today   CXR in am   pain mgmt   cont care as per primary team  Pleural studies P  DW Thoracic team in am rounds

## 2021-04-28 ENCOUNTER — TRANSCRIPTION ENCOUNTER (OUTPATIENT)
Age: 86
End: 2021-04-28

## 2021-04-28 LAB
ANION GAP SERPL CALC-SCNC: 7 MMOL/L — SIGNIFICANT CHANGE UP (ref 5–17)
BUN SERPL-MCNC: 24 MG/DL — HIGH (ref 7–23)
CALCIUM SERPL-MCNC: 8.4 MG/DL — LOW (ref 8.5–10.1)
CHLORIDE SERPL-SCNC: 109 MMOL/L — HIGH (ref 96–108)
CO2 SERPL-SCNC: 23 MMOL/L — SIGNIFICANT CHANGE UP (ref 22–31)
CREAT SERPL-MCNC: 1.05 MG/DL — SIGNIFICANT CHANGE UP (ref 0.5–1.3)
CULTURE RESULTS: SIGNIFICANT CHANGE UP
GLUCOSE SERPL-MCNC: 138 MG/DL — HIGH (ref 70–99)
GRAM STN FLD: SIGNIFICANT CHANGE UP
HCT VFR BLD CALC: 31.1 % — LOW (ref 39–50)
HGB BLD-MCNC: 10 G/DL — LOW (ref 13–17)
MCHC RBC-ENTMCNC: 30.4 PG — SIGNIFICANT CHANGE UP (ref 27–34)
MCHC RBC-ENTMCNC: 32.2 GM/DL — SIGNIFICANT CHANGE UP (ref 32–36)
MCV RBC AUTO: 94.5 FL — SIGNIFICANT CHANGE UP (ref 80–100)
ORGANISM # SPEC MICROSCOPIC CNT: SIGNIFICANT CHANGE UP
ORGANISM # SPEC MICROSCOPIC CNT: SIGNIFICANT CHANGE UP
PLATELET # BLD AUTO: 190 K/UL — SIGNIFICANT CHANGE UP (ref 150–400)
POTASSIUM SERPL-MCNC: 4 MMOL/L — SIGNIFICANT CHANGE UP (ref 3.5–5.3)
POTASSIUM SERPL-SCNC: 4 MMOL/L — SIGNIFICANT CHANGE UP (ref 3.5–5.3)
RBC # BLD: 3.29 M/UL — LOW (ref 4.2–5.8)
RBC # FLD: 14 % — SIGNIFICANT CHANGE UP (ref 10.3–14.5)
SODIUM SERPL-SCNC: 139 MMOL/L — SIGNIFICANT CHANGE UP (ref 135–145)
SPECIMEN SOURCE: SIGNIFICANT CHANGE UP
SPECIMEN SOURCE: SIGNIFICANT CHANGE UP
WBC # BLD: 5.47 K/UL — SIGNIFICANT CHANGE UP (ref 3.8–10.5)
WBC # FLD AUTO: 5.47 K/UL — SIGNIFICANT CHANGE UP (ref 3.8–10.5)

## 2021-04-28 PROCEDURE — 99232 SBSQ HOSP IP/OBS MODERATE 35: CPT | Mod: GC

## 2021-04-28 PROCEDURE — 99232 SBSQ HOSP IP/OBS MODERATE 35: CPT

## 2021-04-28 PROCEDURE — 71045 X-RAY EXAM CHEST 1 VIEW: CPT | Mod: 26

## 2021-04-28 RX ORDER — INSULIN LISPRO 100/ML
5 VIAL (ML) SUBCUTANEOUS
Refills: 0 | Status: DISCONTINUED | OUTPATIENT
Start: 2021-04-28 | End: 2021-04-29

## 2021-04-28 RX ORDER — TRAMADOL HYDROCHLORIDE 50 MG/1
1 TABLET ORAL
Qty: 0 | Refills: 0 | DISCHARGE

## 2021-04-28 RX ORDER — INSULIN LISPRO 100/ML
2 VIAL (ML) SUBCUTANEOUS
Refills: 0 | Status: DISCONTINUED | OUTPATIENT
Start: 2021-04-28 | End: 2021-04-29

## 2021-04-28 RX ORDER — INSULIN LISPRO 100/ML
3 VIAL (ML) SUBCUTANEOUS
Refills: 0 | Status: DISCONTINUED | OUTPATIENT
Start: 2021-04-28 | End: 2021-04-29

## 2021-04-28 RX ORDER — VANCOMYCIN HCL 1 G
1000 VIAL (EA) INTRAVENOUS ONCE
Refills: 0 | Status: COMPLETED | OUTPATIENT
Start: 2021-04-28 | End: 2021-04-28

## 2021-04-28 RX ADMIN — INSULIN GLARGINE 13 UNIT(S): 100 INJECTION, SOLUTION SUBCUTANEOUS at 21:28

## 2021-04-28 RX ADMIN — PANTOPRAZOLE SODIUM 40 MILLIGRAM(S): 20 TABLET, DELAYED RELEASE ORAL at 06:18

## 2021-04-28 RX ADMIN — FINASTERIDE 5 MILLIGRAM(S): 5 TABLET, FILM COATED ORAL at 09:46

## 2021-04-28 RX ADMIN — APIXABAN 5 MILLIGRAM(S): 2.5 TABLET, FILM COATED ORAL at 09:47

## 2021-04-28 RX ADMIN — Medication 250 MILLIGRAM(S): at 15:16

## 2021-04-28 RX ADMIN — Medication 1: at 17:19

## 2021-04-28 RX ADMIN — TAMSULOSIN HYDROCHLORIDE 0.4 MILLIGRAM(S): 0.4 CAPSULE ORAL at 21:28

## 2021-04-28 RX ADMIN — Medication 4: at 11:45

## 2021-04-28 RX ADMIN — Medication 2 UNIT(S): at 11:44

## 2021-04-28 RX ADMIN — APIXABAN 5 MILLIGRAM(S): 2.5 TABLET, FILM COATED ORAL at 21:28

## 2021-04-28 RX ADMIN — Medication 2 UNIT(S): at 17:18

## 2021-04-28 RX ADMIN — VALSARTAN 160 MILLIGRAM(S): 80 TABLET ORAL at 09:47

## 2021-04-28 RX ADMIN — Medication 25 MILLIGRAM(S): at 09:47

## 2021-04-28 RX ADMIN — Medication 2 UNIT(S): at 08:09

## 2021-04-28 NOTE — PROGRESS NOTE ADULT - ASSESSMENT
88 y/o male with h/o DM (on insulin), HTN, hiatal hernia, chronic L pleural effusion (of unknown cause), kidney stones, R Tibial/fibular fracture repair with hardwood insertion 3-4yrs ago, CKD2-3, on eliquis was admitted on 4/25 for left chest pain and SOB. Of note pt was seen in -ED on 4/23/2021 following mechanical fall onto left side of body. Pt was discharged back to [facility] after negative workup for trauma with outpt pulm/thoracic advised. CT chest obtained at the time with worsening L pleural effusion. Pt reports pain of the left rib cage with movement, states no pain when lying calmly without movements. Reports intermittent cough without sputum production or hemoptysis, decreased appetite in the past few days, denies fever or chills. CT on 2/25 demonstrated L pleural effusion, drained on 2/26 with Pigtail placement, obtained 1000mL of serosanguinous fluid. Pt was reported with BC growing gram + Cocci in clusters.     1. Left pleural effusion s/p drainage. L2 compression fracture. CRF stage 3.  -bacteremia with CNST in one bottle only - probable contaminant  -no clinical signs of sepsis  -pleural fluid analysis reviewed; doubt empyema  -pleural culture is negative to date  -continue to observe off abx therapy  -monitor temps  -f/u CBC  -supportive care  2. Other issues:   -care per medicine

## 2021-04-28 NOTE — CHART NOTE - NSCHARTNOTEFT_GEN_A_CORE
Pt seen and examined with house staff.  Plan formulated and reviewed on rounds    Briefly,  89 y/o male with HTN, DM and ANJANA found to have moderate size L pl effusion following mechanical fall as outpt.  He is on RA and comfortable  S/P L sided CT placement.  By criteria it is a exudate (PFA/SA=0.9) but LDH is 66 and pH 7.43 which makes me favor transudative process.  GS negative  One Bcx bottle growing MRSE.  Normal WBC and no fever  Repeat BCX on 4/27 also + for GPC in clusters  Case d/w Dr Duong.  Will start IV Vanco 1g x 1 now  Hold DC for now    No events overnight   ROS o/w negative   Ambulating with PT    Awake and alert  NAD  Stable vitals  No fevers    Grossly non focal     Labs reviewed    WBC 5    Not Hemothorax  Bcx results are likely real given repetitive positivity      IV Vanco 1g IV X 1 now as d/w Dr Duong  DC discharge   DOAC   FS with insulin coverage  DVT prophy--AC    Med Surg
Pt seen and examined with house staff.  Plan formulated and reviewed on rounds     Briefly,  89 y/o male with HTN, DM and ANJANA found to have moderate size L pl effusion following mechanical fall as outpt.  He is on RA and comfortable  No events overnight   ROS o/w negative     Awake and alert  NAD  Stable vitals  No fevers    Grossly non focal     Labs reviewed    H/H stable    R/O L hemothorax following trauma  CTS eval  Hold blood thinners   FS with insulin coverage  DVT prophy--SCD    Med Surg
Pt seen and examined with house staff.  Plan formulated and reviewed on rounds    Briefly,  88 y/o male with HTN, DM and ANJANA found to have moderate size L pl effusion following mechanical fall as outpt.  He is on RA and comfortable  S/P L sided CT placement.  By criteria it is a exudate (PFA/SA=0.9) but LDH is 66 and pH 7.43 which makes me favor transudative process.  GS negative  One Bcx bottle growing MRSE.  Normal WBC and no fever  No events overnight   ROS o/w negative   Ambulating with PT    Awake and alert  NAD  Stable vitals  No fevers    Grossly non focal     Labs reviewed    H/H stable    Not Hemothorax  Likely contaminant BCx    Repeat BCx  Agree with holding Abx for now.  ID follow up  CT as per CTS  Resume DOAC   FS with insulin coverage  DVT prophy--AC    Med Surg

## 2021-04-28 NOTE — DISCHARGE NOTE PROVIDER - HOSPITAL COURSE
89M w/ T2DM, HTN, hiatal hernia, chronic left pleural effusion, CKD was sent in from [facility] with left lung pain and SOB. CT chest revealed a worsening left pleural effusion, and pt was admitted for worsened pleural effusion. Chest tube was placed and 1L drained on 4/26. Chest tube continued to drain less and less throughout admission. Pleural fluid with high albumin, meeting criteria for exudative effusion. However, LDH very low, with pale yellow appearance and nl pH. Effusion likely transudative despite testing. Cytology WNL, no organisms seen, fungal culture pending. Pt cleared by CT surgery for removal on day of discharge. Pt did not require supplemental oxygen during admission. Of note, pt grew methacillin-resistant coagulase negative staphylococcus on blood cultures on two separate occasions. Pt evaluated by ID, blood culture findings are likely contaminant as pt is otherwise asymptomatic. Pt was seen and examined and stable for discharge.     Subjective: Pt reports feeling well. Denies any SOB. Denies fever, chills, cough, nausea, diarrhea, dysuria, pain. Pt eager to go home. Discussed fluid will likely reaccumulate though we do not know why.     REVIEW OF SYSTEMS:  CONSTITUTIONAL: No weakness, fevers or chills  EYES/ENT: No visual changes;  No vertigo or throat pain   NECK: No pain or stiffness  RESPIRATORY: No cough, wheezing, hemoptysis; No shortness of breath  CARDIOVASCULAR: No chest pain or palpitations  GASTROINTESTINAL: No abdominal or epigastric pain. No nausea, vomiting; No diarrhea or constipation.   GENITOURINARY: No dysuria, frequency or hematuria  NEUROLOGICAL: No numbness or weakness  SKIN: No itching, rashes      `Vitals  T(F): 97.3 (04-28-21 @ 09:01), Max: 98.2 (04-27-21 @ 15:19)  HR: 77 (04-28-21 @ 09:01) (65 - 77)  BP: 132/54 (04-28-21 @ 09:01) (122/47 - 132/54)  RR: 18 (04-28-21 @ 09:01) (18 - 18)  SpO2: 97% (04-28-21 @ 09:01) (97% - 99%)    Physical Exam   Gen: NAD, comfortable  HENT: atraumatic head and ears, no gross abnormalities of ears, mucous membranes moist, no oral lesions,   CV: RRR, nl s1/s2, no M/R/G  Pulm: nl respiratory effort, decreased breath sounds at bases, left pigtail dressing clean and intact  Back: no tenderness  Abd: soft, nontender, nondistended, no rebound, no guarding, no masses  Extremities: mild nonpitting pedal edema, pedal pulses palpable   Skin: nl warm and dry, no wounds   Neuro: A&Ox3, answering questions appropriately, EOMI, face symmetric, no dysarthria, moving all extremities spontaneously   Psych: no depression, no SI, no HI    < from: Xray Chest 1 View- PORTABLE-Routine (Xray Chest 1 View- PORTABLE-Routine in AM.) (04.27.21 @ 09:24) >    COMPARISON STUDY: 4/26/2021    Frontal expiratory view of the chest shows the heartto be similar in size. Left pigtail catheter remains present.    The lungs show reduction of left effusion with small left base infiltrate or atelectasis and there is no evidence of pneumothorax nor right pleural effusion.    IMPRESSION:  Partial clearing, left base. No pneumothorax.    < end of copied text >    < from: CT Chest w/ IV Cont (04.23.21 @ 19:05) >  FINDINGS:  CHEST:  LUNGS AND LARGE AIRWAYS: Patent central airways. No pulmonary nodules.  PLEURA: Large left pleural effusion with near complete collapse of left lower lobe.  VESSELS: Atherosclerotic changes of the aorta and coronary arteries.  HEART: Heart size is normal. No pericardial effusion.  MEDIASTINUM AND MARIA ELENA: No lymphadenopathy.  CHEST WALL AND LOWER NECK: Bilateral thyroid nodule, measuring up to 1.8 cm in the left thyroid lobe.    ABDOMEN AND PELVIS:  LIVER: Within normal limits.  BILE DUCTS: Normal caliber.  GALLBLADDER: Cholecystectomy.  SPLEEN: Within normal limits.  PANCREAS: Within normal limits.  ADRENALS: Within normal limits.  KIDNEYS/URETERS: Cortical atrophy. An 11 mm and an 8 mm stone in the left renal pelvis with very mild hydronephrosis.    BLADDER: Within normal limits.  REPRODUCTIVE ORGANS: Prostate within normal limits.    BOWEL: Small hiatal hernia. No bowel obstruction. Appendix is normal. Diverticulosis without acute diverticulitis.  PERITONEUM: No ascites.  VESSELS: Atherosclerotic changes.  RETROPERITONEUM/LYMPH NODES: No lymphadenopathy.  ABDOMINAL WALL: Within normal limits.  BONES: Right total hip replacement. Degenerative changes and osteopenia. Stable compression deformity of L1. Compression deformity of L2 of uncertain age, new from prior exam.    IMPRESSION:  Large left pleural effusion with near complete collapse of left lower lobe.  Uncertain age of a compression fracture of L2. Correlate for focal pain in this region.  2 stones in the left renal pelvis with very mild hydronephrosis.  < end of copied text >     89M w/ T2DM, HTN, hiatal hernia, chronic left pleural effusion, CKD was sent in from [facility] with left lung pain and SOB. CT chest revealed a worsening left pleural effusion, and pt was admitted for worsened pleural effusion. Chest tube was placed and 1L drained on 4/26. Chest tube continued to drain less and less throughout admission. Pleural fluid with high albumin, meeting criteria for exudative effusion. However, LDH very low, with pale yellow appearance and nl pH. Effusion likely transudative despite testing. Cytology WNL, no organisms seen, fungal culture pending. Pt cleared by CT surgery for removal on day of discharge. Pt did not require supplemental oxygen during admission. Of note, pt grew methacillin-resistant coagulase negative staphylococcus on blood cultures on two separate occasions. Pt evaluated by ID, blood culture findings were repeated, patient received vancomycin and were eventually negativge. Previous cultures are likely contaminant as pt is otherwise asymptomatic. Pt was seen and examined and stable for discharge. Patient can have dressings removed on Sunday 5/2, will need follow up with Dr. Frankel pulmonology to prevent reaccumulation of fluid and further symptoms.     Subjective: Pt reports feeling well. Denies any SOB. Denies fever, chills, cough, nausea, diarrhea, dysuria, pain. Pt eager to go home. Discussed fluid will likely reaccumulate, seen in 8/2020 by pulmonology, with a small pleural effusion, will need monitoring.     REVIEW OF SYSTEMS:    CONSTITUTIONAL: No weakness, fevers or chills  EYES/ENT: No visual changes;  No vertigo or throat pain   NECK: No pain or stiffness  RESPIRATORY: No cough, wheezing, hemoptysis; No shortness of breath  CARDIOVASCULAR: No chest pain or palpitations  GASTROINTESTINAL: No abdominal or epigastric pain. No nausea, vomiting; No diarrhea or constipation.   GENITOURINARY: No dysuria, frequency or hematuria  NEUROLOGICAL: No numbness or weakness  SKIN: No itching, rashes      PHYSICAL EXAM:  Vital Signs Last 24 Hrs  T(C): 36.4 (01 May 2021 08:29), Max: 36.9 (30 Apr 2021 21:33)  T(F): 97.6 (01 May 2021 08:29), Max: 98.4 (30 Apr 2021 21:33)  HR: 73 (01 May 2021 08:29) (68 - 91)  BP: 113/58 (01 May 2021 08:29) (111/51 - 120/52)  BP(mean): --  RR: 18 (01 May 2021 08:29) (18 - 18)  SpO2: 97% (01 May 2021 08:29) (97% - 97%)    Constitutional: Pt lying in bed, awake and alert, NAD  HEENT: EOMI, normal hearing, moist mucous membranes  Neck: Soft and supple, no JVD  Respiratory: CTABL, No wheezing, rales or rhonchi  Cardiovascular: S1S2+, RRR, no M/G/R  Gastrointestinal: BS+, soft, NT/ND, no guarding, no rebound  Extremities: No peripheral edema  Vascular: 2+ peripheral pulses  Neurological: AAOx3, no focal deficits    < from: Xray Chest 1 View- PORTABLE-Routine (Xray Chest 1 View- PORTABLE-Routine in AM.) (04.27.21 @ 09:24) >    COMPARISON STUDY: 4/26/2021    Frontal expiratory view of the chest shows the heartto be similar in size. Left pigtail catheter remains present.    The lungs show reduction of left effusion with small left base infiltrate or atelectasis and there is no evidence of pneumothorax nor right pleural effusion.    IMPRESSION:  Partial clearing, left base. No pneumothorax.    < end of copied text >    < from: CT Chest w/ IV Cont (04.23.21 @ 19:05) >  FINDINGS:  CHEST:  LUNGS AND LARGE AIRWAYS: Patent central airways. No pulmonary nodules.  PLEURA: Large left pleural effusion with near complete collapse of left lower lobe.  VESSELS: Atherosclerotic changes of the aorta and coronary arteries.  HEART: Heart size is normal. No pericardial effusion.  MEDIASTINUM AND MARIA ELENA: No lymphadenopathy.  CHEST WALL AND LOWER NECK: Bilateral thyroid nodule, measuring up to 1.8 cm in the left thyroid lobe.    ABDOMEN AND PELVIS:  LIVER: Within normal limits.  BILE DUCTS: Normal caliber.  GALLBLADDER: Cholecystectomy.  SPLEEN: Within normal limits.  PANCREAS: Within normal limits.  ADRENALS: Within normal limits.  KIDNEYS/URETERS: Cortical atrophy. An 11 mm and an 8 mm stone in the left renal pelvis with very mild hydronephrosis.    BLADDER: Within normal limits.  REPRODUCTIVE ORGANS: Prostate within normal limits.    BOWEL: Small hiatal hernia. No bowel obstruction. Appendix is normal. Diverticulosis without acute diverticulitis.  PERITONEUM: No ascites.  VESSELS: Atherosclerotic changes.  RETROPERITONEUM/LYMPH NODES: No lymphadenopathy.  ABDOMINAL WALL: Within normal limits.  BONES: Right total hip replacement. Degenerative changes and osteopenia. Stable compression deformity of L1. Compression deformity of L2 of uncertain age, new from prior exam.    IMPRESSION:  Large left pleural effusion with near complete collapse of left lower lobe.  Uncertain age of a compression fracture of L2. Correlate for focal pain in this region.  2 stones in the left renal pelvis with very mild hydronephrosis.  < end of copied text >

## 2021-04-28 NOTE — DISCHARGE NOTE PROVIDER - PROVIDER TOKENS
PROVIDER:[TOKEN:[[id number]]],FREE:[LAST:[Kimberley],FIRST:[Chaitanya],PHONE:[phone]],FAX:[(   )    -],ADDRESS:[[address] [place] [address]]]

## 2021-04-28 NOTE — PROGRESS NOTE ADULT - ASSESSMENT
88 YO M with a PMH of DM, HTN, Hiatal hernia, left chronic pleural effusion. 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     #Left Pleural Effusion, Expanding  - cause unknown, chronic, not traumatic  - pleural fluids with alb ratio of 0.9 and low LDH, while exudative by Light's criteria, likely a chronic transudate   - CT Chest (4/23) reported large Left pleural effusion, near collapse of Left Lower Lung  - CXR (4/27): reduced pleural effusion, no pneumothorax   - H/H stable    #Mechanical Fall  - No acute fractures on imaging, Compression fracture, on L2 indeterminate age  - Fall precautions  - PT Consult appreciated  - Pain control    #Blood Cultures Positive   - possible contaminant?   - Blood culture (4/25, 1 bottle): MR porfirio lerma   - blood culture (4/27, 1 bottle): gram positive cocci in clusters   - Infectious Disease Consult Appreciated: start vancomycin and continue to monitor     #ANJANA on CKD  - resolved, back to baseline   - Renal stones, Hydronephrosis seen on CT on 4/23  - Avoid nephrotoxic medications    #DM  - Lantus 13 QHS  - Premeal changed to 5/3/2  - ISS  - Diabetes Diet     #HTN  - Continue Valsartan, Metoprolol    #VTE PPX  - Eliquis 5 BID    #Code Status  - Full Code    #Disposition  - possible discharge within 48 hours back to AL   - discussed plan with daughter as well

## 2021-04-28 NOTE — DISCHARGE NOTE PROVIDER - NSDCCPCAREPLAN_GEN_ALL_CORE_FT
PRINCIPAL DISCHARGE DIAGNOSIS  Diagnosis: Pleural effusion on left  Assessment and Plan of Treatment: You had so much fluid around your left lung that it was pushing on your lung. A tube was put into the space to drain the fluid. The tests for the fluid was inconclusive for a cause of the fluid. You began to drain less fluid, and the chest tube was removed. This fluid will likely build up again. Please follow up with your primary care doctor.      SECONDARY DISCHARGE DIAGNOSES  Diagnosis: DM (diabetes mellitus)  Assessment and Plan of Treatment: Please continue to monitor and treat your diabetes carefully. Your morning sugars after breakfast were consistently elevated during your admission.     PRINCIPAL DISCHARGE DIAGNOSIS  Diagnosis: Pleural effusion on left  Assessment and Plan of Treatment: You had so much fluid around your left lung that it was pushing on your lung. A tube was put into the space to drain the fluid. The tests for the fluid was inconclusive for a cause of the fluid. You began to drain less fluid, and the chest tube was removed. This fluid will likely build up again. Please follow up with your primary care doctor. You will need to follow up with Dr. Jostin mcclain pulmonolgist for continued follow up and prevention of your pleural effusion.      SECONDARY DISCHARGE DIAGNOSES  Diagnosis: DM (diabetes mellitus)  Assessment and Plan of Treatment: Please continue to monitor and treat your diabetes carefully. Your morning sugars after breakfast were consistently elevated during your admission. We were able to control your glucose levels, but you will need to have control with your primary care physician for continual management.

## 2021-04-28 NOTE — DISCHARGE NOTE PROVIDER - NSDCMRMEDTOKEN_GEN_ALL_CORE_FT
Admelog SoloStar 100 units/mL injectable solution: Sliding Scale  Eliquis 5 mg oral tablet: 1 tab(s) orally 2 times a day  finasteride 5 mg oral tablet: 1 tab(s) orally once a day  Flomax 0.4 mg oral capsule: 1 cap(s) orally once a day   glimepiride 2 mg oral tablet: 1 tab(s) orally 2 times a day  Lantus: 13 unit(s) subcutaneous once a day (at bedtime)  metoprolol succinate 25 mg oral tablet, extended release: 1 tab(s) orally once a day  omeprazole 40 mg oral delayed release capsule: 1 cap(s) orally once a day  Tradjenta 5 mg oral tablet: 1 tab(s) orally once a day  valsartan 160 mg oral tablet: 1 tab(s) orally once a day

## 2021-04-28 NOTE — PROGRESS NOTE ADULT - SUBJECTIVE AND OBJECTIVE BOX
Subjective:  Pt seen, repeat blood cultures w growth. Afebrile. On RA, denies SOB.    Vital Signs:  Vital Signs Last 24 Hrs  T(C): 36.3 (04-28-21 @ 09:01), Max: 36.8 (04-27-21 @ 15:19)  T(F): 97.3 (04-28-21 @ 09:01), Max: 98.2 (04-27-21 @ 15:19)  HR: 77 (04-28-21 @ 09:01) (65 - 77)  BP: 132/54 (04-28-21 @ 09:01) (122/47 - 132/54)  RR: 18 (04-28-21 @ 09:01) (18 - 18)  SpO2: 97% (04-28-21 @ 09:01) (97% - 99%) on (O2)    Telemetry/Alarms:    Relevant labs, radiology and Medications reviewed                        10.0   5.47  )-----------( 190      ( 28 Apr 2021 08:07 )             31.1     04-28    139  |  109<H>  |  24<H>  ----------------------------<  138<H>  4.0   |  23  |  1.05    Ca    8.4<L>      28 Apr 2021 08:07        MEDICATIONS  (STANDING):  apixaban 5 milliGRAM(s) Oral every 12 hours  dextrose 40% Gel 15 Gram(s) Oral once  dextrose 5%. 1000 milliLiter(s) (50 mL/Hr) IV Continuous <Continuous>  dextrose 5%. 1000 milliLiter(s) (100 mL/Hr) IV Continuous <Continuous>  dextrose 50% Injectable 25 Gram(s) IV Push once  dextrose 50% Injectable 12.5 Gram(s) IV Push once  dextrose 50% Injectable 25 Gram(s) IV Push once  finasteride 5 milliGRAM(s) Oral daily  glucagon  Injectable 1 milliGRAM(s) IntraMuscular once  insulin glargine Injectable (LANTUS) 13 Unit(s) SubCutaneous at bedtime  insulin lispro (ADMELOG) corrective regimen sliding scale   SubCutaneous three times a day before meals  insulin lispro (ADMELOG) corrective regimen sliding scale   SubCutaneous at bedtime  insulin lispro Injectable (ADMELOG) 2 Unit(s) SubCutaneous three times a day before meals  lidocaine   Patch 2 Patch Transdermal every 24 hours  metoprolol succinate ER 25 milliGRAM(s) Oral daily  pantoprazole    Tablet 40 milliGRAM(s) Oral before breakfast  tamsulosin 0.4 milliGRAM(s) Oral at bedtime  valsartan 160 milliGRAM(s) Oral daily    MEDICATIONS  (PRN):  acetaminophen   Tablet .. 650 milliGRAM(s) Oral every 4 hours PRN Mild Pain (1 - 3)  oxyCODONE    IR 5 milliGRAM(s) Oral every 6 hours PRN Moderate Pain (4 - 6)      Physical exam  Gen NAD  Neuro alert, follows commands  Card RRR  Pulm equal, decreased bases. Posterior left pigtail site w hematoma.  Abd soft, obese  Ext warm, edema    Tubes: Left CT w 325cc output last 24 hours. NO AL, waterseal    I&O's Summary    27 Apr 2021 07:01  -  28 Apr 2021 07:00  --------------------------------------------------------  IN: 0 mL / OUT: 925 mL / NET: -925 mL    28 Apr 2021 07:01  -  28 Apr 2021 11:33  --------------------------------------------------------  IN: 0 mL / OUT: 100 mL / NET: -100 mL        Assessment  89y Male  w/ PAST MEDICAL & SURGICAL HISTORY:  DM (diabetes mellitus)    HTN (hypertension)    HLD (hyperlipidemia)    Kidney stone    Hernia    H/O left inguinal hernia repair    Endoscopy finding  choked on piece of chiken-had upper endo with clearance of food    History of tibial fracture  R tibial fracture s/p revision with hardware placement ~3-4yrs ago, per pt    admitted with complaints of Patient is a 89y old  Male who presents with a chief complaint of Left pleural effusion (28 Apr 2021 08:44)    89 yo male with h/o DM (on insulin), HTN, hiatal hernia, chronic L pleural effusion (of unknown cause), h/o kidney stones, R Tibial/fibular fracture repair with hardwood insertion 3-4yrs ago, CKD2-3, BPH, h/o DVT on eliquis admitted 4/26 w SOB. Called for moderate left effusion, s/p pigtail 4/26, exudative by protein criteria, CX NTD and cyto P. Pt w positive Blood cultures x 2.    Will d/c chest tube day of discharge.  No further intervention planned, will most likely reaccumulate since was a chronic effusion    Discussed with Cardiothoracic Team at AM rounds.

## 2021-04-28 NOTE — DISCHARGE NOTE PROVIDER - CARE PROVIDER_API CALL
Héctor Frankel  INTERNAL MEDICINE  [address] [place] [address]  Phone: [phone]  Fax: [phone]  Follow Up Time:     Chaitanya Chu  [address] [place] [address]  Phone: [phone]  Fax: (   )    -  Follow Up Time:

## 2021-04-28 NOTE — DISCHARGE NOTE PROVIDER - NSDCFUADDINST_GEN_ALL_CORE_FT
You can have your dressings removed on Sunday 5/2 at the nursing home. You will need to follow up with Dr. Frankel your pulmonologist for continued follow up of your pleural effusion.

## 2021-04-28 NOTE — PROGRESS NOTE ADULT - SUBJECTIVE AND OBJECTIVE BOX
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 CT Chest at that time showed worsening Left pleural effusion and pt admitted for compressive left pleural effusion.     Subjective: Pt reports feeling well. Denies any SOB. Denies fever, chills, cough, nausea, diarrhea, dysuria, pain. Pt eager to go home. Discussed fluid will likely reaccumulate though we do not know why.     REVIEW OF SYSTEMS:  CONSTITUTIONAL: No weakness, fevers or chills  EYES/ENT: No visual changes;  No vertigo or throat pain   NECK: No pain or stiffness  RESPIRATORY: No cough, wheezing, hemoptysis; No shortness of breath  CARDIOVASCULAR: No chest pain or palpitations  GASTROINTESTINAL: No abdominal or epigastric pain. No nausea, vomiting; No diarrhea or constipation.   GENITOURINARY: No dysuria, frequency or hematuria  NEUROLOGICAL: No numbness or weakness  SKIN: No itching, rashes    PHYSICAL EXAM:  Vital Signs Last 24 Hrs  T(C): 36.3 (28 Apr 2021 09:01), Max: 36.8 (27 Apr 2021 15:19)  T(F): 97.3 (28 Apr 2021 09:01), Max: 98.2 (27 Apr 2021 15:19)  HR: 77 (28 Apr 2021 09:01) (65 - 77)  BP: 132/54 (28 Apr 2021 09:01) (122/47 - 132/54)  RR: 18 (28 Apr 2021 09:01) (18 - 18)  SpO2: 97% (28 Apr 2021 09:01) (97% - 99%)    Chest tube out: 325mL / 24 hrs    Constitutional: Pt lying in bed, awake and alert, NAD  HEENT: EOMI, normal hearing, moist mucous membranes  Neck: Soft and supple, no JVD  Respiratory: nl respiratory effort, +decreased breath sounds at bases  Cardiovascular: S1S2+, RRR, no M/G/R  Gastrointestinal: soft, NT/ND, no guarding, no rebound  Extremities: mild nonpitting edema  Vascular: 2+ peripheral pulses  Neurological: AAOx3, no focal deficits  Musculoskeletal: 5/5 strength b/l upper and lower extremities    MEDICATIONS  (STANDING):  apixaban 5 milliGRAM(s) Oral every 12 hours  dextrose 40% Gel 15 Gram(s) Oral once  dextrose 5%. 1000 milliLiter(s) (50 mL/Hr) IV Continuous <Continuous>  dextrose 5%. 1000 milliLiter(s) (100 mL/Hr) IV Continuous <Continuous>  dextrose 50% Injectable 25 Gram(s) IV Push once  dextrose 50% Injectable 12.5 Gram(s) IV Push once  dextrose 50% Injectable 25 Gram(s) IV Push once  finasteride 5 milliGRAM(s) Oral daily  glucagon  Injectable 1 milliGRAM(s) IntraMuscular once  insulin glargine Injectable (LANTUS) 13 Unit(s) SubCutaneous at bedtime  insulin lispro (ADMELOG) corrective regimen sliding scale   SubCutaneous three times a day before meals  insulin lispro (ADMELOG) corrective regimen sliding scale   SubCutaneous at bedtime  insulin lispro Injectable (ADMELOG) 2 Unit(s) SubCutaneous three times a day before meals  lidocaine   Patch 2 Patch Transdermal every 24 hours  metoprolol succinate ER 25 milliGRAM(s) Oral daily  pantoprazole    Tablet 40 milliGRAM(s) Oral before breakfast  tamsulosin 0.4 milliGRAM(s) Oral at bedtime  valsartan 160 milliGRAM(s) Oral daily    MEDICATIONS  (PRN):  acetaminophen   Tablet .. 650 milliGRAM(s) Oral every 4 hours PRN Mild Pain (1 - 3)  oxyCODONE    IR 5 milliGRAM(s) Oral every 6 hours PRN Moderate Pain (4 - 6)    LABS: All Labs Reviewed:                        10.3   5.22  )-----------( 198      ( 27 Apr 2021 07:44 )             31.7   04-27    142  |  111<H>  |  25<H>  ----------------------------<  134<H>  4.0   |  25  |  1.23    Ca    8.6      27 Apr 2021 07:44    TPro  5.7<L>  /  Alb  2.3<L>  /  TBili  0.3  /  DBili  x   /  AST  15  /  ALT  14  /  AlkPhos  72  04-26    PT/INR - ( 25 Apr 2021 20:53 )   PT: 13.7 sec;   INR: 1.18 ratio         PTT - ( 25 Apr 2021 20:53 )  PTT:32.7 sec  CARDIAC MARKERS ( 25 Apr 2021 23:20 )  <0.015 ng/mL / x     / x     / x     / x      CARDIAC MARKERS ( 25 Apr 2021 20:53 )  <0.015 ng/mL / x     / x     / x     / x          Culture - Blood (04.27.21 @ 11:58)   Gram Stain:   Growth in aerobic bottle: Gram Positive Cocci in Clusters         RADIOLOGY/EKG:  < from: CT Chest No Cont (04.25.21 @ 22:20) >  IMPRESSION:  Moderate left pleural effusion, slightly decreased in size since 4/23/2021 with compressive atelectasis in the left lower lobe.            THOM CLINTON MD; Attending Radiologist  This document has been electronically signed. Apr 26 2021  8:21AM    < end of copied text >        < from: Xray Chest 1 View- PORTABLE-Routine (Xray Chest 1 View- PORTABLE-Routine in AM.) (04.27.21 @ 09:24) >  COMPARISON STUDY: 4/26/2021    Frontal expiratory view of the chest shows the heartto be similar in size. Left pigtail catheter remains present.    The lungs show reduction of left effusion with small left base infiltrate or atelectasis and there is no evidence of pneumothorax nor right pleural effusion.    IMPRESSION:  Partial clearing, left base. No pneumothorax.    < end of copied text >

## 2021-04-28 NOTE — DISCHARGE NOTE NURSING/CASE MANAGEMENT/SOCIAL WORK - PATIENT PORTAL LINK FT
You can access the FollowMyHealth Patient Portal offered by [facility] by registering at the following website: [email or web address]. By joining [employer]’s FollowMyHealth portal, you will also be able to view your health information using other applications (apps) compatible with our system.

## 2021-04-29 LAB
CULTURE RESULTS: SIGNIFICANT CHANGE UP
SPECIMEN SOURCE: SIGNIFICANT CHANGE UP

## 2021-04-29 PROCEDURE — 99232 SBSQ HOSP IP/OBS MODERATE 35: CPT

## 2021-04-29 PROCEDURE — 99232 SBSQ HOSP IP/OBS MODERATE 35: CPT | Mod: GC

## 2021-04-29 PROCEDURE — 71045 X-RAY EXAM CHEST 1 VIEW: CPT | Mod: 26

## 2021-04-29 RX ORDER — INSULIN LISPRO 100/ML
2 VIAL (ML) SUBCUTANEOUS
Refills: 0 | Status: DISCONTINUED | OUTPATIENT
Start: 2021-04-29 | End: 2021-05-01

## 2021-04-29 RX ORDER — INSULIN LISPRO 100/ML
7 VIAL (ML) SUBCUTANEOUS
Refills: 0 | Status: DISCONTINUED | OUTPATIENT
Start: 2021-04-29 | End: 2021-05-01

## 2021-04-29 RX ORDER — INSULIN LISPRO 100/ML
5 VIAL (ML) SUBCUTANEOUS
Refills: 0 | Status: DISCONTINUED | OUTPATIENT
Start: 2021-04-29 | End: 2021-04-29

## 2021-04-29 RX ORDER — VANCOMYCIN HCL 1 G
1000 VIAL (EA) INTRAVENOUS EVERY 12 HOURS
Refills: 0 | Status: DISCONTINUED | OUTPATIENT
Start: 2021-04-29 | End: 2021-05-01

## 2021-04-29 RX ORDER — INSULIN LISPRO 100/ML
5 VIAL (ML) SUBCUTANEOUS
Refills: 0 | Status: DISCONTINUED | OUTPATIENT
Start: 2021-04-29 | End: 2021-05-01

## 2021-04-29 RX ADMIN — TAMSULOSIN HYDROCHLORIDE 0.4 MILLIGRAM(S): 0.4 CAPSULE ORAL at 21:32

## 2021-04-29 RX ADMIN — Medication 250 MILLIGRAM(S): at 11:28

## 2021-04-29 RX ADMIN — APIXABAN 5 MILLIGRAM(S): 2.5 TABLET, FILM COATED ORAL at 11:25

## 2021-04-29 RX ADMIN — Medication 2: at 18:22

## 2021-04-29 RX ADMIN — LIDOCAINE 2 PATCH: 4 CREAM TOPICAL at 11:26

## 2021-04-29 RX ADMIN — VALSARTAN 160 MILLIGRAM(S): 80 TABLET ORAL at 11:26

## 2021-04-29 RX ADMIN — INSULIN GLARGINE 13 UNIT(S): 100 INJECTION, SOLUTION SUBCUTANEOUS at 21:32

## 2021-04-29 RX ADMIN — PANTOPRAZOLE SODIUM 40 MILLIGRAM(S): 20 TABLET, DELAYED RELEASE ORAL at 05:37

## 2021-04-29 RX ADMIN — Medication 1: at 08:10

## 2021-04-29 RX ADMIN — Medication 250 MILLIGRAM(S): at 21:32

## 2021-04-29 RX ADMIN — FINASTERIDE 5 MILLIGRAM(S): 5 TABLET, FILM COATED ORAL at 11:26

## 2021-04-29 RX ADMIN — LIDOCAINE 2 PATCH: 4 CREAM TOPICAL at 18:17

## 2021-04-29 RX ADMIN — OXYCODONE HYDROCHLORIDE 5 MILLIGRAM(S): 5 TABLET ORAL at 12:00

## 2021-04-29 RX ADMIN — APIXABAN 5 MILLIGRAM(S): 2.5 TABLET, FILM COATED ORAL at 21:38

## 2021-04-29 RX ADMIN — Medication 25 MILLIGRAM(S): at 11:25

## 2021-04-29 RX ADMIN — LIDOCAINE 2 PATCH: 4 CREAM TOPICAL at 21:38

## 2021-04-29 RX ADMIN — Medication 3 UNIT(S): at 13:10

## 2021-04-29 RX ADMIN — Medication 5 UNIT(S): at 08:10

## 2021-04-29 RX ADMIN — Medication 3: at 13:09

## 2021-04-29 RX ADMIN — OXYCODONE HYDROCHLORIDE 5 MILLIGRAM(S): 5 TABLET ORAL at 11:30

## 2021-04-29 RX ADMIN — Medication 2 UNIT(S): at 18:23

## 2021-04-29 NOTE — PROGRESS NOTE ADULT - ASSESSMENT
89 YO M with a PMH of DM, HTN, Hiatal hernia, left chronic pleural effusion. Right Tib/Fib fracture, CKD 2/3 with recent mechanical fall without sustained fractures, brought in from [facility] for Left sided lung pain and short of breath admitted for compressive left pleural effusion s/p chest tube with continued hospitalization due to + blood culture.     #Left Pleural Effusion, Expanding  - cause unknown, chronic, not traumatic  - pleural fluids with alb ratio of 0.9 and low LDH, while exudative by Light's criteria, likely a chronic transudate   - CT Chest (4/23) reported large Left pleural effusion, near collapse of Left Lower Lung  - CXR (4/27): reduced pleural effusion, no pneumothorax   - H/H stable  - CT Surgery Appreciated, Remove Chest Tube prior to DC.     #Mechanical Fall  - No acute fractures on imaging, Compression fracture, on L2 indeterminate age  - Fall precautions  - PT Consult appreciated  - Pain control    #Blood Cultures Positive   - possible contaminant?   - Blood culture (4/25, 1 bottle): MR porfirio lerma   - blood culture (4/27, 1 bottle): gram positive cocci in clusters   - Infectious Disease Consult Appreciated: Continue Vancomycin  - Repeat Blood Cultures obtained 4/29, follow up.     #ANJANA on CKD  - resolved, back to baseline   - Renal stones, Hydronephrosis seen on CT on 4/23  - Avoid nephrotoxic medications    #DM  - Lantus 13 QHS  - Premeal changed to 5/3/2  - ISS  - Diabetes Diet     #HTN  - Continue Valsartan, Metoprolol    #VTE PPX  - Eliquis 5 BID    #Code Status  - Full Code    #Disposition  - possible discharge within 48-72 hours back to [facility] 89 YO M with a PMH of DM, HTN, Hiatal hernia, left chronic pleural effusion. Right Tib/Fib fracture, CKD 2/3 with recent mechanical fall without sustained fractures, brought in from [facility] for Left sided lung pain and short of breath admitted for compressive left pleural effusion s/p chest tube with continued hospitalization due to + blood culture.     #Left Pleural Effusion, Expanding  - cause unknown, chronic, not traumatic  - pleural fluids with alb ratio of 0.9 and low LDH, while exudative by Light's criteria, likely a chronic transudate   - CT Chest (4/23) reported large Left pleural effusion, near collapse of Left Lower Lung  - CXR (4/27): reduced pleural effusion, no pneumothorax   - H/H stable  - CT Surgery Appreciated, Remove Chest Tube prior to DC.     #Mechanical Fall  - No acute fractures on imaging, Compression fracture, on L2 indeterminate age  - Fall precautions  - PT Consult appreciated  - Pain control    #Blood Cultures Positive   - possible contaminant?   - Blood culture (4/25, 1 bottle): MR porfirio lerma   - blood culture (4/27, 1 bottle): gram positive cocci in clusters   - Infectious Disease Consult Appreciated: Continue Vancomycin  - Repeat Blood Cultures obtained 4/29, follow up.     #ANJANA on CKD  - resolved, back to baseline   - Renal stones, Hydronephrosis seen on CT on 4/23  - Avoid nephrotoxic medications    #DM  - Lantus 13 QHS  - Premeal increased to 5  - ISS  - Diabetes Diet     #HTN  - Continue Valsartan, Metoprolol    #VTE PPX  - Eliquis 5 BID    #Code Status  - Full Code    #Disposition  - possible discharge within 48-72 hours back to [facility]

## 2021-04-29 NOTE — PROGRESS NOTE ADULT - SUBJECTIVE AND OBJECTIVE BOX
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 CT Chest at that time showed worsening Left pleural effusion and pt admitted for compressive left pleural effusion.     Subjective: Pt reports feeling well. Denies any SOB. Denies fever, chills, cough, nausea, diarrhea, dysuria, pain. Pt in agreement about continuing antibiotics.     REVIEW OF SYSTEMS:  CONSTITUTIONAL: No weakness, fevers or chills  EYES/ENT: No visual changes;  No vertigo or throat pain   NECK: No pain or stiffness  RESPIRATORY: No cough, wheezing, hemoptysis; No shortness of breath  CARDIOVASCULAR: No chest pain or palpitations  GASTROINTESTINAL: No abdominal or epigastric pain. No nausea, vomiting; No diarrhea or constipation.   GENITOURINARY: No dysuria, frequency or hematuria  NEUROLOGICAL: No numbness or weakness  SKIN: No itching, rashes    PHYSICAL EXAM:  Vital Signs Last 24 Hrs  T(C): 36.7 (28 Apr 2021 21:12), Max: 36.9 (28 Apr 2021 15:30)  T(F): 98.1 (28 Apr 2021 21:12), Max: 98.4 (28 Apr 2021 15:30)  HR: 70 (28 Apr 2021 21:12) (70 - 70)  BP: 122/60 (28 Apr 2021 21:12) (122/60 - 123/61)  BP(mean): --  RR: 18 (28 Apr 2021 21:12) (18 - 18)  SpO2: 97% (28 Apr 2021 21:12) (97% - 97%)    Constitutional: Pt lying in bed, awake and alert, NAD  HEENT: EOMI, normal hearing, moist mucous membranes  Neck: Soft and supple, no JVD  Respiratory: CTABL, No wheezing, rales or rhonchi  Cardiovascular: S1S2+, RRR, no M/G/R  Gastrointestinal: BS+, soft, NT/ND, no guarding, no rebound  Extremities: No peripheral edema  Vascular: 2+ peripheral pulses  Neurological: AAOx3, no focal deficits  Musculoskeletal: 5/5 strength b/l upper and lower extremities  Skin: No rashes      MEDICATIONS  (STANDING):  apixaban 5 milliGRAM(s) Oral every 12 hours  dextrose 40% Gel 15 Gram(s) Oral once  dextrose 5%. 1000 milliLiter(s) (50 mL/Hr) IV Continuous <Continuous>  dextrose 5%. 1000 milliLiter(s) (100 mL/Hr) IV Continuous <Continuous>  dextrose 50% Injectable 25 Gram(s) IV Push once  dextrose 50% Injectable 12.5 Gram(s) IV Push once  dextrose 50% Injectable 25 Gram(s) IV Push once  finasteride 5 milliGRAM(s) Oral daily  glucagon  Injectable 1 milliGRAM(s) IntraMuscular once  insulin glargine Injectable (LANTUS) 13 Unit(s) SubCutaneous at bedtime  insulin lispro (ADMELOG) corrective regimen sliding scale   SubCutaneous three times a day before meals  insulin lispro (ADMELOG) corrective regimen sliding scale   SubCutaneous at bedtime  insulin lispro Injectable (ADMELOG) 5 Unit(s) SubCutaneous before breakfast  insulin lispro Injectable (ADMELOG) 3 Unit(s) SubCutaneous before lunch  insulin lispro Injectable (ADMELOG) 2 Unit(s) SubCutaneous before dinner  lidocaine   Patch 2 Patch Transdermal every 24 hours  metoprolol succinate ER 25 milliGRAM(s) Oral daily  pantoprazole    Tablet 40 milliGRAM(s) Oral before breakfast  tamsulosin 0.4 milliGRAM(s) Oral at bedtime  valsartan 160 milliGRAM(s) Oral daily  vancomycin  IVPB 1000 milliGRAM(s) IV Intermittent every 12 hours    MEDICATIONS  (PRN):  acetaminophen   Tablet .. 650 milliGRAM(s) Oral every 4 hours PRN Mild Pain (1 - 3)  oxyCODONE    IR 5 milliGRAM(s) Oral every 6 hours PRN Moderate Pain (4 - 6)      LABS: All Labs Reviewed:                        10.0   5.47  )-----------( 190      ( 28 Apr 2021 08:07 )             31.1   04-28    139  |  109<H>  |  24<H>  ----------------------------<  138<H>  4.0   |  23  |  1.05    Ca    8.4<L>      28 Apr 2021 08:07    TPro  5.7<L>  /  Alb  2.3<L>  /  TBili  0.3  /  DBili  x   /  AST  15  /  ALT  14  /  AlkPhos  72  04-26    PT/INR - ( 25 Apr 2021 20:53 )   PT: 13.7 sec;   INR: 1.18 ratio         PTT - ( 25 Apr 2021 20:53 )  PTT:32.7 sec  CARDIAC MARKERS ( 25 Apr 2021 23:20 )  <0.015 ng/mL / x     / x     / x     / x      CARDIAC MARKERS ( 25 Apr 2021 20:53 )  <0.015 ng/mL / x     / x     / x     / x          Culture - Blood (04.27.21 @ 11:58)   Gram Stain:   Growth in aerobic bottle: Gram Positive Cocci in Clusters   Culture - Blood (04.27.21 @ 11:58)   Gram Stain:   Growth in aerobic bottle: Gram Positive Cocci in Clusters   Specimen Source: .Blood None   Culture Results:   Growth in aerobic bottle: Gram Positive Cocci in Clusters       RADIOLOGY/EKG:  < from: CT Chest No Cont (04.25.21 @ 22:20) >  IMPRESSION:  Moderate left pleural effusion, slightly decreased in size since 4/23/2021 with compressive atelectasis in the left lower lobe.            THOM CLINTON MD; Attending Radiologist  This document has been electronically signed. Apr 26 2021  8:21AM    < end of copied text >        < from: Xray Chest 1 View- PORTABLE-Routine (Xray Chest 1 View- PORTABLE-Routine in AM.) (04.27.21 @ 09:24) >  COMPARISON STUDY: 4/26/2021    Frontal expiratory view of the chest shows the heartto be similar in size. Left pigtail catheter remains present.    The lungs show reduction of left effusion with small left base infiltrate or atelectasis and there is no evidence of pneumothorax nor right pleural effusion.    IMPRESSION:  Partial clearing, left base. No pneumothorax.    < end of copied text >

## 2021-04-29 NOTE — PROGRESS NOTE ADULT - ASSESSMENT
89 y/o male with h/o DM (on insulin), HTN, hiatal hernia, chronic L pleural effusion (of unknown cause), kidney stones, R Tibial/fibular fracture repair with hardwood insertion 3-4yrs ago, CKD2-3, on eliquis was admitted on 4/25 for left chest pain and SOB. Of note pt was seen in -ED on 4/23/2021 following mechanical fall onto left side of body. Pt was discharged back to [facility] after negative workup for trauma with outpt pulm/thoracic advised. CT chest obtained at the time with worsening L pleural effusion. Pt reports pain of the left rib cage with movement, states no pain when lying calmly without movements. Reports intermittent cough without sputum production or hemoptysis, decreased appetite in the past few days, denies fever or chills. CT on 2/25 demonstrated L pleural effusion, drained on 2/26 with Pigtail placement, obtained 1000mL of serosanguinous fluid. Pt was reported with BC growing gram + Cocci in clusters.     1. Left pleural effusion s/p drainage. L2 compression fracture. CRF stage 3.  -bacteremia with CNST in one bottle only, but repeat BC are showing GPC again  -no clinical signs of sepsis  -pleural fluid analysis reviewed; doubt empyema  -pleural culture is negative to date  -on vancomycin 1 gm IV q12h   -reason for abx use and side effects reviewed with patient; monitor BMP and vancomycin trough levels  -keep on vancomycin IV for now  -repeat BC x 2  -monitor temps  -f/u CBC  -supportive care  2. Other issues:   -care per medicine

## 2021-04-29 NOTE — PROGRESS NOTE ADULT - SUBJECTIVE AND OBJECTIVE BOX
Subjective:  Pt seen, frustrated that he is staying in hospital for IV ABX. Chest tube w 210cc out last 24 hours.    Vital Signs:  Vital Signs Last 24 Hrs  T(C): 37.3 (04-29-21 @ 11:31), Max: 37.3 (04-29-21 @ 11:31)  T(F): 99.1 (04-29-21 @ 11:31), Max: 99.1 (04-29-21 @ 11:31)  HR: 96 (04-29-21 @ 11:31) (70 - 96)  BP: 113/56 (04-29-21 @ 11:31) (101/53 - 123/61)  RR: 18 (04-29-21 @ 11:31) (18 - 18)  SpO2: 97% (04-29-21 @ 11:31) (96% - 97%) on (O2)    Telemetry/Alarms:    Relevant labs, radiology and Medications reviewed                        10.0   5.47  )-----------( 190      ( 28 Apr 2021 08:07 )             31.1     04-28    139  |  109<H>  |  24<H>  ----------------------------<  138<H>  4.0   |  23  |  1.05    Ca    8.4<L>      28 Apr 2021 08:07        MEDICATIONS  (STANDING):  apixaban 5 milliGRAM(s) Oral every 12 hours  dextrose 40% Gel 15 Gram(s) Oral once  dextrose 5%. 1000 milliLiter(s) (50 mL/Hr) IV Continuous <Continuous>  dextrose 5%. 1000 milliLiter(s) (100 mL/Hr) IV Continuous <Continuous>  dextrose 50% Injectable 25 Gram(s) IV Push once  dextrose 50% Injectable 12.5 Gram(s) IV Push once  dextrose 50% Injectable 25 Gram(s) IV Push once  finasteride 5 milliGRAM(s) Oral daily  glucagon  Injectable 1 milliGRAM(s) IntraMuscular once  insulin glargine Injectable (LANTUS) 13 Unit(s) SubCutaneous at bedtime  insulin lispro (ADMELOG) corrective regimen sliding scale   SubCutaneous three times a day before meals  insulin lispro (ADMELOG) corrective regimen sliding scale   SubCutaneous at bedtime  insulin lispro Injectable (ADMELOG) 5 Unit(s) SubCutaneous before lunch  insulin lispro Injectable (ADMELOG) 7 Unit(s) SubCutaneous before breakfast  insulin lispro Injectable (ADMELOG) 2 Unit(s) SubCutaneous before dinner  lidocaine   Patch 2 Patch Transdermal every 24 hours  metoprolol succinate ER 25 milliGRAM(s) Oral daily  pantoprazole    Tablet 40 milliGRAM(s) Oral before breakfast  tamsulosin 0.4 milliGRAM(s) Oral at bedtime  valsartan 160 milliGRAM(s) Oral daily  vancomycin  IVPB 1000 milliGRAM(s) IV Intermittent every 12 hours    MEDICATIONS  (PRN):  acetaminophen   Tablet .. 650 milliGRAM(s) Oral every 4 hours PRN Mild Pain (1 - 3)  oxyCODONE    IR 5 milliGRAM(s) Oral every 6 hours PRN Moderate Pain (4 - 6)      Physical exam  Gen NAD  Neuro AAox3  Card RRR  Pulm equal  Abd soft, obese  Ext warm, edema    Tubes: Left pigtial to waterseal, no AL 210cc serous output    I&O's Summary    28 Apr 2021 07:01  -  29 Apr 2021 07:00  --------------------------------------------------------  IN: 0 mL / OUT: 310 mL / NET: -310 mL        Assessment  89y Male  w/ PAST MEDICAL & SURGICAL HISTORY:  DM (diabetes mellitus)    HTN (hypertension)    HLD (hyperlipidemia)    Kidney stone    Hernia    H/O left inguinal hernia repair    Endoscopy finding  choked on piece of chiken-had upper endo with clearance of food    History of tibial fracture  R tibial fracture s/p revision with hardware placement ~3-4yrs ago, per pt    admitted with complaints of Patient is a 89y old  Male who presents with a chief complaint of Left pleural effusion (29 Apr 2021 09:15)  .  89 yo male with h/o DM (on insulin), HTN, hiatal hernia, chronic L pleural effusion (of unknown cause), h/o kidney stones, R Tibial/fibular fracture repair with hardwood insertion 3-4yrs ago, CKD2-3, BPH, h/o DVT on eliquis admitted 4/26 w SOB. Called for moderate left effusion, s/p pigtail 4/26, exudative by protein criteria, CX NTD and cyto P. Pt w positive Blood cultures x 1 for 2 sets. CN staph meth resistent.    Will d/c chest tube tomorrow unless high output  No further intervention planned, will most likely reaccumulate since was a chronic effusion    Discussed with Cardiothoracic Team at AM rounds.

## 2021-04-29 NOTE — PROGRESS NOTE ADULT - SUBJECTIVE AND OBJECTIVE BOX
Date of service: 04-29-21 @ 09:04    Events noted  Reported with new bacteremia on repeat BC  Has left side chest tube  Has dry cough  No SOB at rest  No fever    ROS: no fever or chills; denies dizziness, no HA, no abdominal pain, no diarrhea or constipation; no dysuria, no legs pain, no rashes    MEDICATIONS  (STANDING):  apixaban 5 milliGRAM(s) Oral every 12 hours  dextrose 40% Gel 15 Gram(s) Oral once  dextrose 5%. 1000 milliLiter(s) (50 mL/Hr) IV Continuous <Continuous>  dextrose 5%. 1000 milliLiter(s) (100 mL/Hr) IV Continuous <Continuous>  dextrose 50% Injectable 25 Gram(s) IV Push once  dextrose 50% Injectable 12.5 Gram(s) IV Push once  dextrose 50% Injectable 25 Gram(s) IV Push once  finasteride 5 milliGRAM(s) Oral daily  glucagon  Injectable 1 milliGRAM(s) IntraMuscular once  insulin glargine Injectable (LANTUS) 13 Unit(s) SubCutaneous at bedtime  insulin lispro (ADMELOG) corrective regimen sliding scale   SubCutaneous three times a day before meals  insulin lispro (ADMELOG) corrective regimen sliding scale   SubCutaneous at bedtime  insulin lispro Injectable (ADMELOG) 5 Unit(s) SubCutaneous before breakfast  insulin lispro Injectable (ADMELOG) 3 Unit(s) SubCutaneous before lunch  insulin lispro Injectable (ADMELOG) 2 Unit(s) SubCutaneous before dinner  lidocaine   Patch 2 Patch Transdermal every 24 hours  metoprolol succinate ER 25 milliGRAM(s) Oral daily  pantoprazole    Tablet 40 milliGRAM(s) Oral before breakfast  tamsulosin 0.4 milliGRAM(s) Oral at bedtime  valsartan 160 milliGRAM(s) Oral daily  vancomycin  IVPB 1000 milliGRAM(s) IV Intermittent every 12 hours    Vital Signs Last 24 Hrs  T(C): 36.7 (28 Apr 2021 21:12), Max: 36.9 (28 Apr 2021 15:30)  T(F): 98.1 (28 Apr 2021 21:12), Max: 98.4 (28 Apr 2021 15:30)  HR: 70 (28 Apr 2021 21:12) (70 - 70)  BP: 122/60 (28 Apr 2021 21:12) (122/60 - 123/61)  BP(mean): --  RR: 18 (28 Apr 2021 21:12) (18 - 18)  SpO2: 97% (28 Apr 2021 21:12) (97% - 97%)     Physical exam:    Constitutional:  No acute distress  HEENT: NC/AT, EOMI, PERRLA, conjunctivae clear  Neck: supple; thyroid not palpable  Back: no tenderness  Respiratory: respiratory effort normal; crackles at left base  left side chest tube  Cardiovascular: S1S2 regular, no murmurs  Abdomen: soft, not tender, not distended, positive BS  Genitourinary: no suprapubic tenderness  Lymphatic: no LN palpable  Musculoskeletal: no muscle tenderness, no joint swelling or tenderness  Extremities: + 2 peripheral pulses, 2+ edema   Neurological/ Psychiatric: AxOx3, judgement and insight normal; moving all extremities  Skin: Superficial skin laceration and excoriation of L elbow area (from recent fall), no bruises of the trunk, back or L rib area     Labs: reviewed                        10.0   5.47  )-----------( 190      ( 28 Apr 2021 08:07 )             31.1     04-28    139  |  109<H>  |  24<H>  ----------------------------<  138<H>  4.0   |  23  |  1.05    Ca    8.4<L>      28 Apr 2021 08:07                        10.3   5.22  )-----------( 198      ( 27 Apr 2021 07:44 )             31.7     04-27    142  |  111<H>  |  25<H>  ----------------------------<  134<H>  4.0   |  25  |  1.23    Ca    8.6      27 Apr 2021 07:44    TPro  5.7<L>  /  Alb  2.3<L>  /  TBili  0.3  /  DBili  x   /  AST  15  /  ALT  14  /  AlkPhos  72  04-26     LIVER FUNCTIONS - ( 26 Apr 2021 06:33 )  Alb: 2.3 g/dL / Pro: 5.7 gm/dL / ALK PHOS: 72 U/L / ALT: 14 U/L / AST: 15 U/L / GGT: x             Culture - Blood (collected 27 Apr 2021 11:58)  Source: .Blood None  Gram Stain (28 Apr 2021 09:03):    Growth in aerobic bottle: Gram Positive Cocci in Clusters  Preliminary Report (28 Apr 2021 09:04):    Growth in aerobic bottle: Gram Positive Cocci in Clusters    Culture - Blood (collected 27 Apr 2021 11:57)  Source: .Blood None  Preliminary Report (28 Apr 2021 16:01):    No growth to date.    Culture - Fungal, Body Fluid (collected 26 Apr 2021 14:30)  Source: .Body Fluid Pleural Fluid  Preliminary Report (27 Apr 2021 07:57):    Testing in progress    Culture - Body Fluid with Gram Stain (collected 26 Apr 2021 14:30)  Source: .Body Fluid Pleural Fluid  Gram Stain (27 Apr 2021 02:16):    polymorphonuclear leukocytes seen    No organisms seen    by cytocentrifuge  Preliminary Report (27 Apr 2021 17:28):    No growth    Culture - Blood (collected 25 Apr 2021 20:53)  Source: .Blood None  Gram Stain (27 Apr 2021 07:49):    Growth in anaerobic bottle: Gram Positive Cocci in Clusters  Final Report (28 Apr 2021 12:22):    Growth in anaerobic bottle: Staphylococcus hominis Coag Negative    Staphylococcus  Organism: Blood Culture PCR (28 Apr 2021 12:22)      -  Coagulase negative Staphylococcus, Methicillin resistant: Detec      Method Type: PCR    Culture - Blood (collected 25 Apr 2021 20:53)  Source: .Blood None  Preliminary Report (26 Apr 2021 23:01):    No growth to date.    Culture - Urine (collected 23 Apr 2021 19:19)  Source: .Urine None  Final Report (24 Apr 2021 22:38):    <10,000 CFU/mL Normal Urogenital Gerda        COVID NotDetec(04-25 @ 20:53)    Radiology: all available radiological tests reviewed    < from: Xray Chest 1 View- PORTABLE-Urgent (Xray Chest 1 View- PORTABLE-Urgent .) (04.26.21 @ 15:23) >  IMPRESSION:  Smaller left effusion.  < end of copied text >      < from: CT Chest No Cont (04.25.21 @ 22:20) >  FINDINGS:  LUNGS AND AIRWAYS: Patent central airways.  Opacity at the posterior aspect of the left lower lobe along the major fissure, likely atelectasis and atelectasis at the posterior aspect of the right lower lobe. Also see below.  PLEURA: Moderate right pleural effusion, slightly decreased in size since4/23/2021 with compressive atelectasis in the left lower lobe.  MEDIASTINUM AND MARIA ELENA: No lymphadenopathy. Moderate hiatal hernia.  VESSELS: Thoracic aorta normal in caliber with mild calcified plaque. Coronary artery calcifications.  HEART: Heart size is normal. No pericardial effusion. Small amount of pericardial fluid.  CHEST WALL AND LOWER NECK: No enlarged axillary lymph nodes. 1.6 x 0.9 cm left thyroid lobe nodule.  VISUALIZED UPPER ABDOMEN: Cholecystectomy. Nonobstructing calculi in the left kidney. Fatty infiltration of the pancreas.  BONES: Compression fracture of L2 of indeterminate age, unchanged since 4/23/2021. Old left 10th rib fracture.  IMPRESSION:  Moderate left pleural effusion, slightly decreased in size since 4/23/2021 with compressive atelectasis in the left lower lobe.  < end of copied text >      Advanced directives addressed: full resuscitation

## 2021-04-30 LAB
ANION GAP SERPL CALC-SCNC: 5 MMOL/L — SIGNIFICANT CHANGE UP (ref 5–17)
BUN SERPL-MCNC: 28 MG/DL — HIGH (ref 7–23)
CALCIUM SERPL-MCNC: 8.8 MG/DL — SIGNIFICANT CHANGE UP (ref 8.5–10.1)
CHLORIDE SERPL-SCNC: 110 MMOL/L — HIGH (ref 96–108)
CO2 SERPL-SCNC: 24 MMOL/L — SIGNIFICANT CHANGE UP (ref 22–31)
CREAT SERPL-MCNC: 1.16 MG/DL — SIGNIFICANT CHANGE UP (ref 0.5–1.3)
CULTURE RESULTS: SIGNIFICANT CHANGE UP
GLUCOSE SERPL-MCNC: 151 MG/DL — HIGH (ref 70–99)
HCT VFR BLD CALC: 30.9 % — LOW (ref 39–50)
HGB BLD-MCNC: 10.1 G/DL — LOW (ref 13–17)
MCHC RBC-ENTMCNC: 30.6 PG — SIGNIFICANT CHANGE UP (ref 27–34)
MCHC RBC-ENTMCNC: 32.7 GM/DL — SIGNIFICANT CHANGE UP (ref 32–36)
MCV RBC AUTO: 93.6 FL — SIGNIFICANT CHANGE UP (ref 80–100)
PLATELET # BLD AUTO: 218 K/UL — SIGNIFICANT CHANGE UP (ref 150–400)
POTASSIUM SERPL-MCNC: 4.2 MMOL/L — SIGNIFICANT CHANGE UP (ref 3.5–5.3)
POTASSIUM SERPL-SCNC: 4.2 MMOL/L — SIGNIFICANT CHANGE UP (ref 3.5–5.3)
RBC # BLD: 3.3 M/UL — LOW (ref 4.2–5.8)
RBC # FLD: 14.3 % — SIGNIFICANT CHANGE UP (ref 10.3–14.5)
SODIUM SERPL-SCNC: 139 MMOL/L — SIGNIFICANT CHANGE UP (ref 135–145)
SPECIMEN SOURCE: SIGNIFICANT CHANGE UP
VANCOMYCIN TROUGH SERPL-MCNC: 15.1 UG/ML — SIGNIFICANT CHANGE UP (ref 10–20)
WBC # BLD: 4.27 K/UL — SIGNIFICANT CHANGE UP (ref 3.8–10.5)
WBC # FLD AUTO: 4.27 K/UL — SIGNIFICANT CHANGE UP (ref 3.8–10.5)

## 2021-04-30 PROCEDURE — 71045 X-RAY EXAM CHEST 1 VIEW: CPT | Mod: 26,77

## 2021-04-30 PROCEDURE — 99232 SBSQ HOSP IP/OBS MODERATE 35: CPT | Mod: GC

## 2021-04-30 PROCEDURE — 71045 X-RAY EXAM CHEST 1 VIEW: CPT | Mod: 26

## 2021-04-30 PROCEDURE — 99232 SBSQ HOSP IP/OBS MODERATE 35: CPT

## 2021-04-30 RX ADMIN — Medication 5: at 11:43

## 2021-04-30 RX ADMIN — INSULIN GLARGINE 13 UNIT(S): 100 INJECTION, SOLUTION SUBCUTANEOUS at 21:36

## 2021-04-30 RX ADMIN — Medication 2 UNIT(S): at 17:17

## 2021-04-30 RX ADMIN — APIXABAN 5 MILLIGRAM(S): 2.5 TABLET, FILM COATED ORAL at 21:35

## 2021-04-30 RX ADMIN — FINASTERIDE 5 MILLIGRAM(S): 5 TABLET, FILM COATED ORAL at 10:18

## 2021-04-30 RX ADMIN — Medication 25 MILLIGRAM(S): at 10:18

## 2021-04-30 RX ADMIN — TAMSULOSIN HYDROCHLORIDE 0.4 MILLIGRAM(S): 0.4 CAPSULE ORAL at 21:36

## 2021-04-30 RX ADMIN — Medication 2: at 17:17

## 2021-04-30 RX ADMIN — LIDOCAINE 2 PATCH: 4 CREAM TOPICAL at 21:35

## 2021-04-30 RX ADMIN — APIXABAN 5 MILLIGRAM(S): 2.5 TABLET, FILM COATED ORAL at 10:18

## 2021-04-30 RX ADMIN — Medication 5 UNIT(S): at 11:43

## 2021-04-30 RX ADMIN — Medication 250 MILLIGRAM(S): at 10:19

## 2021-04-30 RX ADMIN — PANTOPRAZOLE SODIUM 40 MILLIGRAM(S): 20 TABLET, DELAYED RELEASE ORAL at 06:02

## 2021-04-30 RX ADMIN — Medication 250 MILLIGRAM(S): at 21:36

## 2021-04-30 RX ADMIN — LIDOCAINE 2 PATCH: 4 CREAM TOPICAL at 19:38

## 2021-04-30 RX ADMIN — VALSARTAN 160 MILLIGRAM(S): 80 TABLET ORAL at 10:18

## 2021-04-30 RX ADMIN — LIDOCAINE 2 PATCH: 4 CREAM TOPICAL at 10:18

## 2021-04-30 NOTE — PROGRESS NOTE ADULT - SUBJECTIVE AND OBJECTIVE BOX
Date of service: 04-30-21 @ 07:33    Lying in bed in NAD  Has left chest chest tube  No fever  Has left chest tenderness at tube site    ROS: no fever or chills; denies dizziness, no HA, no SOB or cough, no abdominal pain, no diarrhea or constipation; no dysuria, no legs pain, no rashes    MEDICATIONS  (STANDING):  apixaban 5 milliGRAM(s) Oral every 12 hours  dextrose 40% Gel 15 Gram(s) Oral once  dextrose 5%. 1000 milliLiter(s) (50 mL/Hr) IV Continuous <Continuous>  dextrose 5%. 1000 milliLiter(s) (100 mL/Hr) IV Continuous <Continuous>  dextrose 50% Injectable 25 Gram(s) IV Push once  dextrose 50% Injectable 12.5 Gram(s) IV Push once  dextrose 50% Injectable 25 Gram(s) IV Push once  finasteride 5 milliGRAM(s) Oral daily  glucagon  Injectable 1 milliGRAM(s) IntraMuscular once  insulin glargine Injectable (LANTUS) 13 Unit(s) SubCutaneous at bedtime  insulin lispro (ADMELOG) corrective regimen sliding scale   SubCutaneous three times a day before meals  insulin lispro (ADMELOG) corrective regimen sliding scale   SubCutaneous at bedtime  insulin lispro Injectable (ADMELOG) 5 Unit(s) SubCutaneous before lunch  insulin lispro Injectable (ADMELOG) 7 Unit(s) SubCutaneous before breakfast  insulin lispro Injectable (ADMELOG) 2 Unit(s) SubCutaneous before dinner  lidocaine   Patch 2 Patch Transdermal every 24 hours  metoprolol succinate ER 25 milliGRAM(s) Oral daily  pantoprazole    Tablet 40 milliGRAM(s) Oral before breakfast  tamsulosin 0.4 milliGRAM(s) Oral at bedtime  valsartan 160 milliGRAM(s) Oral daily  vancomycin  IVPB 1000 milliGRAM(s) IV Intermittent every 12 hours    Vital Signs Last 24 Hrs  T(C): 37 (29 Apr 2021 20:45), Max: 37.3 (29 Apr 2021 11:31)  T(F): 98.6 (29 Apr 2021 20:45), Max: 99.1 (29 Apr 2021 11:31)  HR: 69 (29 Apr 2021 20:45) (69 - 96)  BP: 110/42 (29 Apr 2021 20:45) (101/53 - 113/56)  BP(mean): --  RR: 18 (29 Apr 2021 20:45) (18 - 18)  SpO2: 95% (29 Apr 2021 20:45) (95% - 97%)     Physical exam:    Constitutional:  No acute distress  HEENT: NC/AT, EOMI, PERRLA, conjunctivae clear  Neck: supple; thyroid not palpable  Back: no tenderness  Respiratory: respiratory effort normal; crackles at left base  left side chest tube  Cardiovascular: S1S2 regular, no murmurs  Abdomen: soft, not tender, not distended, positive BS  Genitourinary: no suprapubic tenderness  Lymphatic: no LN palpable  Musculoskeletal: no muscle tenderness, no joint swelling or tenderness  Extremities: + 2 peripheral pulses, 2+ edema   Neurological/ Psychiatric: AxOx3, judgement and insight normal; moving all extremities  Skin: Superficial skin laceration and excoriation of L elbow area (from recent fall)    Labs: reviewed                        10.0   5.47  )-----------( 190      ( 28 Apr 2021 08:07 )             31.1     04-28    139  |  109<H>  |  24<H>  ----------------------------<  138<H>  4.0   |  23  |  1.05    Ca    8.4<L>      28 Apr 2021 08:07                        10.3   5.22  )-----------( 198      ( 27 Apr 2021 07:44 )             31.7     04-27    142  |  111<H>  |  25<H>  ----------------------------<  134<H>  4.0   |  25  |  1.23    Ca    8.6      27 Apr 2021 07:44    TPro  5.7<L>  /  Alb  2.3<L>  /  TBili  0.3  /  DBili  x   /  AST  15  /  ALT  14  /  AlkPhos  72  04-26     LIVER FUNCTIONS - ( 26 Apr 2021 06:33 )  Alb: 2.3 g/dL / Pro: 5.7 gm/dL / ALK PHOS: 72 U/L / ALT: 14 U/L / AST: 15 U/L / GGT: x             Culture - Blood (collected 27 Apr 2021 11:58)  Source: .Blood None  Gram Stain (28 Apr 2021 09:03):    Growth in aerobic bottle: Gram Positive Cocci in Clusters  Final Report (29 Apr 2021 10:56):    Growth in aerobic bottle: Staphylococcus hominis    Coag Negative Staphylococcus    Single set isolate, possible contaminant. Contact    Microbiology if susceptibility testing clinically    indicated.    Culture - Blood (collected 27 Apr 2021 11:57)  Source: .Blood None  Preliminary Report (28 Apr 2021 16:01):    No growth to date.    Culture - Fungal, Body Fluid (collected 26 Apr 2021 14:30)  Source: .Body Fluid Pleural Fluid  Preliminary Report (27 Apr 2021 07:57):    Testing in progress    Culture - Body Fluid with Gram Stain (collected 26 Apr 2021 14:30)  Source: .Body Fluid Pleural Fluid  Gram Stain (27 Apr 2021 02:16):    polymorphonuclear leukocytes seen    No organisms seen    by cytocentrifuge  Preliminary Report (27 Apr 2021 17:28):    No growth    Culture - Blood (collected 25 Apr 2021 20:53)  Source: .Blood None  Gram Stain (27 Apr 2021 07:49):    Growth in anaerobic bottle: Gram Positive Cocci in Clusters  Final Report (28 Apr 2021 12:22):    Growth in anaerobic bottle: Staphylococcus hominis Coag Negative    Staphylococcus    Single set isolate, possible contaminant. Contact    Microbiology if susceptibility testing clinically    indicated.  Organism: Blood Culture PCR (28 Apr 2021 12:22)      -  Coagulase negative Staphylococcus, Methicillin resistant: Detec      Method Type: PCR    Culture - Blood (collected 25 Apr 2021 20:53)  Source: .Blood None  Preliminary Report (26 Apr 2021 23:01):    No growth to date.    Culture - Urine (collected 23 Apr 2021 19:19)  Source: .Urine None  Final Report (24 Apr 2021 22:38):    <10,000 CFU/mL Normal Urogenital Gerda        [facility](04-25 @ 20:53)    Radiology: all available radiological tests reviewed    < from: Xray Chest 1 View- PORTABLE-Urgent (Xray Chest 1 View- PORTABLE-Urgent .) (04.26.21 @ 15:23) >  IMPRESSION:  Smaller left effusion.  < end of copied text >      < from: CT Chest No Cont (04.25.21 @ 22:20) >  FINDINGS:  LUNGS AND AIRWAYS: Patent central airways.  Opacity at the posterior aspect of the left lower lobe along the major fissure, likely atelectasis and atelectasis at the posterior aspect of the right lower lobe. Also see below.  PLEURA: Moderate right pleural effusion, slightly decreased in size since4/23/2021 with compressive atelectasis in the left lower lobe.  MEDIASTINUM AND MARIA ELENA: No lymphadenopathy. Moderate hiatal hernia.  VESSELS: Thoracic aorta normal in caliber with mild calcified plaque. Coronary artery calcifications.  HEART: Heart size is normal. No pericardial effusion. Small amount of pericardial fluid.  CHEST WALL AND LOWER NECK: No enlarged axillary lymph nodes. 1.6 x 0.9 cm left thyroid lobe nodule.  VISUALIZED UPPER ABDOMEN: Cholecystectomy. Nonobstructing calculi in the left kidney. Fatty infiltration of the pancreas.  BONES: Compression fracture of L2 of indeterminate age, unchanged since 4/23/2021. Old left 10th rib fracture.  IMPRESSION:  Moderate left pleural effusion, slightly decreased in size since 4/23/2021 with compressive atelectasis in the left lower lobe.  < end of copied text >      Advanced directives addressed: full resuscitation

## 2021-04-30 NOTE — PROGRESS NOTE ADULT - ASSESSMENT
89 y/o male with h/o DM (on insulin), HTN, hiatal hernia, chronic L pleural effusion (of unknown cause), kidney stones, R Tibial/fibular fracture repair with hardwood insertion 3-4yrs ago, CKD2-3, on eliquis was admitted on 4/25 for left chest pain and SOB. Of note pt was seen in -ED on 4/23/2021 following mechanical fall onto left side of body. Pt was discharged back to [facility] after negative workup for trauma with outpt pulm/thoracic advised. CT chest obtained at the time with worsening L pleural effusion. Pt reports pain of the left rib cage with movement, states no pain when lying calmly without movements. Reports intermittent cough without sputum production or hemoptysis, decreased appetite in the past few days, denies fever or chills. CT on 2/25 demonstrated L pleural effusion, drained on 2/26 with Pigtail placement, obtained 1000mL of serosanguinous fluid. Pt was reported with BC growing gram + Cocci in clusters.     1. Left pleural effusion s/p drainage. L2 compression fracture. CRF stage 3.  -bacteremia with CNST in one bottle only  - repeat BC showed CNST/ staph hominis in one bottle only  -no clinical signs of sepsis  -pleural fluid analysis reviewed; doubt empyema  -pleural culture is negative to date  -on vancomycin 1 gm IV q12h # 2  -tolerating abx well so far; no side effects noted  -obtain vancomycin trough levels  -repeat BC x 2 collected on 4/29  -keep on vancomycin IV for now; if repeat BC reported negative plan to discontinue further abx therapy  -monitor temps  -f/u CBC  -supportive care  2. Other issues:   -care per medicine

## 2021-04-30 NOTE — PROGRESS NOTE ADULT - ASSESSMENT
88 YO M with a PMH of DM, HTN, Hiatal hernia, left chronic pleural effusion. Right Tib/Fib fracture, CKD 2/3 with recent mechanical fall without sustained fractures, brought in from [facility] for Left sided lung pain and short of breath admitted for compressive left pleural effusion s/p chest tube with continued hospitalization due to + blood culture.     #Left Pleural Effusion, Expanding  - cause unknown, chronic, not traumatic  - pleural fluids with alb ratio of 0.9 and low LDH, while exudative by Light's criteria, likely a chronic transudate   - CT Chest (4/23) reported large Left pleural effusion, near collapse of Left Lower Lung  - CXR (4/27): reduced pleural effusion, no pneumothorax   - H/H stable  - CT Surgery Appreciated, Remove Chest Tube today.     #Mechanical Fall  - No acute fractures on imaging, Compression fracture, on L2 indeterminate age  - Fall precautions  - PT Consult appreciated  - Pain control    #Blood Cultures Positive   - possible contaminant?   - Blood culture (4/25, 1 bottle): MR porfirio lerma   - blood culture (4/27, 1 bottle): gram positive cocci in clusters   - Blood culture (4/29, 1 Bottle): No growth to date   - Infectious Disease Consult Appreciated:   - DC.     #ANJANA on CKD  - resolved, back to baseline   - Renal stones, Hydronephrosis seen on CT on 4/23  - Avoid nephrotoxic medications    #DM  - Lantus 13 QHS  - Premeal 5/3/2  - ISS  - Diabetes Diet     #HTN  - Continue Valsartan, Metoprolol    #VTE PPX  - Eliquis 5 BID    #Code Status  - Full Code    #Disposition  - possible discharge in AM.

## 2021-04-30 NOTE — PROGRESS NOTE ADULT - SUBJECTIVE AND OBJECTIVE BOX
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 CT Chest at that time showed worsening Left pleural effusion and pt admitted for compressive left pleural effusion.     Subjective: Pt reports feeling well. Denies any SOB. Denies fever, chills, cough, nausea, diarrhea, dysuria, pain. Patient planned to have chest tube removed today. Repeat blood cultures negative. Will DC in AM most likely.     REVIEW OF SYSTEMS:  CONSTITUTIONAL: No weakness, fevers or chills  EYES/ENT: No visual changes;  No vertigo or throat pain. Stuffy nose.   NECK: No pain or stiffness  RESPIRATORY: No cough, wheezing, hemoptysis; No shortness of breath  CARDIOVASCULAR: No chest pain or palpitations  GASTROINTESTINAL: No abdominal or epigastric pain. No nausea, vomiting; No diarrhea or constipation.   GENITOURINARY: No dysuria, frequency or hematuria  NEUROLOGICAL: No numbness or weakness  SKIN: No itching, rashes    PHYSICAL EXAM:  Vital Signs Last 24 Hrs  T(C): 36.8 (30 Apr 2021 08:37), Max: 37 (29 Apr 2021 20:45)  T(F): 98.3 (30 Apr 2021 08:37), Max: 98.6 (29 Apr 2021 20:45)  HR: 71 (30 Apr 2021 08:37) (69 - 71)  BP: 111/51 (30 Apr 2021 08:37) (104/48 - 111/51)  BP(mean): --  RR: 18 (30 Apr 2021 08:37) (18 - 18)  SpO2: 95% (30 Apr 2021 08:37) (95% - 96%)    Constitutional: Pt lying in bed, awake and alert, NAD  HEENT: EOMI, normal hearing, moist mucous membranes  Neck: Soft and supple, no JVD  Respiratory: CTABL, No wheezing, rales or rhonchi  Cardiovascular: S1S2+, RRR, no M/G/R  Gastrointestinal: BS+, soft, NT/ND, no guarding, no rebound  Extremities: No peripheral edema  Vascular: 2+ peripheral pulses  Neurological: AAOx3, no focal deficits  Musculoskeletal: 5/5 strength b/l upper and lower extremities  Skin: No rashes    MEDICATIONS  (STANDING):  apixaban 5 milliGRAM(s) Oral every 12 hours  dextrose 40% Gel 15 Gram(s) Oral once  dextrose 5%. 1000 milliLiter(s) (50 mL/Hr) IV Continuous <Continuous>  dextrose 5%. 1000 milliLiter(s) (100 mL/Hr) IV Continuous <Continuous>  dextrose 50% Injectable 25 Gram(s) IV Push once  dextrose 50% Injectable 12.5 Gram(s) IV Push once  dextrose 50% Injectable 25 Gram(s) IV Push once  finasteride 5 milliGRAM(s) Oral daily  glucagon  Injectable 1 milliGRAM(s) IntraMuscular once  insulin glargine Injectable (LANTUS) 13 Unit(s) SubCutaneous at bedtime  insulin lispro (ADMELOG) corrective regimen sliding scale   SubCutaneous three times a day before meals  insulin lispro (ADMELOG) corrective regimen sliding scale   SubCutaneous at bedtime  insulin lispro Injectable (ADMELOG) 5 Unit(s) SubCutaneous before lunch  insulin lispro Injectable (ADMELOG) 7 Unit(s) SubCutaneous before breakfast  insulin lispro Injectable (ADMELOG) 2 Unit(s) SubCutaneous before dinner  lidocaine   Patch 2 Patch Transdermal every 24 hours  metoprolol succinate ER 25 milliGRAM(s) Oral daily  pantoprazole    Tablet 40 milliGRAM(s) Oral before breakfast  tamsulosin 0.4 milliGRAM(s) Oral at bedtime  valsartan 160 milliGRAM(s) Oral daily  vancomycin  IVPB 1000 milliGRAM(s) IV Intermittent every 12 hours    MEDICATIONS  (PRN):  acetaminophen   Tablet .. 650 milliGRAM(s) Oral every 4 hours PRN Mild Pain (1 - 3)  oxyCODONE    IR 5 milliGRAM(s) Oral every 6 hours PRN Moderate Pain (4 - 6)    LABS: All Labs Reviewed:                                 10.1   4.27  )-----------( 218      ( 30 Apr 2021 08:30 )             30.9   04-30    139  |  110<H>  |  28<H>  ----------------------------<  151<H>  4.2   |  24  |  1.16    Ca    8.8      30 Apr 2021 08:30    TPro  5.7<L>  /  Alb  2.3<L>  /  TBili  0.3  /  DBili  x   /  AST  15  /  ALT  14  /  AlkPhos  72  04-26    PT/INR - ( 25 Apr 2021 20:53 )   PT: 13.7 sec;   INR: 1.18 ratio         PTT - ( 25 Apr 2021 20:53 )  PTT:32.7 sec  CARDIAC MARKERS ( 25 Apr 2021 23:20 )  <0.015 ng/mL / x     / x     / x     / x      CARDIAC MARKERS ( 25 Apr 2021 20:53 )  <0.015 ng/mL / x     / x     / x     / x          Culture - Blood (04.27.21 @ 11:58)   Gram Stain:   Growth in aerobic bottle: Gram Positive Cocci in Clusters   Culture - Blood (04.27.21 @ 11:58)   Gram Stain:   Growth in aerobic bottle: Gram Positive Cocci in Clusters   Specimen Source: .Blood None   Culture Results:   Growth in aerobic bottle: Gram Positive Cocci in Clusters       RADIOLOGY/EKG:  < from: CT Chest No Cont (04.25.21 @ 22:20) >  IMPRESSION:  Moderate left pleural effusion, slightly decreased in size since 4/23/2021 with compressive atelectasis in the left lower lobe.            THOM CLINTON MD; Attending Radiologist  This document has been electronically signed. Apr 26 2021  8:21AM    < end of copied text >        < from: Xray Chest 1 View- PORTABLE-Routine (Xray Chest 1 View- PORTABLE-Routine in AM.) (04.27.21 @ 09:24) >  COMPARISON STUDY: 4/26/2021    Frontal expiratory view of the chest shows the heartto be similar in size. Left pigtail catheter remains present.    The lungs show reduction of left effusion with small left base infiltrate or atelectasis and there is no evidence of pneumothorax nor right pleural effusion.    IMPRESSION:  Partial clearing, left base. No pneumothorax.    < end of copied text >

## 2021-05-01 VITALS
HEART RATE: 63 BPM | TEMPERATURE: 98 F | SYSTOLIC BLOOD PRESSURE: 117 MMHG | RESPIRATION RATE: 18 BRPM | DIASTOLIC BLOOD PRESSURE: 56 MMHG | OXYGEN SATURATION: 100 %

## 2021-05-01 LAB
ANION GAP SERPL CALC-SCNC: 6 MMOL/L — SIGNIFICANT CHANGE UP (ref 5–17)
BUN SERPL-MCNC: 29 MG/DL — HIGH (ref 7–23)
CALCIUM SERPL-MCNC: 8.7 MG/DL — SIGNIFICANT CHANGE UP (ref 8.5–10.1)
CHLORIDE SERPL-SCNC: 110 MMOL/L — HIGH (ref 96–108)
CO2 SERPL-SCNC: 22 MMOL/L — SIGNIFICANT CHANGE UP (ref 22–31)
CREAT SERPL-MCNC: 1.19 MG/DL — SIGNIFICANT CHANGE UP (ref 0.5–1.3)
CULTURE RESULTS: NO GROWTH — SIGNIFICANT CHANGE UP
GLUCOSE SERPL-MCNC: 137 MG/DL — HIGH (ref 70–99)
HCT VFR BLD CALC: 32.4 % — LOW (ref 39–50)
HGB BLD-MCNC: 10.2 G/DL — LOW (ref 13–17)
MCHC RBC-ENTMCNC: 30.4 PG — SIGNIFICANT CHANGE UP (ref 27–34)
MCHC RBC-ENTMCNC: 31.5 GM/DL — LOW (ref 32–36)
MCV RBC AUTO: 96.4 FL — SIGNIFICANT CHANGE UP (ref 80–100)
PLATELET # BLD AUTO: 211 K/UL — SIGNIFICANT CHANGE UP (ref 150–400)
POTASSIUM SERPL-MCNC: 4.1 MMOL/L — SIGNIFICANT CHANGE UP (ref 3.5–5.3)
POTASSIUM SERPL-SCNC: 4.1 MMOL/L — SIGNIFICANT CHANGE UP (ref 3.5–5.3)
RBC # BLD: 3.36 M/UL — LOW (ref 4.2–5.8)
RBC # FLD: 14.2 % — SIGNIFICANT CHANGE UP (ref 10.3–14.5)
SODIUM SERPL-SCNC: 138 MMOL/L — SIGNIFICANT CHANGE UP (ref 135–145)
SPECIMEN SOURCE: SIGNIFICANT CHANGE UP
WBC # BLD: 4.48 K/UL — SIGNIFICANT CHANGE UP (ref 3.8–10.5)
WBC # FLD AUTO: 4.48 K/UL — SIGNIFICANT CHANGE UP (ref 3.8–10.5)

## 2021-05-01 PROCEDURE — 99239 HOSP IP/OBS DSCHRG MGMT >30: CPT

## 2021-05-01 PROCEDURE — 99232 SBSQ HOSP IP/OBS MODERATE 35: CPT

## 2021-05-01 RX ORDER — INSULIN LISPRO 100/ML
5 VIAL (ML) SUBCUTANEOUS ONCE
Refills: 0 | Status: COMPLETED | OUTPATIENT
Start: 2021-05-01 | End: 2021-05-01

## 2021-05-01 RX ADMIN — Medication 2: at 12:46

## 2021-05-01 RX ADMIN — PANTOPRAZOLE SODIUM 40 MILLIGRAM(S): 20 TABLET, DELAYED RELEASE ORAL at 06:02

## 2021-05-01 RX ADMIN — APIXABAN 5 MILLIGRAM(S): 2.5 TABLET, FILM COATED ORAL at 09:45

## 2021-05-01 RX ADMIN — Medication 1: at 16:31

## 2021-05-01 RX ADMIN — Medication 5 UNIT(S): at 12:47

## 2021-05-01 RX ADMIN — Medication 5 UNIT(S): at 08:44

## 2021-05-01 RX ADMIN — Medication 25 MILLIGRAM(S): at 09:46

## 2021-05-01 RX ADMIN — VALSARTAN 160 MILLIGRAM(S): 80 TABLET ORAL at 09:45

## 2021-05-01 RX ADMIN — Medication 2 UNIT(S): at 16:31

## 2021-05-01 RX ADMIN — FINASTERIDE 5 MILLIGRAM(S): 5 TABLET, FILM COATED ORAL at 09:46

## 2021-05-01 NOTE — PROGRESS NOTE ADULT - SUBJECTIVE AND OBJECTIVE BOX
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 CT Chest at that time showed worsening Left pleural effusion and pt admitted for compressive left pleural effusion.     Subjective: Pt reports feeling well. Denies any SOB. Denies fever, chills, cough, nausea, diarrhea, dysuria, pain. Patient had chest tube removed 4/30. Repeat blood cultures negative. Will DC today.     REVIEW OF SYSTEMS:  CONSTITUTIONAL: No weakness, fevers or chills  EYES/ENT: No visual changes;  No vertigo or throat pain. Stuffy nose.   NECK: No pain or stiffness  RESPIRATORY: No cough, wheezing, hemoptysis; No shortness of breath  CARDIOVASCULAR: No chest pain or palpitations  GASTROINTESTINAL: No abdominal or epigastric pain. No nausea, vomiting; No diarrhea or constipation.   GENITOURINARY: No dysuria, frequency or hematuria  NEUROLOGICAL: No numbness or weakness  SKIN: No itching, rashes    PHYSICAL EXAM:  Vital Signs Last 24 Hrs  T(C): 36.4 (01 May 2021 08:29), Max: 36.9 (30 Apr 2021 21:33)  T(F): 97.6 (01 May 2021 08:29), Max: 98.4 (30 Apr 2021 21:33)  HR: 79 (01 May 2021 13:17) (68 - 91)  BP: 100/58 (01 May 2021 13:17) (100/58 - 120/52)  BP(mean): --  RR: 18 (01 May 2021 08:29) (18 - 18)  SpO2: 97% (01 May 2021 08:29) (97% - 97%)    Constitutional: Pt lying in bed, awake and alert, NAD  HEENT: EOMI, normal hearing, moist mucous membranes  Neck: Soft and supple, no JVD  Respiratory: CTABL, No wheezing, rales or rhonchi  Cardiovascular: S1S2+, RRR, no M/G/R  Gastrointestinal: BS+, soft, NT/ND, no guarding, no rebound  Extremities: No peripheral edema  Vascular: 2+ peripheral pulses  Neurological: AAOx3, no focal deficits  Musculoskeletal: 5/5 strength b/l upper and lower extremities  Skin: No rashes    MEDICATIONS  (STANDING):  apixaban 5 milliGRAM(s) Oral every 12 hours  dextrose 40% Gel 15 Gram(s) Oral once  dextrose 5%. 1000 milliLiter(s) (50 mL/Hr) IV Continuous <Continuous>  dextrose 5%. 1000 milliLiter(s) (100 mL/Hr) IV Continuous <Continuous>  dextrose 50% Injectable 25 Gram(s) IV Push once  dextrose 50% Injectable 12.5 Gram(s) IV Push once  dextrose 50% Injectable 25 Gram(s) IV Push once  finasteride 5 milliGRAM(s) Oral daily  glucagon  Injectable 1 milliGRAM(s) IntraMuscular once  insulin glargine Injectable (LANTUS) 13 Unit(s) SubCutaneous at bedtime  insulin lispro (ADMELOG) corrective regimen sliding scale   SubCutaneous three times a day before meals  insulin lispro (ADMELOG) corrective regimen sliding scale   SubCutaneous at bedtime  insulin lispro Injectable (ADMELOG) 5 Unit(s) SubCutaneous before lunch  insulin lispro Injectable (ADMELOG) 7 Unit(s) SubCutaneous before breakfast  insulin lispro Injectable (ADMELOG) 2 Unit(s) SubCutaneous before dinner  lidocaine   Patch 2 Patch Transdermal every 24 hours  metoprolol succinate ER 25 milliGRAM(s) Oral daily  pantoprazole    Tablet 40 milliGRAM(s) Oral before breakfast  tamsulosin 0.4 milliGRAM(s) Oral at bedtime  valsartan 160 milliGRAM(s) Oral daily    MEDICATIONS  (PRN):  acetaminophen   Tablet .. 650 milliGRAM(s) Oral every 4 hours PRN Mild Pain (1 - 3)  oxyCODONE    IR 5 milliGRAM(s) Oral every 6 hours PRN Moderate Pain (4 - 6)    LABS: All Labs Reviewed:                        10.2   4.48  )-----------( 211      ( 01 May 2021 08:09 )             32.4   05-01    138  |  110<H>  |  29<H>  ----------------------------<  137<H>  4.1   |  22  |  1.19    Ca    8.7      01 May 2021 08:09    TPro  5.7<L>  /  Alb  2.3<L>  /  TBili  0.3  /  DBili  x   /  AST  15  /  ALT  14  /  AlkPhos  72  04-26    PT/INR - ( 25 Apr 2021 20:53 )   PT: 13.7 sec;   INR: 1.18 ratio         PTT - ( 25 Apr 2021 20:53 )  PTT:32.7 sec  CARDIAC MARKERS ( 25 Apr 2021 23:20 )  <0.015 ng/mL / x     / x     / x     / x      CARDIAC MARKERS ( 25 Apr 2021 20:53 )  <0.015 ng/mL / x     / x     / x     / x          Culture - Blood (04.27.21 @ 11:58)   Gram Stain:   Growth in aerobic bottle: Gram Positive Cocci in Clusters   Culture - Blood (04.27.21 @ 11:58)   Gram Stain:   Growth in aerobic bottle: Gram Positive Cocci in Clusters   Specimen Source: .Blood None   Culture Results:   Growth in aerobic bottle: Gram Positive Cocci in Clusters       RADIOLOGY/EKG:  < from: CT Chest No Cont (04.25.21 @ 22:20) >  IMPRESSION:  Moderate left pleural effusion, slightly decreased in size since 4/23/2021 with compressive atelectasis in the left lower lobe.            THOM CLINTON MD; Attending Radiologist  This document has been electronically signed. Apr 26 2021  8:21AM    < end of copied text >        < from: Xray Chest 1 View- PORTABLE-Routine (Xray Chest 1 View- PORTABLE-Routine in AM.) (04.27.21 @ 09:24) >  COMPARISON STUDY: 4/26/2021    Frontal expiratory view of the chest shows the heartto be similar in size. Left pigtail catheter remains present.    The lungs show reduction of left effusion with small left base infiltrate or atelectasis and there is no evidence of pneumothorax nor right pleural effusion.    IMPRESSION:  Partial clearing, left base. No pneumothorax.    < end of copied text >

## 2021-05-01 NOTE — PROGRESS NOTE ADULT - ASSESSMENT
88 y/o male with h/o DM (on insulin), HTN, hiatal hernia, chronic L pleural effusion (of unknown cause), kidney stones, R Tibial/fibular fracture repair with hardwood insertion 3-4yrs ago, CKD2-3, on eliquis was admitted on 4/25 for left chest pain and SOB. Of note pt was seen in -ED on 4/23/2021 following mechanical fall onto left side of body. Pt was discharged back to [facility] after negative workup for trauma with outpt pulm/thoracic advised. CT chest obtained at the time with worsening L pleural effusion. Pt reports pain of the left rib cage with movement, states no pain when lying calmly without movements. Reports intermittent cough without sputum production or hemoptysis, decreased appetite in the past few days, denies fever or chills. CT on 2/25 demonstrated L pleural effusion, drained on 2/26 with Pigtail placement, obtained 1000mL of serosanguinous fluid. Pt was reported with BC growing gram + Cocci in clusters.     1. Left pleural effusion s/p drainage. L2 compression fracture. CRF stage 3.  -bacteremia with CNST in one bottle only  - repeat BC showed CNST/ staph hominis in one bottle only  -further repeat BC are negative to date  -no clinical signs of sepsis  -pleural fluid analysis reviewed; doubt empyema; chest tube was removed  -pleural culture is negative to date  -on vancomycin 1 gm IV q12h # 2  -tolerating abx well so far; no side effects noted  -vancomycin trough level is therapeutic  -repeat BC x 2 collected on 4/29 is negative to date  -d/c further vancomycin   -monitor temps  -f/u CBC  -supportive care  2. Other issues:   -care per medicine

## 2021-05-01 NOTE — PROGRESS NOTE ADULT - SUBJECTIVE AND OBJECTIVE BOX
Subjective: Patient with no complaints. No acute overnight events. saturating 97% on RA. Denies chest pain, SOB, cough. Vanco DC'ed due to negative repeat blood cultures. CT removed yesterday.     Vital Signs:  Vital Signs Last 24 Hrs  T(C): 36.4 (05-01-21 @ 08:29), Max: 36.9 (04-30-21 @ 21:33)  T(F): 97.6 (05-01-21 @ 08:29), Max: 98.4 (04-30-21 @ 21:33)  HR: 73 (05-01-21 @ 08:29) (68 - 91)  BP: 113/58 (05-01-21 @ 08:29) (111/51 - 120/52)  RR: 18 (05-01-21 @ 08:29) (18 - 18)  SpO2: 97% (05-01-21 @ 08:29) (97% - 97%) on (O2)    I&O's Detail    30 Apr 2021 07:01  -  01 May 2021 07:00  --------------------------------------------------------  IN:  Total IN: 0 mL    OUT:    Voided (mL): 300 mL  Total OUT: 300 mL    Total NET: -300 mL      01 May 2021 07:01  -  01 May 2021 09:49  --------------------------------------------------------  IN:  Total IN: 0 mL    OUT:    Voided (mL): 175 mL  Total OUT: 175 mL    Total NET: -175 mL      General: NAD  Neurology: Awake, nonfocal  Eyes: Scleras clear, EOMI, Gross vision intact  ENT: Gross hearing intact, grossly patent pharynx, no stridor  Neck: Neck supple, trachea midline, No JVD  Respiratory: CTA B/L, No wheezing, rales, rhonchi  CV: RRR, S1S2, no murmurs, rubs or gallops  Extremities: No edema  Psych: Oriented x 3, normal affect  Tubes: Dressing c/d/i    Relevant labs, radiology and Medications reviewed                        10.2   4.48  )-----------( 211      ( 01 May 2021 08:09 )             32.4     05-01    138  |  110<H>  |  29<H>  ----------------------------<  137<H>  4.1   |  22  |  1.19    Ca    8.7      01 May 2021 08:09        MEDICATIONS  (STANDING):  apixaban 5 milliGRAM(s) Oral every 12 hours  dextrose 40% Gel 15 Gram(s) Oral once  dextrose 5%. 1000 milliLiter(s) (50 mL/Hr) IV Continuous <Continuous>  dextrose 5%. 1000 milliLiter(s) (100 mL/Hr) IV Continuous <Continuous>  dextrose 50% Injectable 25 Gram(s) IV Push once  dextrose 50% Injectable 12.5 Gram(s) IV Push once  dextrose 50% Injectable 25 Gram(s) IV Push once  finasteride 5 milliGRAM(s) Oral daily  glucagon  Injectable 1 milliGRAM(s) IntraMuscular once  insulin glargine Injectable (LANTUS) 13 Unit(s) SubCutaneous at bedtime  insulin lispro (ADMELOG) corrective regimen sliding scale   SubCutaneous three times a day before meals  insulin lispro (ADMELOG) corrective regimen sliding scale   SubCutaneous at bedtime  insulin lispro Injectable (ADMELOG) 5 Unit(s) SubCutaneous before lunch  insulin lispro Injectable (ADMELOG) 7 Unit(s) SubCutaneous before breakfast  insulin lispro Injectable (ADMELOG) 2 Unit(s) SubCutaneous before dinner  lidocaine   Patch 2 Patch Transdermal every 24 hours  metoprolol succinate ER 25 milliGRAM(s) Oral daily  pantoprazole    Tablet 40 milliGRAM(s) Oral before breakfast  tamsulosin 0.4 milliGRAM(s) Oral at bedtime  valsartan 160 milliGRAM(s) Oral daily    MEDICATIONS  (PRN):  acetaminophen   Tablet .. 650 milliGRAM(s) Oral every 4 hours PRN Mild Pain (1 - 3)  oxyCODONE    IR 5 milliGRAM(s) Oral every 6 hours PRN Moderate Pain (4 - 6)        Assessment  89y Male  w/ PAST MEDICAL & SURGICAL HISTORY:  DM (diabetes mellitus)    HTN (hypertension)    HLD (hyperlipidemia)    Kidney stone    Hernia    H/O left inguinal hernia repair    Endoscopy finding  choked on piece of chiken-had upper endo with clearance of food    History of tibial fracture  R tibial fracture s/p revision with hardware placement ~3-4yrs ago, per pt    admitted with complaints of Patient is a 89y old  Male who presents with a chief complaint of Left pleural effusion (01 May 2021 08:43)      Subjective: Patient with no complaints. No acute overnight events. Saturating 97% on RA. Denies chest pain, SOB, cough, fever, chills. Vanco DC'ed due to negative repeat blood cultures. CT removed yesterday.     Vital Signs:  Vital Signs Last 24 Hrs  T(C): 36.4 (05-01-21 @ 08:29), Max: 36.9 (04-30-21 @ 21:33)  T(F): 97.6 (05-01-21 @ 08:29), Max: 98.4 (04-30-21 @ 21:33)  HR: 73 (05-01-21 @ 08:29) (68 - 91)  BP: 113/58 (05-01-21 @ 08:29) (111/51 - 120/52)  RR: 18 (05-01-21 @ 08:29) (18 - 18)  SpO2: 97% (05-01-21 @ 08:29) (97% - 97%) on (O2)    I&O's Detail    30 Apr 2021 07:01  -  01 May 2021 07:00  --------------------------------------------------------  IN:  Total IN: 0 mL    OUT:    Voided (mL): 300 mL  Total OUT: 300 mL    Total NET: -300 mL      01 May 2021 07:01  -  01 May 2021 09:49  --------------------------------------------------------  IN:  Total IN: 0 mL    OUT:    Voided (mL): 175 mL  Total OUT: 175 mL    Total NET: -175 mL      General: NAD  Neurology: Awake, nonfocal  Eyes: Scleras clear, EOMI, Gross vision intact  ENT: Gross hearing intact, grossly patent pharynx, no stridor  Neck: Neck supple, trachea midline, No JVD  Respiratory: CTA B/L, No wheezing, rales, rhonchi  CV: RRR, S1S2, no murmurs, rubs or gallops  Extremities: No edema  Psych: Oriented x 3, normal affect  Tubes: Dressing c/d/i    Relevant labs, radiology and Medications reviewed                        10.2   4.48  )-----------( 211      ( 01 May 2021 08:09 )             32.4     05-01    138  |  110<H>  |  29<H>  ----------------------------<  137<H>  4.1   |  22  |  1.19    Ca    8.7      01 May 2021 08:09        MEDICATIONS  (STANDING):  apixaban 5 milliGRAM(s) Oral every 12 hours  dextrose 40% Gel 15 Gram(s) Oral once  dextrose 5%. 1000 milliLiter(s) (50 mL/Hr) IV Continuous <Continuous>  dextrose 5%. 1000 milliLiter(s) (100 mL/Hr) IV Continuous <Continuous>  dextrose 50% Injectable 25 Gram(s) IV Push once  dextrose 50% Injectable 12.5 Gram(s) IV Push once  dextrose 50% Injectable 25 Gram(s) IV Push once  finasteride 5 milliGRAM(s) Oral daily  glucagon  Injectable 1 milliGRAM(s) IntraMuscular once  insulin glargine Injectable (LANTUS) 13 Unit(s) SubCutaneous at bedtime  insulin lispro (ADMELOG) corrective regimen sliding scale   SubCutaneous three times a day before meals  insulin lispro (ADMELOG) corrective regimen sliding scale   SubCutaneous at bedtime  insulin lispro Injectable (ADMELOG) 5 Unit(s) SubCutaneous before lunch  insulin lispro Injectable (ADMELOG) 7 Unit(s) SubCutaneous before breakfast  insulin lispro Injectable (ADMELOG) 2 Unit(s) SubCutaneous before dinner  lidocaine   Patch 2 Patch Transdermal every 24 hours  metoprolol succinate ER 25 milliGRAM(s) Oral daily  pantoprazole    Tablet 40 milliGRAM(s) Oral before breakfast  tamsulosin 0.4 milliGRAM(s) Oral at bedtime  valsartan 160 milliGRAM(s) Oral daily    MEDICATIONS  (PRN):  acetaminophen   Tablet .. 650 milliGRAM(s) Oral every 4 hours PRN Mild Pain (1 - 3)  oxyCODONE    IR 5 milliGRAM(s) Oral every 6 hours PRN Moderate Pain (4 - 6)        Assessment  89y Male  w/ PAST MEDICAL & SURGICAL HISTORY:  DM (diabetes mellitus)    HTN (hypertension)    HLD (hyperlipidemia)    Kidney stone    Hernia    H/O left inguinal hernia repair    Endoscopy finding  choked on piece of chiken-had upper endo with clearance of food    History of tibial fracture  R tibial fracture s/p revision with hardware placement ~3-4yrs ago, per pt    admitted with complaints of Patient is a 89y old  Male who presents with a chief complaint of Left pleural effusion (01 May 2021 08:43)

## 2021-05-01 NOTE — PROGRESS NOTE ADULT - ASSESSMENT
88 YO M with a PMH of DM, HTN, Hiatal hernia, left chronic pleural effusion. Right Tib/Fib fracture, CKD 2/3 with recent mechanical fall without sustained fractures, brought in from [facility] for Left sided lung pain and short of breath admitted for compressive left pleural effusion s/p chest tube with continued hospitalization due to + blood culture. Blood cultures likely contaminants. DC today. S/P Vancomycin.     #Left Pleural Effusion, Expanding  - cause unknown, chronic, not traumatic  - pleural fluids with alb ratio of 0.9 and low LDH, while exudative by Light's criteria, likely a chronic transudate   - CT Chest (4/23) reported large Left pleural effusion, near collapse of Left Lower Lung  - CXR (4/27): reduced pleural effusion, no pneumothorax   - H/H stable  - CT Surgery Appreciated, Remove Chest Tube 4/30, can remove dressing 5/2 at nursing facility     #Mechanical Fall  - No acute fractures on imaging, Compression fracture, on L2 indeterminate age  - Fall precautions  - PT Consult appreciated  - Pain control    #Blood Cultures Positive   - possible contaminant?   - Blood culture (4/25, 1 bottle): MR porfirio lerma   - blood culture (4/27, 1 bottle): gram positive cocci in clusters   - Blood culture (4/29, 1 Bottle): No growth to date   - Infectious Disease Consult Appreciated: s/p Vancomycin.   - DC today     #ANJANA on CKD  - resolved, back to baseline   - Renal stones, Hydronephrosis seen on CT on 4/23  - Avoid nephrotoxic medications    #DM  - Lantus 13 QHS  - Premeal 5/3/2  - ISS  - Diabetes Diet     #HTN  - Continue Valsartan, Metoprolol    #VTE PPX  - Eliquis 5 BID    #Code Status  - Full Code    #Disposition  - DISCHARGE TODAY.

## 2021-05-01 NOTE — PROGRESS NOTE ADULT - NSICDXPILOT_GEN_ALL_CORE
[place]

## 2021-05-01 NOTE — PROGRESS NOTE ADULT - ATTENDING COMMENTS
As above, pt seen and examined with house staff and treatment plan formulated on rounds.     See Attending note for further details.  Start Abx and DC discharge
-rs-decreaed air entry on left side   -p/a-soft, bs+  -cvs-s1s2 normal     A/P    # ct supportive care     #discharge plan
-rs-decreased air entry on left side  -p/a-soft, bs+  -cvs-s1s2 normal     A/P    #ct abx, repeat blood culture to follow     #discharge plan
As above, pt seen and examined with house staff and treatment plan formulated on rounds.     See Attending note for further details
Patient is a 89 y.o. male who presents with mechanical fall with left sided chest pain and sob admitted with left pleural effusion with PMH DM, HTN, Hiatal hernia, h/o left chronic pleural effusion, h/o right tib/fib fx, CKD stage 2-3     #Left Pleural Effusion, Expanding  - cause unknown, chronic, not traumatic  - pleural fluids with alb ratio of 0.9 and low LDH, while exudative by Light's criteria, likely a chronic transudate   - CT Chest (4/23) reported large Left pleural effusion, near collapse of Left Lower Lung  - CXR (4/27): reduced pleural effusion, no pneumothorax   - H/H stable  - CT Surgery Appreciated, Remove Chest Tube 4/30, can remove dressing 5/2 at nursing facility     #Mechanical Fall  - No acute fractures on imaging, Compression fracture, on L2 indeterminate age  - Fall precautions  - PT Consult appreciated  - Pain control    #Blood Cultures Positive   - possible contaminant?   - Blood culture (4/25, 1 bottle): MR monroy hominus   - blood culture (4/27, 1 bottle): gram positive cocci in clusters   - Blood culture (4/29, 1 Bottle): No growth to date   - Infectious Disease Consult Appreciated: s/p Vancomycin.     Disposition: DC today
As above, pt seen and examined with house staff and treatment plan formulated on rounds.     See Attending note for further details

## 2021-05-01 NOTE — PROGRESS NOTE ADULT - PROVIDER SPECIALTY LIST ADULT
Family Medicine
Thoracic Surgery
Family Medicine
Family Medicine
Infectious Disease
Infectious Disease
Thoracic Surgery
Family Medicine
Family Medicine
Infectious Disease
Infectious Disease
Family Medicine

## 2021-05-01 NOTE — PROGRESS NOTE ADULT - ASSESSMENT
88 yo male with h/o DM (on insulin), HTN, hiatal hernia, chronic L pleural effusion (of unknown cause), h/o kidney stones, R Tibial/fibular fracture repair with hardwood insertion 3-4yrs ago, CKD2-3, BPH, h/o DVT on Eliquis admitted 4/26 w SOB. Called for moderate left effusion, s/p pigtail 4/26, exudative by Light's criteria, CX NTD and cyto negative. Pt w positive Blood cultures x 1 for 2 sets- Coag neg staph.     No further intervention   May remove dressing at old chest tube site tomorrow       Lou HOUSTON  Thoracic Sx  #[id number]   88 yo male with h/o DM (on insulin), HTN, hiatal hernia, chronic L pleural effusion (of unknown cause), h/o kidney stones, R Tibial/fibular fracture repair with hardwood insertion 3-4yrs ago, CKD2-3, BPH, h/o DVT on Eliquis admitted 4/26 w SOB. Called for moderate left effusion, s/p pigtail 4/26, exudative by Light's criteria, CX NTD and cyto negative. Pt w positive Blood cultures x 1 for 2 sets- Coag neg staph. Chest tube removed 4/30.     No further intervention   May remove dressing at old chest tube site tomorrow       Lou Burks PA  Thoracic Sx  #[id number]

## 2021-05-01 NOTE — PROGRESS NOTE ADULT - REASON FOR ADMISSION
Left pleural effusion

## 2021-05-01 NOTE — PROGRESS NOTE ADULT - SUBJECTIVE AND OBJECTIVE BOX
Date of service: 05-01-21 @ 08:44    Lying in bed in NAD  Feels better  CT was removed  No fever  Has mild left chest tenderness at chest tube recent site    ROS: no fever or chills; denies dizziness, no HA, no SOB or cough, no abdominal pain, no diarrhea or constipation; no dysuria, no legs pain, no rashes    MEDICATIONS  (STANDING):  apixaban 5 milliGRAM(s) Oral every 12 hours  dextrose 40% Gel 15 Gram(s) Oral once  dextrose 5%. 1000 milliLiter(s) (50 mL/Hr) IV Continuous <Continuous>  dextrose 5%. 1000 milliLiter(s) (100 mL/Hr) IV Continuous <Continuous>  dextrose 50% Injectable 25 Gram(s) IV Push once  dextrose 50% Injectable 12.5 Gram(s) IV Push once  dextrose 50% Injectable 25 Gram(s) IV Push once  finasteride 5 milliGRAM(s) Oral daily  glucagon  Injectable 1 milliGRAM(s) IntraMuscular once  insulin glargine Injectable (LANTUS) 13 Unit(s) SubCutaneous at bedtime  insulin lispro (ADMELOG) corrective regimen sliding scale   SubCutaneous three times a day before meals  insulin lispro (ADMELOG) corrective regimen sliding scale   SubCutaneous at bedtime  insulin lispro Injectable (ADMELOG) 5 Unit(s) SubCutaneous before lunch  insulin lispro Injectable (ADMELOG) 7 Unit(s) SubCutaneous before breakfast  insulin lispro Injectable (ADMELOG) 2 Unit(s) SubCutaneous before dinner  insulin lispro Injectable (ADMELOG). 5 Unit(s) SubCutaneous once  lidocaine   Patch 2 Patch Transdermal every 24 hours  metoprolol succinate ER 25 milliGRAM(s) Oral daily  pantoprazole    Tablet 40 milliGRAM(s) Oral before breakfast  tamsulosin 0.4 milliGRAM(s) Oral at bedtime  valsartan 160 milliGRAM(s) Oral daily  vancomycin  IVPB 1000 milliGRAM(s) IV Intermittent every 12 hours    Vital Signs Last 24 Hrs  T(C): 36.4 (01 May 2021 08:29), Max: 36.9 (30 Apr 2021 21:33)  T(F): 97.6 (01 May 2021 08:29), Max: 98.4 (30 Apr 2021 21:33)  HR: 73 (01 May 2021 08:29) (68 - 91)  BP: 113/58 (01 May 2021 08:29) (111/51 - 120/52)  BP(mean): --  RR: 18 (01 May 2021 08:29) (18 - 18)  SpO2: 97% (01 May 2021 08:29) (97% - 97%)     Physical exam:    Constitutional:  No acute distress  HEENT: NC/AT, EOMI, PERRLA, conjunctivae clear  Neck: supple; thyroid not palpable  Back: no tenderness  Respiratory: respiratory effort normal; crackles at left base  left side chest tube  Cardiovascular: S1S2 regular, no murmurs  Abdomen: soft, not tender, not distended, positive BS  Genitourinary: no suprapubic tenderness  Lymphatic: no LN palpable  Musculoskeletal: no muscle tenderness, no joint swelling or tenderness  Extremities: + 2 peripheral pulses, 2+ edema   Neurological/ Psychiatric: AxOx3, judgement and insight normal; moving all extremities  Skin: Superficial skin laceration and excoriation of L elbow area (from recent fall)    Labs: reviewed                        10.0   5.47  )-----------( 190      ( 28 Apr 2021 08:07 )             31.1     04-28    139  |  109<H>  |  24<H>  ----------------------------<  138<H>  4.0   |  23  |  1.05    Ca    8.4<L>      28 Apr 2021 08:07                        10.3   5.22  )-----------( 198      ( 27 Apr 2021 07:44 )             31.7     04-27    142  |  111<H>  |  25<H>  ----------------------------<  134<H>  4.0   |  25  |  1.23    Ca    8.6      27 Apr 2021 07:44    TPro  5.7<L>  /  Alb  2.3<L>  /  TBili  0.3  /  DBili  x   /  AST  15  /  ALT  14  /  AlkPhos  72  04-26     LIVER FUNCTIONS - ( 26 Apr 2021 06:33 )  Alb: 2.3 g/dL / Pro: 5.7 gm/dL / ALK PHOS: 72 U/L / ALT: 14 U/L / AST: 15 U/L / GGT: x             Culture - Blood (collected 29 Apr 2021 08:30)  Source: .Blood None  Preliminary Report (30 Apr 2021 13:02):    No growth to date.    Culture - Blood (collected 29 Apr 2021 08:28)  Source: .Blood None  Preliminary Report (30 Apr 2021 13:02):    No growth to date.    Culture - Blood (collected 27 Apr 2021 11:58)  Source: .Blood None  Gram Stain (28 Apr 2021 09:03):    Growth in aerobic bottle: Gram Positive Cocci in Clusters  Final Report (29 Apr 2021 10:56):    Growth in aerobic bottle: Staphylococcus hominis    Coag Negative Staphylococcus    Single set isolate, possible contaminant. Contact    Microbiology if susceptibility testing clinically    indicated.    Culture - Blood (collected 27 Apr 2021 11:57)  Source: .Blood None  Preliminary Report (28 Apr 2021 16:01):    No growth to date.    Culture - Fungal, Body Fluid (collected 26 Apr 2021 14:30)  Source: .Body Fluid Pleural Fluid  Preliminary Report (27 Apr 2021 07:57):    Testing in progress    Culture - Body Fluid with Gram Stain (collected 26 Apr 2021 14:30)  Source: .Body Fluid Pleural Fluid  Gram Stain (27 Apr 2021 02:16):    polymorphonuclear leukocytes seen    No organisms seen    by cytocentrifuge  Preliminary Report (27 Apr 2021 17:28):    No growth    Culture - Blood (collected 25 Apr 2021 20:53)  Source: .Blood None  Gram Stain (27 Apr 2021 07:49):    Growth in anaerobic bottle: Gram Positive Cocci in Clusters  Final Report (28 Apr 2021 12:22):    Growth in anaerobic bottle: Staphylococcus hominis Coag Negative    Staphylococcus    Single set isolate, possible contaminant.   Organism: Blood Culture PCR (28 Apr 2021 12:22)      -  Coagulase negative Staphylococcus, Methicillin resistant: Detec      Method Type: PCR    Culture - Blood (collected 25 Apr 2021 20:53)  Source: .Blood None  Final Report (30 Apr 2021 23:01):    No Growth Final    Culture - Urine (collected 23 Apr 2021 19:19)  Source: .Urine None  Final Report (24 Apr 2021 22:38):    <10,000 CFU/mL Normal Urogenital Gerda        [facility] [place] [place] [facility](04-25 @ 20:53)    Radiology: all available radiological tests reviewed    < from: Xray Chest 1 View- PORTABLE-Urgent (Xray Chest 1 View- PORTABLE-Urgent .) (04.26.21 @ 15:23) >  IMPRESSION:  Smaller left effusion.  < end of copied text >      < from: CT Chest No Cont (04.25.21 @ 22:20) >  FINDINGS:  LUNGS AND AIRWAYS: Patent central airways.  Opacity at the posterior aspect of the left lower lobe along the major fissure, likely atelectasis and atelectasis at the posterior aspect of the right lower lobe. Also see below.  PLEURA: Moderate right pleural effusion, slightly decreased in size since4/23/2021 with compressive atelectasis in the left lower lobe.  MEDIASTINUM AND MARIA ELENA: No lymphadenopathy. Moderate hiatal hernia.  VESSELS: Thoracic aorta normal in caliber with mild calcified plaque. Coronary artery calcifications.  HEART: Heart size is normal. No pericardial effusion. Small amount of pericardial fluid.  CHEST WALL AND LOWER NECK: No enlarged axillary lymph nodes. 1.6 x 0.9 cm left thyroid lobe nodule.  VISUALIZED UPPER ABDOMEN: Cholecystectomy. Nonobstructing calculi in the left kidney. Fatty infiltration of the pancreas.  BONES: Compression fracture of L2 of indeterminate age, unchanged since 4/23/2021. Old left 10th rib fracture.  IMPRESSION:  Moderate left pleural effusion, slightly decreased in size since 4/23/2021 with compressive atelectasis in the left lower lobe.  < end of copied text >      Advanced directives addressed: full resuscitation

## 2021-05-02 LAB
CULTURE RESULTS: SIGNIFICANT CHANGE UP
SPECIMEN SOURCE: SIGNIFICANT CHANGE UP

## 2021-05-07 DIAGNOSIS — D64.9 ANEMIA, UNSPECIFIED: ICD-10-CM

## 2021-05-07 DIAGNOSIS — E78.5 HYPERLIPIDEMIA, UNSPECIFIED: ICD-10-CM

## 2021-05-07 DIAGNOSIS — N13.2 HYDRONEPHROSIS WITH RENAL AND URETERAL CALCULOUS OBSTRUCTION: ICD-10-CM

## 2021-05-07 DIAGNOSIS — R63.0 ANOREXIA: ICD-10-CM

## 2021-05-07 DIAGNOSIS — R78.81 BACTEREMIA: ICD-10-CM

## 2021-05-07 DIAGNOSIS — J96.00 ACUTE RESPIRATORY FAILURE, UNSPECIFIED WHETHER WITH HYPOXIA OR HYPERCAPNIA: ICD-10-CM

## 2021-05-07 DIAGNOSIS — N18.30 CHRONIC KIDNEY DISEASE, STAGE 3 UNSPECIFIED: ICD-10-CM

## 2021-05-07 DIAGNOSIS — I12.9 HYPERTENSIVE CHRONIC KIDNEY DISEASE WITH STAGE 1 THROUGH STAGE 4 CHRONIC KIDNEY DISEASE, OR UNSPECIFIED CHRONIC KIDNEY DISEASE: ICD-10-CM

## 2021-05-07 DIAGNOSIS — Z87.442 PERSONAL HISTORY OF URINARY CALCULI: ICD-10-CM

## 2021-05-07 DIAGNOSIS — Z79.01 LONG TERM (CURRENT) USE OF ANTICOAGULANTS: ICD-10-CM

## 2021-05-07 DIAGNOSIS — E46 UNSPECIFIED PROTEIN-CALORIE MALNUTRITION: ICD-10-CM

## 2021-05-07 DIAGNOSIS — J90 PLEURAL EFFUSION, NOT ELSEWHERE CLASSIFIED: ICD-10-CM

## 2021-05-07 DIAGNOSIS — Z79.4 LONG TERM (CURRENT) USE OF INSULIN: ICD-10-CM

## 2021-05-07 DIAGNOSIS — B95.62 METHICILLIN RESISTANT STAPHYLOCOCCUS AUREUS INFECTION AS THE CAUSE OF DISEASES CLASSIFIED ELSEWHERE: ICD-10-CM

## 2021-05-07 DIAGNOSIS — Z88.5 ALLERGY STATUS TO NARCOTIC AGENT: ICD-10-CM

## 2021-05-07 DIAGNOSIS — Z87.81 PERSONAL HISTORY OF (HEALED) TRAUMATIC FRACTURE: ICD-10-CM

## 2021-05-07 DIAGNOSIS — M48.56XA COLLAPSED VERTEBRA, NOT ELSEWHERE CLASSIFIED, LUMBAR REGION, INITIAL ENCOUNTER FOR FRACTURE: ICD-10-CM

## 2021-05-07 DIAGNOSIS — N17.9 ACUTE KIDNEY FAILURE, UNSPECIFIED: ICD-10-CM

## 2021-05-07 DIAGNOSIS — E11.9 TYPE 2 DIABETES MELLITUS WITHOUT COMPLICATIONS: ICD-10-CM

## 2021-05-07 DIAGNOSIS — J98.11 ATELECTASIS: ICD-10-CM

## 2021-05-13 ENCOUNTER — INPATIENT (INPATIENT)
Facility: HOSPITAL | Age: 86
LOS: 7 days | Discharge: HOME HEALTH | DRG: 824 | End: 2021-05-21
Attending: STUDENT IN AN ORGANIZED HEALTH CARE EDUCATION/TRAINING PROGRAM | Admitting: INTERNAL MEDICINE
Payer: MEDICARE

## 2021-05-13 VITALS
DIASTOLIC BLOOD PRESSURE: 87 MMHG | WEIGHT: 199.96 LBS | HEART RATE: 68 BPM | RESPIRATION RATE: 15 BRPM | SYSTOLIC BLOOD PRESSURE: 144 MMHG | TEMPERATURE: 98 F | OXYGEN SATURATION: 98 % | HEIGHT: 72 IN

## 2021-05-13 DIAGNOSIS — Z87.81 PERSONAL HISTORY OF (HEALED) TRAUMATIC FRACTURE: Chronic | ICD-10-CM

## 2021-05-13 DIAGNOSIS — R19.8 OTHER SPECIFIED SYMPTOMS AND SIGNS INVOLVING THE DIGESTIVE SYSTEM AND ABDOMEN: Chronic | ICD-10-CM

## 2021-05-13 DIAGNOSIS — Z98.890 OTHER SPECIFIED POSTPROCEDURAL STATES: Chronic | ICD-10-CM

## 2021-05-13 LAB
APPEARANCE UR: CLEAR — SIGNIFICANT CHANGE UP
APTT BLD: 30.5 SEC — SIGNIFICANT CHANGE UP (ref 27.5–35.5)
BASOPHILS # BLD AUTO: 0.03 K/UL — SIGNIFICANT CHANGE UP (ref 0–0.2)
BASOPHILS NFR BLD AUTO: 0.5 % — SIGNIFICANT CHANGE UP (ref 0–2)
BILIRUB UR-MCNC: NEGATIVE — SIGNIFICANT CHANGE UP
COLOR SPEC: YELLOW — SIGNIFICANT CHANGE UP
DIFF PNL FLD: ABNORMAL
EOSINOPHIL # BLD AUTO: 0.09 K/UL — SIGNIFICANT CHANGE UP (ref 0–0.5)
EOSINOPHIL NFR BLD AUTO: 1.5 % — SIGNIFICANT CHANGE UP (ref 0–6)
GLUCOSE UR QL: 1000 MG/DL
HCT VFR BLD CALC: 34 % — LOW (ref 39–50)
HGB BLD-MCNC: 11.1 G/DL — LOW (ref 13–17)
IMM GRANULOCYTES NFR BLD AUTO: 0.3 % — SIGNIFICANT CHANGE UP (ref 0–1.5)
INR BLD: 1.03 RATIO — SIGNIFICANT CHANGE UP (ref 0.88–1.16)
KETONES UR-MCNC: ABNORMAL
LACTATE SERPL-SCNC: 2.4 MMOL/L — HIGH (ref 0.7–2)
LEUKOCYTE ESTERASE UR-ACNC: NEGATIVE — SIGNIFICANT CHANGE UP
LYMPHOCYTES # BLD AUTO: 0.56 K/UL — LOW (ref 1–3.3)
LYMPHOCYTES # BLD AUTO: 9.4 % — LOW (ref 13–44)
MCHC RBC-ENTMCNC: 30.9 PG — SIGNIFICANT CHANGE UP (ref 27–34)
MCHC RBC-ENTMCNC: 32.6 GM/DL — SIGNIFICANT CHANGE UP (ref 32–36)
MCV RBC AUTO: 94.7 FL — SIGNIFICANT CHANGE UP (ref 80–100)
MONOCYTES # BLD AUTO: 0.59 K/UL — SIGNIFICANT CHANGE UP (ref 0–0.9)
MONOCYTES NFR BLD AUTO: 9.9 % — SIGNIFICANT CHANGE UP (ref 2–14)
NEUTROPHILS # BLD AUTO: 4.67 K/UL — SIGNIFICANT CHANGE UP (ref 1.8–7.4)
NEUTROPHILS NFR BLD AUTO: 78.4 % — HIGH (ref 43–77)
NITRITE UR-MCNC: NEGATIVE — SIGNIFICANT CHANGE UP
PH UR: 5 — SIGNIFICANT CHANGE UP (ref 5–8)
PLATELET # BLD AUTO: 254 K/UL — SIGNIFICANT CHANGE UP (ref 150–400)
PROT UR-MCNC: 15 MG/DL
PROTHROM AB SERPL-ACNC: 12 SEC — SIGNIFICANT CHANGE UP (ref 10.6–13.6)
RAPID RVP RESULT: SIGNIFICANT CHANGE UP
RBC # BLD: 3.59 M/UL — LOW (ref 4.2–5.8)
RBC # FLD: 14.2 % — SIGNIFICANT CHANGE UP (ref 10.3–14.5)
SARS-COV-2 RNA SPEC QL NAA+PROBE: SIGNIFICANT CHANGE UP
SP GR SPEC: 1.01 — SIGNIFICANT CHANGE UP (ref 1.01–1.02)
TROPONIN I SERPL-MCNC: <0.015 NG/ML — SIGNIFICANT CHANGE UP (ref 0.01–0.04)
UROBILINOGEN FLD QL: NEGATIVE MG/DL — SIGNIFICANT CHANGE UP
WBC # BLD: 5.96 K/UL — SIGNIFICANT CHANGE UP (ref 3.8–10.5)
WBC # FLD AUTO: 5.96 K/UL — SIGNIFICANT CHANGE UP (ref 3.8–10.5)

## 2021-05-13 PROCEDURE — 73090 X-RAY EXAM OF FOREARM: CPT | Mod: 26,RT

## 2021-05-13 PROCEDURE — 71250 CT THORAX DX C-: CPT | Mod: 26,ME

## 2021-05-13 PROCEDURE — 72125 CT NECK SPINE W/O DYE: CPT | Mod: 26,MD

## 2021-05-13 PROCEDURE — G1004: CPT

## 2021-05-13 PROCEDURE — 74176 CT ABD & PELVIS W/O CONTRAST: CPT | Mod: 26,ME

## 2021-05-13 PROCEDURE — 70450 CT HEAD/BRAIN W/O DYE: CPT | Mod: 26,MD

## 2021-05-13 RX ORDER — INSULIN GLARGINE 100 [IU]/ML
13 INJECTION, SOLUTION SUBCUTANEOUS
Qty: 0 | Refills: 0 | DISCHARGE

## 2021-05-13 RX ORDER — SODIUM CHLORIDE 9 MG/ML
500 INJECTION INTRAMUSCULAR; INTRAVENOUS; SUBCUTANEOUS ONCE
Refills: 0 | Status: COMPLETED | OUTPATIENT
Start: 2021-05-13 | End: 2021-05-13

## 2021-05-13 RX ADMIN — SODIUM CHLORIDE 500 MILLILITER(S): 9 INJECTION INTRAMUSCULAR; INTRAVENOUS; SUBCUTANEOUS at 19:00

## 2021-05-13 RX ADMIN — SODIUM CHLORIDE 500 MILLILITER(S): 9 INJECTION INTRAMUSCULAR; INTRAVENOUS; SUBCUTANEOUS at 22:20

## 2021-05-13 RX ADMIN — SODIUM CHLORIDE 500 MILLILITER(S): 9 INJECTION INTRAMUSCULAR; INTRAVENOUS; SUBCUTANEOUS at 16:19

## 2021-05-13 RX ADMIN — SODIUM CHLORIDE 500 MILLILITER(S): 9 INJECTION INTRAMUSCULAR; INTRAVENOUS; SUBCUTANEOUS at 23:20

## 2021-05-13 NOTE — ED PROVIDER NOTE - CARE PLAN
Principal Discharge DX:	Fall, initial encounter  Secondary Diagnosis:	Syncope, unspecified syncope type   Principal Discharge DX:	Fall, initial encounter  Secondary Diagnosis:	Syncope, unspecified syncope type  Secondary Diagnosis:	Abdominal mass, unspecified abdominal location   Principal Discharge DX:	Inability to ambulate due to multiple joints  Secondary Diagnosis:	Syncope, unspecified syncope type  Secondary Diagnosis:	Abdominal mass, unspecified abdominal location  Secondary Diagnosis:	Fall, initial encounter

## 2021-05-13 NOTE — ED PROVIDER NOTE - NS_ ATTENDINGSCRIBEDETAILS _ED_A_ED_FT
I Teofilo Edwards MD saw and examined the patient. Scribe documented for me and under my supervision. I have modified the scribe's documentation where necessary to reflect my history, physical exam and other relevant documentations pertinent to the care of the patient.

## 2021-05-13 NOTE — ED ADULT NURSE NOTE - NSIMPLEMENTINTERV_GEN_ALL_ED
Implemented All Fall with Harm Risk Interventions:  [place] to call system. Call bell, personal items and telephone within reach. Instruct patient to call for assistance. Room bathroom lighting operational. Non-slip footwear when patient is off stretcher. Physically safe environment: no spills, clutter or unnecessary equipment. Stretcher in lowest position, wheels locked, appropriate side rails in place. Provide visual cue, wrist band, yellow gown, etc. Monitor gait and stability. Monitor for mental status changes and reorient to person, place, and time. Review medications for side effects contributing to fall risk. Reinforce activity limits and safety measures with patient and family. Provide visual clues: red socks.

## 2021-05-13 NOTE — ED PROVIDER NOTE - PSH
Endoscopy finding  choked on piece of chiken-had upper endo with clearance of food  H/O left inguinal hernia repair    History of tibial fracture  R tibial fracture s/p revision with hardware placement ~3-4yrs ago, per pt

## 2021-05-13 NOTE — ED PROVIDER NOTE - OBJECTIVE STATEMENT
89 y/o male with a PMHx of T2DM, HTN, hiatal hernia, chronic left pleural effusion, CKD, presents to the ED BIBA [facility] s/p unwitnessed fall. Pt states he was ambulating in his room and turned around too fast and tripped and fell. Pt was unable to get up on his own, and needed assistance. Pt with abrasion to R ear. On Eliquis. No cp, sob, palpitation, abd pain, neck pain, lightheadedness/ dizziness, weakness, fatigue.   PMD: Dr. Dumas  Cardio: Dr. Granger 89 y/o male with a PMHx of T2DM, HTN, hiatal hernia, chronic left pleural effusion, CKD, presents to the ED BIBA [facility] s/p unwitnessed fall. Pt states he was ambulating in his room and turned around too fast and tripped and fell. Pt was unable to get up on his own, and needed assistance. Pt with abrasion to R ear. On Eliquis. No cp, sob, palpitation, abd pain, neck pain, lightheadedness/ dizziness, saddle anesthesia, weakness, fatigue.   PMD: Dr. Dumas  Cardio: Dr. Granger

## 2021-05-13 NOTE — ED PROVIDER NOTE - SKIN, MLM
Skin normal color for race, warm, dry, +abrasion on tragus of R ear Skin normal color for race, warm, dry, +abrasion on tragus of R ear, +2 puncture wound to R forearm about .3 cm in length, Skin normal color for race, warm, dry, +abrasion on helix of R ear, +2 puncture wound to R forearm about .3 cm in length.

## 2021-05-13 NOTE — ED PROVIDER NOTE - CHPI ED SYMPTOMS NEG
no chest pain, no shortness of breath, no palpitations, no abdominal pain, no neck pain, no lightheadedness/weakness, no fatigue/no loss of consciousness/no vomiting/no weakness

## 2021-05-13 NOTE — ED PROVIDER NOTE - CONSTITUTIONAL, MLM
Well appearing, awake, alert, oriented to person, place, time/situation and in no apparent distress. normal... Well appearing, awake, alert, oriented to person, place, time/situation and in no apparent distress. non toxic. Well appearing, awake, alert, oriented to person, place, time/situation and in no apparent distress. non toxic appearing.

## 2021-05-13 NOTE — ED ADULT TRIAGE NOTE - CHIEF COMPLAINT QUOTE
patient had unwitnessed fall, states he lost his footing. injury to ear and arm. on eliquis daily. neuro alert initiated.

## 2021-05-13 NOTE — ED ADULT NURSE REASSESSMENT NOTE - NS ED NURSE REASSESS COMMENT FT1
care assumed from previous RN. pt is A&O x4, resting in bed watching TV. c/o R arm soreness. denies any other pain. updated on plan of care. side rails, call bell within reach.

## 2021-05-13 NOTE — ED PROVIDER NOTE - SECONDARY DIAGNOSIS.
Syncope, unspecified syncope type Abdominal mass, unspecified abdominal location Fall, initial encounter

## 2021-05-13 NOTE — ED PROVIDER NOTE - MUSCULOSKELETAL, MLM
Spine appears normal, range of motion is not limited,  b/l LE swelling moderate to severe as per pt it is his baseline. +chronic venous changes in both legs also baseline, +multiple old contusions on arm and forearm likely because of anticoagulation. Spine appears normal, range of motion is not limited, +TTP of R forearm able to flex the wrist, able to flex elbow. b/l LE swelling moderate to severe as per pt it is his baseline. +chronic venous changes in both legs also baseline, +multiple old contusions on arm and forearm likely because of anticoagulation. Spine appears normal, range of motion is not limited, +TTP of R forearm able to flex the wrist, able to flex elbow. no visible deformity. No laxity of elbow, wrist, or any joints in hand. b/l LE swelling moderate to severe as per pt it is his baseline. +chronic venous changes/ discoloration in both legs also baseline, +multiple old contusions on arm and forearm likely because of anticoagulation/possibly secondary to minor trauma.

## 2021-05-13 NOTE — ED PROVIDER NOTE - CLINICAL SUMMARY MEDICAL DECISION MAKING FREE TEXT BOX
pt states he fell down and attributes to turning too fast, denies cp, sob, palpitations, lightheadedness, unable to get up from position on ground and contacted emergency service after 20minutes and brought here. no complaints currently. due to age and fall inability to get up ct head chest abd non contrast, will administrate w/u with basic blood work, urine, and contact family pt states he fell down and attributes to turning too fast, denies cp, sob, palpitations, lightheadedness, unable to get up from position on ground and contacted emergency service after 20minutes and brought here. no complaints currently. due to age and fall inability to get up ct head chest abd non contrast, will administrate w/u with basic blood work, urine, and contact family for further decision making. pt states he fell down and attributes to turning too fast, denies cp, sob, palpitations, lightheadedness, unable to get up from position on ground and contacted emergency service after 20minutes and brought here. no complaints currently. due to age and fall inability to get up ct head chest abd non contrast, will administrate w/u with basic blood work, urine, and contact family for further decision making. also x ray of R forearm pt states he fell down and attributes to turning too fast, denies cp, sob, palpitations, lightheadedness, unable to get up from position on ground and contacted emergency service after 20minutes and brought here. no complaints currently. due to age and fall inability to get up ct head chest abd non contrast, will administrate w/u with basic blood work, urine, and contact family for further decision making. also x ray of R forearm    Jerry CALLAHAN: Patient is unable to ambulate, multiple medical co morbidities, will admit for further care.

## 2021-05-13 NOTE — ED ADULT NURSE NOTE - OBJECTIVE STATEMENT
pt arrives to ED s/p unwitnessed mechanical trip and fall. pt states he lost his footing. hit head, denies LOC. Takes eliquis daily. alert and oriented x 4.

## 2021-05-13 NOTE — ED PROVIDER NOTE - PROGRESS NOTE DETAILS
Jerry CALLAHAN: Discussed the results of the labs and imaging with patient in detail. Patient still unable to ambulate on his own, multiple co morbidities, will admit for further inpatient evaluation and care potential PT. Hospitalist admission of patient is appreciated.

## 2021-05-13 NOTE — ED PROVIDER NOTE - NEUROLOGICAL, MLM
Alert and oriented, no focal deficits, no motor or sensory deficits. Alert and oriented, no focal deficits, no motor or sensory deficits. b/l 2+ dp and radial pulse, nih 0 gcs 15 Alert and oriented, no focal deficits, no motor or sensory deficits. b/l 2+ dp and radial pulse, NIH=0 GCS=15

## 2021-05-13 NOTE — ED ADULT NURSE NOTE - NS ED NOTE  TALK SOMEONE YN
Continue Regimen: fluorouracil 5%
Plan: Treat areas noted on exam today with 5 FU in the fall/winter
Detail Level: Zone
No

## 2021-05-14 ENCOUNTER — TRANSCRIPTION ENCOUNTER (OUTPATIENT)
Age: 86
End: 2021-05-14
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
## 2021-05-14 LAB
CULTURE RESULTS: SIGNIFICANT CHANGE UP
ERYTHROCYTE [SEDIMENTATION RATE] IN BLOOD: 28 MM/HR — HIGH (ref 0–20)
LACTATE SERPL-SCNC: 1 MMOL/L — SIGNIFICANT CHANGE UP (ref 0.7–2)
LDH SERPL L TO P-CCNC: 160 U/L — SIGNIFICANT CHANGE UP (ref 84–241)
SPECIMEN SOURCE: SIGNIFICANT CHANGE UP

## 2021-05-14 PROCEDURE — 71045 X-RAY EXAM CHEST 1 VIEW: CPT

## 2021-05-14 PROCEDURE — 89051 BODY FLUID CELL COUNT: CPT

## 2021-05-14 PROCEDURE — 88341 IMHCHEM/IMCYTCHM EA ADD ANTB: CPT

## 2021-05-14 PROCEDURE — 97530 THERAPEUTIC ACTIVITIES: CPT | Mod: GP

## 2021-05-14 PROCEDURE — 86901 BLOOD TYPING SEROLOGIC RH(D): CPT

## 2021-05-14 PROCEDURE — 83735 ASSAY OF MAGNESIUM: CPT

## 2021-05-14 PROCEDURE — 88333 PATH CONSLTJ SURG CYTO XM 1: CPT

## 2021-05-14 PROCEDURE — C1889: CPT

## 2021-05-14 PROCEDURE — 88108 CYTOPATH CONCENTRATE TECH: CPT

## 2021-05-14 PROCEDURE — 82042 OTHER SOURCE ALBUMIN QUAN EA: CPT

## 2021-05-14 PROCEDURE — 36415 COLL VENOUS BLD VENIPUNCTURE: CPT

## 2021-05-14 PROCEDURE — 88185 FLOWCYTOMETRY/TC ADD-ON: CPT

## 2021-05-14 PROCEDURE — 76380 CAT SCAN FOLLOW-UP STUDY: CPT

## 2021-05-14 PROCEDURE — 84100 ASSAY OF PHOSPHORUS: CPT

## 2021-05-14 PROCEDURE — 85027 COMPLETE CBC AUTOMATED: CPT

## 2021-05-14 PROCEDURE — C9399: CPT

## 2021-05-14 PROCEDURE — 82962 GLUCOSE BLOOD TEST: CPT

## 2021-05-14 PROCEDURE — 86850 RBC ANTIBODY SCREEN: CPT

## 2021-05-14 PROCEDURE — 32555 ASPIRATE PLEURA W/ IMAGING: CPT | Mod: LT

## 2021-05-14 PROCEDURE — 86304 IMMUNOASSAY TUMOR CA 125: CPT

## 2021-05-14 PROCEDURE — 84157 ASSAY OF PROTEIN OTHER: CPT

## 2021-05-14 PROCEDURE — 87075 CULTR BACTERIA EXCEPT BLOOD: CPT

## 2021-05-14 PROCEDURE — 86769 SARS-COV-2 COVID-19 ANTIBODY: CPT

## 2021-05-14 PROCEDURE — 86900 BLOOD TYPING SEROLOGIC ABO: CPT

## 2021-05-14 PROCEDURE — 80048 BASIC METABOLIC PNL TOTAL CA: CPT

## 2021-05-14 PROCEDURE — 88305 TISSUE EXAM BY PATHOLOGIST: CPT

## 2021-05-14 PROCEDURE — U0005: CPT

## 2021-05-14 PROCEDURE — 88184 FLOWCYTOMETRY/ TC 1 MARKER: CPT

## 2021-05-14 PROCEDURE — U0003: CPT

## 2021-05-14 PROCEDURE — 82607 VITAMIN B-12: CPT

## 2021-05-14 PROCEDURE — 83550 IRON BINDING TEST: CPT

## 2021-05-14 PROCEDURE — 83540 ASSAY OF IRON: CPT

## 2021-05-14 PROCEDURE — 88323 CONSLTJ&REPRT MATRL PREP SLD: CPT

## 2021-05-14 PROCEDURE — 86301 IMMUNOASSAY TUMOR CA 19-9: CPT

## 2021-05-14 PROCEDURE — 88360 TUMOR IMMUNOHISTOCHEM/MANUAL: CPT

## 2021-05-14 PROCEDURE — 97116 GAIT TRAINING THERAPY: CPT | Mod: GP

## 2021-05-14 PROCEDURE — 87102 FUNGUS ISOLATION CULTURE: CPT

## 2021-05-14 PROCEDURE — 82746 ASSAY OF FOLIC ACID SERUM: CPT

## 2021-05-14 PROCEDURE — 93306 TTE W/DOPPLER COMPLETE: CPT

## 2021-05-14 PROCEDURE — 83880 ASSAY OF NATRIURETIC PEPTIDE: CPT

## 2021-05-14 PROCEDURE — 87635 SARS-COV-2 COVID-19 AMP PRB: CPT

## 2021-05-14 PROCEDURE — 83605 ASSAY OF LACTIC ACID: CPT

## 2021-05-14 PROCEDURE — 80053 COMPREHEN METABOLIC PANEL: CPT

## 2021-05-14 PROCEDURE — 85652 RBC SED RATE AUTOMATED: CPT

## 2021-05-14 PROCEDURE — 83615 LACTATE (LD) (LDH) ENZYME: CPT

## 2021-05-14 PROCEDURE — 82378 CARCINOEMBRYONIC ANTIGEN: CPT

## 2021-05-14 PROCEDURE — 99222 1ST HOSP IP/OBS MODERATE 55: CPT

## 2021-05-14 PROCEDURE — 93970 EXTREMITY STUDY: CPT

## 2021-05-14 PROCEDURE — 83986 ASSAY PH BODY FLUID NOS: CPT

## 2021-05-14 PROCEDURE — 82945 GLUCOSE OTHER FLUID: CPT

## 2021-05-14 PROCEDURE — 87070 CULTURE OTHR SPECIMN AEROBIC: CPT

## 2021-05-14 RX ORDER — INSULIN LISPRO 100/ML
VIAL (ML) SUBCUTANEOUS
Refills: 0 | Status: DISCONTINUED | OUTPATIENT
Start: 2021-05-14 | End: 2021-05-20

## 2021-05-14 RX ORDER — TAMSULOSIN HYDROCHLORIDE 0.4 MG/1
0.4 CAPSULE ORAL AT BEDTIME
Refills: 0 | Status: DISCONTINUED | OUTPATIENT
Start: 2021-05-14 | End: 2021-05-20

## 2021-05-14 RX ORDER — INSULIN GLARGINE 100 [IU]/ML
10 INJECTION, SOLUTION SUBCUTANEOUS AT BEDTIME
Refills: 0 | Status: DISCONTINUED | OUTPATIENT
Start: 2021-05-14 | End: 2021-05-20

## 2021-05-14 RX ORDER — TRAMADOL HYDROCHLORIDE 50 MG/1
50 TABLET ORAL DAILY
Refills: 0 | Status: DISCONTINUED | OUTPATIENT
Start: 2021-05-14 | End: 2021-05-20

## 2021-05-14 RX ORDER — DEXTROSE 50 % IN WATER 50 %
12.5 SYRINGE (ML) INTRAVENOUS ONCE
Refills: 0 | Status: DISCONTINUED | OUTPATIENT
Start: 2021-05-14 | End: 2021-05-20

## 2021-05-14 RX ORDER — SODIUM CHLORIDE 9 MG/ML
1000 INJECTION, SOLUTION INTRAVENOUS
Refills: 0 | Status: DISCONTINUED | OUTPATIENT
Start: 2021-05-14 | End: 2021-05-20

## 2021-05-14 RX ORDER — DEXTROSE 50 % IN WATER 50 %
25 SYRINGE (ML) INTRAVENOUS ONCE
Refills: 0 | Status: DISCONTINUED | OUTPATIENT
Start: 2021-05-14 | End: 2021-05-20

## 2021-05-14 RX ORDER — METOPROLOL TARTRATE 50 MG
25 TABLET ORAL DAILY
Refills: 0 | Status: DISCONTINUED | OUTPATIENT
Start: 2021-05-14 | End: 2021-05-20

## 2021-05-14 RX ORDER — PANTOPRAZOLE SODIUM 20 MG/1
40 TABLET, DELAYED RELEASE ORAL
Refills: 0 | Status: DISCONTINUED | OUTPATIENT
Start: 2021-05-14 | End: 2021-05-20

## 2021-05-14 RX ORDER — VALSARTAN 80 MG/1
160 TABLET ORAL DAILY
Refills: 0 | Status: DISCONTINUED | OUTPATIENT
Start: 2021-05-14 | End: 2021-05-20

## 2021-05-14 RX ORDER — GLUCAGON INJECTION, SOLUTION 0.5 MG/.1ML
1 INJECTION, SOLUTION SUBCUTANEOUS ONCE
Refills: 0 | Status: DISCONTINUED | OUTPATIENT
Start: 2021-05-14 | End: 2021-05-20

## 2021-05-14 RX ORDER — LANOLIN ALCOHOL/MO/W.PET/CERES
5 CREAM (GRAM) TOPICAL AT BEDTIME
Refills: 0 | Status: DISCONTINUED | OUTPATIENT
Start: 2021-05-14 | End: 2021-05-20

## 2021-05-14 RX ORDER — APIXABAN 2.5 MG/1
5 TABLET, FILM COATED ORAL
Refills: 0 | Status: DISCONTINUED | OUTPATIENT
Start: 2021-05-14 | End: 2021-05-14

## 2021-05-14 RX ORDER — ACETAMINOPHEN 500 MG
650 TABLET ORAL EVERY 6 HOURS
Refills: 0 | Status: DISCONTINUED | OUTPATIENT
Start: 2021-05-14 | End: 2021-05-20

## 2021-05-14 RX ORDER — SODIUM CHLORIDE 9 MG/ML
1000 INJECTION INTRAMUSCULAR; INTRAVENOUS; SUBCUTANEOUS
Refills: 0 | Status: DISCONTINUED | OUTPATIENT
Start: 2021-05-14 | End: 2021-05-20

## 2021-05-14 RX ORDER — FINASTERIDE 5 MG/1
5 TABLET, FILM COATED ORAL DAILY
Refills: 0 | Status: DISCONTINUED | OUTPATIENT
Start: 2021-05-14 | End: 2021-05-20

## 2021-05-14 RX ORDER — DEXTROSE 50 % IN WATER 50 %
15 SYRINGE (ML) INTRAVENOUS ONCE
Refills: 0 | Status: DISCONTINUED | OUTPATIENT
Start: 2021-05-14 | End: 2021-05-20

## 2021-05-14 RX ADMIN — TRAMADOL HYDROCHLORIDE 50 MILLIGRAM(S): 50 TABLET ORAL at 10:31

## 2021-05-14 RX ADMIN — Medication 6: at 07:58

## 2021-05-14 RX ADMIN — Medication 2: at 17:18

## 2021-05-14 RX ADMIN — SODIUM CHLORIDE 75 MILLILITER(S): 9 INJECTION INTRAMUSCULAR; INTRAVENOUS; SUBCUTANEOUS at 06:02

## 2021-05-14 RX ADMIN — TAMSULOSIN HYDROCHLORIDE 0.4 MILLIGRAM(S): 0.4 CAPSULE ORAL at 21:04

## 2021-05-14 RX ADMIN — Medication 2: at 13:00

## 2021-05-14 RX ADMIN — FINASTERIDE 5 MILLIGRAM(S): 5 TABLET, FILM COATED ORAL at 10:32

## 2021-05-14 RX ADMIN — Medication 5 MILLIGRAM(S): at 21:04

## 2021-05-14 RX ADMIN — INSULIN GLARGINE 10 UNIT(S): 100 INJECTION, SOLUTION SUBCUTANEOUS at 21:03

## 2021-05-14 RX ADMIN — Medication 25 MILLIGRAM(S): at 10:32

## 2021-05-14 RX ADMIN — VALSARTAN 160 MILLIGRAM(S): 80 TABLET ORAL at 10:31

## 2021-05-14 RX ADMIN — APIXABAN 5 MILLIGRAM(S): 2.5 TABLET, FILM COATED ORAL at 10:38

## 2021-05-14 RX ADMIN — PANTOPRAZOLE SODIUM 40 MILLIGRAM(S): 20 TABLET, DELAYED RELEASE ORAL at 06:01

## 2021-05-14 NOTE — ED ADULT NURSE REASSESSMENT NOTE - NS ED NURSE REASSESS COMMENT FT1
provided sandwich and juice. resting in bed, comfortable appearance. side rails up and call bell within reach. awaiting inpatient bed.

## 2021-05-14 NOTE — DISCHARGE NOTE NURSING/CASE MANAGEMENT/SOCIAL WORK - NSDCFUADDAPPT_GEN_ALL_CORE_FT
Follow Up with Dr Palla 6/1/21 Tuesday @ 2:45PM. Follow Up with Dr Palla 6/1/21 Tuesday @ 2:45PM.  Follow Up with Dr Rivera's NP 5/27/21 Thursday @ 12:45PM.

## 2021-05-14 NOTE — H&P ADULT - NSHPPHYSICALEXAM_GEN_ALL_CORE
Vital Signs Last 24 Hrs  T(C): 36.6 (14 May 2021 00:15), Max: 36.8 (13 May 2021 18:36)  T(F): 97.8 (14 May 2021 00:15), Max: 98.2 (13 May 2021 18:36)  HR: 67 (14 May 2021 00:15) (67 - 68)  BP: 149/67 (14 May 2021 00:15) (140/66 - 149/67)  BP(mean): 94 (14 May 2021 00:15) (94 - 94)  RR: 16 (14 May 2021 00:15) (15 - 16)  SpO2: 98% (14 May 2021 00:15) (97% - 98%)

## 2021-05-14 NOTE — H&P ADULT - BIRTH SEX
Male

## 2021-05-14 NOTE — DISCHARGE NOTE NURSING/CASE MANAGEMENT/SOCIAL WORK - PATIENT PORTAL LINK FT
You can access the FollowMyHealth Patient Portal offered by [facility] by registering at the following website: [email or web address]. By joining [employer]’s FollowMyHealth portal, you will also be able to view your health information using other applications (apps) compatible with our system.

## 2021-05-14 NOTE — H&P ADULT - ASSESSMENT
A/P:    1.  Fall  ?Syncope  -Unwitnessed fall  -will follow clinically in telemetry  -keep on fall precaution  -follow PT eval    2.  Left Pleural Effusion  -no resp distress  -give oxygen as needed  -follow Pulmonary eval    3.  Abdominal mass  -unsure etiology  -follow Oncology consult    4.  Elevated Lactate  -on IVF  -follow lactate level     5.  DM  -on lantus and ISS  -follow Dxt    6.  h/o A fib  -HR controlled  -on Eliquis    7.  DVT ppx: On Eliquis    8.  Code Status: Patient is in Full code status. Goals of care discussed.

## 2021-05-14 NOTE — H&P ADULT - HISTORY OF PRESENT ILLNESS
89 y/o Male with a PMHx of T2DM, HTN, hiatal hernia, chronic left pleural effusion, CKD, presents to the ED BIBA [facility] after an unwitnessed fall. Patient states he was ambulating in his room and turned around too fast and tripped and fell. Patient was unable to get up on his own, and needed assistance. Patient with abrasion to R ear. No chest pain, sob, palpitation, abd pain, neck pain, lightheadedness/ dizziness, weakness, fatigue. Patient denies any loss of consciousness. He remember the whole episode clearly. No seizure like activity.

## 2021-05-15 LAB
ANION GAP SERPL CALC-SCNC: 6 MMOL/L — SIGNIFICANT CHANGE UP (ref 5–17)
BUN SERPL-MCNC: 19 MG/DL — SIGNIFICANT CHANGE UP (ref 7–23)
CALCIUM SERPL-MCNC: 8.5 MG/DL — SIGNIFICANT CHANGE UP (ref 8.5–10.1)
CHLORIDE SERPL-SCNC: 112 MMOL/L — HIGH (ref 96–108)
CO2 SERPL-SCNC: 22 MMOL/L — SIGNIFICANT CHANGE UP (ref 22–31)
CREAT SERPL-MCNC: 0.99 MG/DL — SIGNIFICANT CHANGE UP (ref 0.5–1.3)
FOLATE SERPL-MCNC: 10.3 NG/ML — SIGNIFICANT CHANGE UP
GLUCOSE SERPL-MCNC: 187 MG/DL — HIGH (ref 70–99)
HCT VFR BLD CALC: 30.4 % — LOW (ref 39–50)
HGB BLD-MCNC: 9.8 G/DL — LOW (ref 13–17)
IRON SATN MFR SERPL: 23 % — SIGNIFICANT CHANGE UP (ref 16–55)
IRON SATN MFR SERPL: 52 UG/DL — SIGNIFICANT CHANGE UP (ref 45–165)
LDH SERPL L TO P-CCNC: 148 U/L — SIGNIFICANT CHANGE UP (ref 84–241)
MCHC RBC-ENTMCNC: 30.6 PG — SIGNIFICANT CHANGE UP (ref 27–34)
MCHC RBC-ENTMCNC: 32.2 GM/DL — SIGNIFICANT CHANGE UP (ref 32–36)
MCV RBC AUTO: 95 FL — SIGNIFICANT CHANGE UP (ref 80–100)
PLATELET # BLD AUTO: 204 K/UL — SIGNIFICANT CHANGE UP (ref 150–400)
POTASSIUM SERPL-MCNC: 3.9 MMOL/L — SIGNIFICANT CHANGE UP (ref 3.5–5.3)
POTASSIUM SERPL-SCNC: 3.9 MMOL/L — SIGNIFICANT CHANGE UP (ref 3.5–5.3)
RBC # BLD: 3.2 M/UL — LOW (ref 4.2–5.8)
RBC # FLD: 14.4 % — SIGNIFICANT CHANGE UP (ref 10.3–14.5)
SODIUM SERPL-SCNC: 140 MMOL/L — SIGNIFICANT CHANGE UP (ref 135–145)
TIBC SERPL-MCNC: 225 UG/DL — SIGNIFICANT CHANGE UP (ref 220–430)
UIBC SERPL-MCNC: 174 UG/DL — SIGNIFICANT CHANGE UP (ref 110–370)
VIT B12 SERPL-MCNC: 292 PG/ML — SIGNIFICANT CHANGE UP (ref 232–1245)
WBC # BLD: 4.87 K/UL — SIGNIFICANT CHANGE UP (ref 3.8–10.5)
WBC # FLD AUTO: 4.87 K/UL — SIGNIFICANT CHANGE UP (ref 3.8–10.5)

## 2021-05-15 PROCEDURE — 99232 SBSQ HOSP IP/OBS MODERATE 35: CPT

## 2021-05-15 RX ADMIN — TRAMADOL HYDROCHLORIDE 50 MILLIGRAM(S): 50 TABLET ORAL at 09:34

## 2021-05-15 RX ADMIN — Medication 5 MILLIGRAM(S): at 21:33

## 2021-05-15 RX ADMIN — FINASTERIDE 5 MILLIGRAM(S): 5 TABLET, FILM COATED ORAL at 09:32

## 2021-05-15 RX ADMIN — INSULIN GLARGINE 10 UNIT(S): 100 INJECTION, SOLUTION SUBCUTANEOUS at 22:27

## 2021-05-15 RX ADMIN — VALSARTAN 160 MILLIGRAM(S): 80 TABLET ORAL at 09:32

## 2021-05-15 RX ADMIN — Medication 2: at 08:01

## 2021-05-15 RX ADMIN — Medication 4: at 17:11

## 2021-05-15 RX ADMIN — Medication 25 MILLIGRAM(S): at 09:32

## 2021-05-15 RX ADMIN — PANTOPRAZOLE SODIUM 40 MILLIGRAM(S): 20 TABLET, DELAYED RELEASE ORAL at 05:30

## 2021-05-15 RX ADMIN — TAMSULOSIN HYDROCHLORIDE 0.4 MILLIGRAM(S): 0.4 CAPSULE ORAL at 21:34

## 2021-05-15 RX ADMIN — TRAMADOL HYDROCHLORIDE 50 MILLIGRAM(S): 50 TABLET ORAL at 11:20

## 2021-05-15 RX ADMIN — Medication 4: at 12:18

## 2021-05-15 NOTE — PHYSICAL THERAPY INITIAL EVALUATION ADULT - GENERAL OBSERVATIONS, REHAB EVAL
Pt rec'd supine in bed, IV, HM. c/o pain dorsum R hand w. forceful , no TTP. note L knee edema w/ mild TTP medially (no c/o w/ WBing though)

## 2021-05-15 NOTE — PROGRESS NOTE ADULT - SUBJECTIVE AND OBJECTIVE BOX
CC:  Patient is a 89y old  Male who presents with a chief complaint of Fall (15 May 2021 09:50)    SUBJECTIVE:     -no new complaints or issues at current time.    ROS:  all other review of systems are negative unless indicated above.    acetaminophen   Tablet .. 650 milliGRAM(s) Oral every 6 hours PRN  finasteride 5 milliGRAM(s) Oral daily  glucagon  Injectable 1 milliGRAM(s) IntraMuscular once  insulin glargine Injectable (LANTUS) 10 Unit(s) SubCutaneous at bedtime  insulin lispro (ADMELOG) corrective regimen sliding scale   SubCutaneous three times a day before meals  melatonin 5 milliGRAM(s) Oral at bedtime  metoprolol succinate ER 25 milliGRAM(s) Oral daily  pantoprazole    Tablet 40 milliGRAM(s) Oral before breakfast  sodium chloride 0.9%. 1000 milliLiter(s) IV Continuous <Continuous>  tamsulosin 0.4 milliGRAM(s) Oral at bedtime  traMADol 50 milliGRAM(s) Oral daily  valsartan 160 milliGRAM(s) Oral daily    T(C): 36.8 (05-15-21 @ 08:28), Max: 36.8 (05-14-21 @ 20:53)  HR: 68 (05-15-21 @ 08:28) (63 - 70)  BP: 119/63 (05-15-21 @ 08:28) (119/63 - 139/57)  RR: 18 (05-15-21 @ 08:28) (18 - 20)  SpO2: 92% (05-15-21 @ 08:28) (92% - 96%)    Constitutional: NAD.   HEENT: PERRL, EOMI, MMM.  Neck: Soft and supple, No carotid bruit, No JVD  Respiratory: Breath sounds are clear bilaterally, No wheezing, rales or rhonchi  Cardiovascular: S1 and S2, regular rate and rhythm, no murmur, rub or gallop.  Gastrointestinal: Bowel Sounds present, soft, nontender, nondistended, no guarding, no rebound, no mass.  Extremities: No peripheral edema  Vascular: 2+ peripheral pulses  Neurological: A/O x , no focal deficits  Musculoskeletal: 5/5 strength b/l upper and lower extremities  Skin:  no visible rashes.                         9.8    4.87  )-----------( 204      ( 15 May 2021 08:57 )             30.4     PT/INR - ( 13 May 2021 16:12 )   PT: 12.0 sec;   INR: 1.03 ratio         PTT - ( 13 May 2021 16:12 )  PTT:30.5 sec  05-15    140  |  112<H>  |  19  ----------------------------<  187<H>  3.9   |  22  |  0.99    Ca    8.5      15 May 2021 08:57    TPro  6.2  /  Alb  2.8<L>  /  TBili  0.2  /  DBili  x   /  AST  10<L>  /  ALT  15  /  AlkPhos  114  05-13

## 2021-05-15 NOTE — PHYSICAL THERAPY INITIAL EVALUATION ADULT - PERTINENT HX OF CURRENT PROBLEM, REHAB EVAL
presents to the ED BIBA [facility] after an unwitnessed fall. Patient states he was ambulating in his room and turned around too fast and tripped and fell. pt w/ abrasion R ear, R FA. Pt found to have L pleural eff. and abdom mass.

## 2021-05-16 LAB
ANION GAP SERPL CALC-SCNC: 6 MMOL/L — SIGNIFICANT CHANGE UP (ref 5–17)
BUN SERPL-MCNC: 24 MG/DL — HIGH (ref 7–23)
CALCIUM SERPL-MCNC: 8 MG/DL — LOW (ref 8.5–10.1)
CHLORIDE SERPL-SCNC: 113 MMOL/L — HIGH (ref 96–108)
CO2 SERPL-SCNC: 19 MMOL/L — LOW (ref 22–31)
CREAT SERPL-MCNC: 0.98 MG/DL — SIGNIFICANT CHANGE UP (ref 0.5–1.3)
GLUCOSE SERPL-MCNC: 152 MG/DL — HIGH (ref 70–99)
HCT VFR BLD CALC: 31.8 % — LOW (ref 39–50)
HGB BLD-MCNC: 10 G/DL — LOW (ref 13–17)
MCHC RBC-ENTMCNC: 30.1 PG — SIGNIFICANT CHANGE UP (ref 27–34)
MCHC RBC-ENTMCNC: 31.4 GM/DL — LOW (ref 32–36)
MCV RBC AUTO: 95.8 FL — SIGNIFICANT CHANGE UP (ref 80–100)
PLATELET # BLD AUTO: 205 K/UL — SIGNIFICANT CHANGE UP (ref 150–400)
POTASSIUM SERPL-MCNC: 4.5 MMOL/L — SIGNIFICANT CHANGE UP (ref 3.5–5.3)
POTASSIUM SERPL-SCNC: 4.5 MMOL/L — SIGNIFICANT CHANGE UP (ref 3.5–5.3)
RBC # BLD: 3.32 M/UL — LOW (ref 4.2–5.8)
RBC # FLD: 14.4 % — SIGNIFICANT CHANGE UP (ref 10.3–14.5)
SODIUM SERPL-SCNC: 138 MMOL/L — SIGNIFICANT CHANGE UP (ref 135–145)
WBC # BLD: 5.42 K/UL — SIGNIFICANT CHANGE UP (ref 3.8–10.5)
WBC # FLD AUTO: 5.42 K/UL — SIGNIFICANT CHANGE UP (ref 3.8–10.5)

## 2021-05-16 PROCEDURE — 99233 SBSQ HOSP IP/OBS HIGH 50: CPT

## 2021-05-16 PROCEDURE — 93970 EXTREMITY STUDY: CPT | Mod: 26

## 2021-05-16 RX ORDER — ENOXAPARIN SODIUM 100 MG/ML
100 INJECTION SUBCUTANEOUS ONCE
Refills: 0 | Status: COMPLETED | OUTPATIENT
Start: 2021-05-16 | End: 2021-05-16

## 2021-05-16 RX ADMIN — VALSARTAN 160 MILLIGRAM(S): 80 TABLET ORAL at 09:18

## 2021-05-16 RX ADMIN — ENOXAPARIN SODIUM 100 MILLIGRAM(S): 100 INJECTION SUBCUTANEOUS at 17:37

## 2021-05-16 RX ADMIN — Medication 5 MILLIGRAM(S): at 21:39

## 2021-05-16 RX ADMIN — Medication 2: at 07:59

## 2021-05-16 RX ADMIN — TRAMADOL HYDROCHLORIDE 50 MILLIGRAM(S): 50 TABLET ORAL at 09:18

## 2021-05-16 RX ADMIN — TAMSULOSIN HYDROCHLORIDE 0.4 MILLIGRAM(S): 0.4 CAPSULE ORAL at 21:40

## 2021-05-16 RX ADMIN — PANTOPRAZOLE SODIUM 40 MILLIGRAM(S): 20 TABLET, DELAYED RELEASE ORAL at 06:05

## 2021-05-16 RX ADMIN — TRAMADOL HYDROCHLORIDE 50 MILLIGRAM(S): 50 TABLET ORAL at 11:22

## 2021-05-16 RX ADMIN — Medication 2: at 12:33

## 2021-05-16 RX ADMIN — INSULIN GLARGINE 10 UNIT(S): 100 INJECTION, SOLUTION SUBCUTANEOUS at 21:55

## 2021-05-16 RX ADMIN — Medication 25 MILLIGRAM(S): at 09:18

## 2021-05-16 RX ADMIN — Medication 6: at 17:37

## 2021-05-16 RX ADMIN — FINASTERIDE 5 MILLIGRAM(S): 5 TABLET, FILM COATED ORAL at 09:18

## 2021-05-16 NOTE — PROGRESS NOTE ADULT - SUBJECTIVE AND OBJECTIVE BOX
CC:  Patient is a 89y old  Male who presents with a chief complaint of Fall (16 May 2021 09:50)    SUBJECTIVE:     -no new complaints or issues at current time.    ROS:  all other review of systems are negative unless indicated above.    acetaminophen   Tablet .. 650 milliGRAM(s) Oral every 6 hours PRN  finasteride 5 milliGRAM(s) Oral daily  insulin glargine Injectable (LANTUS) 10 Unit(s) SubCutaneous at bedtime  insulin lispro (ADMELOG) corrective regimen sliding scale   SubCutaneous three times a day before meals  melatonin 5 milliGRAM(s) Oral at bedtime  metoprolol succinate ER 25 milliGRAM(s) Oral daily  pantoprazole    Tablet 40 milliGRAM(s) Oral before breakfast  sodium chloride 0.9%. 1000 milliLiter(s) IV Continuous <Continuous>  tamsulosin 0.4 milliGRAM(s) Oral at bedtime  traMADol 50 milliGRAM(s) Oral daily  valsartan 160 milliGRAM(s) Oral daily    T(C): 36.2 (05-16-21 @ 09:17), Max: 36.2 (05-16-21 @ 09:17)  HR: 75 (05-16-21 @ 12:33) (71 - 75)  BP: 122/65 (05-16-21 @ 12:33) (122/65 - 165/62)  RR: 18 (05-16-21 @ 09:17) (18 - 18)  SpO2: 97% (05-16-21 @ 09:17) (97% - 97%)    Constitutional: NAD.   HEENT: PERRL, EOMI, MMM.  Neck: Soft and supple, No carotid bruit, No JVD  Respiratory: Breath sounds are clear bilaterally, No wheezing, rales or rhonchi  Cardiovascular: S1 and S2, regular rate and rhythm, no murmur, rub or gallop.  Gastrointestinal: Bowel Sounds present, soft, nontender, nondistended, no guarding, no rebound, no mass.  Extremities: 3+ LE edema  Vascular: 2+ peripheral pulses  Neurological: A/O x 3, no focal deficits  Musculoskeletal: 5/5 strength b/l upper and lower extremities  Skin:  no visible rashes.                         10.0   5.42  )-----------( 205      ( 16 May 2021 08:43 )             31.8       05-16    138  |  113<H>  |  24<H>  ----------------------------<  152<H>  4.5   |  19<L>  |  0.98    Ca    8.0<L>      16 May 2021 08:43    < from: Xray Forearm, Right (05.13.21 @ 15:53) >  IMPRESSION: No acute finding.    < end of copied text >  < from: CT Abdomen and Pelvis No Cont (05.13.21 @ 15:47) >    IMPRESSION:  No acute intrathoracic or intra-abdominal sequelae of trauma.    Moderate left pleural effusion with compressive atelectasis in the left lower lobe, slightly decreased since 4/25/2021.    * 3.4 x 2.5 cm mesenteric mass in the right hemiabdomen with small adjacent lymph nodes and mild infiltration of the adjacent fat suspicious for neoplasm; differential includes carcinoid or lymphoma.    < end of copied text >  < from: CT Cervical Spine No Cont (05.13.21 @ 15:46) >  IMPRESSION:    No acute cervical fracture identified. Multilevel degenerative changes cervical spine.    Large left pleural effusion. Chest CT pending, to be reported separately.    < end of copied text >  < from: CT Head No Cont (05.13.21 @ 15:46) >  IMPRESSION:    1)  lacunar infarcts and chronic ischemic changes noted in both hemispheres with volume loss and involutional change. No acute abnormality suggested..  2)  no intracerebral hemorrhage, contusion, or extracerebral collections are identified.    < end of copied text >  < from: 12 Lead ECG (05.14.21 @ 00:25) >  Diagnosis Line Sinus rhythm with 1st degree A-V block  Otherwise normal ECG  When compared with ECG of 25-APR-2021 20:39,  No significant change was found  Confirmed by Trevon Reynolds MD ([id number]) on 5/14/2021 8:43:29 AM    < end of copied text >

## 2021-05-16 NOTE — PROGRESS NOTE ADULT - SUBJECTIVE AND OBJECTIVE BOX
Patient is a 89y old  Male who presents with a chief complaint of Fall (14 May 2021 18:43)      HPI:  89 y/o Male with a PMHx of T2DM, HTN, hiatal hernia, chronic left pleural effusion, CKD, presents to the ED BIBA [facility] after an unwitnessed fall. Patient states he was ambulating in his room and turned around too fast and tripped and fell. Patient was unable to get up on his own, and needed assistance. Patient with abrasion to R ear. No chest pain, sob, palpitation, abd pain, neck pain, lightheadedness/ dizziness, weakness, fatigue. Patient denies any loss of consciousness. He remember the whole episode clearly. No seizure like activity.  (14 May 2021 01:58)  pt is seen and examined in his room  prior large volume thoracentesis reported  he appears not interested in repeat tap / "my breathing is perfect"  no cp  no cough    PAST MEDICAL & SURGICAL HISTORY:  DM (diabetes mellitus)    HTN (hypertension)    HLD (hyperlipidemia)    Kidney stone    Hernia    H/O left inguinal hernia repair    Endoscopy finding  choked on piece of chiken-had upper endo with clearance of food    History of tibial fracture  R tibial fracture s/p revision with hardware placement ~3-4yrs ago, per pt        PREVIOUS DIAGNOSTIC TESTING:      MEDICATIONS  (STANDING):  dextrose 40% Gel 15 Gram(s) Oral once  dextrose 5%. 1000 milliLiter(s) (50 mL/Hr) IV Continuous <Continuous>  dextrose 5%. 1000 milliLiter(s) (100 mL/Hr) IV Continuous <Continuous>  dextrose 50% Injectable 25 Gram(s) IV Push once  dextrose 50% Injectable 12.5 Gram(s) IV Push once  dextrose 50% Injectable 25 Gram(s) IV Push once  finasteride 5 milliGRAM(s) Oral daily  glucagon  Injectable 1 milliGRAM(s) IntraMuscular once  insulin glargine Injectable (LANTUS) 10 Unit(s) SubCutaneous at bedtime  insulin lispro (ADMELOG) corrective regimen sliding scale   SubCutaneous three times a day before meals  melatonin 5 milliGRAM(s) Oral at bedtime  metoprolol succinate ER 25 milliGRAM(s) Oral daily  pantoprazole    Tablet 40 milliGRAM(s) Oral before breakfast  sodium chloride 0.9%. 1000 milliLiter(s) (75 mL/Hr) IV Continuous <Continuous>  tamsulosin 0.4 milliGRAM(s) Oral at bedtime  traMADol 50 milliGRAM(s) Oral daily  valsartan 160 milliGRAM(s) Oral daily    MEDICATIONS  (PRN):  acetaminophen   Tablet .. 650 milliGRAM(s) Oral every 6 hours PRN Temp greater or equal to 38C (100.4F), Mild Pain (1 - 3)      FAMILY HISTORY:  No pertinent family history in first degree relatives  pt cannot recall any history of disease        SOCIAL HISTORY:  [facility]ides in [facility]    REVIEW OF SYSTEM:  Pertinent items are noted in HPI.      Vital Signs Last 24 Hrs  T(C): 36.2 (16 May 2021 09:17), Max: 36.2 (16 May 2021 09:17)  T(F): 97.2 (16 May 2021 09:17), Max: 97.2 (16 May 2021 09:17)  HR: 71 (16 May 2021 09:17) (71 - 71)  BP: 165/62 (16 May 2021 09:17) (165/62 - 165/62)  BP(mean): --  RR: 18 (16 May 2021 09:17) (18 - 18)  SpO2: 97% (16 May 2021 09:17) (97% - 97%)    PHYSICAL EXAM  General Appearance: cooperative, no acute distress, elderly male  HEENT: PERRL, conjunctiva clear,Neck: Supple  Lungs: decreased breath sounds left mi- lower ling filed, no wheeze  dull ness to percussion LLL  Heart: Regular rate and rhythm, S1, S2 normal  Abdomen: Soft, non-tender, bowel sounds active  Extremities: no cyanosis, some peripheral edema, no joint swelling  Skin: Skin color, texture normal, no rashes   Neurologic: non focal    ECG:    LABS:                          9.8    4.87  )-----------( 204      ( 15 May 2021 08:57 )             30.4     05-15    140  |  112<H>  |  19  ----------------------------<  187<H>  3.9   |  22  |  0.99    Ca    8.5      15 May 2021 08:57    TPro  6.2  /  Alb  2.8<L>  /  TBili  0.2  /  DBili  x   /  AST  10<L>  /  ALT  15  /  AlkPhos  114  05-13    CARDIAC MARKERS ( 13 May 2021 19:23 )  <0.015 ng/mL / x     / x     / x     / x      CARDIAC MARKERS ( 13 May 2021 17:56 )  <0.015 ng/mL / x     / x     / x     / x              PT/INR - ( 13 May 2021 16:12 )   PT: 12.0 sec;   INR: 1.03 ratio         PTT - ( 13 May 2021 16:12 )  PTT:30.5 sec  Urinalysis Basic - ( 13 May 2021 16:12 )    Color: Yellow / Appearance: Clear / S.015 / pH: x  Gluc: x / Ketone: Trace  / Bili: Negative / Urobili: Negative mg/dL   Blood: x / Protein: 15 mg/dL / Nitrite: Negative   Leuk Esterase: Negative / RBC: >50 /HPF / WBC 6-10   Sq Epi: x / Non Sq Epi: Few / Bacteria: Few      < from: CT Chest No Cont (21 @ 15:47) >  COMPARISON: CT chest 2021 and CT chest/abdomen/pelvis 2021    CONTRAST/COMPLICATIONS:  IV Contrast: NONE  Oral Contrast: NONE  Complications: None reported at time of study completion    PROCEDURE:  CT of the Chest, Abdomen and Pelvis was performed.  Sagittal and coronal reformats were performed.    FINDINGS:  CHEST:  LUNGS AND LARGE AIRWAYS: Patent central airways. Opacities in left upper lobe along the major fissure, likely atelectasis. Also see below.  PLEURA: Moderate left pleural effusion with compressive atelectasis of the left lower lobe.  VESSELS: Thoracic aorta normal in caliber with atherosclerotic changes in the thoracic aorta and coronary arteries.  HEART: Heart size is normal. No pericardial effusion.  MEDIASTINUM AND MARIA ELENA: No lymphadenopathy. Small to moderate hiatal hernia.  CHEST WALL AND LOWER NECK: No enlarged axillary lymph nodes.    ABDOMEN AND PELVIS:  LIVER: Within normal limits.  BILE DUCTS: Normal caliber.  GALLBLADDER: Cholecystectomy.  SPLEEN: Within normal limits.  PANCREAS: Atrophicwith fatty infiltration.  ADRENALS: Within normal limits.  KIDNEYS/URETERS: Dilated left renal pelvis with calculi measuring 1.7 x 1.2 cm  and 1.0 x 1.0 cm. Nonobstructing calculus in the mid left kidney measuring 0.5 cm. No right-sided renal calculi. Right extra renal pelvis.    BLADDER: Distended urinary bladder.  REPRODUCTIVE ORGANS: Prostate not enlarged.    BOWEL: Colonic diverticulosis without evidence of diverticulitis. No bowel obstruction. Appendix is normal.  PERITONEUM: 3.4 x 2.5 cm mesenteric mass in the right hemiabdomen with small adjacent lymph nodes and infiltration of the adjacent fat (series 2 image 117).  VESSELS: Abdominal aorta normal in caliber with mild calcified plaque.  RETROPERITONEUM/LYMPH NODES: No lymphadenopathy.  ABDOMINAL WALL: Unremarkable.  BONES: Compression deformity of the superior endplate of L2, unchanged since 2021. Mild compression deformity of the superior endplate of L1, unchanged. Degenerative changes in the spine. Left hip arthroplasty. Noacute fracture. Old left 10th rib fracture.    IMPRESSION:  No acute intrathoracic or intra-abdominal sequelae of trauma.    Moderate left pleural effusion with compressive atelectasis in the left lower lobe, slightly decreased since 2021.     3.4 x 2.5 cm mesenteric mass in the right hemiabdomen with small adjacent lymph nodes and mild infiltration of the adjacent fat suspicious for neoplasm; differential includes carcinoid or lymphoma.    < end of copied text >        RADIOLOGY & ADDITIONAL STUDIES:

## 2021-05-16 NOTE — PROGRESS NOTE ADULT - ASSESSMENT
89M.  hx DVT (apixaban).  admitted 05/16/21.  presented from [facility] via EMS to ED.  c/o unwitnessed mechanical fall.  patient denied LOC.      PMHx:  DM;  HTN;  HLD;  DVT (apixaban);  chronic L pleural effusion;  CKD-3;  nephrolithiasis.    OFF SERVICE:    fall, r/o syncope.  - patient denied LOC, but I question this.  - imaging:  no acute traumatic findings.  - telemetry monitoring:  NSR 68.  PVCs.  A-paced.  - orthostatic VS:  pending.  - TTE:  pending.  - Cardiology consult.  - EP:  PPM interrogation.    chronic L pleural effusion.  - chest CT:  slightly decreased from 04/25/21.  - breathing comfortably on RA.  - patient deferred repeat thoracentesis.  will likely require repeat large volume thoracentesis (diagnostic) by IR if he agrees to proceed.  - Pulmonology.    mesenteric mass.  - incidental finding.  - CT AB/pelvis:  mesenteric mass in the R hemiabdomen.  ddx includes carcinoid v. lymphoma.  - plan for IR CT guided biopsy of abdominal/mesenteric mass tomorrow.  - hold apixaban.    hx DM2.  - A1C:  pending.  - FS target 140-180mg/dL.  - consisent CHO diet.  - basal insulin:  10units sq qhs.  - correction insulin coverage.    DVT prophylaxis.  - UFH sq.    disposition.  - 3E.    communication.  - RN.     89M.  hx DVT (apixaban).  admitted 05/16/21.  presented from [facility] via EMS to ED.  c/o unwitnessed mechanical fall.  patient denied LOC.      PMHx:  DM;  HTN;  HLD;  DVT (apixaban);  chronic L pleural effusion;  CKD-3;  nephrolithiasis.    OFF SERVICE:    fall, r/o syncope.  - patient denied LOC, but I question this.  - imaging:  no acute traumatic findings.  - telemetry monitoring:  NSR 68.  PVCs.  A-paced.  - orthostatic VS:  pending.  - TTE:  pending.  - Cardiology consult.  - EP:  PPM interrogation.    chronic L pleural effusion.  - chest CT:  slightly decreased from 04/25/21.  - breathing comfortably on RA.  - patient deferred repeat thoracentesis, but will likely require large volume thoracentesis (diagnostic/therapeutic) by IR if he agrees to proceed prior to DC.  - Pulmonology.    mesenteric mass.  - incidental finding.  - CT AB/pelvis:  mesenteric mass in the R hemiabdomen.  ddx includes carcinoid v. lymphoma.  - plan for IR CT guided biopsy of abdominal/mesenteric mass tomorrow.  - HOLD apixaban.    hx DVT.  - no prior US or CTA found in prior notes.  - "AF" documented in H&P and Pulmonary notes, however I can not find documentation (from Cardiology or otherwise) to confirm this.  - repeat LE venous doppler to confirm DVT.  - apixaban was HELD in anticipation for procedure (likely tomorrow).  - will bridge w/ Lovenox 1mg/kg sq x 1 now as a precaution.    hx DM2.  - A1C:  pending.  - FS target 140-180mg/dL.  - consisent CHO diet.  - basal insulin:  10units sq qhs.  - correction insulin coverage.    DVT prophylaxis.  - LMWH.    disposition.  - 3E.    communication.  - RN.     89M.  hx DVT (apixaban).  admitted 05/16/21.  presented from [facility] via EMS to ED.  c/o unwitnessed mechanical fall.  patient denied LOC.      PMHx:  DM;  HTN;  HLD;  DVT (apixaban);  chronic L pleural effusion;  CKD-3;  nephrolithiasis.    OFF SERVICE:    fall, r/o syncope.  - patient denied LOC, but I question this.  - imaging:  no acute traumatic findings.  - telemetry monitoring:  NSR 68.  PVCs.  A-paced.  - orthostatic VS:  pending.  - TTE:  pending.  - Cardiology consult.    chronic L pleural effusion.  - chest CT:  slightly decreased from 04/25/21.  - breathing comfortably on RA.  - patient deferred repeat thoracentesis, but will likely require large volume thoracentesis (diagnostic/therapeutic) by IR if he agrees to proceed prior to DC.  - Pulmonology.    mesenteric mass.  - incidental finding.  - CT AB/pelvis:  mesenteric mass in the R hemiabdomen.  ddx includes carcinoid v. lymphoma.  - plan for IR CT guided biopsy of abdominal/mesenteric mass tomorrow.  - HOLD apixaban.    hx DVT.  - no prior US or CTA found in prior notes.  - "AF" documented in H&P and Pulmonary notes, however I can not find further documentation (from Cardiology or otherwise) to confirm this, only [facility]  - repeat LE venous doppler to confirm DVT.  - apixaban was HELD in anticipation for procedure (likely tomorrow).  - will bridge w/ Lovenox 1mg/kg sq x 1 now as a precaution.    hx DM2.  - A1C:  pending.  - FS target 140-180mg/dL.  - consisent CHO diet.  - basal insulin:  10units sq qhs.  - correction insulin coverage.    DVT prophylaxis.  - LMWH.    disposition.  - 3E.    communication.  - RN.     89M.  hx DVT (apixaban).  admitted 05/16/21.  presented from [facility] via EMS to ED.  c/o unwitnessed mechanical fall.  patient denied LOC.      PMHx:  DM;  HTN;  HLD;  DVT (apixaban);  chronic L pleural effusion;  CKD-3;  nephrolithiasis.    OFF SERVICE:    fall, r/o syncope.  - patient denied LOC, but I question this.  - imaging:  no acute traumatic findings.  - telemetry monitoring:  NSR 68.  PVCs.  A-paced.  - orthostatic VS:  pending.  - TTE:  pending.  - PT.  - Cardiology consult.    chronic L pleural effusion.  - chest CT:  slightly decreased from 04/25/21.  - breathing comfortably on RA.  - patient deferred repeat thoracentesis, but will likely require large volume thoracentesis (diagnostic/therapeutic) by IR if he agrees to proceed prior to DC.  - Pulmonology.    mesenteric mass.  - incidental finding.  - CT AB/pelvis:  mesenteric mass in the R hemiabdomen.  ddx includes carcinoid v. lymphoma.  - plan for IR CT guided biopsy of abdominal/mesenteric mass tomorrow.  - HOLD apixaban.    hx DVT.  - no prior US or CTA found in prior notes.  - repeat LE venous doppler to evaluate if DVT remains present.  - AC:  apixaban was HELD in anticipation for procedure (likely tomorrow).  bridge w/ Lovenox 1mg/kg sq x 1 now.  resume AC at the discretion of IR.    hx AF (?).  - "AF" documented in H&P and Pulmonary notes, however I can not find further documentation (from Cardiology or otherwise) to confirm this, only [facility]  - AC:  HELD apixaban due to anticipated procedure in the AM.  - RTC:  metoprolol ER 25mg po qd.      hx DM2.  - A1C:  pending.  - FS target 140-180mg/dL.  - consisent CHO diet.  - basal insulin:  10units sq qhs.  - correction insulin coverage.    DVT prophylaxis.  - LMWH.    disposition.  - 3E.    communication.  - RN.

## 2021-05-16 NOTE — PROGRESS NOTE ADULT - ASSESSMENT
89 y/o Male with a PMHx of T2DM, HTN, hiatal hernia, chronic left pleural effusion, CKD, presents to the ED BIBA [facility] after an unwitnessed fall. Patient states he was ambulating in his room and turned around too fast and tripped and fell. Patient was unable to get up on his own, and needed assistance. Patient with abrasion to R ear. No chest pain, sob, palpitation, abd pain, neck pain, lightheadedness/ dizziness, weakness, fatigue. Patient denies any loss of consciousness. He remember the whole episode clearly. No seizure like activity.  (14 May 2021 01:58)  pt is seen and examined in his room  prior large volume thoracentesis reported  he appears not interested in repeat tap / "my breathing is perfect"  no cp  no cough    Assessment / plan;  s/p syncope   Pafib / moderate MR  chronic left sided pleural effusion appears moderate in size  compressive atelectasis due to above  adequate oxygenation on room air / no resp distress  reported prior thoracentesis / pt is not interested in repeat procedure unless more sxs  mesenteric mass in right side abdomen    agree with diuresis as tolerated  will paytonley need and benefit from repeat large volume / diagnostic thoracentesis by IR next week if pt agrees to proceed   will fu with you  GI and oncology eval in progress
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
## 2021-05-17 DIAGNOSIS — I48.92 UNSPECIFIED ATRIAL FLUTTER: ICD-10-CM

## 2021-05-17 LAB
ANION GAP SERPL CALC-SCNC: 6 MMOL/L — SIGNIFICANT CHANGE UP (ref 5–17)
BUN SERPL-MCNC: 28 MG/DL — HIGH (ref 7–23)
CALCIUM SERPL-MCNC: 8 MG/DL — LOW (ref 8.5–10.1)
CHLORIDE SERPL-SCNC: 111 MMOL/L — HIGH (ref 96–108)
CO2 SERPL-SCNC: 23 MMOL/L — SIGNIFICANT CHANGE UP (ref 22–31)
CREAT SERPL-MCNC: 1.1 MG/DL — SIGNIFICANT CHANGE UP (ref 0.5–1.3)
GLUCOSE BLDC GLUCOMTR-MCNC: 204 MG/DL — HIGH (ref 70–99)
GLUCOSE BLDC GLUCOMTR-MCNC: 298 MG/DL — HIGH (ref 70–99)
GLUCOSE SERPL-MCNC: 169 MG/DL — HIGH (ref 70–99)
HCT VFR BLD CALC: 31 % — LOW (ref 39–50)
HGB BLD-MCNC: 10 G/DL — LOW (ref 13–17)
MCHC RBC-ENTMCNC: 30.2 PG — SIGNIFICANT CHANGE UP (ref 27–34)
MCHC RBC-ENTMCNC: 32.3 GM/DL — SIGNIFICANT CHANGE UP (ref 32–36)
MCV RBC AUTO: 93.7 FL — SIGNIFICANT CHANGE UP (ref 80–100)
PLATELET # BLD AUTO: 208 K/UL — SIGNIFICANT CHANGE UP (ref 150–400)
POTASSIUM SERPL-MCNC: 3.8 MMOL/L — SIGNIFICANT CHANGE UP (ref 3.5–5.3)
POTASSIUM SERPL-SCNC: 3.8 MMOL/L — SIGNIFICANT CHANGE UP (ref 3.5–5.3)
RBC # BLD: 3.31 M/UL — LOW (ref 4.2–5.8)
RBC # FLD: 14.5 % — SIGNIFICANT CHANGE UP (ref 10.3–14.5)
SODIUM SERPL-SCNC: 140 MMOL/L — SIGNIFICANT CHANGE UP (ref 135–145)
WBC # BLD: 4.57 K/UL — SIGNIFICANT CHANGE UP (ref 3.8–10.5)
WBC # FLD AUTO: 4.57 K/UL — SIGNIFICANT CHANGE UP (ref 3.8–10.5)

## 2021-05-17 PROCEDURE — 99233 SBSQ HOSP IP/OBS HIGH 50: CPT

## 2021-05-17 PROCEDURE — 71045 X-RAY EXAM CHEST 1 VIEW: CPT | Mod: 26

## 2021-05-17 PROCEDURE — 99223 1ST HOSP IP/OBS HIGH 75: CPT

## 2021-05-17 PROCEDURE — 76380 CAT SCAN FOLLOW-UP STUDY: CPT | Mod: 26

## 2021-05-17 RX ORDER — ENOXAPARIN SODIUM 100 MG/ML
100 INJECTION SUBCUTANEOUS
Refills: 0 | Status: DISCONTINUED | OUTPATIENT
Start: 2021-05-17 | End: 2021-05-20

## 2021-05-17 RX ORDER — PREGABALIN 225 MG/1
1000 CAPSULE ORAL DAILY
Refills: 0 | Status: DISCONTINUED | OUTPATIENT
Start: 2021-05-17 | End: 2021-05-20

## 2021-05-17 RX ORDER — ONDANSETRON 8 MG/1
4 TABLET, FILM COATED ORAL ONCE
Refills: 0 | Status: DISCONTINUED | OUTPATIENT
Start: 2021-05-17 | End: 2021-05-17

## 2021-05-17 RX ADMIN — VALSARTAN 160 MILLIGRAM(S): 80 TABLET ORAL at 17:47

## 2021-05-17 RX ADMIN — Medication 25 MILLIGRAM(S): at 15:05

## 2021-05-17 RX ADMIN — PREGABALIN 1000 MICROGRAM(S): 225 CAPSULE ORAL at 22:55

## 2021-05-17 RX ADMIN — ENOXAPARIN SODIUM 100 MILLIGRAM(S): 100 INJECTION SUBCUTANEOUS at 22:55

## 2021-05-17 RX ADMIN — TRAMADOL HYDROCHLORIDE 50 MILLIGRAM(S): 50 TABLET ORAL at 17:48

## 2021-05-17 RX ADMIN — FINASTERIDE 5 MILLIGRAM(S): 5 TABLET, FILM COATED ORAL at 15:05

## 2021-05-17 RX ADMIN — INSULIN GLARGINE 10 UNIT(S): 100 INJECTION, SOLUTION SUBCUTANEOUS at 22:55

## 2021-05-17 RX ADMIN — Medication 5 MILLIGRAM(S): at 22:56

## 2021-05-17 RX ADMIN — Medication 6: at 17:37

## 2021-05-17 RX ADMIN — TAMSULOSIN HYDROCHLORIDE 0.4 MILLIGRAM(S): 0.4 CAPSULE ORAL at 22:56

## 2021-05-17 NOTE — PROGRESS NOTE ADULT - ASSESSMENT
89M.  hx DVT (apixaban).  admitted 05/16/21.  presented from [facility] via EMS to ED.  c/o unwitnessed mechanical fall.  patient denied LOC.      PMHx:  DM;  HTN;  HLD;  DVT (apixaban);  chronic L pleural effusion;  CKD-3;  nephrolithiasis.    OFF SERVICE:    fall, r/o syncope.  - patient denied LOC, but I question this.  - imaging:  no acute traumatic findings.  - telemetry monitoring:  NSR 68.  PVCs.  A-paced.  - orthostatic VS:  pending.  - TTE:  pending.  - PT.  - Cardiology consult.    chronic L pleural effusion.  - chest CT:  slightly decreased from 04/25/21.  - breathing comfortably on RA.  - patient deferred repeat thoracentesis, but will likely require large volume thoracentesis (diagnostic/therapeutic) by IR if he agrees to proceed prior to DC.  - Pulmonology.    mesenteric mass.  - incidental finding.  - CT AB/pelvis:  mesenteric mass in the R hemiabdomen.  ddx includes carcinoid v. lymphoma.  - plan for IR CT guided biopsy of abdominal/mesenteric mass tomorrow.  - HOLD apixaban.    hx DVT.  - no prior US or CTA found in prior notes.  - repeat LE venous doppler to evaluate if DVT remains present.  - AC:  apixaban was HELD in anticipation for procedure (likely tomorrow).  bridge w/ Lovenox 1mg/kg sq x 1 now.  resume AC at the discretion of IR.    hx AF (?).  - "AF" documented in H&P and Pulmonary notes, however I can not find further documentation (from Cardiology or otherwise) to confirm this, only [facility]  - AC:  HELD apixaban due to anticipated procedure in the AM.  - RTC:  metoprolol ER 25mg po qd.      hx DM2.  - A1C:  pending.  - FS target 140-180mg/dL.  - consisent CHO diet.  - basal insulin:  10units sq qhs.  - correction insulin coverage.    DVT prophylaxis.  - LMWH.    disposition.  - 3E.    communication.  - RN.     88 y/o Male with a PMHx of DVT (apixaban), nephrolithiasis,    T2DM, HTN, hiatal hernia, chronic left pleural effusion, CKD admitted for:     1. S/p fall, r/o syncope.  - patient denied LOC  - imaging:  no acute traumatic findings.  - telemetry monitoring:  NSR 68.  PVCs.  A-paced.  - orthostatic VS: neg   - TTE:  pending report   - PT  - cardio eval appreciated       2. Chronic  L pleural effusion.  - respiratory status stable   - chest CT:  slightly decreased from 04/25/21.   - breathing comfortably on RA.  - patient deferred repeat thoracentesis.   - Pulmonology f/u appreciated     3. New mesenteric mass.  - incidental finding on CT.  - CT AB/pelvis:  mesenteric mass in the R rebel abdomen.  ddx includes carcinoid v. lymphoma.  - D/w DR Barrios, no safe window for Bx as per  imaging today  - Will ask GI ( possible ERCP?) and Sx oncology eval  - check [facility] CA 19-9, CEA   - D/w DR Carmona     5. hx DVT. LE edema likely 2/2 venous insufficiency, postphlebitic syndrome    - repeat LE venous doppler neg for DVT   - C/w A/c, will keel on Tx dose Lovenox for now       3. hx AF (?).  - "AF" documented in H&P and Pulmonary notes,  no  documentation (from Cardiology or otherwise) to confirm this,  - On AC  - C/w  metoprolol ER 25mg po qd.  -C/w tele       4. DM2.  - A1C:  pending.  - consistent CHO diet.  - basal insulin:  10units sq qhs.  - correction insulin coverage.    5. DVT prophylaxis.  - LMWH.      Dispo: C/w tele, GI and Sx oncology eval

## 2021-05-17 NOTE — PROGRESS NOTE ADULT - SUBJECTIVE AND OBJECTIVE BOX
CC: Fall (17 May 2021 08:27)    HPI:  89 y/o Male with a PMHx of T2DM, HTN, hiatal hernia, chronic left pleural effusion, CKD, presents to the ED BIBA [facility] after an unwitnessed fall. Patient states he was ambulating in his room and turned around too fast and tripped and fell. Patient was unable to get up on his own, and needed assistance. Patient with abrasion to R ear. No chest pain, sob, palpitation, abd pain, neck pain, lightheadedness/ dizziness, weakness, fatigue. Patient denies any loss of consciousness. He remember the whole episode clearly. No seizure like activity.  (14 May 2021 01:58)    INTERVAL HPI/OVERNIGHT EVENTS:    Vital Signs Last 24 Hrs  T(C): 36.8 (17 May 2021 09:26), Max: 36.9 (16 May 2021 21:31)  T(F): 98.3 (17 May 2021 09:26), Max: 98.4 (16 May 2021 21:31)  HR: 61 (17 May 2021 09:26) (59 - 75)  BP: 129/62 (16 May 2021 15:29) (122/65 - 129/62)  BP(mean): --  RR: 18 (17 May 2021 09:26) (18 - 18)  SpO2: 94% (17 May 2021 09:26) (94% - 96%)  I&O's Detail    REVIEW OF SYSTEMS:    CONSTITUTIONAL: No weakness, fevers or chills  EYES/ENT: No visual changes;  No vertigo or throat pain   NECK: No pain or stiffness  RESPIRATORY: No cough, wheezing, hemoptysis; No shortness of breath  CARDIOVASCULAR: No chest pain or palpitations  GASTROINTESTINAL: No abdominal or epigastric pain. No nausea, vomiting, or hematemesis; No diarrhea or constipation. No melena or hematochezia.  GENITOURINARY: No dysuria, frequency or hematuria  NEUROLOGICAL: No numbness or weakness  SKIN: No itching, burning, rashes, or lesions   All other review of systems is negative unless indicated above.  PHYSICAL EXAM:    General: Well developed; well nourished; in no acute distress  Eyes: PERRLA, EOMI; conjunctiva and sclera clear  Head: Normocephalic; atraumatic  ENMT: No nasal discharge; airway clear  Neck: Supple; non tender; no masses  Respiratory: No wheezes, rales or rhonchi  Cardiovascular: Regular rate and rhythm. S1 and S2 Normal; No murmurs, gallops or rubs  Gastrointestinal: Soft non-tender non-distended; Normal bowel sounds  Genitourinary: No  suprapubic  tenderness  Extremities: Normal range of motion, No clubbing, cyanosis or edema  Vascular: Peripheral pulses palpable 2+ bilaterally  Neurological: Alert and oriented x4  Skin: Warm and dry. No acute rash  Lymph Nodes: No acute cervical adenopathy  Musculoskeletal: Normal muscle tone, without deformities  Psychiatric: Cooperative and appropriate                            10.0   4.57  )-----------( 208      ( 17 May 2021 07:30 )             31.0     17 May 2021 07:30    140    |  111    |  28     ----------------------------<  169    3.8     |  23     |  1.10     Ca    8.0        17 May 2021 07:30        CAPILLARY BLOOD GLUCOSE  POCT Blood Glucose.: 170 mg/dL (17 May 2021 07:53)  POCT Blood Glucose.: 202 mg/dL (16 May 2021 21:53)  POCT Blood Glucose.: 269 mg/dL (16 May 2021 17:12)  POCT Blood Glucose.: 163 mg/dL (16 May 2021 12:31)          MEDICATIONS  (STANDING):  dextrose 40% Gel 15 Gram(s) Oral once  dextrose 5%. 1000 milliLiter(s) (50 mL/Hr) IV Continuous <Continuous>  dextrose 5%. 1000 milliLiter(s) (100 mL/Hr) IV Continuous <Continuous>  dextrose 50% Injectable 25 Gram(s) IV Push once  dextrose 50% Injectable 12.5 Gram(s) IV Push once  dextrose 50% Injectable 25 Gram(s) IV Push once  finasteride 5 milliGRAM(s) Oral daily  glucagon  Injectable 1 milliGRAM(s) IntraMuscular once  insulin glargine Injectable (LANTUS) 10 Unit(s) SubCutaneous at bedtime  insulin lispro (ADMELOG) corrective regimen sliding scale   SubCutaneous three times a day before meals  melatonin 5 milliGRAM(s) Oral at bedtime  metoprolol succinate ER 25 milliGRAM(s) Oral daily  pantoprazole    Tablet 40 milliGRAM(s) Oral before breakfast  sodium chloride 0.9%. 1000 milliLiter(s) (75 mL/Hr) IV Continuous <Continuous>  tamsulosin 0.4 milliGRAM(s) Oral at bedtime  traMADol 50 milliGRAM(s) Oral daily  valsartan 160 milliGRAM(s) Oral daily    MEDICATIONS  (PRN):  acetaminophen   Tablet .. 650 milliGRAM(s) Oral every 6 hours PRN Temp greater or equal to 38C (100.4F), Mild Pain (1 - 3)      RADIOLOGY & ADDITIONAL TESTS:   CC: Fall     HPI: 89 y/o Male with a PMHx of T2DM, HTN, hiatal hernia, chronic left pleural effusion, CKD, presents to the ED BIBA [facility] after an unwitnessed fall. Patient states he was ambulating in his room and turned around too fast and tripped and fell. Patient was unable to get up on his own, and needed assistance. Patient with abrasion to R ear. No chest pain, sob, palpitation, abd pain, neck pain, lightheadedness/ dizziness, weakness, fatigue. Patient denies any loss of consciousness. He remember the whole episode clearly. No seizure like activity.      INTERVAL HPI/ OVERNIGHT EVENTS: chart reviewed, Pt was seen and examined, reports no complains, denies pain and SOB, states that has chronic leg edema which is " little more then usual, but doesn't  bother me". POC discussed, Pt is awaiting for mesenteric Bx with IR. Pt confirmed that doesn't want thoracentesis        Vital Signs Last 24 Hrs  T(C): 36.8 (17 May 2021 09:26), Max: 36.9 (16 May 2021 21:31)  T(F): 98.3 (17 May 2021 09:26), Max: 98.4 (16 May 2021 21:31)  HR: 61 (17 May 2021 09:26) (59 - 75)  BP: 129/62 (16 May 2021 15:29) (122/65 - 129/62)  RR: 18 (17 May 2021 09:26) (18 - 18)  SpO2: 94% (17 May 2021 09:26) (94% - 96%)    REVIEW OF SYSTEMS:  All other review of systems is negative unless indicated above.      PHYSICAL EXAM:  General: Well developed;  in no acute distress  Eyes: PERRLA, EOMI; conjunctiva and sclera clear  Head: Normocephalic; atraumatic  ENMT: No nasal discharge; airway clear  Neck: Supple; non tender; no masses  Respiratory: Decreased BS  No wheezes, rales or rhonchi  Cardiovascular: Regular rate and rhythm. S1 and S2 Normal; No murmurs, gallops or rubs  Gastrointestinal: Soft non-tender non-distended; Normal bowel sounds  Genitourinary: No  suprapubic  tenderness  Extremities: Normal range of motion, No clubbing, cyanosis or edema  Vascular: Peripheral pulses palpable 2+ bilaterally  Neurological: Alert and oriented x4  Skin: Warm and dry. No acute rash  Lymph Nodes: No acute cervical adenopathy  Musculoskeletal: Normal muscle tone, without deformities  Psychiatric: Cooperative and appropriate                            10.0   4.57  )-----------( 208      ( 17 May 2021 07:30 )             31.0     17 May 2021 07:30    140    |  111    |  28     ----------------------------<  169    3.8     |  23     |  1.10     Ca    8.0        17 May 2021 07:30        CAPILLARY BLOOD GLUCOSE  POCT Blood Glucose.: 170 mg/dL (17 May 2021 07:53)  POCT Blood Glucose.: 202 mg/dL (16 May 2021 21:53)  POCT Blood Glucose.: 269 mg/dL (16 May 2021 17:12)  POCT Blood Glucose.: 163 mg/dL (16 May 2021 12:31)          MEDICATIONS  (STANDING):  dextrose 40% Gel 15 Gram(s) Oral once  dextrose 5%. 1000 milliLiter(s) (50 mL/Hr) IV Continuous <Continuous>  dextrose 5%. 1000 milliLiter(s) (100 mL/Hr) IV Continuous <Continuous>  dextrose 50% Injectable 25 Gram(s) IV Push once  dextrose 50% Injectable 12.5 Gram(s) IV Push once  dextrose 50% Injectable 25 Gram(s) IV Push once  finasteride 5 milliGRAM(s) Oral daily  glucagon  Injectable 1 milliGRAM(s) IntraMuscular once  insulin glargine Injectable (LANTUS) 10 Unit(s) SubCutaneous at bedtime  insulin lispro (ADMELOG) corrective regimen sliding scale   SubCutaneous three times a day before meals  melatonin 5 milliGRAM(s) Oral at bedtime  metoprolol succinate ER 25 milliGRAM(s) Oral daily  pantoprazole    Tablet 40 milliGRAM(s) Oral before breakfast  sodium chloride 0.9%. 1000 milliLiter(s) (75 mL/Hr) IV Continuous <Continuous>  tamsulosin 0.4 milliGRAM(s) Oral at bedtime  traMADol 50 milliGRAM(s) Oral daily  valsartan 160 milliGRAM(s) Oral daily    MEDICATIONS  (PRN):  acetaminophen   Tablet .. 650 milliGRAM(s) Oral every 6 hours PRN Temp greater or equal to 38C (100.4F), Mild Pain (1 - 3)      RADIOLOGY & ADDITIONAL TESTS:      EXAM:  US DPLX LWR EXT VEINS COMPL BI                        PROCEDURE DATE:  05/16/2021        FINDINGS: Bilateral calf subcutaneous edema identified.    RIGHT:  Normal compressibility of the RIGHT common femoral, femoral and popliteal veins.  Doppler examination shows normal spontaneous and phasic flow.  No RIGHT calf vein thrombosis is detected.    LEFT:  Normal compressibility of the LEFT common femoral, femoral and popliteal veins.  Doppler examination shows normal spontaneous and phasic flow.  No LEFT calf vein thrombosis is detected.    IMPRESSION:  No evidence of deep venous thrombosis in either lower extremity.        EXAM:  CT ABDOMEN AND PELVIS                        EXAM:  CT CHEST                        PROCEDURE DATE:  05/13/2021    PROCEDURE:  CT of the Chest, Abdomen and Pelvis was performed.  Sagittal and coronal reformats were performed.    FINDINGS:  CHEST:  LUNGS AND LARGE AIRWAYS: Patent central airways. Opacities in left upper lobe along the major fissure, likely atelectasis. Also see below.  PLEURA: Moderate left pleural effusion with compressive atelectasis of the left lower lobe.  VESSELS: Thoracic aorta normal in caliber with atherosclerotic changes in the thoracic aorta and coronary arteries.  HEART: Heart size is normal. No pericardial effusion.  MEDIASTINUM AND MARIA ELENA: No lymphadenopathy. Small to moderate hiatal hernia.  CHEST WALL AND LOWER NECK: No enlarged axillary lymph nodes.    ABDOMEN AND PELVIS:  LIVER: Within normal limits.  BILE DUCTS: Normal caliber.  GALLBLADDER: Cholecystectomy.  SPLEEN: Within normal limits.  PANCREAS: Atrophicwith fatty infiltration.  ADRENALS: Within normal limits.  KIDNEYS/URETERS: Dilated left renal pelvis with calculi measuring 1.7 x 1.2 cm  and 1.0 x 1.0 cm. Nonobstructing calculus in the mid left kidney measuring 0.5 cm. No right-sided renal calculi. Right extra renal pelvis.    BLADDER: Distended urinary bladder.  REPRODUCTIVE ORGANS: Prostate not enlarged.    BOWEL: Colonic diverticulosis without evidence of diverticulitis. No bowel obstruction. Appendix is normal.  PERITONEUM: 3.4 x 2.5 cm mesenteric mass in the right hemiabdomen with small adjacent lymph nodes and infiltration of the adjacent fat (series 2 image 117).  VESSELS: Abdominal aorta normal in caliber with mild calcified plaque.  RETROPERITONEUM/LYMPH NODES: No lymphadenopathy.  ABDOMINAL WALL: Unremarkable.  BONES: Compression deformity of the superior endplate of L2, unchanged since 4/23/2021. Mild compression deformity of the superior endplate of L1, unchanged. Degenerative changes in the spine. Left hip arthroplasty. Noacute fracture. Old left 10th rib fracture.    IMPRESSION:  No acute intrathoracic or intra-abdominal sequelae of trauma.  Moderate left pleural effusion with compressive atelectasis in the left lower lobe, slightly decreased since 4/25/2021.  3.4 x 2.5 cm mesenteric mass in the right hemiabdomen with small adjacent lymph nodes and mild infiltration of the adjacent fat suspicious for neoplasm; differential includes carcinoid or lymphoma.

## 2021-05-17 NOTE — PROGRESS NOTE ADULT - ASSESSMENT
88 y/o Male with a PMHx of T2DM, HTN, hiatal hernia, chronic left pleural effusion, CKD, presents to the ED BIBA [facility] after an unwitnessed fall. Patient states he was ambulating in his room and turned around too fast and tripped and fell. Patient was unable to get up on his own, and needed assistance. Patient with abrasion to R ear. No chest pain, sob, palpitation, abd pain, neck pain, lightheadedness/ dizziness, weakness, fatigue. Patient denies any loss of consciousness. He remember the whole episode clearly. No seizure like activity.  (14 May 2021 01:58)  pt is seen and examined in his room  prior large volume thoracentesis reported  he appears not interested in repeat tap / "my breathing is perfect"  no cp  no cough    Assessment / plan;  s/p syncope   Pafib / moderate MR  chronic left sided pleural effusion appears moderate in size  compressive atelectasis due to above  adequate oxygenation on room air / no resp distress  reported prior thoracentesis / pt is not interested in repeat procedure unless more sxs  mesenteric mass in right side abdomen    agree with diuresis as tolerated  will likley need and benefit from repeat large volume / diagnostic thoracentesis by IR next week if pt agrees to proceed   will fu with you  GI and oncology eval in progress  5/17  he declines hx of syncope  he feels "great" with his breathing  no DVT on dopplers  cxr findings are discussed  awaiting oncology eval  fu cxr today requested

## 2021-05-17 NOTE — ED PROVIDER NOTE - CROS ED GI ALL NEG
3 minPhysical Therapy Daily Progress Note    VISIT#: 8    Subjective   More Staton reports: knee very stiff after riding in car to therapy: doing HEP and cycle at home regularly. Purchased swiss ball to use at home.    Pain level: mild pain today 1/10  Date of surgery: 4/2/2021  Next MD appt: 7/27/21    Objective   Observation: mild erythema medial knee.   PROM:   AROM:   See Exercise, Manual, and Modality Logs for complete treatment.     Patient Education:      Assessment/Plan - able to correct gait and bend knee with verbal cues and after RCB - tends to exhibit a fair amount of stiffness b/w sessions.     Progress per Plan of Care           Timed:         Manual Therapy:      mins  39569;     Therapeutic Exercise:    45     mins  25418;     Neuromuscular Mili:        mins  40879;    Therapeutic Activity:          mins  77285;     Gait Training:           mins  05319;     Ultrasound:          mins  99428;    Ionto                                   mins   26957  Self Care                            mins   90741  Canalith Repos                   mins  4209  Aquatic                               mins 11289    Un-Timed:  Electrical Stimulation:        mins  18575 ( );  Dry Needling         mins self-pay  Traction          mins 23433  Low Eval          Mins  07045  Mod Eval          Mins  52498  High Eval                            Mins  89893  Re-Eval                               mins  12985    Timed Treatment:  45   mins   Total Treatment:     45  mins    Michi Juarez PT  
- - -

## 2021-05-17 NOTE — PROGRESS NOTE ADULT - SUBJECTIVE AND OBJECTIVE BOX
Patient is a 89y old  Male who presents with a chief complaint of Fall (14 May 2021 18:43)      HPI:  89 y/o Male with a PMHx of T2DM, HTN, hiatal hernia, chronic left pleural effusion, CKD, presents to the ED BIBA [facility] after an unwitnessed fall. Patient states he was ambulating in his room and turned around too fast and tripped and fell. Patient was unable to get up on his own, and needed assistance. Patient with abrasion to R ear. No chest pain, sob, palpitation, abd pain, neck pain, lightheadedness/ dizziness, weakness, fatigue. Patient denies any loss of consciousness. He remember the whole episode clearly. No seizure like activity.  (14 May 2021 01:58)  pt is seen and examined in his room  prior large volume thoracentesis reported  he appears not interested in repeat tap / "my breathing is perfect"  no cp  no cough    he declines hx of syncope  he feels "great" with his breathing  cxr findings are discussed    PAST MEDICAL & SURGICAL HISTORY:  DM (diabetes mellitus)    HTN (hypertension)    HLD (hyperlipidemia)    Kidney stone    Hernia    H/O left inguinal hernia repair    Endoscopy finding  choked on piece of chiken-had upper endo with clearance of food    History of tibial fracture  R tibial fracture s/p revision with hardware placement ~3-4yrs ago, per pt        PREVIOUS DIAGNOSTIC TESTING:      MEDICATIONS  (STANDING):  dextrose 40% Gel 15 Gram(s) Oral once  dextrose 5%. 1000 milliLiter(s) (50 mL/Hr) IV Continuous <Continuous>  dextrose 5%. 1000 milliLiter(s) (100 mL/Hr) IV Continuous <Continuous>  dextrose 50% Injectable 25 Gram(s) IV Push once  dextrose 50% Injectable 12.5 Gram(s) IV Push once  dextrose 50% Injectable 25 Gram(s) IV Push once  finasteride 5 milliGRAM(s) Oral daily  glucagon  Injectable 1 milliGRAM(s) IntraMuscular once  insulin glargine Injectable (LANTUS) 10 Unit(s) SubCutaneous at bedtime  insulin lispro (ADMELOG) corrective regimen sliding scale   SubCutaneous three times a day before meals  melatonin 5 milliGRAM(s) Oral at bedtime  metoprolol succinate ER 25 milliGRAM(s) Oral daily  pantoprazole    Tablet 40 milliGRAM(s) Oral before breakfast  sodium chloride 0.9%. 1000 milliLiter(s) (75 mL/Hr) IV Continuous <Continuous>  tamsulosin 0.4 milliGRAM(s) Oral at bedtime  traMADol 50 milliGRAM(s) Oral daily  valsartan 160 milliGRAM(s) Oral daily    MEDICATIONS  (PRN):  acetaminophen   Tablet .. 650 milliGRAM(s) Oral every 6 hours PRN Temp greater or equal to 38C (100.4F), Mild Pain (1 - 3)      FAMILY HISTORY:  No pertinent family history in first degree relatives  pt cannot recall any history of disease        SOCIAL HISTORY:  resides in [facility]    REVIEW OF SYSTEM:  Pertinent items are noted in HPI.      Vital Signs Last 24 Hrs  T(C): 36.9 (16 May 2021 21:31), Max: 36.9 (16 May 2021 21:31)  T(F): 98.4 (16 May 2021 21:31), Max: 98.4 (16 May 2021 21:31)  HR: 66 (16 May 2021 21:31) (59 - 75)  BP: 129/62 (16 May 2021 15:29) (122/65 - 165/62)  BP(mean): --  RR: 18 (16 May 2021 15:29) (18 - 18)  SpO2: 96% (16 May 2021 21:31) (96% - 97%)  PHYSICAL EXAM  General Appearance: cooperative, no acute distress, elderly male  HEENT: PERRL, conjunctiva clear,Neck: Supple  Lungs: decreased breath sounds left mi- lower ling filed, no wheeze  dull ness to percussion LLL  Heart: Regular rate and rhythm, S1, S2 normal  Abdomen: Soft, non-tender, bowel sounds active  Extremities: no cyanosis, some peripheral edema, no joint swelling  Skin: Skin color, texture normal, no rashes   Neurologic: non focal    ECG:    LABS:                        10.0   4.57  )-----------( 208      ( 17 May 2021 07:30 )             31.0   05-17    140  |  111<H>  |  28<H>  ----------------------------<  169<H>  3.8   |  23  |  1.10    Ca    8.0<L>      17 May 2021 07:30                            9.8    4.87  )-----------( 204      ( 15 May 2021 08:57 )             30.4     05-15    140  |  112<H>  |  19  ----------------------------<  187<H>  3.9   |  22  |  0.99    Ca    8.5      15 May 2021 08:57    TPro  6.2  /  Alb  2.8<L>  /  TBili  0.2  /  DBili  x   /  AST  10<L>  /  ALT  15  /  AlkPhos  114  05-13    CARDIAC MARKERS ( 13 May 2021 19:23 )  <0.015 ng/mL / x     / x     / x     / x      CARDIAC MARKERS ( 13 May 2021 17:56 )  <0.015 ng/mL / x     / x     / x     / x              PT/INR - ( 13 May 2021 16:12 )   PT: 12.0 sec;   INR: 1.03 ratio         PTT - ( 13 May 2021 16:12 )  PTT:30.5 sec  Urinalysis Basic - ( 13 May 2021 16:12 )    Color: Yellow / Appearance: Clear / S.015 / pH: x  Gluc: x / Ketone: Trace  / Bili: Negative / Urobili: Negative mg/dL   Blood: x / Protein: 15 mg/dL / Nitrite: Negative   Leuk Esterase: Negative / RBC: >50 /HPF / WBC 6-10   Sq Epi: x / Non Sq Epi: Few / Bacteria: Few      < from: CT Chest No Cont (21 @ 15:47) >  COMPARISON: CT chest 2021 and CT chest/abdomen/pelvis 2021    CONTRAST/COMPLICATIONS:  IV Contrast: NONE  Oral Contrast: NONE  Complications: None reported at time of study completion    PROCEDURE:  CT of the Chest, Abdomen and Pelvis was performed.  Sagittal and coronal reformats were performed.    FINDINGS:  CHEST:  LUNGS AND LARGE AIRWAYS: Patent central airways. Opacities in left upper lobe along the major fissure, likely atelectasis. Also see below.  PLEURA: Moderate left pleural effusion with compressive atelectasis of the left lower lobe.  VESSELS: Thoracic aorta normal in caliber with atherosclerotic changes in the thoracic aorta and coronary arteries.  HEART: Heart size is normal. No pericardial effusion.  MEDIASTINUM AND MARIA ELENA: No lymphadenopathy. Small to moderate hiatal hernia.  CHEST WALL AND LOWER NECK: No enlarged axillary lymph nodes.    ABDOMEN AND PELVIS:  LIVER: Within normal limits.  BILE DUCTS: Normal caliber.  GALLBLADDER: Cholecystectomy.  SPLEEN: Within normal limits.  PANCREAS: Atrophicwith fatty infiltration.  ADRENALS: Within normal limits.  KIDNEYS/URETERS: Dilated left renal pelvis with calculi measuring 1.7 x 1.2 cm  and 1.0 x 1.0 cm. Nonobstructing calculus in the mid left kidney measuring 0.5 cm. No right-sided renal calculi. Right extra renal pelvis.    BLADDER: Distended urinary bladder.  REPRODUCTIVE ORGANS: Prostate not enlarged.    BOWEL: Colonic diverticulosis without evidence of diverticulitis. No bowel obstruction. Appendix is normal.  PERITONEUM: 3.4 x 2.5 cm mesenteric mass in the right hemiabdomen with small adjacent lymph nodes and infiltration of the adjacent fat (series 2 image 117).  VESSELS: Abdominal aorta normal in caliber with mild calcified plaque.  RETROPERITONEUM/LYMPH NODES: No lymphadenopathy.  ABDOMINAL WALL: Unremarkable.  BONES: Compression deformity of the superior endplate of L2, unchanged since 2021. Mild compression deformity of the superior endplate of L1, unchanged. Degenerative changes in the spine. Left hip arthroplasty. Noacute fracture. Old left 10th rib fracture.    IMPRESSION:  No acute intrathoracic or intra-abdominal sequelae of trauma.    Moderate left pleural effusion with compressive atelectasis in the left lower lobe, slightly decreased since 2021.     3.4 x 2.5 cm mesenteric mass in the right hemiabdomen with small adjacent lymph nodes and mild infiltration of the adjacent fat suspicious for neoplasm; differential includes carcinoid or lymphoma.    < end of copied text >        RADIOLOGY & ADDITIONAL STUDIES:  r< from: US Duplex Venous Lower Ext Complete, Bilateral (. @ 19:11) >  PROCEDURE DATE:  2021          INTERPRETATION:  CLINICAL INFORMATION: Reported history of DVT; recent CT evaluation raises suspicion for the presence of an intra-abdominal carcinoid versus lymphoma. Evaluate for lower extremity DVT.    COMPARISON: No prior venous sonographic evaluations are available for comparison.    TECHNIQUE: Duplex sonography of the BILATERAL LOWER extremity veins with color and spectral Doppler, with and without compression.    FINDINGS: Bilateral calf subcutaneous edema identified.    RIGHT:  Normal compressibility of the RIGHT common femoral, femoral and popliteal veins.  Doppler examination shows normal spontaneous and phasic flow.  No RIGHT calf vein thrombosis is detected.    LEFT:  Normal compressibility of the LEFT common femoral, femoral and popliteal veins.  Doppler examination shows normal spontaneous and phasic flow.  No LEFT calf vein thrombosis is detected.    IMPRESSION:  No evidence of deep venous thrombosis in either lower extremity.    < end of copied text >

## 2021-05-17 NOTE — PROVIDER CONTACT NOTE (OTHER) - NAME OF MD/NP/PA/DO NOTIFIED:
Dr Rivera
Consult called in for Dr. Hartman (on call) CAMILLE Rizo at [facility]
Dr Cherry
Dr Noyola
Dr Conklin

## 2021-05-17 NOTE — PROVIDER CONTACT NOTE (OTHER) - SITUATION
Office made aware of consult.
spoke with Nisha.  is aware of consult
Office made aware of consult.
Office made aware of consult.

## 2021-05-17 NOTE — CONSULT NOTE ADULT - PROBLEM SELECTOR RECOMMENDATION 2
(correction) paroxysmal afib.  Currently NSR.  restart eliquis when safe from procdural standpoint.  cont. BB.

## 2021-05-18 DIAGNOSIS — J90 PLEURAL EFFUSION, NOT ELSEWHERE CLASSIFIED: ICD-10-CM

## 2021-05-18 LAB
ADD ON TEST-SPECIMEN IN LAB: SIGNIFICANT CHANGE UP
ALBUMIN SERPL ELPH-MCNC: 2.3 G/DL — LOW (ref 3.3–5)
ALP SERPL-CCNC: 79 U/L — SIGNIFICANT CHANGE UP (ref 40–120)
ALT FLD-CCNC: 13 U/L — SIGNIFICANT CHANGE UP (ref 12–78)
ANION GAP SERPL CALC-SCNC: 3 MMOL/L — LOW (ref 5–17)
ANION GAP SERPL CALC-SCNC: 4 MMOL/L — LOW (ref 5–17)
AST SERPL-CCNC: 10 U/L — LOW (ref 15–37)
BILIRUB SERPL-MCNC: 0.2 MG/DL — SIGNIFICANT CHANGE UP (ref 0.2–1.2)
BUN SERPL-MCNC: 23 MG/DL — SIGNIFICANT CHANGE UP (ref 7–23)
BUN SERPL-MCNC: 24 MG/DL — HIGH (ref 7–23)
CALCIUM SERPL-MCNC: 8.3 MG/DL — LOW (ref 8.5–10.1)
CALCIUM SERPL-MCNC: 8.5 MG/DL — SIGNIFICANT CHANGE UP (ref 8.5–10.1)
CANCER AG19-9 SERPL-ACNC: 9 U/ML — SIGNIFICANT CHANGE UP
CEA SERPL-MCNC: 1.7 NG/ML — SIGNIFICANT CHANGE UP (ref 0–3.8)
CHLORIDE SERPL-SCNC: 110 MMOL/L — HIGH (ref 96–108)
CHLORIDE SERPL-SCNC: 112 MMOL/L — HIGH (ref 96–108)
CO2 SERPL-SCNC: 25 MMOL/L — SIGNIFICANT CHANGE UP (ref 22–31)
CO2 SERPL-SCNC: 28 MMOL/L — SIGNIFICANT CHANGE UP (ref 22–31)
CREAT SERPL-MCNC: 1.1 MG/DL — SIGNIFICANT CHANGE UP (ref 0.5–1.3)
CREAT SERPL-MCNC: 1.18 MG/DL — SIGNIFICANT CHANGE UP (ref 0.5–1.3)
GLUCOSE BLDC GLUCOMTR-MCNC: 115 MG/DL — HIGH (ref 70–99)
GLUCOSE SERPL-MCNC: 116 MG/DL — HIGH (ref 70–99)
GLUCOSE SERPL-MCNC: 211 MG/DL — HIGH (ref 70–99)
HCT VFR BLD CALC: 31.4 % — LOW (ref 39–50)
HGB BLD-MCNC: 10.1 G/DL — LOW (ref 13–17)
LDH SERPL L TO P-CCNC: 133 U/L — SIGNIFICANT CHANGE UP (ref 84–241)
MCHC RBC-ENTMCNC: 30.4 PG — SIGNIFICANT CHANGE UP (ref 27–34)
MCHC RBC-ENTMCNC: 32.2 GM/DL — SIGNIFICANT CHANGE UP (ref 32–36)
MCV RBC AUTO: 94.6 FL — SIGNIFICANT CHANGE UP (ref 80–100)
PLATELET # BLD AUTO: 217 K/UL — SIGNIFICANT CHANGE UP (ref 150–400)
POTASSIUM SERPL-MCNC: 4.1 MMOL/L — SIGNIFICANT CHANGE UP (ref 3.5–5.3)
POTASSIUM SERPL-MCNC: 4.1 MMOL/L — SIGNIFICANT CHANGE UP (ref 3.5–5.3)
POTASSIUM SERPL-SCNC: 4.1 MMOL/L — SIGNIFICANT CHANGE UP (ref 3.5–5.3)
POTASSIUM SERPL-SCNC: 4.1 MMOL/L — SIGNIFICANT CHANGE UP (ref 3.5–5.3)
PROT SERPL-MCNC: 5.4 GM/DL — LOW (ref 6–8.3)
RBC # BLD: 3.32 M/UL — LOW (ref 4.2–5.8)
RBC # FLD: 14.6 % — HIGH (ref 10.3–14.5)
SODIUM SERPL-SCNC: 141 MMOL/L — SIGNIFICANT CHANGE UP (ref 135–145)
SODIUM SERPL-SCNC: 141 MMOL/L — SIGNIFICANT CHANGE UP (ref 135–145)
WBC # BLD: 4.29 K/UL — SIGNIFICANT CHANGE UP (ref 3.8–10.5)
WBC # FLD AUTO: 4.29 K/UL — SIGNIFICANT CHANGE UP (ref 3.8–10.5)

## 2021-05-18 PROCEDURE — 99221 1ST HOSP IP/OBS SF/LOW 40: CPT

## 2021-05-18 PROCEDURE — 99233 SBSQ HOSP IP/OBS HIGH 50: CPT

## 2021-05-18 PROCEDURE — 99222 1ST HOSP IP/OBS MODERATE 55: CPT

## 2021-05-18 RX ORDER — FUROSEMIDE 40 MG
40 TABLET ORAL DAILY
Refills: 0 | Status: DISCONTINUED | OUTPATIENT
Start: 2021-05-18 | End: 2021-05-20

## 2021-05-18 RX ADMIN — Medication 5 MILLIGRAM(S): at 21:17

## 2021-05-18 RX ADMIN — FINASTERIDE 5 MILLIGRAM(S): 5 TABLET, FILM COATED ORAL at 09:52

## 2021-05-18 RX ADMIN — TRAMADOL HYDROCHLORIDE 50 MILLIGRAM(S): 50 TABLET ORAL at 09:52

## 2021-05-18 RX ADMIN — ENOXAPARIN SODIUM 100 MILLIGRAM(S): 100 INJECTION SUBCUTANEOUS at 20:23

## 2021-05-18 RX ADMIN — Medication 40 MILLIGRAM(S): at 21:17

## 2021-05-18 RX ADMIN — Medication 4: at 17:09

## 2021-05-18 RX ADMIN — ENOXAPARIN SODIUM 100 MILLIGRAM(S): 100 INJECTION SUBCUTANEOUS at 09:52

## 2021-05-18 RX ADMIN — VALSARTAN 160 MILLIGRAM(S): 80 TABLET ORAL at 09:52

## 2021-05-18 RX ADMIN — Medication 25 MILLIGRAM(S): at 09:52

## 2021-05-18 RX ADMIN — Medication 4: at 12:59

## 2021-05-18 RX ADMIN — PREGABALIN 1000 MICROGRAM(S): 225 CAPSULE ORAL at 12:58

## 2021-05-18 RX ADMIN — PANTOPRAZOLE SODIUM 40 MILLIGRAM(S): 20 TABLET, DELAYED RELEASE ORAL at 07:48

## 2021-05-18 RX ADMIN — INSULIN GLARGINE 10 UNIT(S): 100 INJECTION, SOLUTION SUBCUTANEOUS at 21:17

## 2021-05-18 RX ADMIN — TAMSULOSIN HYDROCHLORIDE 0.4 MILLIGRAM(S): 0.4 CAPSULE ORAL at 21:17

## 2021-05-18 NOTE — PROGRESS NOTE ADULT - SUBJECTIVE AND OBJECTIVE BOX
HPI:  89 y/o Male with a PMHx of T2DM, HTN, hiatal hernia, chronic left pleural effusion, CKD, presents to the ED BIBA [facility] after an unwitnessed fall. Patient states he was ambulating in his room and turned around too fast and tripped and fell. Patient was unable to get up on his own, and needed assistance. Patient with abrasion to R ear. No chest pain, sob, palpitation, abd pain, neck pain, lightheadedness/ dizziness, weakness, fatigue. Patient denies any loss of consciousness. He remember the whole episode clearly. No seizure like activity.  (14 May 2021 01:58)    pt admitted for fall that was unwitnessed, also w/ chronic L pleural effusion,  mesenteric mass (incidental finding).     Reviewed tele, no arrhythmias.  Echo pending.  Reviewed episode w/ pt.  He seems to be a competent historian  & denies that he lost consciousness prior to fall.  States he went to turn & lost balance.  No lightheadness, dizziness or syncope since admit.    : no complaints, no lightheadness.    MEDICATIONS:  OUTPATIENT  Home Medications:  Admelog SoloStar 100 units/mL injectable solution: Sliding Scale (13 May 2021 21:06)  Basaglar KwikPen 100 units/mL subcutaneous solution: 13 unit(s) subcutaneous once a day (at bedtime) (14 May 2021 09:02)  Eliquis 5 mg oral tablet: 1 tab(s) orally 2 times a day (13 May 2021 21:06)  finasteride 5 mg oral tablet: 1 tab(s) orally once a day (13 May 2021 21:06)  glimepiride 2 mg oral tablet: 1 tab(s) orally 2 times a day (13 May 2021 21:06)  metoprolol succinate 25 mg oral tablet, extended release: 1 tab(s) orally once a day (13 May 2021 21:06)  omeprazole 40 mg oral delayed release capsule: 1 cap(s) orally once a day (13 May 2021 21:06)  Tradjenta 5 mg oral tablet: 1 tab(s) orally once a day (13 May 2021 21:06)  traMADol 50 mg oral tablet: 1 tab(s) orally once a day (14 May 2021 09:02)  valsartan 160 mg oral tablet: 1 tab(s) orally once a day (13 May 2021 21:06)      INPATIENT  MEDICATIONS  (STANDING):  cyanocobalamin Injectable 1000 MICROGram(s) SubCutaneous daily  dextrose 40% Gel 15 Gram(s) Oral once  dextrose 5%. 1000 milliLiter(s) (50 mL/Hr) IV Continuous <Continuous>  dextrose 5%. 1000 milliLiter(s) (100 mL/Hr) IV Continuous <Continuous>  dextrose 50% Injectable 25 Gram(s) IV Push once  dextrose 50% Injectable 12.5 Gram(s) IV Push once  dextrose 50% Injectable 25 Gram(s) IV Push once  enoxaparin Injectable 100 milliGRAM(s) SubCutaneous two times a day  finasteride 5 milliGRAM(s) Oral daily  furosemide   Injectable 40 milliGRAM(s) IV Push daily  glucagon  Injectable 1 milliGRAM(s) IntraMuscular once  insulin glargine Injectable (LANTUS) 10 Unit(s) SubCutaneous at bedtime  insulin lispro (ADMELOG) corrective regimen sliding scale   SubCutaneous three times a day before meals  melatonin 5 milliGRAM(s) Oral at bedtime  metoprolol succinate ER 25 milliGRAM(s) Oral daily  pantoprazole    Tablet 40 milliGRAM(s) Oral before breakfast  sodium chloride 0.9%. 1000 milliLiter(s) (75 mL/Hr) IV Continuous <Continuous>  tamsulosin 0.4 milliGRAM(s) Oral at bedtime  traMADol 50 milliGRAM(s) Oral daily  valsartan 160 milliGRAM(s) Oral daily    MEDICATIONS  (PRN):  acetaminophen   Tablet .. 650 milliGRAM(s) Oral every 6 hours PRN Temp greater or equal to 38C (100.4F), Mild Pain (1 - 3)      Vital Signs Last 24 Hrs  T(C): 36.4 (18 May 2021 16:28), Max: 36.8 (18 May 2021 05:34)  T(F): 97.6 (18 May 2021 16:28), Max: 98.3 (18 May 2021 05:34)  HR: 57 (18 May 2021 16:28) (54 - 66)  BP: 132/61 (18 May 2021 16:28) (115/56 - 132/61)  BP(mean): --  RR: 18 (18 May 2021 16:28) (18 - 18)  SpO2: 94% (18 May 2021 16:28) (94% - 98%)Daily     Daily Weight in k.8 (18 May 2021 05:34)I&O's Summary    17 May 2021 07:01  -  18 May 2021 07:00  --------------------------------------------------------  IN: 100 mL / OUT: 175 mL / NET: -75 mL        I&O's Detail    17 May 2021 07:01  -  18 May 2021 07:00  --------------------------------------------------------  IN:    Other (mL): 100 mL  Total IN: 100 mL    OUT:    Voided (mL): 175 mL  Total OUT: 175 mL    Total NET: -75 mL      PHYSICAL EXAM:    Constitutional: NAD, awake and alert,   HEENT: PERR, EOMI,  No oral cyananosis.  Neck:  supple,  No JVD  Respiratory: Breath sounds are clear bilaterally, No wheezing, rales or rhonchi  Cardiovascular: S1 and S2, regular rate and rhythm, no Murmurs, gallops or rubs  Gastrointestinal: Bowel Sounds present, soft, nontender.   Extremities: +1 peripheral edema. No clubbing or cyanosis.  Vascular: 2+ peripheral pulses  Neurological: A/O x 3, no focal deficits    LABS: All Labs Reviewed:                        10.1   4.29  )-----------( 217      ( 18 May 2021 07:40 )             31.4                         10.0   4.57  )-----------( 208      ( 17 May 2021 07:30 )             31.0                         10.0   5.42  )-----------( 205      ( 16 May 2021 08:43 )             31.8     18 May 2021 10:14    141    |  112    |  24     ----------------------------<  211    4.1     |  25     |  1.18   18 May 2021 07:40    141    |  110    |  23     ----------------------------<  116    4.1     |  28     |  1.10   17 May 2021 07:30    140    |  111    |  28     ----------------------------<  169    3.8     |  23     |  1.10     Ca    8.5        18 May 2021 10:14  Ca    8.3        18 May 2021 07:40  Ca    8.0        17 May 2021 07:30    TPro  5.4    /  Alb  2.3    /  TBili  0.2    /  DBili  x      /  AST  10     /  ALT  13     /  AlkPhos  79     18 May 2021 10:14          Blood Culture:           ===============================  EKG:  < from: 12 Lead ECG (21 @ 00:25) >  Diagnosis Line Sinus rhythm with 1st degree A-V block  Otherwise normal ECG  When compared with ECG of 2021 20:39,  No significant change was found  Confirmed by Trevon Reynolds MD ([id number]) on 2021 8:43:29 AM    < end of copied text >    TELE: sinus rhythm, no tachy/byron arrhythmias.  ===============================  Nicholas Tate M.D.  Cardiology, [facility]  Cell: [phone]  Office:   [phone] ([facility] Office)  [phone] ([facility] Office)

## 2021-05-18 NOTE — CONSULT NOTE ADULT - ATTENDING COMMENTS
89  year old man admitted s/p fall and found to have a messenteric mass.     On my review of imaging, endoscopic ultrasound guided biopsy cannot access this mass. As IR cannot find a safe window, then next step would be a surgical biopsy but as he is 89 would go over goals of care prior to initiating a laparoscopic biopsy.

## 2021-05-18 NOTE — PROGRESS NOTE ADULT - ASSESSMENT
89 y/o Male with a PMHx of DVT (apixaban), nephrolithiasis,    T2DM, HTN, hiatal hernia, chronic left pleural effusion, CKD admitted for:     1. S/p fall, r/o syncope.  - patient denied LOC  - imaging:  no acute traumatic findings.  - telemetry monitoring:  NSR 68.  PVCs.  A-paced.  - orthostatic VS: neg   - TTE:  pending report   - PT  - cardio eval appreciated       2. Chronic  L pleural effusion with LE edema, CHF?   - respiratory status stable   - chest CT:  moderate L effusion   - repeat CXR with worsening   -Check BNP  - LE doppler neg for DVT   -  monitor pulse ox   - Trial of IV lasix   - D/w Dr Rivera, plan for thora in am with IR, Pt agrees       3. New mesenteric mass.  - incidental finding on CT.  - CT AB/pelvis:  mesenteric mass in the R rebel abdomen.  ddx includes carcinoid v. lymphoma.  - D/w DR Barrios, no safe window for Bx as per  imaging  -D/w GI no ERCP or Bx   -f/u  tumor markers   - If Pleural fluid cytology neg, will need Dx Laparoscopy and Bx       5. hx DVT. LE edema likely 2/2 venous insufficiency, postphlebitic syndrome ?  - repeat LE venous doppler neg for DVT   - Of DOAC with bridging  with Lovenox for procedures        3. hx AF (?).  - "AF" documented in H&P and Pulmonary notes,  no  documentation (from Cardiology or otherwise) to confirm this,  - On AC  - C/w  metoprolol ER 25mg po qd.  -C/w tele       4. DM2.  - A1C:  8.7   - consistent CHO diet.  - basal insulin:  10units sq qhs.  - correction insulin coverage.    5. DVT prophylaxis. Hold in am for procedure   - LMWH.      Dispo: NPO after midnight for thoracentesis, hold Lovenox in am, d/w RN       POC d/w Daughter Nicky [phone]. Reviewed results and possible plan

## 2021-05-18 NOTE — PROGRESS NOTE ADULT - ASSESSMENT
89 y/o Male with a PMHx of T2DM, HTN, hiatal hernia, chronic left pleural effusion, CKD, presents to the ED BIBA [facility] after an unwitnessed fall. Patient states he was ambulating in his room and turned around too fast and tripped and fell. Patient was unable to get up on his own, and needed assistance. Patient with abrasion to R ear. No chest pain, sob, palpitation, abd pain, neck pain, lightheadedness/ dizziness, weakness, fatigue. Patient denies any loss of consciousness. He remember the whole episode clearly. No seizure like activity.  (14 May 2021 01:58)  pt is seen and examined in his room  prior large volume thoracentesis reported  he appears not interested in repeat tap / "my breathing is perfect"  no cp  no cough    Assessment / plan;  s/p syncope   Pafib / moderate MR  chronic left sided pleural effusion appears moderate in size  compressive atelectasis due to above  adequate oxygenation on room air / no resp distress  reported prior thoracentesis / pt is not interested in repeat procedure unless more sxs  mesenteric mass in right side abdomen    agree with diuresis as tolerated  will likley need and benefit from repeat large volume / diagnostic thoracentesis by IR next week if pt agrees to proceed   will fu with you  GI and oncology eval in progress  5/17  he declines hx of syncope  he feels "great" with his breathing  no DVT on dopplers  cxr findings are discussed  awaiting oncology eval  fu cxr today requested  5/18  All recent data are reviewed in his room with him today  Mesenteric mass could not be biopsied by IR yesterday  he appears asymptomatic from left sided effusion but may have some yield for cytology to be sent if he agrees  he is not even suing o2 in his room now  no active pulm sxs / fu cxr noted with similar results  awaiting possible oncology eval

## 2021-05-18 NOTE — PROGRESS NOTE ADULT - SUBJECTIVE AND OBJECTIVE BOX
CC: Fall     HPI: 89 y/o Male with a PMHx of T2DM, HTN, hiatal hernia, chronic left pleural effusion, CKD, presents to the ED [facility] after an unwitnessed fall. Patient states he was ambulating in his room and turned around too fast and tripped and fell. Patient was unable to get up on his own, and needed assistance. Patient with abrasion to R ear. No chest pain, sob, palpitation, abd pain, neck pain, lightheadedness/ dizziness, weakness, fatigue. Patient denies any loss of consciousness. He remember the whole episode clearly. No seizure like activity.      INTERVAL HPI/ OVERNIGHT EVENTS:  Pt was seen and examined, no complains,  feels well.  Plan for thoracentesis in am discussed, he agrees. No CP no SOB        Vital Signs Last 24 Hrs  T(C): 36.4 (18 May 2021 16:28), Max: 36.8 (18 May 2021 05:34)  T(F): 97.6 (18 May 2021 16:28), Max: 98.3 (18 May 2021 05:34)  HR: 57 (18 May 2021 16:28) (54 - 66)  BP: 132/61 (18 May 2021 16:28) (115/56 - 132/61)  RR: 18 (18 May 2021 16:28) (18 - 18)  SpO2: 94% (18 May 2021 16:28) (94% - 98%)      REVIEW OF SYSTEMS:  All other review of systems is negative unless indicated above.      PHYSICAL EXAM:  General: Well developed;  in no acute distress  Eyes: PERRLA, EOMI; conjunctiva and sclera clear  Head: Normocephalic; atraumatic  ENMT: No nasal discharge; airway clear  Neck: Supple; non tender; no masses  Respiratory: Decreased  BS  L lung,   No wheezes  Cardiovascular: Irregular rate and rhythm. S1 and S2 Normal;   Gastrointestinal: Soft non-tender non-distended; Normal bowel sounds  Genitourinary: No  suprapubic  tenderness  Extremities: + 2-3 pitting  edema  Vascular: Peripheral pulses palpable 2+ bilaterally  Neurological: Alert and oriented x3, non focal   Musculoskeletal: Normal muscle tone and strength   Psychiatric: Cooperative       LABS:                           10.1   4.29  )-----------( 217      ( 18 May 2021 07:40 )             31.4     05-18    141  |  112<H>  |  24<H>  ----------------------------<  211<H>  4.1   |  25  |  1.18    Ca    8.5      18 May 2021 10:14    TPro  5.4<L>  /  Alb  2.3<L>  /  TBili  0.2  /  DBili  x   /  AST  10<L>  /  ALT  13  /  AlkPhos  79  05-18        LIVER FUNCTIONS - ( 18 May 2021 10:14 )  Alb: 2.3 g/dL / Pro: 5.4 gm/dL / ALK PHOS: 79 U/L / ALT: 13 U/L / AST: 10 U/L / GGT: x                                     10.0   4.57  )-----------( 208      ( 17 May 2021 07:30 )             31.0     17 May 2021 07:30    140    |  111    |  28     ----------------------------<  169    3.8     |  23     |  1.10     Ca    8.0        17 May 2021 07:30      CAPILLARY BLOOD GLUCOSE  POCT Blood Glucose.: 210 mg/dL (18 May 2021 16:41)  POCT Blood Glucose.: 227 mg/dL (18 May 2021 12:21)  POCT Blood Glucose.: 115 mg/dL (18 May 2021 07:46)  POCT Blood Glucose.: 204 mg/dL (17 May 2021 22:54)        MEDICATIONS  (STANDING):  cyanocobalamin Injectable 1000 MICROGram(s) SubCutaneous daily  dextrose 40% Gel 15 Gram(s) Oral once  dextrose 5%. 1000 milliLiter(s) (50 mL/Hr) IV Continuous <Continuous>  dextrose 5%. 1000 milliLiter(s) (100 mL/Hr) IV Continuous <Continuous>  dextrose 50% Injectable 25 Gram(s) IV Push once  dextrose 50% Injectable 12.5 Gram(s) IV Push once  dextrose 50% Injectable 25 Gram(s) IV Push once  enoxaparin Injectable 100 milliGRAM(s) SubCutaneous two times a day  finasteride 5 milliGRAM(s) Oral daily  furosemide   Injectable 40 milliGRAM(s) IV Push daily  glucagon  Injectable 1 milliGRAM(s) IntraMuscular once  insulin glargine Injectable (LANTUS) 10 Unit(s) SubCutaneous at bedtime  insulin lispro (ADMELOG) corrective regimen sliding scale   SubCutaneous three times a day before meals  melatonin 5 milliGRAM(s) Oral at bedtime  metoprolol succinate ER 25 milliGRAM(s) Oral daily  pantoprazole    Tablet 40 milliGRAM(s) Oral before breakfast  sodium chloride 0.9%. 1000 milliLiter(s) (75 mL/Hr) IV Continuous <Continuous>  tamsulosin 0.4 milliGRAM(s) Oral at bedtime  traMADol 50 milliGRAM(s) Oral daily  valsartan 160 milliGRAM(s) Oral daily    MEDICATIONS  (PRN):  acetaminophen   Tablet .. 650 milliGRAM(s) Oral every 6 hours PRN Temp greater or equal to 38C (100.4F), Mild Pain (1 - 3)      RADIOLOGY & ADDITIONAL TESTS:      EXAM:  US DPLX LWR EXT VEINS COMPL BI                        PROCEDURE DATE:  05/16/2021        FINDINGS: Bilateral calf subcutaneous edema identified.    RIGHT:  Normal compressibility of the RIGHT common femoral, femoral and popliteal veins.  Doppler examination shows normal spontaneous and phasic flow.  No RIGHT calf vein thrombosis is detected.    LEFT:  Normal compressibility of the LEFT common femoral, femoral and popliteal veins.  Doppler examination shows normal spontaneous and phasic flow.  No LEFT calf vein thrombosis is detected.    IMPRESSION:  No evidence of deep venous thrombosis in either lower extremity.        EXAM:  CT ABDOMEN AND PELVIS                        EXAM:  CT CHEST                        PROCEDURE DATE:  05/13/2021    PROCEDURE:  CT of the Chest, Abdomen and Pelvis was performed.  Sagittal and coronal reformats were performed.    FINDINGS:  CHEST:  LUNGS AND LARGE AIRWAYS: Patent central airways. Opacities in left upper lobe along the major fissure, likely atelectasis. Also see below.  PLEURA: Moderate left pleural effusion with compressive atelectasis of the left lower lobe.  VESSELS: Thoracic aorta normal in caliber with atherosclerotic changes in the thoracic aorta and coronary arteries.  HEART: Heart size is normal. No pericardial effusion.  MEDIASTINUM AND MARIA ELENA: No lymphadenopathy. Small to moderate hiatal hernia.  CHEST WALL AND LOWER NECK: No enlarged axillary lymph nodes.    ABDOMEN AND PELVIS:  LIVER: Within normal limits.  BILE DUCTS: Normal caliber.  GALLBLADDER: Cholecystectomy.  SPLEEN: Within normal limits.  PANCREAS: Atrophicwith fatty infiltration.  ADRENALS: Within normal limits.  KIDNEYS/URETERS: Dilated left renal pelvis with calculi measuring 1.7 x 1.2 cm  and 1.0 x 1.0 cm. Nonobstructing calculus in the mid left kidney measuring 0.5 cm. No right-sided renal calculi. Right extra renal pelvis.    BLADDER: Distended urinary bladder.  REPRODUCTIVE ORGANS: Prostate not enlarged.    BOWEL: Colonic diverticulosis without evidence of diverticulitis. No bowel obstruction. Appendix is normal.  PERITONEUM: 3.4 x 2.5 cm mesenteric mass in the right hemiabdomen with small adjacent lymph nodes and infiltration of the adjacent fat (series 2 image 117).  VESSELS: Abdominal aorta normal in caliber with mild calcified plaque.  RETROPERITONEUM/LYMPH NODES: No lymphadenopathy.  ABDOMINAL WALL: Unremarkable.  BONES: Compression deformity of the superior endplate of L2, unchanged since 4/23/2021. Mild compression deformity of the superior endplate of L1, unchanged. Degenerative changes in the spine. Left hip arthroplasty. Noacute fracture. Old left 10th rib fracture.    IMPRESSION:  No acute intrathoracic or intra-abdominal sequelae of trauma.  Moderate left pleural effusion with compressive atelectasis in the left lower lobe, slightly decreased since 4/25/2021.  3.4 x 2.5 cm mesenteric mass in the right hemiabdomen with small adjacent lymph nodes and mild infiltration of the adjacent fat suspicious for neoplasm; differential includes carcinoid or lymphoma.

## 2021-05-18 NOTE — CONSULT NOTE ADULT - ATTENDING COMMENTS
An 89 year old male with a right sided mesenteric mass that is not accessible by CT guided biopsy or EUS. The differential includes lymphoma or neuroendocrine tumor. He is relatively asymptomatic except for weakness. The mass can be biopsied using laparoscopy. We will discuss with the primary team when his goals of care are and find out if the iopsy was positive would he want chemotherapy.

## 2021-05-18 NOTE — PROGRESS NOTE ADULT - SUBJECTIVE AND OBJECTIVE BOX
Patient is a 89y old  Male who presents with a chief complaint of Fall (14 May 2021 18:43)      HPI:  88 y/o Male with a PMHx of T2DM, HTN, hiatal hernia, chronic left pleural effusion, CKD, [facility] unwitness[facility]. Patient states he was ambulating in his room and turned around too fast and tripped and fell. Patient was unable to get up on his own, and needed assistance. Patient with abrasion to R ear. No chest pain, sob, palpitation, abd pain, neck pain, lightheadedness/ dizziness, weakness, fatigue. Patient denies any loss of consciousness. He remember the whole episode clearly. No seizure like activity.  (14 May 2021 01:58)  pt is seen and examined in his room  prior large volume thoracentesis reported  he appears not interested in repeat tap / "my breathing is perfect"  no cp  no cough    he declines hx of syncope  he feels "great" with his breathing  cxr findings are discussed    All recent data are reviewed in his room with him today  Mesenteric mass could not be biopsied by IR yesterday  he appears asymptomatic from left sided effusion but may have some yield for cytology to be sent if he agrees  he is not even suing o2 in his room now  no active pulm sxs  awaiting possible oncology eval  PAST MEDICAL & SURGICAL HISTORY:  DM (diabetes mellitus)    HTN (hypertension)    HLD (hyperlipidemia)    Kidney stone    Hernia    H/O left inguinal hernia repair    Endoscopy finding  choked on piece of chiken-had upper endo with clearance of food    History of tibial fracture  R tibial fracture s/p revision with hardware placement ~3-4yrs ago, per pt        PREVIOUS DIAGNOSTIC TESTING:      MEDICATIONS  (STANDING):  dextrose 40% Gel 15 Gram(s) Oral once  dextrose 5%. 1000 milliLiter(s) (50 mL/Hr) IV Continuous <Continuous>  dextrose 5%. 1000 milliLiter(s) (100 mL/Hr) IV Continuous <Continuous>  dextrose 50% Injectable 25 Gram(s) IV Push once  dextrose 50% Injectable 12.5 Gram(s) IV Push once  dextrose 50% Injectable 25 Gram(s) IV Push once  finasteride 5 milliGRAM(s) Oral daily  glucagon  Injectable 1 milliGRAM(s) IntraMuscular once  insulin glargine Injectable (LANTUS) 10 Unit(s) SubCutaneous at bedtime  insulin lispro (ADMELOG) corrective regimen sliding scale   SubCutaneous three times a day before meals  melatonin 5 milliGRAM(s) Oral at bedtime  metoprolol succinate ER 25 milliGRAM(s) Oral daily  pantoprazole    Tablet 40 milliGRAM(s) Oral before breakfast  sodium chloride 0.9%. 1000 milliLiter(s) (75 mL/Hr) IV Continuous <Continuous>  tamsulosin 0.4 milliGRAM(s) Oral at bedtime  traMADol 50 milliGRAM(s) Oral daily  valsartan 160 milliGRAM(s) Oral daily    MEDICATIONS  (PRN):  acetaminophen   Tablet .. 650 milliGRAM(s) Oral every 6 hours PRN Temp greater or equal to 38C (100.4F), Mild Pain (1 - 3)      FAMILY HISTORY:  No pertinent family history in first degree relatives  pt cannot recall any history of disease        SOCIAL HISTORY:  [facility] in [facility]    REVIEW OF SYSTEM:  Pertinent items are noted in HPI.    Vital Signs Last 24 Hrs  T(C): 36.8 (18 May 2021 05:34), Max: 36.8 (17 May 2021 09:26)  T(F): 98.3 (18 May 2021 05:34), Max: 98.3 (17 May 2021 09:26)  HR: 54 (18 May 2021 05:34) (54 - 71)  BP: 115/56 (18 May 2021 05:34) (115/56 - 155/53)  BP(mean): 80 (17 May 2021 16:14) (80 - 80)  RR: 18 (18 May 2021 05:34) (14 - 18)  SpO2: 94% (18 May 2021 05:34) (94% - 100%)  PHYSICAL EXAM  General Appearance: cooperative, no acute distress, elderly male  HEENT: PERRL, conjunctiva clear,Neck: Supple  Lungs: decreased breath sounds left mi- lower ling filed, no wheeze  dull ness to percussion LLL  Heart: Regular rate and rhythm, S1, S2 normal  Abdomen: Soft, non-tender, bowel sounds active  Extremities: no cyanosis, some peripheral edema, no joint swelling  Skin: Skin color, texture normal, no rashes   Neurologic: non focal    ECG:    LABS:                        10.0   4.57  )-----------( 208      ( 17 May 2021 07:30 )             31.0   05-    140  |  111<H>  |  28<H>  ----------------------------<  169<H>  3.8   |  23  |  1.10    Ca    8.0<L>      17 May 2021 07:30                            9.8    4.87  )-----------( 204      ( 15 May 2021 08:57 )             30.4     05-15    140  |  112<H>  |  19  ----------------------------<  187<H>  3.9   |  22  |  0.99    Ca    8.5      15 May 2021 08:57    TPro  6.2  /  Alb  2.8<L>  /  TBili  0.2  /  DBili  x   /  AST  10<L>  /  ALT  15  /  AlkPhos  114  05-13    CARDIAC MARKERS ( 13 May 2021 19:23 )  <0.015 ng/mL / x     / x     / x     / x      CARDIAC MARKERS ( 13 May 2021 17:56 )  <0.015 ng/mL / x     / x     / x     / x              PT/INR - ( 13 May 2021 16:12 )   PT: 12.0 sec;   INR: 1.03 ratio         PTT - ( 13 May 2021 16:12 )  PTT:30.5 sec  Urinalysis Basic - ( 13 May 2021 16:12 )    Color: Yellow / Appearance: Clear / S.015 / pH: x  Gluc: x / Ketone: Trace  / Bili: Negative / Urobili: Negative mg/dL   Blood: x / Protein: 15 mg/dL / Nitrite: Negative   Leuk Esterase: Negative / RBC: >50 /HPF / WBC 6-10   Sq Epi: x / Non Sq Epi: Few / Bacteria: Few      < from: CT Chest No Cont (21 @ 15:47) >  COMPARISON: CT chest 2021 and CT chest/abdomen/pelvis 2021    CONTRAST/COMPLICATIONS:  IV Contrast: NONE  Oral Contrast: NONE  Complications: None reported at time of study completion    PROCEDURE:  CT of the Chest, Abdomen and Pelvis was performed.  Sagittal and coronal reformats were performed.    FINDINGS:  CHEST:  LUNGS AND LARGE AIRWAYS: Patent central airways. Opacities in left upper lobe along the major fissure, likely atelectasis. Also see below.  PLEURA: Moderate left pleural effusion with compressive atelectasis of the left lower lobe.  VESSELS: Thoracic aorta normal in caliber with atherosclerotic changes in the thoracic aorta and coronary arteries.  HEART: Heart size is normal. No pericardial effusion.  MEDIASTINUM AND MARIA ELENA: No lymphadenopathy. Small to moderate hiatal hernia.  CHEST WALL AND LOWER NECK: No enlarged axillary lymph nodes.    ABDOMEN AND PELVIS:  LIVER: Within normal limits.  BILE DUCTS: Normal caliber.  GALLBLADDER: Cholecystectomy.  SPLEEN: Within normal limits.  PANCREAS: Atrophicwith fatty infiltration.  ADRENALS: Within normal limits.  KIDNEYS/URETERS: Dilated left renal pelvis with calculi measuring 1.7 x 1.2 cm  and 1.0 x 1.0 cm. Nonobstructing calculus in the mid left kidney measuring 0.5 cm. No right-sided renal calculi. Right extra renal pelvis.    BLADDER: Distended urinary bladder.  REPRODUCTIVE ORGANS: Prostate not enlarged.    BOWEL: Colonic diverticulosis without evidence of diverticulitis. No bowel obstruction. Appendix is normal.  PERITONEUM: 3.4 x 2.5 cm mesenteric mass in the right hemiabdomen with small adjacent lymph nodes and infiltration of the adjacent fat (series 2 image 117).  VESSELS: Abdominal aorta normal in caliber with mild calcified plaque.  RETROPERITONEUM/LYMPH NODES: No lymphadenopathy.  ABDOMINAL WALL: Unremarkable.  BONES: Compression deformity of the superior endplate of L2, unchanged since 2021. Mild compression deformity of the superior endplate of L1, unchanged. Degenerative changes in the spine. Left hip arthroplasty. Noacute fracture. Old left 10th rib fracture.    IMPRESSION:  No acute intrathoracic or intra-abdominal sequelae of trauma.    Moderate left pleural effusion with compressive atelectasis in the left lower lobe, slightly decreased since 2021.     3.4 x 2.5 cm mesenteric mass in the right hemiabdomen with small adjacent lymph nodes and mild infiltration of the adjacent fat suspicious for neoplasm; differential includes carcinoid or lymphoma.    < end of copied text >        RADIOLOGY & ADDITIONAL STUDIES:  r< from: US Duplex Venous Lower Ext Complete, Bilateral (21 @ 19:11) >  PROCEDURE DATE:  2021          INTERPRETATION:  CLINICAL INFORMATION: Reported history of DVT; recent CT evaluation raises suspicion for the presence of an intra-abdominal carcinoid versus lymphoma. Evaluate for lower extremity DVT.    COMPARISON: No prior venous sonographic evaluations are available for comparison.    TECHNIQUE: Duplex sonography of the BILATERAL LOWER extremity veins with color and spectral Doppler, with and without compression.    FINDINGS: Bilateral calf subcutaneous edema identified.    RIGHT:  Normal compressibility of the RIGHT common femoral, femoral and popliteal veins.  Doppler examination shows normal spontaneous and phasic flow.  No RIGHT calf vein thrombosis is detected.    LEFT:  Normal compressibility of the LEFT common femoral, femoral and popliteal veins.  Doppler examination shows normal spontaneous and phasic flow.  No LEFT calf vein thrombosis is detected.    IMPRESSION:  No evidence of deep venous thrombosis in either lower extremity.    < end of copied text >  xr< from: Xray Chest 1 View- PORTABLE-Routine (Xray Chest 1 View- PORTABLE-Routine .) (21 @ 17:26) >  PROCEDURE DATE:  2021          INTERPRETATION:  Portable chest radiograph    CLINICAL INFORMATION: Reevaluate effusion    TECHNIQUE:  Portable  AP view of the chest was obtained.    COMPARISON: 2021 chest available for review.    FINDINGS:    The lungs show moderate LEFT pleural effusion and/or basilar airspace consolidation.  . RIGHT lung parenchyma clear..  No pneumothorax.    There is mild cardiomegaly. Trachea midline.    Visualized osseous structures are intact.      IMPRESSION:   Moderate LEFT pleural effusion and/or basilar airspace consolidation..    < end of copied text >

## 2021-05-18 NOTE — CONSULT NOTE ADULT - PROBLEM SELECTOR RECOMMENDATION 9
- Case discussed with Dr. Velázquez, Dr. Rivera.   - Plan for dx/tx left thoracentesis either tomorrow after GI procedure, or on Thursday. Will need 12 hour lovenox hold prior.
pt denies syncopal episode & appears to be competent historian.  No arrhythmias on tele.  await echo, if neg no further workup.

## 2021-05-19 ENCOUNTER — RESULT REVIEW (OUTPATIENT)
Age: 86
End: 2021-05-19

## 2021-05-19 DIAGNOSIS — Z01.810 ENCOUNTER FOR PREPROCEDURAL CARDIOVASCULAR EXAMINATION: ICD-10-CM

## 2021-05-19 LAB
ALBUMIN FLD-MCNC: 1.8 G/DL — SIGNIFICANT CHANGE UP
ANION GAP SERPL CALC-SCNC: 4 MMOL/L — LOW (ref 5–17)
B PERT IGG+IGM PNL SER: CLEAR — SIGNIFICANT CHANGE UP
BUN SERPL-MCNC: 25 MG/DL — HIGH (ref 7–23)
CALCIUM SERPL-MCNC: 8.9 MG/DL — SIGNIFICANT CHANGE UP (ref 8.5–10.1)
CHLORIDE SERPL-SCNC: 108 MMOL/L — SIGNIFICANT CHANGE UP (ref 96–108)
CO2 SERPL-SCNC: 28 MMOL/L — SIGNIFICANT CHANGE UP (ref 22–31)
COLOR FLD: YELLOW — SIGNIFICANT CHANGE UP
CREAT SERPL-MCNC: 1.19 MG/DL — SIGNIFICANT CHANGE UP (ref 0.5–1.3)
EOSINOPHIL # FLD: 20 % — SIGNIFICANT CHANGE UP
FLUID INTAKE SUBSTANCE CLASS: SIGNIFICANT CHANGE UP
FLUID SEGMENTED GRANULOCYTES: 1 % — SIGNIFICANT CHANGE UP
GLUCOSE FLD-MCNC: 176 MG/DL — SIGNIFICANT CHANGE UP
GLUCOSE SERPL-MCNC: 156 MG/DL — HIGH (ref 70–99)
GRAM STN FLD: SIGNIFICANT CHANGE UP
HCT VFR BLD CALC: 32.4 % — LOW (ref 39–50)
HGB BLD-MCNC: 10.4 G/DL — LOW (ref 13–17)
LDH SERPL L TO P-CCNC: 75 U/L — SIGNIFICANT CHANGE UP
LYMPHOCYTES # FLD: 58 % — SIGNIFICANT CHANGE UP
MCHC RBC-ENTMCNC: 30.4 PG — SIGNIFICANT CHANGE UP (ref 27–34)
MCHC RBC-ENTMCNC: 32.1 GM/DL — SIGNIFICANT CHANGE UP (ref 32–36)
MCV RBC AUTO: 94.7 FL — SIGNIFICANT CHANGE UP (ref 80–100)
MESOTHL CELL # FLD: 5 % — SIGNIFICANT CHANGE UP
MONOS+MACROS # FLD: 16 % — SIGNIFICANT CHANGE UP
NT-PROBNP SERPL-SCNC: 219 PG/ML — SIGNIFICANT CHANGE UP (ref 0–450)
PH FLD: 7.43 — SIGNIFICANT CHANGE UP
PLATELET # BLD AUTO: 210 K/UL — SIGNIFICANT CHANGE UP (ref 150–400)
POTASSIUM SERPL-MCNC: 3.9 MMOL/L — SIGNIFICANT CHANGE UP (ref 3.5–5.3)
POTASSIUM SERPL-SCNC: 3.9 MMOL/L — SIGNIFICANT CHANGE UP (ref 3.5–5.3)
PROT FLD-MCNC: 3.5 G/DL — SIGNIFICANT CHANGE UP
RBC # BLD: 3.42 M/UL — LOW (ref 4.2–5.8)
RBC # FLD: 14.6 % — HIGH (ref 10.3–14.5)
RCV VOL RI: 34 /UL — HIGH (ref 0–0)
SARS-COV-2 RNA SPEC QL NAA+PROBE: SIGNIFICANT CHANGE UP
SODIUM SERPL-SCNC: 140 MMOL/L — SIGNIFICANT CHANGE UP (ref 135–145)
SPECIMEN SOURCE: SIGNIFICANT CHANGE UP
TOTAL NUCLEATED CELL COUNT, BODY FLUID: 349 /UL — SIGNIFICANT CHANGE UP
TUBE TYPE: SIGNIFICANT CHANGE UP
WBC # BLD: 4.98 K/UL — SIGNIFICANT CHANGE UP (ref 3.8–10.5)
WBC # FLD AUTO: 4.98 K/UL — SIGNIFICANT CHANGE UP (ref 3.8–10.5)

## 2021-05-19 PROCEDURE — 88108 CYTOPATH CONCENTRATE TECH: CPT | Mod: 26

## 2021-05-19 PROCEDURE — 99233 SBSQ HOSP IP/OBS HIGH 50: CPT

## 2021-05-19 PROCEDURE — 99223 1ST HOSP IP/OBS HIGH 75: CPT

## 2021-05-19 PROCEDURE — 88305 TISSUE EXAM BY PATHOLOGIST: CPT | Mod: 26

## 2021-05-19 PROCEDURE — 71045 X-RAY EXAM CHEST 1 VIEW: CPT | Mod: 26

## 2021-05-19 PROCEDURE — 99221 1ST HOSP IP/OBS SF/LOW 40: CPT

## 2021-05-19 PROCEDURE — 99232 SBSQ HOSP IP/OBS MODERATE 35: CPT

## 2021-05-19 PROCEDURE — 32555 ASPIRATE PLEURA W/ IMAGING: CPT | Mod: LT

## 2021-05-19 RX ORDER — LIDOCAINE 4 G/100G
2 CREAM TOPICAL DAILY
Refills: 0 | Status: DISCONTINUED | OUTPATIENT
Start: 2021-05-19 | End: 2021-05-20

## 2021-05-19 RX ORDER — OXYCODONE AND ACETAMINOPHEN 5; 325 MG/1; MG/1
1 TABLET ORAL EVERY 4 HOURS
Refills: 0 | Status: DISCONTINUED | OUTPATIENT
Start: 2021-05-19 | End: 2021-05-20

## 2021-05-19 RX ADMIN — LIDOCAINE 2 PATCH: 4 CREAM TOPICAL at 18:14

## 2021-05-19 RX ADMIN — TRAMADOL HYDROCHLORIDE 50 MILLIGRAM(S): 50 TABLET ORAL at 12:01

## 2021-05-19 RX ADMIN — PREGABALIN 1000 MICROGRAM(S): 225 CAPSULE ORAL at 11:01

## 2021-05-19 RX ADMIN — INSULIN GLARGINE 10 UNIT(S): 100 INJECTION, SOLUTION SUBCUTANEOUS at 21:50

## 2021-05-19 RX ADMIN — TRAMADOL HYDROCHLORIDE 50 MILLIGRAM(S): 50 TABLET ORAL at 11:01

## 2021-05-19 RX ADMIN — PANTOPRAZOLE SODIUM 40 MILLIGRAM(S): 20 TABLET, DELAYED RELEASE ORAL at 05:39

## 2021-05-19 RX ADMIN — FINASTERIDE 5 MILLIGRAM(S): 5 TABLET, FILM COATED ORAL at 11:01

## 2021-05-19 RX ADMIN — Medication 25 MILLIGRAM(S): at 11:01

## 2021-05-19 RX ADMIN — TAMSULOSIN HYDROCHLORIDE 0.4 MILLIGRAM(S): 0.4 CAPSULE ORAL at 21:25

## 2021-05-19 RX ADMIN — Medication 4: at 11:58

## 2021-05-19 RX ADMIN — Medication 4: at 16:57

## 2021-05-19 RX ADMIN — Medication 5 MILLIGRAM(S): at 21:25

## 2021-05-19 RX ADMIN — ENOXAPARIN SODIUM 100 MILLIGRAM(S): 100 INJECTION SUBCUTANEOUS at 21:26

## 2021-05-19 RX ADMIN — Medication 40 MILLIGRAM(S): at 11:01

## 2021-05-19 RX ADMIN — VALSARTAN 160 MILLIGRAM(S): 80 TABLET ORAL at 11:01

## 2021-05-19 RX ADMIN — LIDOCAINE 2 PATCH: 4 CREAM TOPICAL at 13:22

## 2021-05-19 NOTE — CONSULT NOTE ADULT - CONSULT REQUESTED BY NAME
Dr Velázquez
Kosta Rivera
DR Anthony
Hospitalist/ Dr Uday Dumas
Dr. Collins
Dr. Velázquez
hospitalist

## 2021-05-19 NOTE — PROGRESS NOTE ADULT - ASSESSMENT
An 89 year old male with mesenteric mass  Needs laparoscopic biopsy    Plan:  Patient booked for procedure tomorrow at 10:30 am  Please obtain clearance for surgery  Please keep NPO after midnight for surgery tomorrow  Please start IV fluids after midnight  Please obtain preop labs and updated COVID test    Plan discussed with Dr Noyola

## 2021-05-19 NOTE — PROGRESS NOTE ADULT - SUBJECTIVE AND OBJECTIVE BOX
CC: Fall     HPI: 89 y/o Male with a PMHx of T2DM, HTN, hiatal hernia, chronic left pleural effusion, CKD, presents to the ED BIBA [facility] after an unwitnessed fall. Patient states he was ambulating in his room and turned around too fast and tripped and fell. Patient was unable to get up on his own, and needed assistance. Patient with abrasion to R ear. No chest pain, sob, palpitation, abd pain, neck pain, lightheadedness/ dizziness, weakness, fatigue. Patient denies any loss of consciousness. He remember the whole episode clearly. No seizure like activity.      INTERVAL HPI/ OVERNIGHT EVENTS:  Pt was seen and examined, no complains,  feels well.  Plan for thoracentesis in am discussed, he agrees. No CP no SOB     REVIEW OF SYSTEMS:  All other review of systems is negative unless indicated above.    PHYSICAL EXAM:  ICU Vital Signs Last 24 Hrs  T(C): 36.8 (19 May 2021 08:11), Max: 36.8 (19 May 2021 08:11)  T(F): 98.2 (19 May 2021 08:11), Max: 98.2 (19 May 2021 08:11)  HR: 62 (19 May 2021 08:11) (57 - 67)  BP: 137/68 (19 May 2021 08:11) (132/61 - 149/58)  BP(mean): 67 (19 May 2021 06:10) (67 - 87)  RR: 17 (19 May 2021 08:11) (16 - 18)  SpO2: 98% (19 May 2021 08:11) (94% - 98%)  General: Well developed;  in no acute distress  Eyes: PERRLA, EOMI; conjunctiva and sclera clear  Head: Normocephalic; atraumatic  ENMT: No nasal discharge; airway clear  Neck: Supple; non tender; no masses  Respiratory: Decreased  BS  L lung,   No wheezes  Cardiovascular: Irregular rate and rhythm. S1 and S2 Normal;   Gastrointestinal: Soft non-tender non-distended; Normal bowel sounds  Genitourinary: No  suprapubic  tenderness  Extremities: + 2-3 pitting  edema  Vascular: Peripheral pulses palpable 2+ bilaterally  Neurological: Alert and oriented x3, non focal   Musculoskeletal: Normal muscle tone and strength   Psychiatric: Cooperative       LABS:     CBC Full  -  ( 19 May 2021 07:36 )  WBC Count : 4.98 K/uL  RBC Count : 3.42 M/uL  Hemoglobin : 10.4 g/dL  Hematocrit : 32.4 %  Platelet Count - Automated : 210 K/uL  Mean Cell Volume : 94.7 fl  Mean Cell Hemoglobin : 30.4 pg  Mean Cell Hemoglobin Concentration : 32.1 gm/dL  Auto Neutrophil # : x  Auto Lymphocyte # : x  Auto Monocyte # : x  Auto Eosinophil # : x  Auto Basophil # : x  Auto Neutrophil % : x  Auto Lymphocyte % : x  Auto Monocyte % : x  Auto Eosinophil % : x  Auto Basophil % : x        05-19    140  |  108  |  25<H>  ----------------------------<  156<H>  3.9   |  28  |  1.19    Ca    8.9      19 May 2021 07:36    TPro  5.4<L>  /  Alb  2.3<L>  /  TBili  0.2  /  DBili  x   /  AST  10<L>  /  ALT  13  /  AlkPhos  79  05-18      89 y/o Male with a PMHx of DVT (apixaban), nephrolithiasis,    T2DM, HTN, hiatal hernia, chronic left pleural effusion, CKD admitted for:     # S/p fall, r/o syncope.  - patient denied LOC  - imaging:  no acute traumatic findings.  - telemetry monitoring:  NSR 68.  PVCs.  A-paced.  - orthostatic VS: neg   - cardio eval appreciated     # Chronic  L pleural effusion with LE edema, CHF? vs malignancy related  - pleural effusion: according to the cardiothoracic team, this could be exudative  - Chest CT:  moderate L effusion   - repeat CXR with worsening   - LE doppler neg for DVT   - monitor pulse ox   - Dr Rivera, plan for thora in am with IR    # New mesenteric mass.  - incidental finding on CT  - CT AB/pelvis:  mesenteric mass in the R rebel abdomen.  ddx includes carcinoid v. lymphoma.  - D/w GI no ERCP or Bx   - If Pleural fluid cytology neg, will need Dx Laparoscopy and Bx     # hx DVT. LE edema likely 2/2 venous insufficiency, postphlebitic syndrome ?  - repeat LE venous doppler neg for DVT   - Of DOAC with bridging  with Lovenox for procedures      # hx AF (?).  - "AF" documented in H&P and Pulmonary notes,  no  documentation (from Cardiology or otherwise) to confirm this,  - On AC  - C/w  metoprolol ER 25mg po qd.  -C/w tele     # DM2.  - A1C:  8.7   - consistent CHO diet.  - basal insulin:  10units sq qhs.  - correction insulin coverage.    # DVT prophylaxis. Hold in am for procedure   - LMWH.    POC d/w Daughter Nicky [phone]. Reviewed results and possible plan      CC: Fall     HPI: 89 y/o Male with a PMHx of T2DM, HTN, hiatal hernia, chronic left pleural effusion, CKD, presents to the ED BIBA [facility] after an unwitnessed fall. Patient states he was ambulating in his room and turned around too fast and tripped and fell. Patient was unable to get up on his own, and needed assistance. Patient with abrasion to R ear. No chest pain, sob, palpitation, abd pain, neck pain, lightheadedness/ dizziness, weakness, fatigue. Patient denies any loss of consciousness. He remember the whole episode clearly. No seizure like activity.      INTERVAL HPI/ OVERNIGHT EVENTS:  Pt was seen and examined, no complains,  feels well.  Plan for thoracentesis in am discussed, he agrees. No CP no SOB.     Patient is anticipated to undergo -  lymph node biopsy 5/19. Care discussed with Surgery /  patient and patient's daughter. pending cardiac clearance,     REVIEW OF SYSTEMS:  All other review of systems is negative unless indicated above.    PHYSICAL EXAM:  ICU Vital Signs Last 24 Hrs  T(C): 36.8 (19 May 2021 08:11), Max: 36.8 (19 May 2021 08:11)  T(F): 98.2 (19 May 2021 08:11), Max: 98.2 (19 May 2021 08:11)  HR: 62 (19 May 2021 08:11) (61 - 67)  BP: 137/68 (19 May 2021 08:11) (137/68 - 149/58)  BP(mean): 67 (19 May 2021 06:10) (67 - 87)  RR: 17 (19 May 2021 08:11) (16 - 17)  SpO2: 98% (19 May 2021 08:11) (96% - 98%)    General: Well developed;  in no acute distress  Eyes: PERRLA, EOMI; conjunctiva and sclera clear  Head: Normocephalic; atraumatic  ENMT: No nasal discharge; airway clear  Neck: Supple; non tender; no masses  Respiratory: Decreased  BS  L lung,   No wheezes  Cardiovascular: Irregular rate and rhythm. S1 and S2 Normal;   Gastrointestinal: Soft non-tender non-distended; Normal bowel sounds  Genitourinary: No  suprapubic  tenderness  Extremities: + 2-3 pitting  edema  Vascular: Peripheral pulses palpable 2+ bilaterally  Neurological: Alert and oriented x3, non focal   Musculoskeletal: Normal muscle tone and strength   Psychiatric: Cooperative       LABS:     CBC Full  -  ( 19 May 2021 07:36 )  WBC Count : 4.98 K/uL  RBC Count : 3.42 M/uL  Hemoglobin : 10.4 g/dL  Hematocrit : 32.4 %  Platelet Count - Automated : 210 K/uL  Mean Cell Volume : 94.7 fl  Mean Cell Hemoglobin : 30.4 pg  Mean Cell Hemoglobin Concentration : 32.1 gm/dL  Auto Neutrophil # : x  Auto Lymphocyte # : x  Auto Monocyte # : x  Auto Eosinophil # : x  Auto Basophil # : x  Auto Neutrophil % : x  Auto Lymphocyte % : x  Auto Monocyte % : x  Auto Eosinophil % : x  Auto Basophil % : x        05-19    140  |  108  |  25<H>  ----------------------------<  156<H>  3.9   |  28  |  1.19    Ca    8.9      19 May 2021 07:36    TPro  5.4<L>  /  Alb  2.3<L>  /  TBili  0.2  /  DBili  x   /  AST  10<L>  /  ALT  13  /  AlkPhos  79  05-18      89 y/o Male with a PMHx of DVT (apixaban), nephrolithiasis,    T2DM, HTN, hiatal hernia, chronic left pleural effusion, CKD admitted for:     # S/p fall, r/o syncope.  - patient denied LOC  - imaging:  no acute traumatic findings.  - telemetry monitoring:  NSR 68.  PVCs.  A-paced.  - orthostatic VS: neg   - cardio eval appreciated     # Chronic  L pleural effusion with LE edema, CHF? vs malignancy related  - pleural effusion: according to the cardiothoracic team, this could be exudative  - Chest CT:  moderate L effusion   - repeat CXR with worsening   - LE doppler neg for DVT   - monitor pulse ox   - Dr Rivera, plan for thora in am with IR    # New mesenteric mass.  - incidental finding on CT -  pending biopsy in the am  - CT AB/pelvis:  mesenteric mass in the R rebel abdomen.  ddx includes carcinoid v. lymphoma.  - D/w GI no ERCP or Bx   - If Pleural fluid cytology neg, will need Dx Laparoscopy and Bx     # hx DVT. LE edema likely 2/2 venous insufficiency, postphlebitic syndrome ?  - repeat LE venous doppler neg for DVT   - Of DOAC with bridging  with Lovenox for procedures      # hx AF (?).  - "AF" documented in H&P and Pulmonary notes,  no  documentation (from Cardiology or otherwise) to confirm this,  - On AC  - C/w  metoprolol ER 25mg po qd.  -C/w tele     # DM2.  - A1C:  8.7   - consistent CHO diet.  - basal insulin:  10units sq qhs.  - correction insulin coverage.    # DVT prophylaxis. Hold in am for procedure   - LMWH.    POC d/w Daughter Nicky [phone]. Reviewed results and possible plan

## 2021-05-19 NOTE — PROGRESS NOTE ADULT - SUBJECTIVE AND OBJECTIVE BOX
Patient was seen and examined at the  bedside this morning  No new complaints  Tolerating diet, ambulating and having regular bowel movements      Vitals:  T(C): 36.8 (05-19 @ 08:11), Max: 36.8 (05-19 @ 08:11)  HR: 62 (05-19 @ 08:11) (61 - 67)  BP: 137/68 (05-19 @ 08:11) (137/68 - 149/58)  RR: 17 (05-19 @ 08:11) (16 - 17)  SpO2: 98% (05-19 @ 08:11) (96% - 98%)    05-18 @ 07:01  -  05-19 @ 07:00  --------------------------------------------------------  IN:  Total IN: 0 mL    OUT:    Voided (mL): 1250 mL  Total OUT: 1250 mL    Total NET: -1250 mL          Physical Exam:  Pt is AAOx3  General: Well developed, in no acute distress.   Chest: Lungs clear, no rales, no rhonchi, no wheezes.   Heart: RR, no murmurs, no rubs, no gallops.   Abdomen: Soft, no tenderness, no masses, BS normal.    Back: Normal curvature, no tenderness.   Neuro: Physiological, no localizing findings.   Skin: Normal, no rashes, no lesions noted.   Extremities: Warm, well perfused, no edema, Pulses intact    05-19 @ 07:36                    10.4  CBC: 4.98>)-------(<210                     32.4                 140 | 108 | 25    CMP:  ----------------------< 156               3.9 | 28 | 1.19                      Ca:8.9  Phos:-  Mg:-               -|      |-        LFTs:  ------|-|-----             -|      |-  05-18 @ 10:14                    -  CBC: ->)-------(<-                     -                 141 | 112 | 24    CMP:  ----------------------< 211               4.1 | 25 | 1.18                      Ca:8.5  Phos:-  Mg:-               0.2|      |10        LFTs:  ------|79|-----             -|      |-  05-18 @ 07:40                    10.1  CBC: 4.29>)-------(<217                     31.4                 141 | 110 | 23    CMP:  ----------------------< 116               4.1 | 28 | 1.10                      Ca:8.3  Phos:-  Mg:-               -|      |-        LFTs:  ------|-|-----             -|      |-      Current Inpatient Medications:  acetaminophen   Tablet .. 650 milliGRAM(s) Oral every 6 hours PRN  cyanocobalamin Injectable 1000 MICROGram(s) SubCutaneous daily  dextrose 40% Gel 15 Gram(s) Oral once  dextrose 5%. 1000 milliLiter(s) (50 mL/Hr) IV Continuous <Continuous>  dextrose 5%. 1000 milliLiter(s) (100 mL/Hr) IV Continuous <Continuous>  dextrose 50% Injectable 25 Gram(s) IV Push once  dextrose 50% Injectable 12.5 Gram(s) IV Push once  dextrose 50% Injectable 25 Gram(s) IV Push once  enoxaparin Injectable 100 milliGRAM(s) SubCutaneous two times a day  finasteride 5 milliGRAM(s) Oral daily  furosemide   Injectable 40 milliGRAM(s) IV Push daily  glucagon  Injectable 1 milliGRAM(s) IntraMuscular once  insulin glargine Injectable (LANTUS) 10 Unit(s) SubCutaneous at bedtime  insulin lispro (ADMELOG) corrective regimen sliding scale   SubCutaneous three times a day before meals  lidocaine   Patch 2 Patch Transdermal daily  melatonin 5 milliGRAM(s) Oral at bedtime  metoprolol succinate ER 25 milliGRAM(s) Oral daily  oxycodone    5 mG/acetaminophen 325 mG 1 Tablet(s) Oral every 4 hours PRN  pantoprazole    Tablet 40 milliGRAM(s) Oral before breakfast  sodium chloride 0.9%. 1000 milliLiter(s) (75 mL/Hr) IV Continuous <Continuous>  tamsulosin 0.4 milliGRAM(s) Oral at bedtime  traMADol 50 milliGRAM(s) Oral daily  valsartan 160 milliGRAM(s) Oral daily

## 2021-05-19 NOTE — CONSULT NOTE ADULT - CONSULT REQUESTED DATE/TIME
15-May-2021 09:50
14-May-2021
18-May-2021
18-May-2021 09:51
18-May-2021 19:16
17-May-2021 15:26
18-May-2021 10:47

## 2021-05-19 NOTE — PROGRESS NOTE ADULT - ASSESSMENT
89 y/o Male with a PMHx of T2DM, HTN, hiatal hernia, chronic left pleural effusion, CKD, presents to the ED BIBA [facility] after an unwitnessed fall. Patient states he was ambulating in his room and turned around too fast and tripped and fell. Patient was unable to get up on his own, and needed assistance. Patient with abrasion to R ear. No chest pain, sob, palpitation, abd pain, neck pain, lightheadedness/ dizziness, weakness, fatigue. Patient denies any loss of consciousness. He remember the whole episode clearly. No seizure like activity.  (14 May 2021 01:58)  pt is seen and examined in his room  prior large volume thoracentesis reported  he appears not interested in repeat tap / "my breathing is perfect"  no cp  no cough    Assessment / plan;  s/p syncope   Pafib / moderate MR  chronic left sided pleural effusion appears moderate in size  compressive atelectasis due to above  adequate oxygenation on room air / no resp distress  reported prior thoracentesis / pt is not interested in repeat procedure unless more sxs  mesenteric mass in right side abdomen    agree with diuresis as tolerated  will likley need and benefit from repeat large volume / diagnostic thoracentesis by IR next week if pt agrees to proceed   will fu with you  GI and oncology eval in progress  5/17  he declines hx of syncope  he feels "great" with his breathing  no DVT on dopplers  cxr findings are discussed  awaiting oncology eval  fu cxr today requested  5/18  All recent data are reviewed in his room with him today  Mesenteric mass could not be biopsied by IR yesterday  he appears asymptomatic from left sided effusion but may have some yield for cytology to be sent if he agrees  he is not even suing o2 in his room now  no active pulm sxs / fu cxr noted with similar results  awaiting possible oncology eval  5/19  data discussed with DR Velázquez after repeat cxr  also discussed all with Interventional radiologist  pt agrees to proceed with repeat large volume thoracentesis / cytology needed  will repeat PF analysis and check LDH serum

## 2021-05-19 NOTE — PROGRESS NOTE ADULT - SUBJECTIVE AND OBJECTIVE BOX
CC: Fall     HPI: 89 y/o Male with a PMHx of T2DM, HTN, hiatal hernia, chronic left pleural effusion, CKD, presents to the ED BIBA [facility] after an unwitnessed fall. Patient states he was ambulating in his room and turned around too fast and tripped and fell. Patient was unable to get up on his own, and needed assistance. Patient with abrasion to R ear. No chest pain, sob, palpitation, abd pain, neck pain, lightheadedness/ dizziness, weakness, fatigue. Patient denies any loss of consciousness. He remember the whole episode clearly. No seizure like activity.      INTERVAL HPI/ OVERNIGHT EVENTS:  Pt was seen and examined, no complains,  feels well.  Plan for thoracentesis in am discussed, he agrees. No CP no SOB     REVIEW OF SYSTEMS:  All other review of systems is negative unless indicated above.    PHYSICAL EXAM:  General: Well developed;  in no acute distress  Eyes: PERRLA, EOMI; conjunctiva and sclera clear  Head: Normocephalic; atraumatic  ENMT: No nasal discharge; airway clear  Neck: Supple; non tender; no masses  Respiratory: Decreased  BS  L lung,   No wheezes  Cardiovascular: Irregular rate and rhythm. S1 and S2 Normal;   Gastrointestinal: Soft non-tender non-distended; Normal bowel sounds  Genitourinary: No  suprapubic  tenderness  Extremities: + 2-3 pitting  edema  Vascular: Peripheral pulses palpable 2+ bilaterally  Neurological: Alert and oriented x3, non focal   Musculoskeletal: Normal muscle tone and strength   Psychiatric: Cooperative       LABS:     CBC Full  -  ( 19 May 2021 07:36 )  WBC Count : 4.98 K/uL  RBC Count : 3.42 M/uL  Hemoglobin : 10.4 g/dL  Hematocrit : 32.4 %  Platelet Count - Automated : 210 K/uL  Mean Cell Volume : 94.7 fl  Mean Cell Hemoglobin : 30.4 pg  Mean Cell Hemoglobin Concentration : 32.1 gm/dL  Auto Neutrophil # : x  Auto Lymphocyte # : x  Auto Monocyte # : x  Auto Eosinophil # : x  Auto Basophil # : x  Auto Neutrophil % : x  Auto Lymphocyte % : x  Auto Monocyte % : x  Auto Eosinophil % : x  Auto Basophil % : x        05-19    140  |  108  |  25<H>  ----------------------------<  156<H>  3.9   |  28  |  1.19    Ca    8.9      19 May 2021 07:36    TPro  5.4<L>  /  Alb  2.3<L>  /  TBili  0.2  /  DBili  x   /  AST  10<L>  /  ALT  13  /  AlkPhos  79  05-18      89 y/o Male with a PMHx of DVT (apixaban), nephrolithiasis,    T2DM, HTN, hiatal hernia, chronic left pleural effusion, CKD admitted for:     1. S/p fall, r/o syncope.  - patient denied LOC  - imaging:  no acute traumatic findings.  - telemetry monitoring:  NSR 68.  PVCs.  A-paced.  - orthostatic VS: neg   - TTE:  pending report   - PT  - cardio eval appreciated       2. Chronic  L pleural effusion with LE edema, CHF?   - respiratory status stable   - chest CT:  moderate L effusion   - repeat CXR with worsening   -Check BNP  - LE doppler neg for DVT   -  monitor pulse ox   - Trial of IV lasix   - D/w Dr Rivera, plan for thora in am with IR, Pt agrees       3. New mesenteric mass.  - incidental finding on CT.  - CT AB/pelvis:  mesenteric mass in the R rebel abdomen.  ddx includes carcinoid v. lymphoma.  - D/w DR Barrios, no safe window for Bx as per  imaging  -D/w GI no ERCP or Bx   -f/u  tumor markers   - If Pleural fluid cytology neg, will need Dx Laparoscopy and Bx       5. hx DVT. LE edema likely 2/2 venous insufficiency, postphlebitic syndrome ?  - repeat LE venous doppler neg for DVT   - Of DOAC with bridging  with Lovenox for procedures        3. hx AF (?).  - "AF" documented in H&P and Pulmonary notes,  no  documentation (from Cardiology or otherwise) to confirm this,  - On AC  - C/w  metoprolol ER 25mg po qd.  -C/w tele       4. DM2.  - A1C:  8.7   - consistent CHO diet.  - basal insulin:  10units sq qhs.  - correction insulin coverage.    5. DVT prophylaxis. Hold in am for procedure   - LMWH.      Dispo: NPO after midnight for thoracentesis, hold Lovenox in am, d/w RN       POC d/w Daughter Nicky [phone]. Reviewed results and possible plan

## 2021-05-19 NOTE — PROGRESS NOTE ADULT - ATTENDING COMMENTS
I met yesterday with the Hospitalist, Mr Brooks and we had his daughter on the phone to discuss the finding of new mesenteric adenopathy his recurrent left pleural effusions that have not been explained. We discussed the possibility that this may be due to lymphoma or other malignancies or possible infection. Without a diagnosis we can't provide recommendations for treatment.  I have recommended laparoscopic biopsy of the mesenteric nodes since the cytology and cultures on the plural fluid has not been diagnostic. Mr. Brooks and his daughter asked questions that we answered to their satisfaction and they have agreed to proceed with laparoscopy this AM.

## 2021-05-19 NOTE — PROGRESS NOTE ADULT - SUBJECTIVE AND OBJECTIVE BOX
Patient is a 89y old  Male who presents with a chief complaint of Fall (14 May 2021 18:43)      HPI:  89 y/o Male with a PMHx of T2DM, HTN, hiatal hernia, chronic left pleural effusion, CKD, presents to the ED BIBA [facility] after an unwitnessed fall. Patient states he was ambulating in his room and turned around too fast and tripped and fell. Patient was unable to get up on his own, and needed assistance. Patient with abrasion to R ear. No chest pain, sob, palpitation, abd pain, neck pain, lightheadedness/ dizziness, weakness, fatigue. Patient denies any loss of consciousness. He remember the whole episode clearly. No seizure like activity.  (14 May 2021 01:58)  pt is seen and examined in his room  prior large volume thoracentesis reported  he appears not interested in repeat tap / "my breathing is perfect"  no cp  no cough    he declines hx of syncope  he feels "great" with his breathing  cxr findings are discussed    All recent data are reviewed in his room with him today  Mesenteric mass could not be biopsied by IR yesterday  he appears asymptomatic from left sided effusion but may have some yield for cytology to be sent if he agrees  he is not even suing o2 in his room now  no active pulm sxs  awaiting possible oncology eval    data discussed with DR Velázquez after repeat cxr  also discussed all with Interventional radiologist  pt agrees to proceed with repeat large volume thoracentesis / cytology needed    PAST MEDICAL & SURGICAL HISTORY:  DM (diabetes mellitus)    HTN (hypertension)    HLD (hyperlipidemia)    Kidney stone    Hernia    H/O left inguinal hernia repair    Endoscopy finding  choked on piece of chiken-had upper endo with clearance of food    History of tibial fracture  R tibial fracture s/p revision with hardware placement ~3-4yrs ago, per pt        PREVIOUS DIAGNOSTIC TESTING:      MEDICATIONS  (STANDING):  dextrose 40% Gel 15 Gram(s) Oral once  dextrose 5%. 1000 milliLiter(s) (50 mL/Hr) IV Continuous <Continuous>  dextrose 5%. 1000 milliLiter(s) (100 mL/Hr) IV Continuous <Continuous>  dextrose 50% Injectable 25 Gram(s) IV Push once  dextrose 50% Injectable 12.5 Gram(s) IV Push once  dextrose 50% Injectable 25 Gram(s) IV Push once  finasteride 5 milliGRAM(s) Oral daily  glucagon  Injectable 1 milliGRAM(s) IntraMuscular once  insulin glargine Injectable (LANTUS) 10 Unit(s) SubCutaneous at bedtime  insulin lispro (ADMELOG) corrective regimen sliding scale   SubCutaneous three times a day before meals  melatonin 5 milliGRAM(s) Oral at bedtime  metoprolol succinate ER 25 milliGRAM(s) Oral daily  pantoprazole    Tablet 40 milliGRAM(s) Oral before breakfast  sodium chloride 0.9%. 1000 milliLiter(s) (75 mL/Hr) IV Continuous <Continuous>  tamsulosin 0.4 milliGRAM(s) Oral at bedtime  traMADol 50 milliGRAM(s) Oral daily  valsartan 160 milliGRAM(s) Oral daily    MEDICATIONS  (PRN):  acetaminophen   Tablet .. 650 milliGRAM(s) Oral every 6 hours PRN Temp greater or equal to 38C (100.4F), Mild Pain (1 - 3)      FAMILY HISTORY:  No pertinent family history in first degree relatives  pt cannot recall any history of disease        SOCIAL HISTORY:  resides in [facility]    REVIEW OF SYSTEM:  Pertinent items are noted in HPI.    Vital Signs Last 24 Hrs  T(C): 36.7 (19 May 2021 06:10), Max: 36.8 (18 May 2021 08:27)  T(F): 98.1 (19 May 2021 06:10), Max: 98.2 (18 May 2021 08:27)  HR: 67 (19 May 2021 06:10) (57 - 67)  BP: 149/58 (19 May 2021 06:10) (125/52 - 149/58)  BP(mean): 67 (19 May 2021 06:10) (67 - 87)  RR: 16 (19 May 2021 06:10) (16 - 18)  SpO2: 96% (19 May 2021 06:10) (94% - 96%)  PHYSICAL EXAM  General Appearance: cooperative, no acute distress, elderly male  HEENT: PERRL, conjunctiva clear,Neck: Supple  Lungs: decreased breath sounds left mi- lower ling filed, no wheeze  dull ness to percussion LLL  Heart: Regular rate and rhythm, S1, S2 normal  Abdomen: Soft, non-tender, bowel sounds active  Extremities: no cyanosis, some peripheral edema, no joint swelling  Skin: Skin color, texture normal, no rashes   Neurologic: non focal    ECG:    LABS:                        10.0   4.57  )-----------( 208      ( 17 May 2021 07:30 )             31.0   05-17    140  |  111<H>  |  28<H>  ----------------------------<  169<H>  3.8   |  23  |  1.10    Ca    8.0<L>      17 May 2021 07:30                            9.8    4.87  )-----------( 204      ( 15 May 2021 08:57 )             30.4     05-15    140  |  112<H>  |  19  ----------------------------<  187<H>  3.9   |  22  |  0.99    Ca    8.5      15 May 2021 08:57    TPro  6.2  /  Alb  2.8<L>  /  TBili  0.2  /  DBili  x   /  AST  10<L>  /  ALT  15  /  AlkPhos  114  05-13    CARDIAC MARKERS ( 13 May 2021 19:23 )  <0.015 ng/mL / x     / x     / x     / x      CARDIAC MARKERS ( 13 May 2021 17:56 )  <0.015 ng/mL / x     / x     / x     / x              PT/INR - ( 13 May 2021 16:12 )   PT: 12.0 sec;   INR: 1.03 ratio         PTT - ( 13 May 2021 16:12 )  PTT:30.5 sec  Urinalysis Basic - ( 13 May 2021 16:12 )    Color: Yellow / Appearance: Clear / S.015 / pH: x  Gluc: x / Ketone: Trace  / Bili: Negative / Urobili: Negative mg/dL   Blood: x / Protein: 15 mg/dL / Nitrite: Negative   Leuk Esterase: Negative / RBC: >50 /HPF / WBC 6-10   Sq Epi: x / Non Sq Epi: Few / Bacteria: Few      < from: CT Chest No Cont (21 @ 15:47) >  COMPARISON: CT chest 2021 and CT chest/abdomen/pelvis 2021    CONTRAST/COMPLICATIONS:  IV Contrast: NONE  Oral Contrast: NONE  Complications: None reported at time of study completion    PROCEDURE:  CT of the Chest, Abdomen and Pelvis was performed.  Sagittal and coronal reformats were performed.    FINDINGS:  CHEST:  LUNGS AND LARGE AIRWAYS: Patent central airways. Opacities in left upper lobe along the major fissure, likely atelectasis. Also see below.  PLEURA: Moderate left pleural effusion with compressive atelectasis of the left lower lobe.  VESSELS: Thoracic aorta normal in caliber with atherosclerotic changes in the thoracic aorta and coronary arteries.  HEART: Heart size is normal. No pericardial effusion.  MEDIASTINUM AND MARIA ELENA: No lymphadenopathy. Small to moderate hiatal hernia.  CHEST WALL AND LOWER NECK: No enlarged axillary lymph nodes.    ABDOMEN AND PELVIS:  LIVER: Within normal limits.  BILE DUCTS: Normal caliber.  GALLBLADDER: Cholecystectomy.  SPLEEN: Within normal limits.  PANCREAS: Atrophicwith fatty infiltration.  ADRENALS: Within normal limits.  KIDNEYS/URETERS: Dilated left renal pelvis with calculi measuring 1.7 x 1.2 cm  and 1.0 x 1.0 cm. Nonobstructing calculus in the mid left kidney measuring 0.5 cm. No right-sided renal calculi. Right extra renal pelvis.    BLADDER: Distended urinary bladder.  REPRODUCTIVE ORGANS: Prostate not enlarged.    BOWEL: Colonic diverticulosis without evidence of diverticulitis. No bowel obstruction. Appendix is normal.  PERITONEUM: 3.4 x 2.5 cm mesenteric mass in the right hemiabdomen with small adjacent lymph nodes and infiltration of the adjacent fat (series 2 image 117).  VESSELS: Abdominal aorta normal in caliber with mild calcified plaque.  RETROPERITONEUM/LYMPH NODES: No lymphadenopathy.  ABDOMINAL WALL: Unremarkable.  BONES: Compression deformity of the superior endplate of L2, unchanged since 2021. Mild compression deformity of the superior endplate of L1, unchanged. Degenerative changes in the spine. Left hip arthroplasty. Noacute fracture. Old left 10th rib fracture.    IMPRESSION:  No acute intrathoracic or intra-abdominal sequelae of trauma.    Moderate left pleural effusion with compressive atelectasis in the left lower lobe, slightly decreased since 2021.     3.4 x 2.5 cm mesenteric mass in the right hemiabdomen with small adjacent lymph nodes and mild infiltration of the adjacent fat suspicious for neoplasm; differential includes carcinoid or lymphoma.    < end of copied text >        RADIOLOGY & ADDITIONAL STUDIES:  r< from: US Duplex Venous Lower Ext Complete, Bilateral (21 @ 19:11) >  PROCEDURE DATE:  2021          INTERPRETATION:  CLINICAL INFORMATION: Reported history of DVT; recent CT evaluation raises suspicion for the presence of an intra-abdominal carcinoid versus lymphoma. Evaluate for lower extremity DVT.    COMPARISON: No prior venous sonographic evaluations are available for comparison.    TECHNIQUE: Duplex sonography of the BILATERAL LOWER extremity veins with color and spectral Doppler, with and without compression.    FINDINGS: Bilateral calf subcutaneous edema identified.    RIGHT:  Normal compressibility of the RIGHT common femoral, femoral and popliteal veins.  Doppler examination shows normal spontaneous and phasic flow.  No RIGHT calf vein thrombosis is detected.    LEFT:  Normal compressibility of the LEFT common femoral, femoral and popliteal veins.  Doppler examination shows normal spontaneous and phasic flow.  No LEFT calf vein thrombosis is detected.    IMPRESSION:  No evidence of deep venous thrombosis in either lower extremity.    < end of copied text >  xr< from: Xray Chest 1 View- PORTABLE-Routine (Xray Chest 1 View- PORTABLE-Routine .) (21 @ 17:26) >  PROCEDURE DATE:  2021          INTERPRETATION:  Portable chest radiograph    CLINICAL INFORMATION: Reevaluate effusion    TECHNIQUE:  Portable  AP view of the chest was obtained.    COMPARISON: 2021 chest available for review.    FINDINGS:    The lungs show moderate LEFT pleural effusion and/or basilar airspace consolidation.  . RIGHT lung parenchyma clear..  No pneumothorax.    There is mild cardiomegaly. Trachea midline.    Visualized osseous structures are intact.      IMPRESSION:   Moderate LEFT pleural effusion and/or basilar airspace consolidation..    < end of copied text >

## 2021-05-19 NOTE — PROGRESS NOTE ADULT - SUBJECTIVE AND OBJECTIVE BOX
HPI:  88 y/o Male with a PMHx of T2DM, HTN, hiatal hernia, chronic left pleural effusion, CKD, presents to the ED BIBA [facility] after an unwitnessed fall. Patient states he was ambulating in his room and turned around too fast and tripped and fell. Patient was unable to get up on his own, and needed assistance. Patient with abrasion to R ear. No chest pain, sob, palpitation, abd pain, neck pain, lightheadedness/ dizziness, weakness, fatigue. Patient denies any loss of consciousness. He remember the whole episode clearly. No seizure like activity.  (14 May 2021 01:58)    pt admitted for fall that was unwitnessed, also w/ chronic L pleural effusion,  mesenteric mass (incidental finding).     Reviewed tele, no arrhythmias.  Echo pending.  Reviewed episode w/ pt.  He seems to be a competent historian  & denies that he lost consciousness prior to fall.  States he went to turn & lost balance.  No lightheadness, dizziness or syncope since admit.    : no complaints, no lightheadness.  : no complaints.  Needs clearance of  laparoscopic guided biopsy.      MEDICATIONS:  OUTPATIENT  Home Medications:  Admelog SoloStar 100 units/mL injectable solution: Sliding Scale (13 May 2021 21:06)  Basaglar KwikPen 100 units/mL subcutaneous solution: 13 unit(s) subcutaneous once a day (at bedtime) (14 May 2021 09:02)  Eliquis 5 mg oral tablet: 1 tab(s) orally 2 times a day (13 May 2021 21:06)  finasteride 5 mg oral tablet: 1 tab(s) orally once a day (13 May 2021 21:06)  glimepiride 2 mg oral tablet: 1 tab(s) orally 2 times a day (13 May 2021 21:06)  metoprolol succinate 25 mg oral tablet, extended release: 1 tab(s) orally once a day (13 May 2021 21:06)  omeprazole 40 mg oral delayed release capsule: 1 cap(s) orally once a day (13 May 2021 21:06)  Tradjenta 5 mg oral tablet: 1 tab(s) orally once a day (13 May 2021 21:06)  traMADol 50 mg oral tablet: 1 tab(s) orally once a day (14 May 2021 09:02)  valsartan 160 mg oral tablet: 1 tab(s) orally once a day (13 May 2021 21:06)      INPATIENT  MEDICATIONS  (STANDING):  cyanocobalamin Injectable 1000 MICROGram(s) SubCutaneous daily  dextrose 40% Gel 15 Gram(s) Oral once  dextrose 5%. 1000 milliLiter(s) (50 mL/Hr) IV Continuous <Continuous>  dextrose 5%. 1000 milliLiter(s) (100 mL/Hr) IV Continuous <Continuous>  dextrose 50% Injectable 25 Gram(s) IV Push once  dextrose 50% Injectable 12.5 Gram(s) IV Push once  dextrose 50% Injectable 25 Gram(s) IV Push once  enoxaparin Injectable 100 milliGRAM(s) SubCutaneous two times a day  finasteride 5 milliGRAM(s) Oral daily  furosemide   Injectable 40 milliGRAM(s) IV Push daily  glucagon  Injectable 1 milliGRAM(s) IntraMuscular once  insulin glargine Injectable (LANTUS) 10 Unit(s) SubCutaneous at bedtime  insulin lispro (ADMELOG) corrective regimen sliding scale   SubCutaneous three times a day before meals  lidocaine   Patch 2 Patch Transdermal daily  melatonin 5 milliGRAM(s) Oral at bedtime  metoprolol succinate ER 25 milliGRAM(s) Oral daily  pantoprazole    Tablet 40 milliGRAM(s) Oral before breakfast  sodium chloride 0.9%. 1000 milliLiter(s) (75 mL/Hr) IV Continuous <Continuous>  tamsulosin 0.4 milliGRAM(s) Oral at bedtime  traMADol 50 milliGRAM(s) Oral daily  valsartan 160 milliGRAM(s) Oral daily    MEDICATIONS  (PRN):  acetaminophen   Tablet .. 650 milliGRAM(s) Oral every 6 hours PRN Temp greater or equal to 38C (100.4F), Mild Pain (1 - 3)  oxycodone    5 mG/acetaminophen 325 mG 1 Tablet(s) Oral every 4 hours PRN Severe Pain (7 - 10)    Vital Signs Last 24 Hrs  T(C): 36.8 (19 May 2021 08:11), Max: 36.8 (19 May 2021 08:11)  T(F): 98.2 (19 May 2021 08:11), Max: 98.2 (19 May 2021 08:11)  HR: 62 (19 May 2021 08:11) (61 - 67)  BP: 137/68 (19 May 2021 08:11) (137/68 - 149/58)  BP(mean): 67 (19 May 2021 06:10) (67 - 87)  RR: 17 (19 May 2021 08:11) (16 - 17)  SpO2: 98% (19 May 2021 08:11) (96% - 98%)Daily     Daily Weight in k.2 (19 May 2021 06:10)I&O's Summary    18 May 2021 07:01  -  19 May 2021 07:00  --------------------------------------------------------  IN: 0 mL / OUT: 1250 mL / NET: -1250 mL        I&O's Detail    18 May 2021 07:01  -  19 May 2021 07:00  --------------------------------------------------------  IN:  Total IN: 0 mL    OUT:    Voided (mL): 1250 mL  Total OUT: 1250 mL    Total NET: -1250 mL          I&O's Summary    18 May 2021 07:01  -  19 May 2021 07:00  --------------------------------------------------------  IN: 0 mL / OUT: 1250 mL / NET: -1250 mL        PHYSICAL EXAM:    Constitutional: NAD, awake and alert,   HEENT: PERR, EOMI,  No oral cyananosis.  Neck:  supple,  No JVD  Respiratory: Breath sounds are clear bilaterally, No wheezing, rales or rhonchi  Cardiovascular: S1 and S2, regular rate and rhythm, no Murmurs, gallops or rubs  Gastrointestinal: Bowel Sounds present, soft, nontender.   Extremities: +1 peripheral edema. No clubbing or cyanosis.  Vascular: 2+ peripheral pulses  Neurological: A/O x 3, no focal deficits      LABS: All Labs Reviewed:                        10.4   4  )-----------( 210      ( 19 May 2021 07:36 )             32.4                         10.1   4.29  )-----------( 217      ( 18 May 2021 07:40 )             31.4                         10.0   4.57  )-----------( 208      ( 17 May 2021 07:30 )             31.0     19 May 2021 07:36    140    |  108    |  25     ----------------------------<  156    3.9     |  28     |  1.19   18 May 2021 10:14    141    |  112    |  24     ----------------------------<  211    4.1     |  25     |  1.18   18 May 2021 07:40    141    |  110    |  23     ----------------------------<  116    4.1     |  28     |  1.10     Ca    8.9        19 May 2021 07:36  Ca    8.5        18 May 2021 10:14  Ca    8.3        18 May 2021 07:40    TPro  5.4    /  Alb  2.3    /  TBili  0.2    /  DBili  x      /  AST  10     /  ALT  13     /  AlkPhos  79     18 May 2021 10:14          Blood Culture:    @ 07:36  Pro Bnp 219        ===============================  EKG:  < from: 12 Lead ECG (21 @ 00:25) >  Diagnosis Line Sinus rhythm with 1st degree A-V block  Otherwise normal ECG  When compared with ECG of 2021 20:39,  No significant change was found  Confirmed by Trevon Reynolds MD ([id number]) on 2021 8:43:29 AM    < end of copied text >    TELE: sinus rhythm, no tachy/byron arrhythmias.  ===============================  Nicholas Tate M.D.  Cardiology,  [facility]  Cell: [phone]  Office:   [phone] ([facility] Office)  [phone] ([facility] Office)

## 2021-05-19 NOTE — CONSULT NOTE ADULT - ASSESSMENT
88yo male admitted s/p fall and laceration to right ear.     Consulted for possible EUS w/ FNA.     Noted to have a right pleural effusion (no signs of SOB-appears comforable-consider cytology per pulmonary/onc).  Also, with a 3.4 x 2.5cm mesenteric mass in the right hemiabdomen with small adjacent lymph nodes concerning for carcinoid vs. lymphoma which was an incidental finding.  Dr. Barrios note is noted and unable to obtain tissue.  Recommended possible EUS w/ FNA if we can obtain tissue vs. laparoscopic biopsy if we can't.    Will review imaging with Dr. Conklin and we suspect we can obtain tissue?   Will keep NPO after midnight for possible procedure tomorrow.     Discussed with pt, Dr. Conklin/Fareed. 
89y old Male with hx of T2DM, HTN, hiatal hernia, chronic left pleural effusion, CKD, 4th admission in a year, with recent admit - for pleural effusion (negative cytology), now admitted from [facility] on  after an unwitnessed fall, mechanical, and pt was unable to get up on his own, and needed assistance. Found here to have imaging showing reaccumulated L pleural effusion (now s/p IR tap 1.5 L out), and CT showing incidental mesenteric mass concerning for malignancy (s/p unsuccessful IR bx). Palliative Care consulted to assist with establishing GOC.    1) Pain  - likely nociceptive from chronic compression fracture, as seen on recent imaging  - pt on tramadol with little effect  - added percocet 1tab q4h prn severe pain  - also added lidocaine patch  - may want to call ortho? cc: any further options like need for bracing    2) Pleural effusions  - recurrent  - last tapped few weeks ago and reaccumulated  - as per pt and record, cytology negative  - IR notes appreciated- pt s/p tap today 1.5 L out  - plan to monitor  - may need to consider pleurX if continues to accumulate  - pulm notes appreciated- adequate oxygenation on room air, check cytology, agreed with tap consider worsened CXR    3) Mesenteric mass  - concern for malignancy  - s/p unsuccessful IR bx  - surgery consulted to consider open--> pt would want this and treatment if possible  - onc notes appreciated- recommending bx to help decide if tx needed/desired    4) debility  - PT notes appreciated- min assist needed, walked 20ft, home with home PT recommended  - suggest nutrition consult  - care coordination note appreciated- pt lived alone for 20y after his wife , then went to [place], and has been at [facility] since November, doing well there with minimal needs    5) Prognosis  - guarded  - pt does not currently have a chronic or progressive illness that justifies hospice, but bx may reveal this and pt would only be eligible if no treatment were offered or desired. At inc risk for complications due to age and moutning debility    6) GOC/Advanced Directives  - pt has capacity for decision making  - HCP on file naming son Kenyon Brooks ([phone]) but pt defers to his daughter Ally Robles [phone]  - full code, needs to be discussed  - GOC meeting scheduled for  at 2pm. Pall NP to follow up in the days coming as I will be out of the office until     Thank you for including us in Mr. Brooks's care. Will continue to follow with you.    Tamanna Hay MD  Palliative Care Attending
An 89 year old male with a right sided mesenteric mass  For possible surgical biopsy    Plan:  After discussion regarding goals of care with the patient and if the patient wishes to pursue biopsy, he will need medical clearance for laparoscopic, possible open mesenteric mass biopsy    plan discussed with Dr Noyola
88 y/o Male with a PMHx of T2DM, HTN, hiatal hernia, chronic left pleural effusion, CKD, presents to the ED BIBA [facility] after an unwitnessed fall. Patient states he was ambulating in his room and turned around too fast and tripped and fell. Patient was unable to get up on his own, and needed assistance. Patient with abrasion to R ear. No chest pain, sob, palpitation, abd pain, neck pain, lightheadedness/ dizziness, weakness, fatigue. Patient denies any loss of consciousness. He remember the whole episode clearly. No seizure like activity.  (14 May 2021 01:58)  pt is seen and examined in his room  prior large volume thoracentesis reported  he appears not interested in repeat tap / "my breathing is perfect"  no cp  no cough    Assessment / plan;  s/p syncope   Pafib / moderate MR  chronic left sided pleural effusion appears moderate in size  compressive atelectasis due to above  adequate oxygenation on room air / no resp distress  reported prior thoracentesis / pt is not interested in repeat procedure unless more sxs  mesenteric mass in right side abdomen    agree with diuresis as tolerated  will paytonley need and benefit from repeat large volume / diagnostic thoracentesis by IR next week if pt agrees to proceed   will fu with you  GI and oncology eval in progress  
89-year-old male with multiple chronic medical issues  Had unwitnessed fall  Noted to have a abdominal mass On surveillance CT scan  Patient is asymptomatic and of the physical findings, review of systems do not help with a diagnosis of a malignancy.    I have reviewed this with the patient  If a biopsy can be done he would like to know what this is and then we can decide whether additional intervention and/or therapy is warranted  I have reviewed this with interventional radiology Dr. Tad Barrios  He believes he can get this via CT Scan guidance    Plan  Interventional radiology CT scan guided biopsy abdominal/mesenteric mass  LDH    Additional strategy after pathology obtained  All questions answered  Above also reviewed with hospitalist Dr. Herrera.
88yo M with left pleural effusion referred to IR for thoracentesis  - Pt is consentable  - Had Lovenox 100mg last night and this morning

## 2021-05-19 NOTE — CONSULT NOTE ADULT - CONSULT REASON
pleural effusion
mesenteric mass
Abdominal mass
Mesenteric mass
possible syncope
GOC
89yo M with left pleural effusion referred to IR for thoracentesis

## 2021-05-19 NOTE — CONSULT NOTE ADULT - SUBJECTIVE AND OBJECTIVE BOX
89-year-old male who spent the last 6 months living in [facility].  Had an unwitnessed fall at the facility and was brought in by ambulance.  Patient denies syncope, urinary or fecal incontinence. No loss of consciousness  Believes he turned around too quickly tripped and lost his balance.  On routine evaluation CT scan abdomen and pelvis reveals a new abdominal/mesenteric mass not previously noted.    Detailed review of systems otherwise unremarkable  Denies fevers night sweats anorexia weight loss abdominal pain change in bowel habits,Nausea vomiting or pain.    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, proteinuria, atrial fibrillation, coronary artery disease, hyperlipidemia, atrial fibrillation on Eliquis, left kidney stone, urinary retention  Forgetfulness    Family medical history 1 sister with scleroderma, mother diabetes, father cirrhosis from alcohol use    Current tobacco use denied  Previous alcohol 1 can of beer a week    Physical examination 89-year-old male awake alert oriented x3 In no acute distress  HEENT normocephalic anicteric EOMI, Dried blood by upper right ear  Neck supple  Lungs clear,Decreased BS left base  Heart S1-S2  Abdomen soft nontender bowel sounds present, no masses noted  Lymph nodes nonpalpable neck axillary or inguinal areas  Skin warm dry without diffuse ecchymosis purpura petechiae or rashes  Neurologic moves all extremities well   Right upper extremity with bandage        EXAM:  CT ABDOMEN AND PELVIS                        EXAM:  CT CHEST                        PROCEDURE DATE:  05/13/2021    INTERPRETATION:  CLINICAL INFORMATION: Abdominal trauma. On Eliquis can't walk.  COMPARISON: CT chest 4/25/2021 and CT chest/abdomen/pelvis 4/23/2021    CONTRAST/COMPLICATIONS:  IV Contrast: NONE  Oral Contrast: NONE  Complications: None reported at time of study completion    PROCEDURE:  CT of the Chest, Abdomen and Pelvis was performed.  Sagittal and coronal reformats were performed.    FINDINGS:  CHEST:  LUNGS AND LARGE AIRWAYS: Patent central airways. Opacities in left upper lobe along the major fissure, likely atelectasis. Also see below.  PLEURA: Moderate left pleural effusion with compressive atelectasis of the left lower lobe.  VESSELS: Thoracic aorta normal in caliber with atherosclerotic changes in the thoracic aorta and coronary arteries.  HEART: Heart size is normal. No pericardial effusion.  MEDIASTINUM AND MARIA ELENA: No lymphadenopathy. Small to moderate hiatal hernia.  CHEST WALL AND LOWER NECK: No enlarged axillary lymph nodes.    ABDOMEN AND PELVIS:  LIVER: Within normal limits.  BILE DUCTS: Normal caliber.  GALLBLADDER: Cholecystectomy.  SPLEEN: Within normal limits.  PANCREAS: Atrophicwith fatty infiltration.  ADRENALS: Within normal limits.  KIDNEYS/URETERS: Dilated left renal pelvis with calculi measuring 1.7 x 1.2 cm  and 1.0 x 1.0 cm. Nonobstructing calculus in the mid left kidney measuring 0.5 cm. No right-sided renal calculi. Right extra renal pelvis.    BLADDER: Distended urinary bladder.  REPRODUCTIVE ORGANS: Prostate not enlarged.    BOWEL: Colonic diverticulosis without evidence of diverticulitis. No bowel obstruction. Appendix is normal.  PERITONEUM: 3.4 x 2.5 cm mesenteric mass in the right hemiabdomen with small adjacent lymph nodes and infiltration of the adjacent fat (series 2 image 117).  VESSELS: Abdominal aorta normal in caliber with mild calcified plaque.  RETROPERITONEUM/LYMPH NODES: No lymphadenopathy.  ABDOMINAL WALL: Unremarkable.  BONES: Compression deformity of the superior endplate of L2, unchanged since 4/23/2021. Mild compression deformity of the superior endplate of L1, unchanged. Degenerative changes in the spine. Left hip arthroplasty. Noacute fracture. Old left 10th rib fracture.    IMPRESSION:  No acute intrathoracic or intra-abdominal sequelae of trauma.    Moderate left pleural effusion with compressive atelectasis in the left lower lobe, slightly decreased since 4/25/2021.     3.4 x 2.5 cm mesenteric mass in the right hemiabdomen with small adjacent lymph nodes and mild infiltration of the adjacent fat suspicious for neoplasm; differential includes carcinoid or lymphoma.      CBC Full  -  ( 13 May 2021 16:12 )  WBC Count : 5.96 K/uL  RBC Count : 3.59 M/uL  Hemoglobin : 11.1 g/dL  Hematocrit : 34.0 %  Platelet Count - Automated : 254 K/uL  Mean Cell Volume : 94.7 fl  Mean Cell Hemoglobin : 30.9 pg  Mean Cell Hemoglobin Concentration : 32.6 gm/dL  Auto Neutrophil # : 4.67 K/uL  Auto Lymphocyte # : 0.56 K/uL  Auto Monocyte # : 0.59 K/uL  Auto Eosinophil # : 0.09 K/uL  Auto Basophil # : 0.03 K/uL  Auto Neutrophil % : 78.4 %  Auto Lymphocyte % : 9.4 %  Auto Monocyte % : 9.9 %  Auto Eosinophil % : 1.5 %  Auto Basophil % : 0.5 %      05-13    139  |  108  |  20  ----------------------------<  296<H>  4.6   |  26  |  1.28    Ca    8.7      13 May 2021 17:56    TPro  6.2  /  Alb  2.8<L>  /  TBili  0.2  /  DBili  x   /  AST  10<L>  /  ALT  15  /  AlkPhos  114  05-13          PT/INR - ( 13 May 2021 16:12 )   PT: 12.0 sec;   INR: 1.03 ratio         PTT - ( 13 May 2021 16:12 )  PTT:30.5 sec    Vital Signs Last 24 Hrs  T(C): 36.5 (14 May 2021 09:00), Max: 36.7 (14 May 2021 03:00)  T(F): 97.7 (14 May 2021 09:00), Max: 98 (14 May 2021 03:00)  HR: 65 (14 May 2021 10:27) (60 - 67)  BP: 128/55 (14 May 2021 09:00) (128/55 - 149/67)  BP(mean): 80 (14 May 2021 03:00) (80 - 94)  RR: 18 (14 May 2021 09:00) (16 - 18)  SpO2: 98% (14 May 2021 09:00) (98% - 99%)          
HPI:  89 y/o Male with a PMHx of T2DM, HTN, hiatal hernia, chronic left pleural effusion, CKD, presents to the ED BIBA [facility] after an unwitnessed fall. Patient states he was ambulating in his room and turned around too fast and tripped and fell. Patient was unable to get up on his own, and needed assistance. Patient with abrasion to R ear. No chest pain, sob, palpitation, abd pain, neck pain, lightheadedness/ dizziness, weakness, fatigue. Patient denies any loss of consciousness. He remember the whole episode clearly. No seizure like activity.  (14 May 2021 01:58)    pt admitted for fall that was unwitnessed, also w/ chronic L pleural effusion,  mesenteric mass (incidental finding).     Reviewed tele, no arrhythmias.  Echo pending.  Reviewed episode w/ pt.  He seems to be a competent historian  & denies that he lost consciousness prior to fall.  States he went to turn & lost balance.  No lightheadness, dizziness or syncope since admit.    PAST MEDICAL AND SURGICAL HISTORY:  DM (diabetes mellitus)  HTN (hypertension)  HLD (hyperlipidemia)  Kidney stone  Hernia  H/O left inguinal hernia repair  Endoscopy finding  choked on piece of chiken-had upper endo with clearance of food  History of tibial fracture  R tibial fracture s/p revision with hardware placement ~3-4yrs ago, per pt    ALLERGIES:  Allergies  morphine (Other)  Intolerances    SOCIAL HISTORY:  Lives in assisted living facility.   Non smoker.  Non alcoholic. (14 May 2021 01:58)      FAMILY  HISTORY:  FAMILY HISTORY:  No pertinent family history in first degree relatives  pt cannot recall any history of disease      MEDICATIONS:  OUTPATIENT:  Home Medications:  Admelog SoloStar 100 units/mL injectable solution: Sliding Scale (13 May 2021 21:06)  Basaglar KwikPen 100 units/mL subcutaneous solution: 13 unit(s) subcutaneous once a day (at bedtime) (14 May 2021 09:02)  Eliquis 5 mg oral tablet: 1 tab(s) orally 2 times a day (13 May 2021 21:06)  finasteride 5 mg oral tablet: 1 tab(s) orally once a day (13 May 2021 21:06)  glimepiride 2 mg oral tablet: 1 tab(s) orally 2 times a day (13 May 2021 21:06)  metoprolol succinate 25 mg oral tablet, extended release: 1 tab(s) orally once a day (13 May 2021 21:06)  omeprazole 40 mg oral delayed release capsule: 1 cap(s) orally once a day (13 May 2021 21:06)  Tradjenta 5 mg oral tablet: 1 tab(s) orally once a day (13 May 2021 21:06)  traMADol 50 mg oral tablet: 1 tab(s) orally once a day (14 May 2021 09:02)  valsartan 160 mg oral tablet: 1 tab(s) orally once a day (13 May 2021 21:06)        INPATIENT:  MEDICATIONS  (STANDING):  dextrose 40% Gel 15 Gram(s) Oral once  dextrose 5%. 1000 milliLiter(s) (50 mL/Hr) IV Continuous <Continuous>  dextrose 5%. 1000 milliLiter(s) (100 mL/Hr) IV Continuous <Continuous>  dextrose 50% Injectable 25 Gram(s) IV Push once  dextrose 50% Injectable 12.5 Gram(s) IV Push once  dextrose 50% Injectable 25 Gram(s) IV Push once  finasteride 5 milliGRAM(s) Oral daily  glucagon  Injectable 1 milliGRAM(s) IntraMuscular once  insulin glargine Injectable (LANTUS) 10 Unit(s) SubCutaneous at bedtime  insulin lispro (ADMELOG) corrective regimen sliding scale   SubCutaneous three times a day before meals  melatonin 5 milliGRAM(s) Oral at bedtime  metoprolol succinate ER 25 milliGRAM(s) Oral daily  pantoprazole    Tablet 40 milliGRAM(s) Oral before breakfast  sodium chloride 0.9%. 1000 milliLiter(s) (75 mL/Hr) IV Continuous <Continuous>  tamsulosin 0.4 milliGRAM(s) Oral at bedtime  traMADol 50 milliGRAM(s) Oral daily  valsartan 160 milliGRAM(s) Oral daily    MEDICATIONS  (PRN):  acetaminophen   Tablet .. 650 milliGRAM(s) Oral every 6 hours PRN Temp greater or equal to 38C (100.4F), Mild Pain (1 - 3)  ondansetron Injectable 4 milliGRAM(s) IV Push once PRN Nausea and/or Vomiting    MEDICATIONS  (PRN):  acetaminophen   Tablet .. 650 milliGRAM(s) Oral every 6 hours PRN Temp greater or equal to 38C (100.4F), Mild Pain (1 - 3)  ondansetron Injectable 4 milliGRAM(s) IV Push once PRN Nausea and/or Vomiting    REVIEW OF SYSTEMS:    CONSTITUTIONAL: No weakness, fevers or chills  EYES/ENT: No visual changes;  No vertigo or throat pain   NECK: No pain or stiffness  RESPIRATORY: No cough, wheezing, hemoptysis; No shortness of breath  CARDIOVASCULAR: No chest pain or palpitations  GASTROINTESTINAL: No abdominal or epigastric pain. No nausea, vomiting, or hematemesis; No diarrhea or constipation. No melena or hematochezia.  GENITOURINARY: No dysuria, frequency or hematuria  NEUROLOGICAL: No numbness or weakness  SKIN: No itching, burning, rashes, or lesions   All other review of systems is negative unless indicated above    Vital Signs Last 24 Hrs  T(C): 36.1 (17 May 2021 13:31), Max: 36.9 (16 May 2021 21:31)  T(F): 97 (17 May 2021 13:31), Max: 98.4 (16 May 2021 21:31)  HR: 60 (17 May 2021 14:00) (59 - 71)  BP: 133/61 (17 May 2021 14:00) (129/62 - 155/53)  BP(mean): --  RR: 15 (17 May 2021 14:00) (14 - 18)  SpO2: 100% (17 May 2021 14:00) (94% - 100%)      PHYSICAL EXAM:    Constitutional: NAD, awake and alert,   HEENT: PERR, EOMI,  No oral cyananosis.  Neck:  supple,  No JVD  Respiratory: Breath sounds are clear bilaterally, No wheezing, rales or rhonchi  Cardiovascular: S1 and S2, regular rate and rhythm, no Murmurs, gallops or rubs  Gastrointestinal: Bowel Sounds present, soft, nontender.   Extremities: +1 peripheral edema. No clubbing or cyanosis.  Vascular: 2+ peripheral pulses  Neurological: A/O x 3, no focal deficits        LABS: All Labs Reviewed:                        10.0   4.57  )-----------( 208      ( 17 May 2021 07:30 )             31.0                         10.0   5.42  )-----------( 205      ( 16 May 2021 08:43 )             31.8                         9.8    4.87  )-----------( 204      ( 15 May 2021 08:57 )             30.4     17 May 2021 07:30    140    |  111    |  28     ----------------------------<  169    3.8     |  23     |  1.10   16 May 2021 08:43    138    |  113    |  24     ----------------------------<  152    4.5     |  19     |  0.98   15 May 2021 08:57    140    |  112    |  19     ----------------------------<  187    3.9     |  22     |  0.99     Ca    8.0        17 May 2021 07:30  Ca    8.0        16 May 2021 08:43  Ca    8.5        15 May 2021 08:57      ===============================  EKG:  < from: 12 Lead ECG (05.14.21 @ 00:25) >  Diagnosis Line Sinus rhythm with 1st degree A-V block  Otherwise normal ECG  When compared with ECG of 25-APR-2021 20:39,  No significant change was found  Confirmed by Trevon Reynolds MD ([id number]) on 5/14/2021 8:43:29 AM    < end of copied text >    TELE: sinus rhythm, no tachy/byron arrhythmias.  ===============================    Nicholas Tate M.D.  Cardiology, [facility]  Cell: [phone]  Office:   [phone] ([facility] Office)  [phone] ([facility] Office)    
Chief Complaint:  Patient is a 89y old  Male who presents with a chief complaint of left pleural effusion    HPI:  89 y/o Male with a PMHx of T2DM, HTN, hiatal hernia, chronic left pleural effusion, CKD, presents to the ED BIBA [facility] after an unwitnessed fall. Pt found to have mesenteric mass, as well as moderate left pleural effusion. IR biopsy of mesenteric mass yesterday was unsuccessful. IR consulted for left thoracentesis. Pt seen at bedside, states he is asymptomatic and breathing OK, but is amenable to thoracentesis. Pt denied fever, NVD, CP, SOB.     Allergies  morphine (Other)    MEDICATIONS  (STANDING):  cyanocobalamin Injectable 1000 MICROGram(s) SubCutaneous daily  dextrose 40% Gel 15 Gram(s) Oral once  dextrose 5%. 1000 milliLiter(s) (50 mL/Hr) IV Continuous <Continuous>  dextrose 5%. 1000 milliLiter(s) (100 mL/Hr) IV Continuous <Continuous>  dextrose 50% Injectable 25 Gram(s) IV Push once  dextrose 50% Injectable 12.5 Gram(s) IV Push once  dextrose 50% Injectable 25 Gram(s) IV Push once  enoxaparin Injectable 100 milliGRAM(s) SubCutaneous two times a day  finasteride 5 milliGRAM(s) Oral daily  glucagon  Injectable 1 milliGRAM(s) IntraMuscular once  insulin glargine Injectable (LANTUS) 10 Unit(s) SubCutaneous at bedtime  insulin lispro (ADMELOG) corrective regimen sliding scale   SubCutaneous three times a day before meals  melatonin 5 milliGRAM(s) Oral at bedtime  metoprolol succinate ER 25 milliGRAM(s) Oral daily  pantoprazole    Tablet 40 milliGRAM(s) Oral before breakfast  sodium chloride 0.9%. 1000 milliLiter(s) (75 mL/Hr) IV Continuous <Continuous>  tamsulosin 0.4 milliGRAM(s) Oral at bedtime  traMADol 50 milliGRAM(s) Oral daily  valsartan 160 milliGRAM(s) Oral daily    MEDICATIONS  (PRN):  acetaminophen   Tablet .. 650 milliGRAM(s) Oral every 6 hours PRN Temp greater or equal to 38C (100.4F), Mild Pain (1 - 3)      PAST MEDICAL & SURGICAL HISTORY:  DM (diabetes mellitus)    HTN (hypertension)    HLD (hyperlipidemia)    Kidney stone    Hernia    H/O left inguinal hernia repair    Endoscopy finding  choked on piece of chiken-had upper endo with clearance of food    History of tibial fracture  R tibial fracture s/p revision with hardware placement ~3-4yrs ago, per pt        FAMILY HISTORY:  No pertinent family history in first degree relatives  pt cannot recall any history of disease      Review of Systems:  As per HPI    Physical Exam:  Vital Signs Last 24 Hrs  T(C): 36.8 (18 May 2021 08:27), Max: 36.8 (18 May 2021 05:34)  T(F): 98.2 (18 May 2021 08:27), Max: 98.3 (18 May 2021 05:34)  HR: 63 (18 May 2021 08:27) (54 - 71)  BP: 125/52 (18 May 2021 08:27) (115/56 - 155/53)  BP(mean): 80 (17 May 2021 16:14) (80 - 80)  RR: 18 (18 May 2021 08:27) (14 - 18)  SpO2: 96% (18 May 2021 08:27) (94% - 100%)    GENERAL APPEARANCE: Well developed, well nourished, in no acute distress.    LUNGS: Diminished breath sounds at left base.     CARDIOVASCULAR: There was a regular rate and rhythm without any murmurs, gallops, rubs.     ABDOMEN: Soft and nontender with normal bowel sounds.     NEUROLOGIC: Alert and oriented x 3. Normal affect.     CBC                        10.1   4.29  )-----------( 217      ( 18 May 2021 07:40 )             31.4       Chemistry  05-18    141  |  112<H>  |  24<H>  ----------------------------<  211<H>  4.1   |  25  |  1.18    Ca    8.5      18 May 2021 10:14    TPro  5.4<L>  /  Alb  2.3<L>  /  TBili  0.2  /  DBili  x   /  AST  10<L>  /  ALT  13  /  AlkPhos  79  05-18      < from: Xray Chest 1 View- PORTABLE-Routine (Xray Chest 1 View- PORTABLE-Routine .) (05.17.21 @ 17:26) >  IMPRESSION:   Moderate LEFT pleural effusion and/or basilar airspace consolidation..    < end of copied text >      
Patient is a 89y old  Male who presents with a chief complaint of Fall (14 May 2021 18:43)      HPI:  89 y/o Male with a PMHx of T2DM, HTN, hiatal hernia, chronic left pleural effusion, CKD, presents to the ED BIBA [facility] after an unwitnessed fall. Patient states he was ambulating in his room and turned around too fast and tripped and fell. Patient was unable to get up on his own, and needed assistance. Patient with abrasion to R ear. No chest pain, sob, palpitation, abd pain, neck pain, lightheadedness/ dizziness, weakness, fatigue. Patient denies any loss of consciousness. He remember the whole episode clearly. No seizure like activity.  (14 May 2021 01:58)  pt is seen and examined in his room  prior large volume thoracentesis reported  he appears not interested in repeat tap / "my breathing is perfect"  no cp  no cough    PAST MEDICAL & SURGICAL HISTORY:  DM (diabetes mellitus)    HTN (hypertension)    HLD (hyperlipidemia)    Kidney stone    Hernia    H/O left inguinal hernia repair    Endoscopy finding  choked on piece of chiken-had upper endo with clearance of food    History of tibial fracture  R tibial fracture s/p revision with hardware placement ~3-4yrs ago, per pt        PREVIOUS DIAGNOSTIC TESTING:      MEDICATIONS  (STANDING):  dextrose 40% Gel 15 Gram(s) Oral once  dextrose 5%. 1000 milliLiter(s) (50 mL/Hr) IV Continuous <Continuous>  dextrose 5%. 1000 milliLiter(s) (100 mL/Hr) IV Continuous <Continuous>  dextrose 50% Injectable 25 Gram(s) IV Push once  dextrose 50% Injectable 12.5 Gram(s) IV Push once  dextrose 50% Injectable 25 Gram(s) IV Push once  finasteride 5 milliGRAM(s) Oral daily  glucagon  Injectable 1 milliGRAM(s) IntraMuscular once  insulin glargine Injectable (LANTUS) 10 Unit(s) SubCutaneous at bedtime  insulin lispro (ADMELOG) corrective regimen sliding scale   SubCutaneous three times a day before meals  melatonin 5 milliGRAM(s) Oral at bedtime  metoprolol succinate ER 25 milliGRAM(s) Oral daily  pantoprazole    Tablet 40 milliGRAM(s) Oral before breakfast  sodium chloride 0.9%. 1000 milliLiter(s) (75 mL/Hr) IV Continuous <Continuous>  tamsulosin 0.4 milliGRAM(s) Oral at bedtime  traMADol 50 milliGRAM(s) Oral daily  valsartan 160 milliGRAM(s) Oral daily    MEDICATIONS  (PRN):  acetaminophen   Tablet .. 650 milliGRAM(s) Oral every 6 hours PRN Temp greater or equal to 38C (100.4F), Mild Pain (1 - 3)      FAMILY HISTORY:  No pertinent family history in first degree relatives  pt cannot recall any history of disease        SOCIAL HISTORY:  resides in [facility]    REVIEW OF SYSTEM:  Pertinent items are noted in HPI.      Vital Signs Last 24 Hrs  T(C): 36.8 (15 May 2021 08:28), Max: 36.8 (14 May 2021 20:53)  T(F): 98.2 (15 May 2021 08:28), Max: 98.2 (14 May 2021 20:53)  HR: 68 (15 May 2021 08:28) (63 - 70)  BP: 119/63 (15 May 2021 08:28) (119/63 - 139/57)  BP(mean): --  RR: 18 (15 May 2021 08:28) (18 - 20)  SpO2: 92% (15 May 2021 08:28) (92% - 96%)    I&O's Summary    14 May 2021 07:01  -  15 May 2021 07:00  --------------------------------------------------------  IN: 0 mL / OUT: 300 mL / NET: -300 mL      PHYSICAL EXAM  General Appearance: cooperative, no acute distress, elderly male  HEENT: PERRL, conjunctiva clear,Neck: Supple  Lungs: decreased breath sounds left mi- lower ling filed, no wheeze  dull ness to percussion LLL  Heart: Regular rate and rhythm, S1, S2 normal  Abdomen: Soft, non-tender, bowel sounds active  Extremities: no cyanosis, some peripheral edema, no joint swelling  Skin: Skin color, texture normal, no rashes   Neurologic: non focal    ECG:    LABS:                          9.8    4.87  )-----------( 204      ( 15 May 2021 08:57 )             30.4     05-15    140  |  112<H>  |  19  ----------------------------<  187<H>  3.9   |  22  |  0.99    Ca    8.5      15 May 2021 08:57    TPro  6.2  /  Alb  2.8<L>  /  TBili  0.2  /  DBili  x   /  AST  10<L>  /  ALT  15  /  AlkPhos  114  05-13    CARDIAC MARKERS ( 13 May 2021 19:23 )  <0.015 ng/mL / x     / x     / x     / x      CARDIAC MARKERS ( 13 May 2021 17:56 )  <0.015 ng/mL / x     / x     / x     / x              PT/INR - ( 13 May 2021 16:12 )   PT: 12.0 sec;   INR: 1.03 ratio         PTT - ( 13 May 2021 16:12 )  PTT:30.5 sec  Urinalysis Basic - ( 13 May 2021 16:12 )    Color: Yellow / Appearance: Clear / S.015 / pH: x  Gluc: x / Ketone: Trace  / Bili: Negative / Urobili: Negative mg/dL   Blood: x / Protein: 15 mg/dL / Nitrite: Negative   Leuk Esterase: Negative / RBC: >50 /HPF / WBC 6-10   Sq Epi: x / Non Sq Epi: Few / Bacteria: Few      < from: CT Chest No Cont (21 @ 15:47) >  COMPARISON: CT chest 2021 and CT chest/abdomen/pelvis 2021    CONTRAST/COMPLICATIONS:  IV Contrast: NONE  Oral Contrast: NONE  Complications: None reported at time of study completion    PROCEDURE:  CT of the Chest, Abdomen and Pelvis was performed.  Sagittal and coronal reformats were performed.    FINDINGS:  CHEST:  LUNGS AND LARGE AIRWAYS: Patent central airways. Opacities in left upper lobe along the major fissure, likely atelectasis. Also see below.  PLEURA: Moderate left pleural effusion with compressive atelectasis of the left lower lobe.  VESSELS: Thoracic aorta normal in caliber with atherosclerotic changes in the thoracic aorta and coronary arteries.  HEART: Heart size is normal. No pericardial effusion.  MEDIASTINUM AND MARIA ELENA: No lymphadenopathy. Small to moderate hiatal hernia.  CHEST WALL AND LOWER NECK: No enlarged axillary lymph nodes.    ABDOMEN AND PELVIS:  LIVER: Within normal limits.  BILE DUCTS: Normal caliber.  GALLBLADDER: Cholecystectomy.  SPLEEN: Within normal limits.  PANCREAS: Atrophicwith fatty infiltration.  ADRENALS: Within normal limits.  KIDNEYS/URETERS: Dilated left renal pelvis with calculi measuring 1.7 x 1.2 cm  and 1.0 x 1.0 cm. Nonobstructing calculus in the mid left kidney measuring 0.5 cm. No right-sided renal calculi. Right extra renal pelvis.    BLADDER: Distended urinary bladder.  REPRODUCTIVE ORGANS: Prostate not enlarged.    BOWEL: Colonic diverticulosis without evidence of diverticulitis. No bowel obstruction. Appendix is normal.  PERITONEUM: 3.4 x 2.5 cm mesenteric mass in the right hemiabdomen with small adjacent lymph nodes and infiltration of the adjacent fat (series 2 image 117).  VESSELS: Abdominal aorta normal in caliber with mild calcified plaque.  RETROPERITONEUM/LYMPH NODES: No lymphadenopathy.  ABDOMINAL WALL: Unremarkable.  BONES: Compression deformity of the superior endplate of L2, unchanged since 2021. Mild compression deformity of the superior endplate of L1, unchanged. Degenerative changes in the spine. Left hip arthroplasty. Noacute fracture. Old left 10th rib fracture.    IMPRESSION:  No acute intrathoracic or intra-abdominal sequelae of trauma.    Moderate left pleural effusion with compressive atelectasis in the left lower lobe, slightly decreased since 2021.     3.4 x 2.5 cm mesenteric mass in the right hemiabdomen with small adjacent lymph nodes and mild infiltration of the adjacent fat suspicious for neoplasm; differential includes carcinoid or lymphoma.    < end of copied text >        RADIOLOGY & ADDITIONAL STUDIES:    
  HPI: Mr. Brooks is an 89y old Male with hx of T2DM, HTN, hiatal hernia, chronic left pleural effusion, CKD, 4th admission in a year, with recent admit - for pleural effusion (negative cytology), now admitted from [facility] on  after an unwitnessed fall, mechanical, and pt was unable to get up on his own, and needed assistance. Found here to have imaging showing reaccumulated L pleural effusion (now s/p IR tap 1.5 L out), and CT showing incidental mesenteric mass concerning for malignancy (s/p unsuccessful IR bx). Palliative Care consulted to assist with establishing GOC.    Met Mr. Brooks this am. Pt was open to hearing about the role of our team and with our support. He noted 10/10 lower back pain which he says has been there for the past 2 months, noting finding of fracture. He recalls having imaging for this and being on tramadol here and at [facility] with little benefit. He was open to addition of percocet today and with lidocaine patches. Shared hx with RN. He denied any dyspnea, recalling he just had a tap with >L out. He denied any other sx as well.     Pt was able to explain why he was here including finding of mass. He recalled that IR tried to bx this, but were unable. He clarified that he would want bx done if able, and would want treatment if cancer is found. He was also on board with GOC meeting with his daughter (acknowledging that his son may be HCP, but wanting daughter to be contacted) Ally. However, he added that Ally is out of town, but will be flying back tomorrow. Thus, he was open to conversation with our team on Friday. Called Ally [phone] after encounter who also was open to meeting, finding solace in having someone to help them make a plan. Thus, meeting set for Friday at 2pm.       PAIN: ( x)Yes   ( )No  Level: mod-sev  Location: mid-lower back  Intensity:    10/10  Quality: ache  Aggravating Factors: standing, sitting, bending  Alleviating Factors: unsure of any  Radiation: denies  Duration/Timing: intermittent  Impact on ADLs: yes    DYSPNEA: ( ) Yes  (x ) No    PAST MEDICAL & SURGICAL HISTORY:  DM (diabetes mellitus)    HTN (hypertension)    HLD (hyperlipidemia)    Kidney stone    Hernia    H/O left inguinal hernia repair    Endoscopy finding  choked on piece of chicken-had upper endo with clearance of food    History of tibial fracture  R tibial fracture s/p revision with hardware placement ~3-4yrs ago, per pt      SOCIAL HX: , living in [facility] for past 6 months, 3 children who live nearby    Hx opiate tolerance (x )YES  ( )NO- weak opioid tramadol    Baseline ADLs  (Prior to Admission)- has RW but mostly uses cane, minimal assistance needed  (x ) Independent   ( )Dependent    FAMILY HISTORY:  pt cannot recall any history of disease    Review of Systems:    Anxiety- denies  Depression- denies  Constipation- denies, BM this am, no issues  Diarrhea- denies  Nausea- denies  Vomiting- denies  Anorexia- no  Weight Loss-  unsure  Cough- occasional and not inc  Secretions- denies  Fatigue- yes  Weakness- overall, yes  Delirium- denies    All other systems reviewed and negative      PHYSICAL EXAM:    Vital Signs Last 24 Hrs  T(C): 36.8 (19 May 2021 08:11), Max: 36.8 (19 May 2021 08:11)  T(F): 98.2 (19 May 2021 08:11), Max: 98.2 (19 May 2021 08:11)  HR: 62 (19 May 2021 08:11) (57 - 67)  BP: 137/68 (19 May 2021 08:11) (132/61 - 149/58)  BP(mean): 67 (19 May 2021 06:10) (67 - 87)  RR: 17 (19 May 2021 08:11) (16 - 18)  SpO2: 98% (19 May 2021 08:11) (94% - 98%)  Daily     Daily Weight in k.2 (19 May 2021 06:10)    PPSV2:  30-40 %    General: Elderly male sitting up in chair, pleasant, uncomfortable in chair  Mental Status: AOx4   HEENT: mmm  Lungs: dec at bases with some rhonchi on left  Cardiac: + s1 s2 rrr  GI: soft nt nd +bs  : voids  MSK/skin: moves all extremities, some muscle/fat wasting in limbs, mild dependent edema in feet  Neuro: no focal deficits or point tenderness along spine      LABS:                        10.4   4.98  )-----------( 210      ( 19 May 2021 07:36 )             32.4         140  |  108  |  25<H>  ----------------------------<  156<H>  3.9   |  28  |  1.19    Ca    8.9      19 May 2021 07:36    TPro  5.4<L>  /  Alb  2.3<L>  /  TBili  0.2  /  DBili  x   /  AST  10<L>  /  ALT  13  /  AlkPhos  79        Albumin: Albumin, Serum: 2.3 g/dL ( @ 10:14)      Allergies    morphine (Other)    Intolerances      MEDICATIONS  (STANDING):  cyanocobalamin Injectable 1000 MICROGram(s) SubCutaneous daily  dextrose 40% Gel 15 Gram(s) Oral once  dextrose 5%. 1000 milliLiter(s) (50 mL/Hr) IV Continuous <Continuous>  dextrose 5%. 1000 milliLiter(s) (100 mL/Hr) IV Continuous <Continuous>  dextrose 50% Injectable 25 Gram(s) IV Push once  dextrose 50% Injectable 12.5 Gram(s) IV Push once  dextrose 50% Injectable 25 Gram(s) IV Push once  enoxaparin Injectable 100 milliGRAM(s) SubCutaneous two times a day  finasteride 5 milliGRAM(s) Oral daily  furosemide   Injectable 40 milliGRAM(s) IV Push daily  glucagon  Injectable 1 milliGRAM(s) IntraMuscular once  insulin glargine Injectable (LANTUS) 10 Unit(s) SubCutaneous at bedtime  insulin lispro (ADMELOG) corrective regimen sliding scale   SubCutaneous three times a day before meals  lidocaine   Patch 2 Patch Transdermal daily  melatonin 5 milliGRAM(s) Oral at bedtime  metoprolol succinate ER 25 milliGRAM(s) Oral daily  pantoprazole    Tablet 40 milliGRAM(s) Oral before breakfast  sodium chloride 0.9%. 1000 milliLiter(s) (75 mL/Hr) IV Continuous <Continuous>  tamsulosin 0.4 milliGRAM(s) Oral at bedtime  traMADol 50 milliGRAM(s) Oral daily  valsartan 160 milliGRAM(s) Oral daily    MEDICATIONS  (PRN):  acetaminophen   Tablet .. 650 milliGRAM(s) Oral every 6 hours PRN Temp greater or equal to 38C (100.4F), Mild Pain (1 - 3)  oxycodone    5 mG/acetaminophen 325 mG 1 Tablet(s) Oral every 4 hours PRN Severe Pain (7 - 10)      RADIOLOGY/ADDITIONAL STUDIES:    EXAM:  XR CHEST PORTABLE ROUTINE 1V                        PROCEDURE DATE:  2021      IMPRESSION:   Moderate LEFT pleural effusion and/or basilar airspace consolidation..    MAGDALENA MILTON MD; Attending Radiologist  This document has been electronically signed. May 17 2021 5:41PM    EXAM:  US DPLX LWR EXT VEINS COMPL BI                        PROCEDURE DATE:  2021      IMPRESSION:  No evidence of deep venous thrombosis in either lower extremity.    CARO MORSE MD; Attending Radiologist  This document has been electronically signed. May 16 2021  7:42PM    EXAM:  CT BRAIN                        PROCEDURE DATE:  2021      IMPRESSION:    1)  lacunar infarcts and chronic ischemic changes noted in both hemispheres with volume loss and involutional change. No acute abnormality suggested..  2)  no intracerebral hemorrhage, contusion, or extracerebral collections are identified.    DONI CASON MD; Attending Radiologist  This document has been electronically signed. May 13 2021  3:47PM    EXAM:  CT ABDOMEN AND PELVIS                        EXAM:  CT CHEST                        PROCEDURE DATE:  2021      BONES: Compression deformity of the superior endplate of L2, unchanged since 2021. Mild compression deformity of the superior endplate of L1, unchanged. Degenerative changes in the spine. Left hip arthroplasty. Noacute fracture. Old left 10th rib fracture.    IMPRESSION:  No acute intrathoracic or intra-abdominal sequelae of trauma.    Moderate left pleural effusion with compressive atelectasis in the left lower lobe, slightly decreased since 2021.     3.4 x 2.5 cm mesenteric mass in the right rebel-abdomen with small adjacent lymph nodes and mild infiltration of the adjacent fat suspicious for neoplasm; differential includes carcinoid or lymphoma.    The findings were discussed with Dr. Edwards on 2021 4:21 PM      
Patient is a 89y old  Male who presents with a chief complaint of Fall (18 May 2021 07:37)    HPI:  88yo male without any complaints.   No abdominal pain, n/v, SOB, or CP.   He is eager to go home.     PAST MEDICAL & SURGICAL HISTORY:  DM (diabetes mellitus)    HTN (hypertension)    HLD (hyperlipidemia)    Kidney stone    Hernia    H/O left inguinal hernia repair    Endoscopy finding  choked on piece of chiken-had upper endo with clearance of food    History of tibial fracture  R tibial fracture s/p revision with hardware placement ~3-4yrs ago, per pt      MEDICATIONS  (STANDING):  cyanocobalamin Injectable 1000 MICROGram(s) SubCutaneous daily  dextrose 40% Gel 15 Gram(s) Oral once  dextrose 5%. 1000 milliLiter(s) (50 mL/Hr) IV Continuous <Continuous>  dextrose 5%. 1000 milliLiter(s) (100 mL/Hr) IV Continuous <Continuous>  dextrose 50% Injectable 25 Gram(s) IV Push once  dextrose 50% Injectable 12.5 Gram(s) IV Push once  dextrose 50% Injectable 25 Gram(s) IV Push once  enoxaparin Injectable 100 milliGRAM(s) SubCutaneous two times a day  finasteride 5 milliGRAM(s) Oral daily  glucagon  Injectable 1 milliGRAM(s) IntraMuscular once  insulin glargine Injectable (LANTUS) 10 Unit(s) SubCutaneous at bedtime  insulin lispro (ADMELOG) corrective regimen sliding scale   SubCutaneous three times a day before meals  melatonin 5 milliGRAM(s) Oral at bedtime  metoprolol succinate ER 25 milliGRAM(s) Oral daily  pantoprazole    Tablet 40 milliGRAM(s) Oral before breakfast  sodium chloride 0.9%. 1000 milliLiter(s) (75 mL/Hr) IV Continuous <Continuous>  tamsulosin 0.4 milliGRAM(s) Oral at bedtime  traMADol 50 milliGRAM(s) Oral daily  valsartan 160 milliGRAM(s) Oral daily    MEDICATIONS  (PRN):  acetaminophen   Tablet .. 650 milliGRAM(s) Oral every 6 hours PRN Temp greater or equal to 38C (100.4F), Mild Pain (1 - 3)    Allergies    morphine (Other)    Intolerances      SOCIAL HISTORY:  FAMILY HISTORY:  No pertinent family history in first degree relatives  pt cannot recall any history of disease      REVIEW OF SYSTEMS:  CONSTITUTIONAL: No weakness, fevers or chills  EYES/ENT: No visual changes;  No vertigo or throat pain   NECK: No pain or stiffness  RESPIRATORY: No cough, wheezing, hemoptysis; No shortness of breath  CARDIOVASCULAR: No chest pain or palpitations  GASTROINTESTINAL: No abdominal or epigastric pain. No nausea, vomiting, or hematemesis; No diarrhea or constipation. No melena or hematochezia.  GENITOURINARY: No dysuria, frequency or hematuria  NEUROLOGICAL: No numbness or weakness  SKIN: No itching, burning, rashes, or lesions   PSYCH: Normal mood and affect  All other review of systems is negative unless indicated above.  Vital Signs Last 24 Hrs  T(C): 36.8 (18 May 2021 08:27), Max: 36.8 (18 May 2021 05:34)  T(F): 98.2 (18 May 2021 08:27), Max: 98.3 (18 May 2021 05:34)  HR: 63 (18 May 2021 08:27) (54 - 71)  BP: 125/52 (18 May 2021 08:27) (115/56 - 155/53)  BP(mean): 80 (17 May 2021 16:14) (80 - 80)  RR: 18 (18 May 2021 08:27) (14 - 18)  SpO2: 96% (18 May 2021 08:27) (94% - 100%)    PHYSICAL EXAM:  Constitutional: Elderly male in NAD.   HEENT: MMM  Neck: No LAD  Respiratory: CTAB  Cardiovascular: S1 and S2, RRR, no M/G/R  Gastrointestinal: BS+, soft, NT/ND  Extremities: No peripheral edema  Vascular: 2+ peripheral pulses  Neurological: A/O x 3, no focal deficits  Psychiatric: Normal mood, normal affect  Skin: Laceration noted to right ear.     LABS:                        10.1   4.29  )-----------( 217      ( 18 May 2021 07:40 )             31.4     05-18    141  |  110<H>  |  23  ----------------------------<  116<H>  4.1   |  28  |  1.10    Ca    8.3<L>      18 May 2021 07:40            RADIOLOGY & ADDITIONAL STUDIES:
An 89 year old male who was admitted to the hospital after sustaining a mechanical fall and abrasions to the left ear  He was found to have a mesenteric mass and left pleural effusion  Biopsy of the mass was not feasible by IR as no safe window could be found and also seems not amenable to EUS biopsy  Surgery were consulted for possible biopsy  Patient denies any symptoms at the meantime    Vitals:  T(C): 36.4 (05-18 @ 16:28), Max: 36.8 (05-18 @ 05:34)  HR: 57 (05-18 @ 16:28) (54 - 66)  BP: 132/61 (05-18 @ 16:28) (115/56 - 132/61)  RR: 18 (05-18 @ 16:28) (18 - 18)  SpO2: 94% (05-18 @ 16:28) (94% - 98%)    05-17 @ 07:01  -  05-18 @ 07:00  --------------------------------------------------------  IN:    Other (mL): 100 mL  Total IN: 100 mL    OUT:    Voided (mL): 175 mL  Total OUT: 175 mL    Total NET: -75 mL          Physical Exam:  Pt is AAOx3  General: Well developed, in no acute distress.   Chest: Lungs clear, no rales, no rhonchi, no wheezes.   Heart: RR, no murmurs, no rubs, no gallops.   Abdomen: Protuberant, soft, no tenderness, no masses, BS normal, left inguinal scar  Back: Normal curvature, no tenderness.   Neuro: Physiological, no localizing findings.   Skin: Normal, no rashes, no lesions noted.   Extremities: Warm, well perfused, no edema, Pulses intact    05-18 @ 10:14                    -  CBC: ->)-------(<-                     -                 141 | 112 | 24    CMP:  ----------------------< 211               4.1 | 25 | 1.18                      Ca:8.5  Phos:-  Mg:-               0.2|      |10        LFTs:  ------|79|-----             -|      |-  05-18 @ 07:40                    10.1  CBC: 4.29>)-------(<217                     31.4                 141 | 110 | 23    CMP:  ----------------------< 116               4.1 | 28 | 1.10                      Ca:8.3  Phos:-  Mg:-               -|      |-        LFTs:  ------|-|-----             -|      |-  05-17 @ 07:30                    10.0  CBC: 4.57>)-------(<208                     31.0                 140 | 111 | 28    CMP:  ----------------------< 169               3.8 | 23 | 1.10                      Ca:8.0  Phos:-  Mg:-               -|      |-        LFTs:  ------|-|-----             -|      |-      Current Inpatient Medications:  acetaminophen   Tablet .. 650 milliGRAM(s) Oral every 6 hours PRN  cyanocobalamin Injectable 1000 MICROGram(s) SubCutaneous daily  dextrose 40% Gel 15 Gram(s) Oral once  dextrose 5%. 1000 milliLiter(s) (50 mL/Hr) IV Continuous <Continuous>  dextrose 5%. 1000 milliLiter(s) (100 mL/Hr) IV Continuous <Continuous>  dextrose 50% Injectable 25 Gram(s) IV Push once  dextrose 50% Injectable 12.5 Gram(s) IV Push once  dextrose 50% Injectable 25 Gram(s) IV Push once  enoxaparin Injectable 100 milliGRAM(s) SubCutaneous two times a day  finasteride 5 milliGRAM(s) Oral daily  glucagon  Injectable 1 milliGRAM(s) IntraMuscular once  insulin glargine Injectable (LANTUS) 10 Unit(s) SubCutaneous at bedtime  insulin lispro (ADMELOG) corrective regimen sliding scale   SubCutaneous three times a day before meals  melatonin 5 milliGRAM(s) Oral at bedtime  metoprolol succinate ER 25 milliGRAM(s) Oral daily  pantoprazole    Tablet 40 milliGRAM(s) Oral before breakfast  sodium chloride 0.9%. 1000 milliLiter(s) (75 mL/Hr) IV Continuous <Continuous>  tamsulosin 0.4 milliGRAM(s) Oral at bedtime  traMADol 50 milliGRAM(s) Oral daily  valsartan 160 milliGRAM(s) Oral daily

## 2021-05-20 ENCOUNTER — RESULT REVIEW (OUTPATIENT)
Age: 86
End: 2021-05-20

## 2021-05-20 ENCOUNTER — APPOINTMENT (OUTPATIENT)
Dept: SURGICAL ONCOLOGY | Facility: HOSPITAL | Age: 86
End: 2021-05-20

## 2021-05-20 DIAGNOSIS — I48.0 PAROXYSMAL ATRIAL FIBRILLATION: ICD-10-CM

## 2021-05-20 LAB
ANION GAP SERPL CALC-SCNC: 5 MMOL/L — SIGNIFICANT CHANGE UP (ref 5–17)
BUN SERPL-MCNC: 31 MG/DL — HIGH (ref 7–23)
CALCIUM SERPL-MCNC: 9 MG/DL — SIGNIFICANT CHANGE UP (ref 8.5–10.1)
CHLORIDE SERPL-SCNC: 107 MMOL/L — SIGNIFICANT CHANGE UP (ref 96–108)
CO2 SERPL-SCNC: 28 MMOL/L — SIGNIFICANT CHANGE UP (ref 22–31)
CREAT SERPL-MCNC: 1.3 MG/DL — SIGNIFICANT CHANGE UP (ref 0.5–1.3)
GLUCOSE SERPL-MCNC: 173 MG/DL — HIGH (ref 70–99)
HCT VFR BLD CALC: 31.6 % — LOW (ref 39–50)
HGB BLD-MCNC: 10 G/DL — LOW (ref 13–17)
MAGNESIUM SERPL-MCNC: 2.4 MG/DL — SIGNIFICANT CHANGE UP (ref 1.6–2.6)
MCHC RBC-ENTMCNC: 30 PG — SIGNIFICANT CHANGE UP (ref 27–34)
MCHC RBC-ENTMCNC: 31.6 GM/DL — LOW (ref 32–36)
MCV RBC AUTO: 94.9 FL — SIGNIFICANT CHANGE UP (ref 80–100)
PHOSPHATE SERPL-MCNC: 3.4 MG/DL — SIGNIFICANT CHANGE UP (ref 2.5–4.5)
PLATELET # BLD AUTO: 211 K/UL — SIGNIFICANT CHANGE UP (ref 150–400)
POTASSIUM SERPL-MCNC: 3.9 MMOL/L — SIGNIFICANT CHANGE UP (ref 3.5–5.3)
POTASSIUM SERPL-SCNC: 3.9 MMOL/L — SIGNIFICANT CHANGE UP (ref 3.5–5.3)
RBC # BLD: 3.33 M/UL — LOW (ref 4.2–5.8)
RBC # FLD: 14.5 % — SIGNIFICANT CHANGE UP (ref 10.3–14.5)
SARS-COV-2 RNA SPEC QL NAA+PROBE: SIGNIFICANT CHANGE UP
SODIUM SERPL-SCNC: 140 MMOL/L — SIGNIFICANT CHANGE UP (ref 135–145)
WBC # BLD: 4.89 K/UL — SIGNIFICANT CHANGE UP (ref 3.8–10.5)
WBC # FLD AUTO: 4.89 K/UL — SIGNIFICANT CHANGE UP (ref 3.8–10.5)

## 2021-05-20 PROCEDURE — 99232 SBSQ HOSP IP/OBS MODERATE 35: CPT

## 2021-05-20 PROCEDURE — 88323 CONSLTJ&REPRT MATRL PREP SLD: CPT | Mod: 26

## 2021-05-20 PROCEDURE — 88333 PATH CONSLTJ SURG CYTO XM 1: CPT | Mod: 26

## 2021-05-20 PROCEDURE — 88305 TISSUE EXAM BY PATHOLOGIST: CPT | Mod: 26,59

## 2021-05-20 PROCEDURE — 93306 TTE W/DOPPLER COMPLETE: CPT | Mod: 26

## 2021-05-20 PROCEDURE — 88189 FLOWCYTOMETRY/READ 16 & >: CPT | Mod: 59

## 2021-05-20 PROCEDURE — 49321 LAPAROSCOPY BIOPSY: CPT

## 2021-05-20 RX ORDER — METOPROLOL TARTRATE 50 MG
25 TABLET ORAL DAILY
Refills: 0 | Status: DISCONTINUED | OUTPATIENT
Start: 2021-05-20 | End: 2021-05-21

## 2021-05-20 RX ORDER — ACETAMINOPHEN 500 MG
1000 TABLET ORAL ONCE
Refills: 0 | Status: DISCONTINUED | OUTPATIENT
Start: 2021-05-20 | End: 2021-05-20

## 2021-05-20 RX ORDER — OXYCODONE HYDROCHLORIDE 5 MG/1
5 TABLET ORAL ONCE
Refills: 0 | Status: DISCONTINUED | OUTPATIENT
Start: 2021-05-20 | End: 2021-05-20

## 2021-05-20 RX ORDER — ONDANSETRON 8 MG/1
4 TABLET, FILM COATED ORAL ONCE
Refills: 0 | Status: DISCONTINUED | OUTPATIENT
Start: 2021-05-20 | End: 2021-05-20

## 2021-05-20 RX ORDER — TAMSULOSIN HYDROCHLORIDE 0.4 MG/1
0.4 CAPSULE ORAL AT BEDTIME
Refills: 0 | Status: DISCONTINUED | OUTPATIENT
Start: 2021-05-20 | End: 2021-05-21

## 2021-05-20 RX ORDER — PANTOPRAZOLE SODIUM 20 MG/1
40 TABLET, DELAYED RELEASE ORAL
Refills: 0 | Status: DISCONTINUED | OUTPATIENT
Start: 2021-05-20 | End: 2021-05-21

## 2021-05-20 RX ORDER — APIXABAN 2.5 MG/1
5 TABLET, FILM COATED ORAL
Refills: 0 | Status: DISCONTINUED | OUTPATIENT
Start: 2021-05-21 | End: 2021-05-21

## 2021-05-20 RX ORDER — VALSARTAN 80 MG/1
160 TABLET ORAL DAILY
Refills: 0 | Status: DISCONTINUED | OUTPATIENT
Start: 2021-05-20 | End: 2021-05-21

## 2021-05-20 RX ORDER — INSULIN GLARGINE 100 [IU]/ML
10 INJECTION, SOLUTION SUBCUTANEOUS AT BEDTIME
Refills: 0 | Status: DISCONTINUED | OUTPATIENT
Start: 2021-05-20 | End: 2021-05-21

## 2021-05-20 RX ORDER — FENTANYL CITRATE 50 UG/ML
50 INJECTION INTRAVENOUS
Refills: 0 | Status: DISCONTINUED | OUTPATIENT
Start: 2021-05-20 | End: 2021-05-20

## 2021-05-20 RX ORDER — SODIUM CHLORIDE 9 MG/ML
1000 INJECTION, SOLUTION INTRAVENOUS
Refills: 0 | Status: DISCONTINUED | OUTPATIENT
Start: 2021-05-20 | End: 2021-05-20

## 2021-05-20 RX ORDER — TRAMADOL HYDROCHLORIDE 50 MG/1
50 TABLET ORAL DAILY
Refills: 0 | Status: DISCONTINUED | OUTPATIENT
Start: 2021-05-20 | End: 2021-05-21

## 2021-05-20 RX ORDER — HYDROMORPHONE HYDROCHLORIDE 2 MG/ML
0.5 INJECTION INTRAMUSCULAR; INTRAVENOUS; SUBCUTANEOUS
Refills: 0 | Status: DISCONTINUED | OUTPATIENT
Start: 2021-05-20 | End: 2021-05-20

## 2021-05-20 RX ORDER — DEXTROSE 50 % IN WATER 50 %
25 SYRINGE (ML) INTRAVENOUS ONCE
Refills: 0 | Status: DISCONTINUED | OUTPATIENT
Start: 2021-05-20 | End: 2021-05-21

## 2021-05-20 RX ORDER — LANOLIN ALCOHOL/MO/W.PET/CERES
5 CREAM (GRAM) TOPICAL AT BEDTIME
Refills: 0 | Status: DISCONTINUED | OUTPATIENT
Start: 2021-05-20 | End: 2021-05-21

## 2021-05-20 RX ORDER — INSULIN LISPRO 100/ML
VIAL (ML) SUBCUTANEOUS
Refills: 0 | Status: DISCONTINUED | OUTPATIENT
Start: 2021-05-20 | End: 2021-05-21

## 2021-05-20 RX ADMIN — FENTANYL CITRATE 50 MICROGRAM(S): 50 INJECTION INTRAVENOUS at 12:47

## 2021-05-20 RX ADMIN — OXYCODONE HYDROCHLORIDE 5 MILLIGRAM(S): 5 TABLET ORAL at 12:56

## 2021-05-20 RX ADMIN — LIDOCAINE 2 PATCH: 4 CREAM TOPICAL at 03:06

## 2021-05-20 RX ADMIN — FENTANYL CITRATE 50 MICROGRAM(S): 50 INJECTION INTRAVENOUS at 13:32

## 2021-05-20 RX ADMIN — FENTANYL CITRATE 50 MICROGRAM(S): 50 INJECTION INTRAVENOUS at 12:43

## 2021-05-20 RX ADMIN — VALSARTAN 160 MILLIGRAM(S): 80 TABLET ORAL at 15:43

## 2021-05-20 RX ADMIN — SODIUM CHLORIDE 75 MILLILITER(S): 9 INJECTION, SOLUTION INTRAVENOUS at 12:43

## 2021-05-20 RX ADMIN — Medication 5 MILLIGRAM(S): at 21:59

## 2021-05-20 RX ADMIN — PANTOPRAZOLE SODIUM 40 MILLIGRAM(S): 20 TABLET, DELAYED RELEASE ORAL at 15:43

## 2021-05-20 RX ADMIN — INSULIN GLARGINE 10 UNIT(S): 100 INJECTION, SOLUTION SUBCUTANEOUS at 22:00

## 2021-05-20 RX ADMIN — Medication 2: at 08:01

## 2021-05-20 RX ADMIN — Medication 25 MILLIGRAM(S): at 15:43

## 2021-05-20 RX ADMIN — FENTANYL CITRATE 50 MICROGRAM(S): 50 INJECTION INTRAVENOUS at 12:56

## 2021-05-20 RX ADMIN — TAMSULOSIN HYDROCHLORIDE 0.4 MILLIGRAM(S): 0.4 CAPSULE ORAL at 21:59

## 2021-05-20 RX ADMIN — FENTANYL CITRATE 50 MICROGRAM(S): 50 INJECTION INTRAVENOUS at 12:35

## 2021-05-20 NOTE — PROGRESS NOTE ADULT - SUBJECTIVE AND OBJECTIVE BOX
Patient is a 89y old  Male who presents with a chief complaint of Fall (14 May 2021 18:43)      HPI:  89 y/o Male with a PMHx of T2DM, HTN, hiatal hernia, chronic left pleural effusion, CKD, presents to the ED BIBA [facility] after an unwitnessed fall. Patient states he was ambulating in his room and turned around too fast and tripped and fell. Patient was unable to get up on his own, and needed assistance. Patient with abrasion to R ear. No chest pain, sob, palpitation, abd pain, neck pain, lightheadedness/ dizziness, weakness, fatigue. Patient denies any loss of consciousness. He remember the whole episode clearly. No seizure like activity.  (14 May 2021 01:58)  pt is seen and examined in his room  prior large volume thoracentesis reported  he appears not interested in repeat tap / "my breathing is perfect"  no cp  no cough    he declines hx of syncope  he feels "great" with his breathing  cxr findings are discussed    All recent data are reviewed in his room with him today  Mesenteric mass could not be biopsied by IR yesterday  he appears asymptomatic from left sided effusion but may have some yield for cytology to be sent if he agrees  he is not even suing o2 in his room now  no active pulm sxs  awaiting possible oncology eval    data discussed with DR Velázquez after repeat cxr  also discussed all with Interventional radiologist  pt agrees to proceed with repeat large volume thoracentesis / cytology needed    tolerated his procedure  large volume thoracentesis, 1.5 liters  "breathing is good"  PAST MEDICAL & SURGICAL HISTORY:  DM (diabetes mellitus)    HTN (hypertension)    HLD (hyperlipidemia)    Kidney stone    Hernia    H/O left inguinal hernia repair    Endoscopy finding  choked on piece of chiken-had upper endo with clearance of food    History of tibial fracture  R tibial fracture s/p revision with hardware placement ~3-4yrs ago, per pt        PREVIOUS DIAGNOSTIC TESTING:      MEDICATIONS  (STANDING):  dextrose 40% Gel 15 Gram(s) Oral once  dextrose 5%. 1000 milliLiter(s) (50 mL/Hr) IV Continuous <Continuous>  dextrose 5%. 1000 milliLiter(s) (100 mL/Hr) IV Continuous <Continuous>  dextrose 50% Injectable 25 Gram(s) IV Push once  dextrose 50% Injectable 12.5 Gram(s) IV Push once  dextrose 50% Injectable 25 Gram(s) IV Push once  finasteride 5 milliGRAM(s) Oral daily  glucagon  Injectable 1 milliGRAM(s) IntraMuscular once  insulin glargine Injectable (LANTUS) 10 Unit(s) SubCutaneous at bedtime  insulin lispro (ADMELOG) corrective regimen sliding scale   SubCutaneous three times a day before meals  melatonin 5 milliGRAM(s) Oral at bedtime  metoprolol succinate ER 25 milliGRAM(s) Oral daily  pantoprazole    Tablet 40 milliGRAM(s) Oral before breakfast  sodium chloride 0.9%. 1000 milliLiter(s) (75 mL/Hr) IV Continuous <Continuous>  tamsulosin 0.4 milliGRAM(s) Oral at bedtime  traMADol 50 milliGRAM(s) Oral daily  valsartan 160 milliGRAM(s) Oral daily    MEDICATIONS  (PRN):  acetaminophen   Tablet .. 650 milliGRAM(s) Oral every 6 hours PRN Temp greater or equal to 38C (100.4F), Mild Pain (1 - 3)      FAMILY HISTORY:  No pertinent family history in first degree relatives  pt cannot recall any history of disease        SOCIAL HISTORY:  resides in [facility]    REVIEW OF SYSTEM:  Pertinent items are noted in HPI.    Vital Signs Last 24 Hrs  T(C): 36.8 (19 May 2021 22:55), Max: 36.9 (19 May 2021 20:31)  T(F): 98.2 (19 May 2021 22:55), Max: 98.4 (19 May 2021 20:31)  HR: 58 (19 May 2021 22:55) (58 - 62)  BP: 100/54 (19 May 2021 22:55) (100/54 - 137/68)  BP(mean): --  RR: 18 (19 May 2021 22:55) (17 - 18)  SpO2: 97% (19 May 2021 22:55) (96% - 98%)  PHYSICAL EXAM  General Appearance: cooperative, no acute distress, elderly male  HEENT: PERRL, conjunctiva clear,Neck: Supple  Lungs: decreased breath sounds left mi- lower ling filed, no wheeze  dull ness to percussion LLL  Heart: Regular rate and rhythm, S1, S2 normal  Abdomen: Soft, non-tender, bowel sounds active  Extremities: no cyanosis, some peripheral edema, no joint swelling  Skin: Skin color, texture normal, no rashes   Neurologic: non focal    ECG:    LABS:                        10.0   4.57  )-----------( 208      ( 17 May 2021 07:30 )             31.0   05-17    140  |  111<H>  |  28<H>  ----------------------------<  169<H>  3.8   |  23  |  1.10    Ca    8.0<L>      17 May 2021 07:30                            9.8    4.87  )-----------( 204      ( 15 May 2021 08:57 )             30.4     05-15    140  |  112<H>  |  19  ----------------------------<  187<H>  3.9   |  22  |  0.99    Ca    8.5      15 May 2021 08:57    TPro  6.2  /  Alb  2.8<L>  /  TBili  0.2  /  DBili  x   /  AST  10<L>  /  ALT  15  /  AlkPhos  114  05-13    CARDIAC MARKERS ( 13 May 2021 19:23 )  <0.015 ng/mL / x     / x     / x     / x      CARDIAC MARKERS ( 13 May 2021 17:56 )  <0.015 ng/mL / x     / x     / x     / x              PT/INR - ( 13 May 2021 16:12 )   PT: 12.0 sec;   INR: 1.03 ratio         PTT - ( 13 May 2021 16:12 )  PTT:30.5 sec  Urinalysis Basic - ( 13 May 2021 16:12 )    Color: Yellow / Appearance: Clear / S.015 / pH: x  Gluc: x / Ketone: Trace  / Bili: Negative / Urobili: Negative mg/dL   Blood: x / Protein: 15 mg/dL / Nitrite: Negative   Leuk Esterase: Negative / RBC: >50 /HPF / WBC 6-10   Sq Epi: x / Non Sq Epi: Few / Bacteria: Few      < from: CT Chest No Cont (21 @ 15:47) >  COMPARISON: CT chest 2021 and CT chest/abdomen/pelvis 2021    CONTRAST/COMPLICATIONS:  IV Contrast: NONE  Oral Contrast: NONE  Complications: None reported at time of study completion    PROCEDURE:  CT of the Chest, Abdomen and Pelvis was performed.  Sagittal and coronal reformats were performed.    FINDINGS:  CHEST:  LUNGS AND LARGE AIRWAYS: Patent central airways. Opacities in left upper lobe along the major fissure, likely atelectasis. Also see below.  PLEURA: Moderate left pleural effusion with compressive atelectasis of the left lower lobe.  VESSELS: Thoracic aorta normal in caliber with atherosclerotic changes in the thoracic aorta and coronary arteries.  HEART: Heart size is normal. No pericardial effusion.  MEDIASTINUM AND MARIA ELENA: No lymphadenopathy. Small to moderate hiatal hernia.  CHEST WALL AND LOWER NECK: No enlarged axillary lymph nodes.    ABDOMEN AND PELVIS:  LIVER: Within normal limits.  BILE DUCTS: Normal caliber.  GALLBLADDER: Cholecystectomy.  SPLEEN: Within normal limits.  PANCREAS: Atrophicwith fatty infiltration.  ADRENALS: Within normal limits.  KIDNEYS/URETERS: Dilated left renal pelvis with calculi measuring 1.7 x 1.2 cm  and 1.0 x 1.0 cm. Nonobstructing calculus in the mid left kidney measuring 0.5 cm. No right-sided renal calculi. Right extra renal pelvis.    BLADDER: Distended urinary bladder.  REPRODUCTIVE ORGANS: Prostate not enlarged.    BOWEL: Colonic diverticulosis without evidence of diverticulitis. No bowel obstruction. Appendix is normal.  PERITONEUM: 3.4 x 2.5 cm mesenteric mass in the right hemiabdomen with small adjacent lymph nodes and infiltration of the adjacent fat (series 2 image 117).  VESSELS: Abdominal aorta normal in caliber with mild calcified plaque.  RETROPERITONEUM/LYMPH NODES: No lymphadenopathy.  ABDOMINAL WALL: Unremarkable.  BONES: Compression deformity of the superior endplate of L2, unchanged since 2021. Mild compression deformity of the superior endplate of L1, unchanged. Degenerative changes in the spine. Left hip arthroplasty. Noacute fracture. Old left 10th rib fracture.    IMPRESSION:  No acute intrathoracic or intra-abdominal sequelae of trauma.    Moderate left pleural effusion with compressive atelectasis in the left lower lobe, slightly decreased since 2021.     3.4 x 2.5 cm mesenteric mass in the right hemiabdomen with small adjacent lymph nodes and mild infiltration of the adjacent fat suspicious for neoplasm; differential includes carcinoid or lymphoma.    < end of copied text >        RADIOLOGY & ADDITIONAL STUDIES:  r< from: US Duplex Venous Lower Ext Complete, Bilateral (05.16.21 @ 19:11) >  PROCEDURE DATE:  2021          INTERPRETATION:  CLINICAL INFORMATION: Reported history of DVT; recent CT evaluation raises suspicion for the presence of an intra-abdominal carcinoid versus lymphoma. Evaluate for lower extremity DVT.    COMPARISON: No prior venous sonographic evaluations are available for comparison.    TECHNIQUE: Duplex sonography of the BILATERAL LOWER extremity veins with color and spectral Doppler, with and without compression.    FINDINGS: Bilateral calf subcutaneous edema identified.    RIGHT:  Normal compressibility of the RIGHT common femoral, femoral and popliteal veins.  Doppler examination shows normal spontaneous and phasic flow.  No RIGHT calf vein thrombosis is detected.    LEFT:  Normal compressibility of the LEFT common femoral, femoral and popliteal veins.  Doppler examination shows normal spontaneous and phasic flow.  No LEFT calf vein thrombosis is detected.    IMPRESSION:  No evidence of deep venous thrombosis in either lower extremity.    < end of copied text >  xr< from: Xray Chest 1 View- PORTABLE-Routine (Xray Chest 1 View- PORTABLE-Routine .) (21 @ 17:26) >  PROCEDURE DATE:  2021          INTERPRETATION:  Portable chest radiograph    CLINICAL INFORMATION: Reevaluate effusion    TECHNIQUE:  Portable  AP view of the chest was obtained.    COMPARISON: 2021 chest available for review.    FINDINGS:    The lungs show moderate LEFT pleural effusion and/or basilar airspace consolidation.  . RIGHT lung parenchyma clear..  No pneumothorax.    There is mild cardiomegaly. Trachea midline.    Visualized osseous structures are intact.      IMPRESSION:   Moderate LEFT pleural effusion and/or basilar airspace consolidation..    < end of copied text >

## 2021-05-20 NOTE — PROGRESS NOTE ADULT - ASSESSMENT
89 y/o Male with a PMHx of T2DM, HTN, hiatal hernia, chronic left pleural effusion, CKD, presents to the ED BIBA [facility] after an unwitnessed fall. Patient states he was ambulating in his room and turned around too fast and tripped and fell. Patient was unable to get up on his own, and needed assistance. Patient with abrasion to R ear. No chest pain, sob, palpitation, abd pain, neck pain, lightheadedness/ dizziness, weakness, fatigue. Patient denies any loss of consciousness. He remember the whole episode clearly. No seizure like activity.  (14 May 2021 01:58)  pt is seen and examined in his room  prior large volume thoracentesis reported  he appears not interested in repeat tap / "my breathing is perfect"  no cp  no cough    Assessment / plan;  s/p syncope   Pafib / moderate MR  chronic left sided pleural effusion appears moderate in size  compressive atelectasis due to above  adequate oxygenation on room air / no resp distress  reported prior thoracentesis / pt is not interested in repeat procedure unless more sxs  mesenteric mass in right side abdomen    agree with diuresis as tolerated  will likley need and benefit from repeat large volume / diagnostic thoracentesis by IR next week if pt agrees to proceed   will fu with you  GI and oncology eval in progress  5/17  he declines hx of syncope  he feels "great" with his breathing  no DVT on dopplers  cxr findings are discussed  awaiting oncology eval  fu cxr today requested  5/18  All recent data are reviewed in his room with him today  Mesenteric mass could not be biopsied by IR yesterday  he appears asymptomatic from left sided effusion but may have some yield for cytology to be sent if he agrees  he is not even suing o2 in his room now  no active pulm sxs / fu cxr noted with similar results  awaiting possible oncology eval  5/19  data discussed with DR Velázquez after repeat cxr  also discussed all with Interventional radiologist  pt agrees to proceed with repeat large volume thoracentesis / cytology needed  will repeat PF analysis and check LDH serum  5/20  s/p large volume repeat left sided thoracentesis  post procedure cxr reviewed and improved  cytology sent for malignancy workup  mesenteric mass/node BX planned  all his questions are answered  monitor if fluid accumulating quickly  will update note after fluid analysis

## 2021-05-20 NOTE — PROGRESS NOTE ADULT - SUBJECTIVE AND OBJECTIVE BOX
REASON FOR VISIT: Syncope    HPI:  88 y/o Male with a PMHx of T2DM, HTN, hiatal hernia, chronic left pleural effusion, CKD, presents to the ED BIBA [facility] after an unwitnessed fall. Patient states he was ambulating in his room and turned around too fast and tripped and fell. Patient was unable to get up on his own, and needed assistance. Patient with abrasion to R ear. No chest pain, sob, palpitation, abd pain, neck pain, lightheadedness/ dizziness, weakness, fatigue. Patient denies any loss of consciousness. He remember the whole episode clearly. No seizure like activity.  (14 May 2021 01:58)    pt admitted for fall that was unwitnessed, also w/ chronic L pleural effusion,  mesenteric mass (incidental finding).     Reviewed tele, no arrhythmias.  Echo pending.  Reviewed episode w/ pt.  He seems to be a competent historian  & denies that he lost consciousness prior to fall.  States he went to turn & lost balance.  No lightheadness, dizziness or syncope since admit.    5/18/'21: no complaints, no lightheadness.  5/19/'21: no complaints.  Needs clearance of laparoscopic guided biopsy.  5/20/21:  Feels well; no new complaints; awaiting biopsy     MEDICATIONS  (STANDING):  cyanocobalamin Injectable 1000 MICROGram(s) SubCutaneous daily  enoxaparin Injectable 100 milliGRAM(s) SubCutaneous two times a day  finasteride 5 milliGRAM(s) Oral daily  furosemide   Injectable 40 milliGRAM(s) IV Push daily  glucagon  Injectable 1 milliGRAM(s) IntraMuscular once  insulin glargine Injectable (LANTUS) 10 Unit(s) SubCutaneous at bedtime  insulin lispro (ADMELOG) corrective regimen sliding scale   SubCutaneous three times a day before meals  lidocaine   Patch 2 Patch Transdermal daily  melatonin 5 milliGRAM(s) Oral at bedtime  metoprolol succinate ER 25 milliGRAM(s) Oral daily  pantoprazole    Tablet 40 milliGRAM(s) Oral before breakfast  sodium chloride 0.9%. 1000 milliLiter(s) (75 mL/Hr) IV Continuous <Continuous>  tamsulosin 0.4 milliGRAM(s) Oral at bedtime  traMADol 50 milliGRAM(s) Oral daily  valsartan 160 milliGRAM(s) Oral daily    MEDICATIONS  (PRN):  acetaminophen   Tablet .. 650 milliGRAM(s) Oral every 6 hours PRN Temp greater or equal to 38C (100.4F), Mild Pain (1 - 3)  oxycodone    5 mG/acetaminophen 325 mG 1 Tablet(s) Oral every 4 hours PRN Severe Pain (7 - 10)    Vital Signs Last 24 Hrs  T(C): 36.4 (20 May 2021 08:44), Max: 36.9 (19 May 2021 20:31)  T(F): 97.6 (20 May 2021 08:44), Max: 98.4 (19 May 2021 20:31)  HR: 64 (20 May 2021 08:44) (58 - 64)  BP: 117/59 (20 May 2021 08:44) (100/54 - 117/59)  RR: 18 (20 May 2021 08:44) (18 - 18)  SpO2: 98% (20 May 2021 08:44) (96% - 98%)      PHYSICAL EXAM:  Constitutional: NAD, awake   Neck:  supple,  No JVD  Respiratory: Breath sounds are clear bilaterally, No wheezing, rales or rhonchi  Cardiovascular: S1 and S2, regular rate and rhythm  Gastrointestinal: Bowel Sounds present, soft, nontender.     LABS:              10.0   4.89  )-----------( 211      ( 20 May 2021 07:16 )             31.6     140  |  107  |  31<H>  ----------------------------<  173<H>  3.9   |  28  |  1.30    Ca    9.0      20 May 2021 07:16  Phos  3.4     05-20  Mg     2.4     05-20    TPro  5.4<L>  /  Alb  2.3<L>  /  TBili  0.2  /  DBili  x   /  AST  10<L>  /  ALT  13  /  AlkPhos  79  05-18  ===============================  12 Lead ECG (05.14.21 @ 00:25): Sinus rhythm with 1st degree A-V block    TELE: sinus rhythm, no tachy/byron arrhythmias.  ===============================

## 2021-05-20 NOTE — BRIEF OPERATIVE NOTE - OPERATION/FINDINGS
Bulky mesenteric mass identified and biopsy taken x2 and samples from biopsy forceps, sent to pathology  No other lesions identified  PACU in stable condition
Due to location of lesion and overlying bowel, no safe window could be found for percutaneous biopsy despite imaging in supine, right decubitus, and left decubitus positions.    Consider EUS vs laparoscopic biopsy
successful aspiration of 1560CC clear yellow fluid from left pleural effusion

## 2021-05-20 NOTE — PROGRESS NOTE ADULT - SUBJECTIVE AND OBJECTIVE BOX
CC: Fall     HPI: 89 y/o Male with a PMHx of T2DM, HTN, hiatal hernia, chronic left pleural effusion, CKD, presents to the ED BIBA [facility] after an unwitnessed fall. Patient states he was ambulating in his room and turned around too fast and tripped and fell. Patient was unable to get up on his own, and needed assistance. Patient with abrasion to R ear. No chest pain, sob, palpitation, abd pain, neck pain, lightheadedness/ dizziness, weakness, fatigue. Patient denies any loss of consciousness. He remember the whole episode clearly. No seizure like activity.      INTERVAL HPI/ OVERNIGHT EVENTS:  Pt was seen and examined, no complains,  feels well.  Plan for thoracentesis in am discussed, he agrees. No CP no SOB.     Doing well this morning, without any complaints. patient is planned for lymph node biopsy this am. D/C planning in the am.     REVIEW OF SYSTEMS:  All other review of systems is negative unless indicated above.    PHYSICAL EXAM:  ICU Vital Signs Last 24 Hrs  T(C): 36.8 (19 May 2021 08:11), Max: 36.8 (19 May 2021 08:11)  T(F): 98.2 (19 May 2021 08:11), Max: 98.2 (19 May 2021 08:11)  HR: 62 (19 May 2021 08:11) (61 - 67)  BP: 137/68 (19 May 2021 08:11) (137/68 - 149/58)  BP(mean): 67 (19 May 2021 06:10) (67 - 87)  RR: 17 (19 May 2021 08:11) (16 - 17)  SpO2: 98% (19 May 2021 08:11) (96% - 98%)    General: Well developed;  in no acute distress  Eyes: PERRLA, EOMI; conjunctiva and sclera clear  Head: Normocephalic; atraumatic  ENMT: No nasal discharge; airway clear  Neck: Supple; non tender; no masses  Respiratory: Decreased  BS  L lung,   No wheezes  Cardiovascular: Irregular rate and rhythm. S1 and S2 Normal;   Gastrointestinal: Soft non-tender non-distended; Normal bowel sounds  Genitourinary: No  suprapubic  tenderness  Extremities: + 2-3 pitting  edema  Vascular: Peripheral pulses palpable 2+ bilaterally  Neurological: Alert and oriented x3, non focal   Musculoskeletal: Normal muscle tone and strength   Psychiatric: Cooperative       LABS:     CBC Full  -  ( 19 May 2021 07:36 )  WBC Count : 4.98 K/uL  RBC Count : 3.42 M/uL  Hemoglobin : 10.4 g/dL  Hematocrit : 32.4 %  Platelet Count - Automated : 210 K/uL  Mean Cell Volume : 94.7 fl  Mean Cell Hemoglobin : 30.4 pg  Mean Cell Hemoglobin Concentration : 32.1 gm/dL  Auto Neutrophil # : x  Auto Lymphocyte # : x  Auto Monocyte # : x  Auto Eosinophil # : x  Auto Basophil # : x  Auto Neutrophil % : x  Auto Lymphocyte % : x  Auto Monocyte % : x  Auto Eosinophil % : x  Auto Basophil % : x        05-19    140  |  108  |  25<H>  ----------------------------<  156<H>  3.9   |  28  |  1.19    Ca    8.9      19 May 2021 07:36    TPro  5.4<L>  /  Alb  2.3<L>  /  TBili  0.2  /  DBili  x   /  AST  10<L>  /  ALT  13  /  AlkPhos  79  05-18      89 y/o Male with a PMHx of DVT (apixaban), nephrolithiasis,    T2DM, HTN, hiatal hernia, chronic left pleural effusion, CKD admitted for:     # S/p fall, r/o syncope.  - patient denied LOC  - imaging:  no acute traumatic findings.  - telemetry monitoring:  NSR 68.  PVCs.  A-paced.  - orthostatic VS: neg   - cardio eval appreciated     # Chronic  L pleural effusion with LE edema, CHF? vs malignancy related  - pleural effusion: according to the cardiothoracic team, this could be exudative  - Chest CT:  moderate L effusion   - repeat CXR with worsening   - LE doppler neg for DVT   - monitor pulse ox   - Dr Rivera, plan for thora in am with IR    # New mesenteric mass.  - incidental finding on CT -  pending biopsy in the am  - CT AB/pelvis:  mesenteric mass in the R rebel abdomen.  ddx includes carcinoid v. lymphoma.  - D/w GI no ERCP or Bx   - If Pleural fluid cytology neg, will need Dx Laparoscopy and Bx     # hx DVT. LE edema likely 2/2 venous insufficiency, postphlebitic syndrome ?  - repeat LE venous doppler neg for DVT   - Of DOAC with bridging  with Lovenox for procedures      # hx AF (?).  - "AF" documented in H&P and Pulmonary notes,  no  documentation (from Cardiology or otherwise) to confirm this,  - On AC  - C/w  metoprolol ER 25mg po qd.  -C/w tele     # DM2.  - A1C:  8.7   - consistent CHO diet.  - basal insulin:  10units sq qhs.  - correction insulin coverage.    # DVT prophylaxis. Hold in am for procedure   - LMWH.    POC d/w Daughter Nicky [phone]. Reviewed results and possible plan

## 2021-05-21 ENCOUNTER — TRANSCRIPTION ENCOUNTER (OUTPATIENT)
Age: 86
End: 2021-05-21

## 2021-05-21 VITALS
SYSTOLIC BLOOD PRESSURE: 149 MMHG | HEART RATE: 62 BPM | RESPIRATION RATE: 18 BRPM | OXYGEN SATURATION: 99 % | TEMPERATURE: 97 F | DIASTOLIC BLOOD PRESSURE: 56 MMHG

## 2021-05-21 LAB
ANION GAP SERPL CALC-SCNC: 7 MMOL/L — SIGNIFICANT CHANGE UP (ref 5–17)
BUN SERPL-MCNC: 34 MG/DL — HIGH (ref 7–23)
CALCIUM SERPL-MCNC: 8.5 MG/DL — SIGNIFICANT CHANGE UP (ref 8.5–10.1)
CHLORIDE SERPL-SCNC: 109 MMOL/L — HIGH (ref 96–108)
CO2 SERPL-SCNC: 23 MMOL/L — SIGNIFICANT CHANGE UP (ref 22–31)
COVID-19 SPIKE DOMAIN AB INTERP: POSITIVE
COVID-19 SPIKE DOMAIN ANTIBODY RESULT: 40.8 U/ML — HIGH
CREAT SERPL-MCNC: 1.35 MG/DL — HIGH (ref 0.5–1.3)
GLUCOSE SERPL-MCNC: 194 MG/DL — HIGH (ref 70–99)
HCT VFR BLD CALC: 30.9 % — LOW (ref 39–50)
HGB BLD-MCNC: 9.9 G/DL — LOW (ref 13–17)
MCHC RBC-ENTMCNC: 30.9 PG — SIGNIFICANT CHANGE UP (ref 27–34)
MCHC RBC-ENTMCNC: 32 GM/DL — SIGNIFICANT CHANGE UP (ref 32–36)
MCV RBC AUTO: 96.6 FL — SIGNIFICANT CHANGE UP (ref 80–100)
PLATELET # BLD AUTO: 192 K/UL — SIGNIFICANT CHANGE UP (ref 150–400)
POTASSIUM SERPL-MCNC: 4.6 MMOL/L — SIGNIFICANT CHANGE UP (ref 3.5–5.3)
POTASSIUM SERPL-SCNC: 4.6 MMOL/L — SIGNIFICANT CHANGE UP (ref 3.5–5.3)
RBC # BLD: 3.2 M/UL — LOW (ref 4.2–5.8)
RBC # FLD: 14.6 % — HIGH (ref 10.3–14.5)
SARS-COV-2 IGG+IGM SERPL QL IA: 40.8 U/ML — HIGH
SARS-COV-2 IGG+IGM SERPL QL IA: POSITIVE
SODIUM SERPL-SCNC: 139 MMOL/L — SIGNIFICANT CHANGE UP (ref 135–145)
WBC # BLD: 6.18 K/UL — SIGNIFICANT CHANGE UP (ref 3.8–10.5)
WBC # FLD AUTO: 6.18 K/UL — SIGNIFICANT CHANGE UP (ref 3.8–10.5)

## 2021-05-21 PROCEDURE — 99232 SBSQ HOSP IP/OBS MODERATE 35: CPT

## 2021-05-21 PROCEDURE — 71045 X-RAY EXAM CHEST 1 VIEW: CPT | Mod: 26

## 2021-05-21 PROCEDURE — 99238 HOSP IP/OBS DSCHRG MGMT 30/<: CPT

## 2021-05-21 RX ORDER — LANOLIN ALCOHOL/MO/W.PET/CERES
1 CREAM (GRAM) TOPICAL
Qty: 0 | Refills: 0 | DISCHARGE
Start: 2021-05-21

## 2021-05-21 RX ADMIN — PANTOPRAZOLE SODIUM 40 MILLIGRAM(S): 20 TABLET, DELAYED RELEASE ORAL at 05:25

## 2021-05-21 RX ADMIN — APIXABAN 5 MILLIGRAM(S): 2.5 TABLET, FILM COATED ORAL at 09:07

## 2021-05-21 RX ADMIN — TRAMADOL HYDROCHLORIDE 50 MILLIGRAM(S): 50 TABLET ORAL at 09:10

## 2021-05-21 RX ADMIN — Medication 4: at 07:40

## 2021-05-21 RX ADMIN — VALSARTAN 160 MILLIGRAM(S): 80 TABLET ORAL at 09:08

## 2021-05-21 RX ADMIN — Medication 25 MILLIGRAM(S): at 09:07

## 2021-05-21 RX ADMIN — TRAMADOL HYDROCHLORIDE 50 MILLIGRAM(S): 50 TABLET ORAL at 09:55

## 2021-05-21 NOTE — PROGRESS NOTE ADULT - SUBJECTIVE AND OBJECTIVE BOX
Patient is a 89y old  Male who presents with a chief complaint of Fall (14 May 2021 18:43)      HPI:  89 y/o Male with a PMHx of T2DM, HTN, hiatal hernia, chronic left pleural effusion, CKD, presents to the ED BIBA [facility] after an unwitnessed fall. Patient states he was ambulating in his room and turned around too fast and tripped and fell. Patient was unable to get up on his own, and needed assistance. Patient with abrasion to R ear. No chest pain, sob, palpitation, abd pain, neck pain, lightheadedness/ dizziness, weakness, fatigue. Patient denies any loss of consciousness. He remember the whole episode clearly. No seizure like activity.  (14 May 2021 01:58)  pt is seen and examined in his room  prior large volume thoracentesis reported  he appears not interested in repeat tap / "my breathing is perfect"  no cp  no cough    he declines hx of syncope  he feels "great" with his breathing  cxr findings are discussed    All recent data are reviewed in his room with him today  Mesenteric mass could not be biopsied by IR yesterday  he appears asymptomatic from left sided effusion but may have some yield for cytology to be sent if he agrees  he is not even suing o2 in his room now  no active pulm sxs  awaiting possible oncology eval    data discussed with DR Velázquez after repeat cxr  also discussed all with Interventional radiologist  pt agrees to proceed with repeat large volume thoracentesis / cytology needed    tolerated his procedure  large volume thoracentesis, 1.5 liters  "breathing is good"    s/p mesenteric mass biopsy  PF analysis discussed  breathing well  PAST MEDICAL & SURGICAL HISTORY:  DM (diabetes mellitus)    HTN (hypertension)    HLD (hyperlipidemia)    Kidney stone    Hernia    H/O left inguinal hernia repair    Endoscopy finding  choked on piece of chiken-had upper endo with clearance of food    History of tibial fracture  R tibial fracture s/p revision with hardware placement ~3-4yrs ago, per pt        PREVIOUS DIAGNOSTIC TESTING:      MEDICATIONS  (STANDING):  dextrose 40% Gel 15 Gram(s) Oral once  dextrose 5%. 1000 milliLiter(s) (50 mL/Hr) IV Continuous <Continuous>  dextrose 5%. 1000 milliLiter(s) (100 mL/Hr) IV Continuous <Continuous>  dextrose 50% Injectable 25 Gram(s) IV Push once  dextrose 50% Injectable 12.5 Gram(s) IV Push once  dextrose 50% Injectable 25 Gram(s) IV Push once  finasteride 5 milliGRAM(s) Oral daily  glucagon  Injectable 1 milliGRAM(s) IntraMuscular once  insulin glargine Injectable (LANTUS) 10 Unit(s) SubCutaneous at bedtime  insulin lispro (ADMELOG) corrective regimen sliding scale   SubCutaneous three times a day before meals  melatonin 5 milliGRAM(s) Oral at bedtime  metoprolol succinate ER 25 milliGRAM(s) Oral daily  pantoprazole    Tablet 40 milliGRAM(s) Oral before breakfast  sodium chloride 0.9%. 1000 milliLiter(s) (75 mL/Hr) IV Continuous <Continuous>  tamsulosin 0.4 milliGRAM(s) Oral at bedtime  traMADol 50 milliGRAM(s) Oral daily  valsartan 160 milliGRAM(s) Oral daily    MEDICATIONS  (PRN):  acetaminophen   Tablet .. 650 milliGRAM(s) Oral every 6 hours PRN Temp greater or equal to 38C (100.4F), Mild Pain (1 - 3)      FAMILY HISTORY:  No pertinent family history in first degree relatives  pt cannot recall any history of disease        SOCIAL HISTORY:  resides in [facility]    REVIEW OF SYSTEM:  Pertinent items are noted in HPI.    Vital Signs Last 24 Hrs  T(C): 36.6 (20 May 2021 23:06), Max: 36.6 (20 May 2021 23:06)  T(F): 97.9 (20 May 2021 23:06), Max: 97.9 (20 May 2021 23:06)  HR: 65 (20 May 2021 23:06) (64 - 75)  BP: 112/49 (20 May 2021 23:06) (112/49 - 150/57)  BP(mean): --  RR: 18 (20 May 2021 23:06) (12 - 19)  SpO2: 99% (20 May 2021 23:06) (95% - 100%)  PHYSICAL EXAM  General Appearance: cooperative, no acute distress, elderly male  HEENT: PERRL, conjunctiva clear,Neck: Supple  Lungs: decreased breath sounds left mi- lower ling filed, no wheeze  dull ness to percussion LLL  Heart: Regular rate and rhythm, S1, S2 normal  Abdomen: Soft, non-tender, bowel sounds active  Extremities: no cyanosis, some peripheral edema, no joint swelling  Skin: Skin color, texture normal, no rashes   Neurologic: non focal    ECG:    LABS:                        10.0   4.57  )-----------( 208      ( 17 May 2021 07:30 )             31.0   05-    140  |  111<H>  |  28<H>  ----------------------------<  169<H>  3.8   |  23  |  1.10    Ca    8.0<L>      17 May 2021 07:30                            9.8    4.87  )-----------( 204      ( 15 May 2021 08:57 )             30.4     05-15    140  |  112<H>  |  19  ----------------------------<  187<H>  3.9   |  22  |  0.99    Ca    8.5      15 May 2021 08:57    TPro  6.2  /  Alb  2.8<L>  /  TBili  0.2  /  DBili  x   /  AST  10<L>  /  ALT  15  /  AlkPhos  114  05-13    CARDIAC MARKERS ( 13 May 2021 19:23 )  <0.015 ng/mL / x     / x     / x     / x      CARDIAC MARKERS ( 13 May 2021 17:56 )  <0.015 ng/mL / x     / x     / x     / x              PT/INR - ( 13 May 2021 16:12 )   PT: 12.0 sec;   INR: 1.03 ratio         PTT - ( 13 May 2021 16:12 )  PTT:30.5 sec  Urinalysis Basic - ( 13 May 2021 16:12 )    Color: Yellow / Appearance: Clear / S.015 / pH: x  Gluc: x / Ketone: Trace  / Bili: Negative / Urobili: Negative mg/dL   Blood: x / Protein: 15 mg/dL / Nitrite: Negative   Leuk Esterase: Negative / RBC: >50 /HPF / WBC 6-10   Sq Epi: x / Non Sq Epi: Few / Bacteria: Few      < from: CT Chest No Cont (21 @ 15:47) >  COMPARISON: CT chest 2021 and CT chest/abdomen/pelvis 2021    CONTRAST/COMPLICATIONS:  IV Contrast: NONE  Oral Contrast: NONE  Complications: None reported at time of study completion    PROCEDURE:  CT of the Chest, Abdomen and Pelvis was performed.  Sagittal and coronal reformats were performed.    FINDINGS:  CHEST:  LUNGS AND LARGE AIRWAYS: Patent central airways. Opacities in left upper lobe along the major fissure, likely atelectasis. Also see below.  PLEURA: Moderate left pleural effusion with compressive atelectasis of the left lower lobe.  VESSELS: Thoracic aorta normal in caliber with atherosclerotic changes in the thoracic aorta and coronary arteries.  HEART: Heart size is normal. No pericardial effusion.  MEDIASTINUM AND MARIA ELENA: No lymphadenopathy. Small to moderate hiatal hernia.  CHEST WALL AND LOWER NECK: No enlarged axillary lymph nodes.    ABDOMEN AND PELVIS:  LIVER: Within normal limits.  BILE DUCTS: Normal caliber.  GALLBLADDER: Cholecystectomy.  SPLEEN: Within normal limits.  PANCREAS: Atrophicwith fatty infiltration.  ADRENALS: Within normal limits.  KIDNEYS/URETERS: Dilated left renal pelvis with calculi measuring 1.7 x 1.2 cm  and 1.0 x 1.0 cm. Nonobstructing calculus in the mid left kidney measuring 0.5 cm. No right-sided renal calculi. Right extra renal pelvis.    BLADDER: Distended urinary bladder.  REPRODUCTIVE ORGANS: Prostate not enlarged.    BOWEL: Colonic diverticulosis without evidence of diverticulitis. No bowel obstruction. Appendix is normal.  PERITONEUM: 3.4 x 2.5 cm mesenteric mass in the right hemiabdomen with small adjacent lymph nodes and infiltration of the adjacent fat (series 2 image 117).  VESSELS: Abdominal aorta normal in caliber with mild calcified plaque.  RETROPERITONEUM/LYMPH NODES: No lymphadenopathy.  ABDOMINAL WALL: Unremarkable.  BONES: Compression deformity of the superior endplate of L2, unchanged since 2021. Mild compression deformity of the superior endplate of L1, unchanged. Degenerative changes in the spine. Left hip arthroplasty. Noacute fracture. Old left 10th rib fracture.    IMPRESSION:  No acute intrathoracic or intra-abdominal sequelae of trauma.    Moderate left pleural effusion with compressive atelectasis in the left lower lobe, slightly decreased since 2021.     3.4 x 2.5 cm mesenteric mass in the right hemiabdomen with small adjacent lymph nodes and mild infiltration of the adjacent fat suspicious for neoplasm; differential includes carcinoid or lymphoma.    < end of copied text >        RADIOLOGY & ADDITIONAL STUDIES:  r< from: US Duplex Venous Lower Ext Complete, Bilateral (21 @ 19:11) >  PROCEDURE DATE:  2021          INTERPRETATION:  CLINICAL INFORMATION: Reported history of DVT; recent CT evaluation raises suspicion for the presence of an intra-abdominal carcinoid versus lymphoma. Evaluate for lower extremity DVT.    COMPARISON: No prior venous sonographic evaluations are available for comparison.    TECHNIQUE: Duplex sonography of the BILATERAL LOWER extremity veins with color and spectral Doppler, with and without compression.    FINDINGS: Bilateral calf subcutaneous edema identified.    RIGHT:  Normal compressibility of the RIGHT common femoral, femoral and popliteal veins.  Doppler examination shows normal spontaneous and phasic flow.  No RIGHT calf vein thrombosis is detected.    LEFT:  Normal compressibility of the LEFT common femoral, femoral and popliteal veins.  Doppler examination shows normal spontaneous and phasic flow.  No LEFT calf vein thrombosis is detected.    IMPRESSION:  No evidence of deep venous thrombosis in either lower extremity.    < end of copied text >  xr< from: Xray Chest 1 View- PORTABLE-Routine (Xray Chest 1 View- PORTABLE-Routine .) (21 @ 17:26) >  PROCEDURE DATE:  2021          INTERPRETATION:  Portable chest radiograph    CLINICAL INFORMATION: Reevaluate effusion    TECHNIQUE:  Portable  AP view of the chest was obtained.    COMPARISON: 2021 chest available for review.    FINDINGS:    The lungs show moderate LEFT pleural effusion and/or basilar airspace consolidation.  . RIGHT lung parenchyma clear..  No pneumothorax.    There is mild cardiomegaly. Trachea midline.    Visualized osseous structures are intact.      IMPRESSION:   Moderate LEFT pleural effusion and/or basilar airspace consolidation..    < end of copied text >

## 2021-05-21 NOTE — PROGRESS NOTE ADULT - PROVIDER SPECIALTY LIST ADULT
Hospitalist
Cardiology
Hospitalist
Pulmonology
Hospitalist
Hospitalist
Pulmonology
Surgery
Surgery
Hospitalist
Pulmonology
Cardiology

## 2021-05-21 NOTE — PROGRESS NOTE ADULT - PROBLEM SELECTOR PLAN 2
Currently NSR.  restart eliquis when safe from procdural standpoint.  cont. BB.
pt denies syncopal episode & appears to be competent historian.  No arrhythmias on tele.  await echo, if neg no further workup.
pt denies syncopal episode & appears to be competent historian.  No arrhythmias on tele.  await echo, if neg no further workup.
pt denies syncopal episode & appears to be competent historian.  No arrhythmias on tele.

## 2021-05-21 NOTE — DISCHARGE NOTE PROVIDER - CARE PROVIDERS DIRECT ADDRESSES
,DirectAddress_Unknown,[email or web address],DirectAddress_Unknown

## 2021-05-21 NOTE — PROGRESS NOTE ADULT - PROBLEM SELECTOR PLAN 1
Normal LV size and systolic function, mild diastolic dysfunction, AV sclerosis, MAC,  moderate MR now   , mildly dilated aortic root (4.1 cm), normal estimated PASP    Patient need follow up to monitor severity of MR after 4 weeks  discussed with dr crystal
pt denies syncopal episode & appears to be competent historian.  No arrhythmias on tele.  await echo, if neg no further workup.
I reviewed TTE:  Normal LV size and systolic function, mild diastolic dysfunction, AV sclerosis, MAC, mild-moderate MR, mildly dilated aortic root (4.1 cm), normal estimated PASP.    Mr Goerhraleah appears stable from cardiac standpoint for surgical biopsy of mesenteric mass.
No cardiovascular symptoms: chest pain, dyspnea, palpitations.  Per records h/o afib on eliquis but remains NSR.  Laparoscopic biopsy of right sided mesenteric mass planned.  Echo ordered as listed below to evaluate questionable syncopal episode.  would favor completion of this test prior to surgery.  Overall low risk for cardiac events for low risk procedure.

## 2021-05-21 NOTE — PROGRESS NOTE ADULT - PROBLEM SELECTOR PLAN 3
(correction) paroxysmal afib.  Currently NSR.  restart eliquis when safe from procdural standpoint.  cont. BB.
Currently in sinus; resume anticoagulation when feasible.
Currently in sinus; resume anticoagulation when feasible.

## 2021-05-21 NOTE — DISCHARGE NOTE PROVIDER - CARE PROVIDER_API CALL
Luis Carlos Noyola)  Surgery  [address], [address] [place] [address]  Phone: [phone]  Fax: [phone]  Follow Up Time:     Ho Ny)  Medical Oncology  [address] [place] [address]  Phone: [phone]  Fax: [phone]  Follow Up Time:     MARK Rivera ()  Pulmonary Disease; Sleep Medicine  [address] [place] [address]  Phone: [phone]  Fax: [phone]  Follow Up Time:

## 2021-05-21 NOTE — DISCHARGE NOTE PROVIDER - HOSPITAL COURSE
HPI: 88 y/o Male with a PMHx of T2DM, HTN, hiatal hernia, chronic left pleural effusion, CKD, presents to the ED BIBA [facility] after an unwitnessed fall. Patient states he was ambulating in his room and turned around too fast and tripped and fell. Patient was unable to get up on his own, and needed assistance. Patient with abrasion to R ear. No chest pain, sob, palpitation, abd pain, neck pain, lightheadedness/ dizziness, weakness, fatigue. Patient denies any loss of consciousness. He remember the whole episode clearly. No seizure like activity.      INTERVAL HPI/ OVERNIGHT EVENTS:  Pt was seen and examined, no complains,  feels well.  Plan for thoracentesis in am discussed, he agrees. No CP no SOB.     Patient had re-occumulation of fluid in the lung during this hospitalization which demonstrated an exudative fluid. Patient had thoracenthesis drainign 1.5 L of fluids. In addition, during this hospitalization, patient also had a lymph node biopsy done by Dr. Noyola. Procedure went well. Awaiting pathology results. Care discussed with Dr. Carmona who will follow up with patient outpatient./     REVIEW OF SYSTEMS:  General: NAD, hemodynamically stable, + weakness  HEENT:  Eyes:  No visual loss, blurred vision, double vision or yellow sclerae. Ears, Nose, Throat:  No hearing loss, sneezing, congestion, runny nose or sore throat.  SKIN:  No rash or itching.  CARDIOVASCULAR:  No chest pain   RESPIRATORY:  No shortness of breath  GASTROINTESTINAL:  No anorexia, nausea, vomiting or diarrhea. No abdominal pain or blood.  NEUROLOGICAL:  No headache, dizziness, syncope, paralysis, ataxia, numbness or tingling in the extremities. No change in bowel or bladder control.  MUSCULOSKELETAL:  No muscle, back pain, joint pain or stiffness.  HEMATOLOGIC:  No anemia, bleeding or bruising.  LYMPHATICS:  No enlarged nodes. No history of splenectomy.  ENDOCRINOLOGIC:  No reports of sweating, cold or heat intolerance. No polyuria or polydipsia.  ALLERGIES:  No history of asthma, hives, eczema or rhinitis.       HPI: 89 y/o Male with a PMHx of T2DM, HTN, hiatal hernia, chronic left pleural effusion, CKD, presents to the ED BIBA [facility] after an unwitnessed fall. Patient states he was ambulating in his room and turned around too fast and tripped and fell. Patient was unable to get up on his own, and needed assistance. Patient with abrasion to R ear. No chest pain, sob, palpitation, abd pain, neck pain, lightheadedness/ dizziness, weakness, fatigue. Patient denies any loss of consciousness. He remember the whole episode clearly. No seizure like activity.      INTERVAL HPI/ OVERNIGHT EVENTS:  Pt was seen and examined, no complains,  feels well.  Plan for thoracentesis in am discussed, he agrees. No CP no SOB.     Patient had re-occumulation of fluid in the lung during this hospitalization which demonstrated an exudative fluid. Patient had thoracenthesis drainign 1.5 L of fluids. In addition, during this hospitalization, patient also had a lymph node biopsy done by Dr. Noyola. Procedure went well. Awaiting pathology results. Care discussed with Dr. Carmona who will follow up with patient outpatient./     REVIEW OF SYSTEMS:  General: NAD, hemodynamically stable, + weakness  HEENT:  Eyes:  No visual loss, blurred vision, double vision or yellow sclerae. Ears, Nose, Throat:  No hearing loss, sneezing, congestion, runny nose or sore throat.  SKIN:  No rash or itching.  CARDIOVASCULAR:  No chest pain   RESPIRATORY:  No shortness of breath  GASTROINTESTINAL:  No anorexia, nausea, vomiting or diarrhea. No abdominal pain or blood.  NEUROLOGICAL:  No headache, dizziness, syncope, paralysis, ataxia, numbness or tingling in the extremities. No change in bowel or bladder control.  MUSCULOSKELETAL:  No muscle, back pain, joint pain or stiffness.  HEMATOLOGIC:  No anemia, bleeding or bruising.  LYMPHATICS:  No enlarged nodes. No history of splenectomy.  ENDOCRINOLOGIC:  No reports of sweating, cold or heat intolerance. No polyuria or polydipsia.  ALLERGIES:  No history of asthma, hives, eczema or rhinitis.      # Chronic  L pleural effusion with LE edema -  exudative in nature -  suspected lymphoma  - pleural effusion: exudative  - Chest CT:  moderate L effusion s/p thoracentesis  - patient underwent a lymph node (mesentery) biopsy -  pending path results  - you need to be followed up with Dr. Noyola  - please follow up with dr. Carmona  - close follow up recommended with Dr. Alcala    # New mesenteric mass.  - incidental finding on CT -  of mesenteric mass -  s/p biopsy 5/20  - CT AB/pelvis:  mesenteric mass in the R rebel abdomen.  ddx includes carcinoid v. lymphoma.    # hx DVT  # LE edema likely 2/2 venous insufficiency, postphlebitic syndrome ?  - repeat LE venous doppler neg for DVT   - Of DOAC with bridging  with Lovenox for procedures      # chronic AF   - "AF" documented in H&P and Pulmonary notes,  no  documentation (from Cardiology or otherwise) to confirm this,  - On AC  - C/w  metoprolol ER 25mg po qd.    # DM2.  - A1C:  8.7   - consistent CHO diet.  - basal insulin:  10units sq qhs.  - correction insulin coverage.    # DVT prophylaxis. Hold in am for procedure   - LMWH.

## 2021-05-21 NOTE — DISCHARGE NOTE PROVIDER - NSDCCPCAREPLAN_GEN_ALL_CORE_FT
PRINCIPAL DISCHARGE DIAGNOSIS  Diagnosis: Shortness of breath  Assessment and Plan of Treatment: - secondary to recurrent pleural effusions  - s/p thoracenthesis dyuring this admission removed 1.5 L of fluid  - close follow up with pulmonary      SECONDARY DISCHARGE DIAGNOSES  Diagnosis: Abdominal mass  Assessment and Plan of Treatment: - s/p biopsy of the mass  - awaiting for path results

## 2021-05-21 NOTE — PROGRESS NOTE ADULT - ASSESSMENT
89 y/o Male with a PMHx of T2DM, HTN, hiatal hernia, chronic left pleural effusion, CKD, presents to the ED BIB[facility] after an unwitnessed fall. Patient states he was ambulating in his room and turned around too fast and tripped and fell. Patient was unable to get up on his own, and needed assistance. Patient with abrasion to R ear. No chest pain, sob, palpitation, abd pain, neck pain, lightheadedness/ dizziness, weakness, fatigue. Patient denies any loss of consciousness. He remember the whole episode clearly. No seizure like activity.  (14 May 2021 01:58)  pt is seen and examined in his room  prior large volume thoracentesis reported  he appears not interested in repeat tap / "my breathing is perfect"  no cp  no cough    Assessment / plan;  s/p syncope   Pafib / moderate MR  chronic left sided pleural effusion appears moderate in size  compressive atelectasis due to above  adequate oxygenation on room air / no resp distress  reported prior thoracentesis / pt is not interested in repeat procedure unless more sxs  mesenteric mass in right side abdomen    agree with diuresis as tolerated  will likley need and benefit from repeat large volume / diagnostic thoracentesis by IR next week if pt agrees to proceed   will fu with you  GI and oncology eval in progress  5/17  he declines hx of syncope  he feels "great" with his breathing  no DVT on dopplers  cxr findings are discussed  awaiting oncology eval  fu cxr today requested  5/18  All recent data are reviewed in his room with him today  Mesenteric mass could not be biopsied by IR yesterday  he appears asymptomatic from left sided effusion but may have some yield for cytology to be sent if he agrees  he is not even suing o2 in his room now  no active pulm sxs / fu cxr noted with similar results  awaiting possible oncology eval  5/19  data discussed with DR Velázquez after repeat cxr  also discussed all with Interventional radiologist  pt agrees to proceed with repeat large volume thoracentesis / cytology needed  will repeat PF analysis and check LDH serum  5/20-5/21  s/p large volume repeat left sided thoracentesis  post procedure cxr reviewed and improved  cytology sent for malignancy workup  mesenteric mass/node BX done 5/20  all his questions are answered  monitor if fluid accumulating quickly  Dr Frankel is covering 5/22-5/23

## 2021-05-21 NOTE — PROGRESS NOTE ADULT - PROBLEM SELECTOR PROBLEM 1
Preoperative cardiovascular examination
Fall, initial encounter
Preoperative cardiovascular examination
Preoperative cardiovascular examination

## 2021-05-21 NOTE — PROGRESS NOTE ADULT - ASSESSMENT
An 86 year old male with mesenteric mass, s/p laparoscopic biopsy    plan:  Diet as tolerated  Ambulation  can be discharged from surgery point of view    plan discussed with Dr Noyola

## 2021-05-21 NOTE — PROGRESS NOTE ADULT - SUBJECTIVE AND OBJECTIVE BOX
Say 1 s/p laparoscopic mesenteric mass biopsy  Patient was seen and examined at the bedside   No complaints  Tolerating diet  Having normal bowel movements    Vitals:  T(C): 36.3 ( @ 08:24), Max: 36.6 ( @ 23:06)  HR: 62 (:24) (62 - 75)  BP: 149/56 ( @ 08:24) (112/49 - 150/57)  RR: 18 ( 08:24) (12 - 19)  SpO2: 99% ( 08:24) (95% - 100%)     @ 07:01  -   @ 07:00  --------------------------------------------------------  IN:    Other (mL): 900 mL  Total IN: 900 mL    OUT:    Other (mL): 1200 mL    Voided (mL): 200 mL  Total OUT: 1400 mL    Total NET: -500 mL          Physical Exam:  Pt is AAOx3  General: Well developed, in no acute distress.   Chest: Lungs clear, no rales, no rhonchi, no wheezes.   Heart: RR, no murmurs, no rubs, no gallops.   Abdomen: Soft, no tenderness, no masses, BS normal, incisions are clean and dry    Back: Normal curvature, no tenderness.   Neuro: Physiological, no localizing findings.   Skin: Normal, no rashes, no lesions noted.   Extremities: Warm, well perfused, no edema, Pulses intact     @ 07:34                    9.9  CBC: 6.18>)-------(<192                     30.9                 139 | 109 | 34    CMP:  ----------------------< 194               4.6 | 23 | 1.35                      Ca:8.5  Phos:-  Mg:-               -|      |-        LFTs:  ------|-|-----             -|      |-   @ 07:16                    10.0  CBC: 4.89>)-------(<211                     31.6                 140 | 107 | 31    CMP:  ----------------------< 173               3.9 | 28 | 1.30                      Ca:9.0  Phos:3.4  M.4               -|      |-        LFTs:  ------|-|-----             -|      |-      Culture - Fungal, Body Fluid (collected 21 @ 09:30)  Source: .Body Fluid Pleural Fluid  Preliminary Report (21 @ 09:17):    Testing in progress    Culture - Body Fluid with Gram Stain (collected 21 @ 09:30)  Source: .Body Fluid Pleural Fluid  Gram Stain (21 @ 22:14):    polymorphonuclear leukocytes seen    No organisms seen    by cytocentrifuge  Preliminary Report (21 @ 11:51):    No growth      Current Inpatient Medications:  apixaban 5 milliGRAM(s) Oral two times a day  dextrose 50% Injectable 25 Gram(s) IV Push once  insulin glargine Injectable (LANTUS) 10 Unit(s) SubCutaneous at bedtime  insulin lispro (ADMELOG) corrective regimen sliding scale   SubCutaneous three times a day before meals  melatonin 5 milliGRAM(s) Oral at bedtime  metoprolol succinate ER 25 milliGRAM(s) Oral daily  pantoprazole    Tablet 40 milliGRAM(s) Oral before breakfast  tamsulosin 0.4 milliGRAM(s) Oral at bedtime  traMADol 50 milliGRAM(s) Oral daily  valsartan 160 milliGRAM(s) Oral daily

## 2021-05-21 NOTE — DISCHARGE NOTE PROVIDER - PROVIDER TOKENS
PROVIDER:[TOKEN:[[id number]]],PROVIDER:[TOKEN:[[id number]]],PROVIDER:[TOKEN:[[id number]]]

## 2021-05-21 NOTE — PROGRESS NOTE ADULT - NSICDXPILOT_GEN_ALL_CORE
[place]

## 2021-05-21 NOTE — PROGRESS NOTE ADULT - SUBJECTIVE AND OBJECTIVE BOX
REASON FOR VISIT: Syncope    HPI:  89 y/o Male with a PMHx of T2DM, HTN, hiatal hernia, chronic left pleural effusion, CKD, presents to the ED BIBA [facility] after an unwitnessed fall. Patient states he was ambulating in his room and turned around too fast and tripped and fell. Patient was unable to get up on his own, and needed assistance. Patient with abrasion to R ear. No chest pain, sob, palpitation, abd pain, neck pain, lightheadedness/ dizziness, weakness, fatigue. Patient denies any loss of consciousness. He remember the whole episode clearly. No seizure like activity.  (14 May 2021 01:58)    pt admitted for fall that was unwitnessed, also w/ chronic L pleural effusion,  mesenteric mass (incidental finding).     Reviewed tele, no arrhythmias.  Echo pending.  Reviewed episode w/ pt.  He seems to be a competent historian  & denies that he lost consciousness prior to fall.  States he went to turn & lost balance.  No lightheadness, dizziness or syncope since admit.    5/18/'21: no complaints, no lightheadness.  5/19/'21: no complaints.  Needs clearance of laparoscopic guided biopsy.  5/20/21:  Feels well; no new complaints; awaiting biopsy   5/21/21 Patient is feeling ok , echo showed moderate MR , tolerated procedure well     MEDICATIONS  (STANDING):  apixaban 5 milliGRAM(s) Oral two times a day  dextrose 50% Injectable 25 Gram(s) IV Push once  insulin glargine Injectable (LANTUS) 10 Unit(s) SubCutaneous at bedtime  insulin lispro (ADMELOG) corrective regimen sliding scale   SubCutaneous three times a day before meals  melatonin 5 milliGRAM(s) Oral at bedtime  metoprolol succinate ER 25 milliGRAM(s) Oral daily  pantoprazole    Tablet 40 milliGRAM(s) Oral before breakfast  tamsulosin 0.4 milliGRAM(s) Oral at bedtime  traMADol 50 milliGRAM(s) Oral daily  valsartan 160 milliGRAM(s) Oral daily    MEDICATIONS  (PRN):      Vital Signs Last 24 Hrs  T(C): 36.3 (21 May 2021 08:24), Max: 36.6 (20 May 2021 23:06)  T(F): 97.4 (21 May 2021 08:24), Max: 97.9 (20 May 2021 23:06)  HR: 62 (21 May 2021 08:24) (62 - 75)  BP: 149/56 (21 May 2021 08:24) (112/49 - 150/57)  BP(mean): --  RR: 18 (21 May 2021 08:24) (12 - 19)  SpO2: 99% (21 May 2021 08:24) (95% - 100%)    I&O's Summary    20 May 2021 07:01  -  21 May 2021 07:00  --------------------------------------------------------  IN: 900 mL / OUT: 1400 mL / NET: -500 mL        PHYSICAL EXAM:  Constitutional: NAD, awake   Neck:  supple,  No JVD  Respiratory: Breath sounds are clear bilaterally, No wheezing, rales or rhonchi  Cardiovascular: S1 and S2, regular rate and rhythm  Gastrointestinal: Bowel Sounds present, soft, nontender.     LABS:                            9.9    6.18  )-----------( 192      ( 21 May 2021 07:34 )             30.9     05-21    139  |  109<H>  |  34<H>  ----------------------------<  194<H>  4.6   |  23  |  1.35<H>    Ca    8.5      21 May 2021 07:34  Phos  3.4     05-20  Mg     2.4     05-20                  Culture Results:   No growth (05-18-21 @ 09:30)  Culture Results:   Testing in progress (05-18-21 @ 09:30)    TPro  5.4<L>  /  Alb  2.3<L>  /  TBili  0.2  /  DBili  x   /  AST  10<L>  /  ALT  13  /  AlkPhos  79  05-18  ===============================  12 Lead ECG (05.14.21 @ 00:25): Sinus rhythm with 1st degree A-V block    TELE: sinus rhythm, no tachy/byron arrhythmias.  ===============================    < from: TTE Echo Complete w/o Contrast w/ Doppler (05.20.21 @ 08:29) >  ummary     The left ventricle is normal in size, wall thickness, wall motion and   contractility.   Estimated left ventricular ejection fraction is 70 %.   Mild aortic sclerosis.   The aortic root is mildly dilated .   The ascending aorta appears to be mildly dilated.   Moderate (2+) eccentric mitral regurgitation. normal RVSP     < end of copied text >

## 2021-05-21 NOTE — CHART NOTE - NSCHARTNOTEFT_GEN_A_CORE
received message from PEGGY Case, explained that patient is being d/c today, GOC meeting was schedule and daughter would like to cancel at this time as they would like to see what the biopsy results are before having any further conversations. Please reconsult if needed Thank you

## 2021-05-21 NOTE — DISCHARGE NOTE PROVIDER - NSDCMRMEDTOKEN_GEN_ALL_CORE_FT
Admelog SoloStar 100 units/mL injectable solution: Sliding Scale  Basaglar KwikPen 100 units/mL subcutaneous solution: 13 unit(s) subcutaneous once a day (at bedtime)  Eliquis 5 mg oral tablet: 1 tab(s) orally 2 times a day  finasteride 5 mg oral tablet: 1 tab(s) orally once a day  Flomax 0.4 mg oral capsule: 1 cap(s) orally once a day   glimepiride 2 mg oral tablet: 1 tab(s) orally 2 times a day  melatonin 5 mg oral tablet: 1 tab(s) orally once a day (at bedtime)  metoprolol succinate 25 mg oral tablet, extended release: 1 tab(s) orally once a day  omeprazole 40 mg oral delayed release capsule: 1 cap(s) orally once a day  Tradjenta 5 mg oral tablet: 1 tab(s) orally once a day  traMADol 50 mg oral tablet: 1 tab(s) orally once a day  valsartan 160 mg oral tablet: 1 tab(s) orally once a day

## 2021-05-24 LAB
CULTURE RESULTS: SIGNIFICANT CHANGE UP
SPECIMEN SOURCE: SIGNIFICANT CHANGE UP

## 2021-05-26 LAB
CULTURE RESULTS: SIGNIFICANT CHANGE UP
SPECIMEN SOURCE: SIGNIFICANT CHANGE UP

## 2021-05-27 LAB — SURGICAL PATHOLOGY STUDY: SIGNIFICANT CHANGE UP

## 2021-05-28 DIAGNOSIS — I87.009 POSTTHROMBOTIC SYNDROME WITHOUT COMPLICATIONS OF UNSPECIFIED EXTREMITY: ICD-10-CM
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
## 2021-05-28 DIAGNOSIS — K44.9 DIAPHRAGMATIC HERNIA WITHOUT OBSTRUCTION OR GANGRENE: ICD-10-CM

## 2021-05-28 DIAGNOSIS — E11.22 TYPE 2 DIABETES MELLITUS WITH DIABETIC CHRONIC KIDNEY DISEASE: ICD-10-CM

## 2021-05-28 DIAGNOSIS — J90 PLEURAL EFFUSION, NOT ELSEWHERE CLASSIFIED: ICD-10-CM

## 2021-05-28 DIAGNOSIS — Z87.448 PERSONAL HISTORY OF OTHER DISEASES OF URINARY SYSTEM: ICD-10-CM

## 2021-05-28 DIAGNOSIS — I12.9 HYPERTENSIVE CHRONIC KIDNEY DISEASE WITH STAGE 1 THROUGH STAGE 4 CHRONIC KIDNEY DISEASE, OR UNSPECIFIED CHRONIC KIDNEY DISEASE: ICD-10-CM

## 2021-05-28 DIAGNOSIS — C85.90 NON-HODGKIN LYMPHOMA, UNSPECIFIED, UNSPECIFIED SITE: ICD-10-CM

## 2021-05-28 DIAGNOSIS — R55 SYNCOPE AND COLLAPSE: ICD-10-CM

## 2021-05-28 DIAGNOSIS — Z86.39 PERSONAL HISTORY OF OTHER ENDOCRINE, NUTRITIONAL AND METABOLIC DISEASE: ICD-10-CM

## 2021-05-28 DIAGNOSIS — S00.411A ABRASION OF RIGHT EAR, INITIAL ENCOUNTER: ICD-10-CM

## 2021-05-28 DIAGNOSIS — Z79.4 LONG TERM (CURRENT) USE OF INSULIN: ICD-10-CM

## 2021-05-28 DIAGNOSIS — I25.10 ATHEROSCLEROTIC HEART DISEASE OF NATIVE CORONARY ARTERY WITHOUT ANGINA PECTORIS: ICD-10-CM

## 2021-05-28 DIAGNOSIS — I10 ESSENTIAL (PRIMARY) HYPERTENSION: ICD-10-CM

## 2021-05-28 DIAGNOSIS — I48.0 PAROXYSMAL ATRIAL FIBRILLATION: ICD-10-CM

## 2021-05-28 DIAGNOSIS — I34.0 NONRHEUMATIC MITRAL (VALVE) INSUFFICIENCY: ICD-10-CM
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
## 2021-05-28 DIAGNOSIS — Z86.718 PERSONAL HISTORY OF OTHER VENOUS THROMBOSIS AND EMBOLISM: ICD-10-CM

## 2021-05-28 DIAGNOSIS — Z87.19 PERSONAL HISTORY OF OTHER DISEASES OF THE DIGESTIVE SYSTEM: ICD-10-CM

## 2021-05-28 DIAGNOSIS — I49.3 VENTRICULAR PREMATURE DEPOLARIZATION: ICD-10-CM

## 2021-05-28 DIAGNOSIS — R19.00 INTRA-ABDOMINAL AND PELVIC SWELLING, MASS AND LUMP, UNSPECIFIED SITE: ICD-10-CM

## 2021-05-28 DIAGNOSIS — N18.9 CHRONIC KIDNEY DISEASE, UNSPECIFIED: ICD-10-CM

## 2021-05-28 DIAGNOSIS — R59.0 LOCALIZED ENLARGED LYMPH NODES: ICD-10-CM

## 2021-05-28 DIAGNOSIS — I87.2 VENOUS INSUFFICIENCY (CHRONIC) (PERIPHERAL): ICD-10-CM

## 2021-05-28 DIAGNOSIS — Z79.01 LONG TERM (CURRENT) USE OF ANTICOAGULANTS: ICD-10-CM

## 2021-05-28 DIAGNOSIS — Z87.442 PERSONAL HISTORY OF URINARY CALCULI: ICD-10-CM

## 2021-05-28 DIAGNOSIS — K21.9 GASTRO-ESOPHAGEAL REFLUX DISEASE WITHOUT ESOPHAGITIS: ICD-10-CM

## 2021-05-28 DIAGNOSIS — Z53.8 PROCEDURE AND TREATMENT NOT CARRIED OUT FOR OTHER REASONS: ICD-10-CM

## 2021-05-28 DIAGNOSIS — J98.11 ATELECTASIS: ICD-10-CM

## 2021-05-28 DIAGNOSIS — E78.5 HYPERLIPIDEMIA, UNSPECIFIED: ICD-10-CM

## 2021-05-28 RX ORDER — TRAMADOL HYDROCHLORIDE 50 MG/1
50 TABLET, COATED ORAL
Refills: 0 | Status: ACTIVE | COMMUNITY

## 2021-05-28 RX ORDER — TAMSULOSIN HYDROCHLORIDE 0.4 MG/1
0.4 CAPSULE ORAL
Refills: 0 | Status: ACTIVE | COMMUNITY

## 2021-05-28 RX ORDER — FINASTERIDE 5 MG/1
5 TABLET, FILM COATED ORAL
Refills: 0 | Status: ACTIVE | COMMUNITY

## 2021-05-28 RX ORDER — LINAGLIPTIN 5 MG/1
5 TABLET, FILM COATED ORAL
Refills: 0 | Status: ACTIVE | COMMUNITY

## 2021-05-28 RX ORDER — OMEPRAZOLE MAGNESIUM 20 MG/1
20 TABLET, DELAYED RELEASE ORAL
Refills: 0 | Status: ACTIVE | COMMUNITY

## 2021-05-28 RX ORDER — GLIPIZIDE 2.5 MG/1
TABLET ORAL
Refills: 0 | Status: ACTIVE | COMMUNITY

## 2021-05-28 RX ORDER — APIXABAN 5 MG/1
5 TABLET, FILM COATED ORAL
Refills: 0 | Status: ACTIVE | COMMUNITY

## 2021-05-28 RX ORDER — METOPROLOL SUCCINATE 25 MG/1
25 TABLET, EXTENDED RELEASE ORAL
Refills: 0 | Status: ACTIVE | COMMUNITY

## 2021-05-28 RX ORDER — VALSARTAN 160 MG/1
160 TABLET ORAL
Refills: 0 | Status: ACTIVE | COMMUNITY

## 2021-05-28 RX ORDER — INSULIN GLARGINE 100 [IU]/ML
100 INJECTION, SOLUTION SUBCUTANEOUS
Refills: 0 | Status: ACTIVE | COMMUNITY

## 2021-05-28 RX ORDER — INSULIN LISPRO 100 U/ML
100 INJECTION, SOLUTION INTRAVENOUS; SUBCUTANEOUS
Refills: 0 | Status: ACTIVE | COMMUNITY

## 2021-06-01 ENCOUNTER — APPOINTMENT (OUTPATIENT)
Dept: CARDIOLOGY | Facility: CLINIC | Age: 86
End: 2021-06-01

## 2021-06-02 ENCOUNTER — INPATIENT (INPATIENT)
Facility: HOSPITAL | Age: 86
LOS: 7 days | Discharge: NURSING, REHAB OR LONG-TERM CARE | DRG: 824 | End: 2021-06-10
Attending: FAMILY MEDICINE | Admitting: INTERNAL MEDICINE
Payer: MEDICARE

## 2021-06-02 ENCOUNTER — APPOINTMENT (OUTPATIENT)
Dept: SURGICAL ONCOLOGY | Facility: CLINIC | Age: 86
End: 2021-06-02
Payer: MEDICARE

## 2021-06-02 VITALS
DIASTOLIC BLOOD PRESSURE: 69 MMHG | TEMPERATURE: 98 F | SYSTOLIC BLOOD PRESSURE: 130 MMHG | RESPIRATION RATE: 17 BRPM | OXYGEN SATURATION: 98 % | HEART RATE: 66 BPM

## 2021-06-02 VITALS — OXYGEN SATURATION: 97 % | HEART RATE: 75 BPM | DIASTOLIC BLOOD PRESSURE: 64 MMHG | SYSTOLIC BLOOD PRESSURE: 110 MMHG

## 2021-06-02 DIAGNOSIS — C82.48: ICD-10-CM

## 2021-06-02 DIAGNOSIS — Z98.890 OTHER SPECIFIED POSTPROCEDURAL STATES: Chronic | ICD-10-CM

## 2021-06-02 DIAGNOSIS — R19.8 OTHER SPECIFIED SYMPTOMS AND SIGNS INVOLVING THE DIGESTIVE SYSTEM AND ABDOMEN: Chronic | ICD-10-CM

## 2021-06-02 DIAGNOSIS — J90 PLEURAL EFFUSION, NOT ELSEWHERE CLASSIFIED: ICD-10-CM

## 2021-06-02 DIAGNOSIS — Z87.81 PERSONAL HISTORY OF (HEALED) TRAUMATIC FRACTURE: Chronic | ICD-10-CM

## 2021-06-02 DIAGNOSIS — C82.01 FOLLICULAR LYMPHOMA GRADE I, LYMPH NODES OF HEAD, FACE, AND NECK: ICD-10-CM

## 2021-06-02 DIAGNOSIS — R06.02 SHORTNESS OF BREATH: ICD-10-CM

## 2021-06-02 DIAGNOSIS — C82.03 FOLLICULAR LYMPHOMA GRADE I, INTRA-ABDOMINAL LYMPH NODES: ICD-10-CM

## 2021-06-02 LAB
ALBUMIN SERPL ELPH-MCNC: 3.1 G/DL — LOW (ref 3.3–5)
ALP SERPL-CCNC: 95 U/L — SIGNIFICANT CHANGE UP (ref 40–120)
ALT FLD-CCNC: 18 U/L — SIGNIFICANT CHANGE UP (ref 12–78)
ANION GAP SERPL CALC-SCNC: 6 MMOL/L — SIGNIFICANT CHANGE UP (ref 5–17)
AST SERPL-CCNC: 26 U/L — SIGNIFICANT CHANGE UP (ref 15–37)
BASOPHILS # BLD AUTO: 0.02 K/UL — SIGNIFICANT CHANGE UP (ref 0–0.2)
BASOPHILS NFR BLD AUTO: 0.3 % — SIGNIFICANT CHANGE UP (ref 0–2)
BILIRUB SERPL-MCNC: 0.5 MG/DL — SIGNIFICANT CHANGE UP (ref 0.2–1.2)
BUN SERPL-MCNC: 17 MG/DL — SIGNIFICANT CHANGE UP (ref 7–23)
CALCIUM SERPL-MCNC: 8.6 MG/DL — SIGNIFICANT CHANGE UP (ref 8.5–10.1)
CHLORIDE SERPL-SCNC: 106 MMOL/L — SIGNIFICANT CHANGE UP (ref 96–108)
CO2 SERPL-SCNC: 25 MMOL/L — SIGNIFICANT CHANGE UP (ref 22–31)
CREAT SERPL-MCNC: 1.34 MG/DL — HIGH (ref 0.5–1.3)
EOSINOPHIL # BLD AUTO: 0.09 K/UL — SIGNIFICANT CHANGE UP (ref 0–0.5)
EOSINOPHIL NFR BLD AUTO: 1.4 % — SIGNIFICANT CHANGE UP (ref 0–6)
GLUCOSE SERPL-MCNC: 244 MG/DL — HIGH (ref 70–99)
HCT VFR BLD CALC: 36 % — LOW (ref 39–50)
HGB BLD-MCNC: 11.4 G/DL — LOW (ref 13–17)
IMM GRANULOCYTES NFR BLD AUTO: 0.3 % — SIGNIFICANT CHANGE UP (ref 0–1.5)
LYMPHOCYTES # BLD AUTO: 0.83 K/UL — LOW (ref 1–3.3)
LYMPHOCYTES # BLD AUTO: 12.5 % — LOW (ref 13–44)
MAGNESIUM SERPL-MCNC: 2.4 MG/DL — SIGNIFICANT CHANGE UP (ref 1.6–2.6)
MCHC RBC-ENTMCNC: 29.9 PG — SIGNIFICANT CHANGE UP (ref 27–34)
MCHC RBC-ENTMCNC: 31.7 GM/DL — LOW (ref 32–36)
MCV RBC AUTO: 94.5 FL — SIGNIFICANT CHANGE UP (ref 80–100)
MONOCYTES # BLD AUTO: 0.77 K/UL — SIGNIFICANT CHANGE UP (ref 0–0.9)
MONOCYTES NFR BLD AUTO: 11.6 % — SIGNIFICANT CHANGE UP (ref 2–14)
NEUTROPHILS # BLD AUTO: 4.91 K/UL — SIGNIFICANT CHANGE UP (ref 1.8–7.4)
NEUTROPHILS NFR BLD AUTO: 73.9 % — SIGNIFICANT CHANGE UP (ref 43–77)
NT-PROBNP SERPL-SCNC: 239 PG/ML — SIGNIFICANT CHANGE UP (ref 0–450)
PLATELET # BLD AUTO: 281 K/UL — SIGNIFICANT CHANGE UP (ref 150–400)
POTASSIUM SERPL-MCNC: 5.2 MMOL/L — SIGNIFICANT CHANGE UP (ref 3.5–5.3)
POTASSIUM SERPL-SCNC: 5.2 MMOL/L — SIGNIFICANT CHANGE UP (ref 3.5–5.3)
PROT SERPL-MCNC: 7.1 GM/DL — SIGNIFICANT CHANGE UP (ref 6–8.3)
RBC # BLD: 3.81 M/UL — LOW (ref 4.2–5.8)
RBC # FLD: 14.5 % — SIGNIFICANT CHANGE UP (ref 10.3–14.5)
SARS-COV-2 RNA SPEC QL NAA+PROBE: SIGNIFICANT CHANGE UP
SODIUM SERPL-SCNC: 137 MMOL/L — SIGNIFICANT CHANGE UP (ref 135–145)
TROPONIN I SERPL-MCNC: <0.015 NG/ML — SIGNIFICANT CHANGE UP (ref 0.01–0.04)
WBC # BLD: 6.64 K/UL — SIGNIFICANT CHANGE UP (ref 3.8–10.5)
WBC # FLD AUTO: 6.64 K/UL — SIGNIFICANT CHANGE UP (ref 3.8–10.5)

## 2021-06-02 PROCEDURE — 85730 THROMBOPLASTIN TIME PARTIAL: CPT

## 2021-06-02 PROCEDURE — 80053 COMPREHEN METABOLIC PANEL: CPT

## 2021-06-02 PROCEDURE — 93010 ELECTROCARDIOGRAM REPORT: CPT

## 2021-06-02 PROCEDURE — 85027 COMPLETE CBC AUTOMATED: CPT

## 2021-06-02 PROCEDURE — 97163 PT EVAL HIGH COMPLEX 45 MIN: CPT | Mod: GP

## 2021-06-02 PROCEDURE — 86900 BLOOD TYPING SEROLOGIC ABO: CPT

## 2021-06-02 PROCEDURE — 83550 IRON BINDING TEST: CPT

## 2021-06-02 PROCEDURE — 86850 RBC ANTIBODY SCREEN: CPT

## 2021-06-02 PROCEDURE — 80074 ACUTE HEPATITIS PANEL: CPT

## 2021-06-02 PROCEDURE — 71045 X-RAY EXAM CHEST 1 VIEW: CPT

## 2021-06-02 PROCEDURE — 80048 BASIC METABOLIC PNL TOTAL CA: CPT

## 2021-06-02 PROCEDURE — 82306 VITAMIN D 25 HYDROXY: CPT

## 2021-06-02 PROCEDURE — 94760 N-INVAS EAR/PLS OXIMETRY 1: CPT

## 2021-06-02 PROCEDURE — 97116 GAIT TRAINING THERAPY: CPT | Mod: GP

## 2021-06-02 PROCEDURE — 85610 PROTHROMBIN TIME: CPT

## 2021-06-02 PROCEDURE — 86480 TB TEST CELL IMMUN MEASURE: CPT

## 2021-06-02 PROCEDURE — 85025 COMPLETE CBC W/AUTO DIFF WBC: CPT

## 2021-06-02 PROCEDURE — 82746 ASSAY OF FOLIC ACID SERUM: CPT

## 2021-06-02 PROCEDURE — 97530 THERAPEUTIC ACTIVITIES: CPT | Mod: GP

## 2021-06-02 PROCEDURE — 86901 BLOOD TYPING SEROLOGIC RH(D): CPT

## 2021-06-02 PROCEDURE — 36415 COLL VENOUS BLD VENIPUNCTURE: CPT

## 2021-06-02 PROCEDURE — 99024 POSTOP FOLLOW-UP VISIT: CPT

## 2021-06-02 PROCEDURE — U0005: CPT

## 2021-06-02 PROCEDURE — C1729: CPT

## 2021-06-02 PROCEDURE — 83615 LACTATE (LD) (LDH) ENZYME: CPT

## 2021-06-02 PROCEDURE — 81001 URINALYSIS AUTO W/SCOPE: CPT

## 2021-06-02 PROCEDURE — 82962 GLUCOSE BLOOD TEST: CPT

## 2021-06-02 PROCEDURE — 82728 ASSAY OF FERRITIN: CPT

## 2021-06-02 PROCEDURE — U0003: CPT

## 2021-06-02 PROCEDURE — 86769 SARS-COV-2 COVID-19 ANTIBODY: CPT

## 2021-06-02 PROCEDURE — 83010 ASSAY OF HAPTOGLOBIN QUANT: CPT

## 2021-06-02 PROCEDURE — 99285 EMERGENCY DEPT VISIT HI MDM: CPT

## 2021-06-02 PROCEDURE — 99221 1ST HOSP IP/OBS SF/LOW 40: CPT

## 2021-06-02 PROCEDURE — 82607 VITAMIN B-12: CPT

## 2021-06-02 PROCEDURE — 71046 X-RAY EXAM CHEST 2 VIEWS: CPT | Mod: 26

## 2021-06-02 PROCEDURE — 83540 ASSAY OF IRON: CPT

## 2021-06-02 PROCEDURE — 84443 ASSAY THYROID STIM HORMONE: CPT

## 2021-06-02 PROCEDURE — 99223 1ST HOSP IP/OBS HIGH 75: CPT

## 2021-06-02 PROCEDURE — 85652 RBC SED RATE AUTOMATED: CPT

## 2021-06-02 RX ORDER — APIXABAN 2.5 MG/1
1 TABLET, FILM COATED ORAL
Qty: 0 | Refills: 0 | DISCHARGE

## 2021-06-02 RX ORDER — METOPROLOL TARTRATE 50 MG
25 TABLET ORAL DAILY
Refills: 0 | Status: DISCONTINUED | OUTPATIENT
Start: 2021-06-02 | End: 2021-06-06

## 2021-06-02 RX ORDER — DEXTROSE 50 % IN WATER 50 %
12.5 SYRINGE (ML) INTRAVENOUS ONCE
Refills: 0 | Status: DISCONTINUED | OUTPATIENT
Start: 2021-06-02 | End: 2021-06-10

## 2021-06-02 RX ORDER — PANTOPRAZOLE SODIUM 20 MG/1
40 TABLET, DELAYED RELEASE ORAL DAILY
Refills: 0 | Status: DISCONTINUED | OUTPATIENT
Start: 2021-06-02 | End: 2021-06-10

## 2021-06-02 RX ORDER — DEXTROSE 50 % IN WATER 50 %
25 SYRINGE (ML) INTRAVENOUS ONCE
Refills: 0 | Status: DISCONTINUED | OUTPATIENT
Start: 2021-06-02 | End: 2021-06-10

## 2021-06-02 RX ORDER — GLUCAGON INJECTION, SOLUTION 0.5 MG/.1ML
1 INJECTION, SOLUTION SUBCUTANEOUS ONCE
Refills: 0 | Status: DISCONTINUED | OUTPATIENT
Start: 2021-06-02 | End: 2021-06-10

## 2021-06-02 RX ORDER — TRAMADOL HYDROCHLORIDE 50 MG/1
50 TABLET ORAL EVERY 8 HOURS
Refills: 0 | Status: DISCONTINUED | OUTPATIENT
Start: 2021-06-02 | End: 2021-06-03

## 2021-06-02 RX ORDER — OMEPRAZOLE 10 MG/1
1 CAPSULE, DELAYED RELEASE ORAL
Qty: 0 | Refills: 0 | DISCHARGE

## 2021-06-02 RX ORDER — LANOLIN ALCOHOL/MO/W.PET/CERES
5 CREAM (GRAM) TOPICAL AT BEDTIME
Refills: 0 | Status: DISCONTINUED | OUTPATIENT
Start: 2021-06-02 | End: 2021-06-10

## 2021-06-02 RX ORDER — TRAMADOL HYDROCHLORIDE 50 MG/1
1 TABLET ORAL
Qty: 0 | Refills: 0 | DISCHARGE

## 2021-06-02 RX ORDER — INSULIN GLARGINE 100 [IU]/ML
13 INJECTION, SOLUTION SUBCUTANEOUS AT BEDTIME
Refills: 0 | Status: DISCONTINUED | OUTPATIENT
Start: 2021-06-02 | End: 2021-06-10

## 2021-06-02 RX ORDER — FUROSEMIDE 40 MG
40 TABLET ORAL ONCE
Refills: 0 | Status: COMPLETED | OUTPATIENT
Start: 2021-06-02 | End: 2021-06-02

## 2021-06-02 RX ORDER — VALSARTAN 80 MG/1
160 TABLET ORAL DAILY
Refills: 0 | Status: DISCONTINUED | OUTPATIENT
Start: 2021-06-02 | End: 2021-06-06

## 2021-06-02 RX ORDER — TAMSULOSIN HYDROCHLORIDE 0.4 MG/1
0.4 CAPSULE ORAL AT BEDTIME
Refills: 0 | Status: DISCONTINUED | OUTPATIENT
Start: 2021-06-02 | End: 2021-06-10

## 2021-06-02 RX ORDER — SODIUM CHLORIDE 9 MG/ML
1000 INJECTION, SOLUTION INTRAVENOUS
Refills: 0 | Status: DISCONTINUED | OUTPATIENT
Start: 2021-06-02 | End: 2021-06-10

## 2021-06-02 RX ORDER — INSULIN LISPRO 100/ML
VIAL (ML) SUBCUTANEOUS AT BEDTIME
Refills: 0 | Status: DISCONTINUED | OUTPATIENT
Start: 2021-06-02 | End: 2021-06-10

## 2021-06-02 RX ORDER — DEXTROSE 50 % IN WATER 50 %
15 SYRINGE (ML) INTRAVENOUS ONCE
Refills: 0 | Status: DISCONTINUED | OUTPATIENT
Start: 2021-06-02 | End: 2021-06-10

## 2021-06-02 RX ORDER — INSULIN LISPRO 100/ML
VIAL (ML) SUBCUTANEOUS
Refills: 0 | Status: DISCONTINUED | OUTPATIENT
Start: 2021-06-02 | End: 2021-06-10

## 2021-06-02 RX ORDER — FINASTERIDE 5 MG/1
5 TABLET, FILM COATED ORAL DAILY
Refills: 0 | Status: DISCONTINUED | OUTPATIENT
Start: 2021-06-02 | End: 2021-06-10

## 2021-06-02 RX ORDER — INSULIN LISPRO 100/ML
0 VIAL (ML) SUBCUTANEOUS
Qty: 0 | Refills: 0 | DISCHARGE

## 2021-06-02 RX ORDER — FINASTERIDE 5 MG/1
1 TABLET, FILM COATED ORAL
Qty: 0 | Refills: 0 | DISCHARGE

## 2021-06-02 RX ORDER — ACETAMINOPHEN 500 MG
650 TABLET ORAL EVERY 6 HOURS
Refills: 0 | Status: DISCONTINUED | OUTPATIENT
Start: 2021-06-02 | End: 2021-06-10

## 2021-06-02 RX ADMIN — Medication 25 MILLIGRAM(S): at 17:30

## 2021-06-02 RX ADMIN — INSULIN GLARGINE 13 UNIT(S): 100 INJECTION, SOLUTION SUBCUTANEOUS at 22:03

## 2021-06-02 RX ADMIN — TAMSULOSIN HYDROCHLORIDE 0.4 MILLIGRAM(S): 0.4 CAPSULE ORAL at 21:13

## 2021-06-02 RX ADMIN — Medication 40 MILLIGRAM(S): at 17:30

## 2021-06-02 RX ADMIN — Medication 4: at 17:30

## 2021-06-02 RX ADMIN — Medication 5 MILLIGRAM(S): at 21:12

## 2021-06-02 RX ADMIN — FINASTERIDE 5 MILLIGRAM(S): 5 TABLET, FILM COATED ORAL at 17:30

## 2021-06-02 RX ADMIN — PANTOPRAZOLE SODIUM 40 MILLIGRAM(S): 20 TABLET, DELAYED RELEASE ORAL at 17:30

## 2021-06-02 NOTE — ED PROVIDER NOTE - NS ED ROS FT
Review of Systems:  	•	CONSTITUTIONAL: no fever  	•	SKIN: no rash  	•	RESPIRATORY: no shortness of breath  	•	CARDIAC: no chest pain  	•	GI:  no abd pain, no nausea, no vomiting, no diarrhea  	•	GENITO-URINARY:  no dysuria  	•	MUSCULOSKELETAL:  no back pain  	•	NEUROLOGIC: no weakness  	•	ALLERGY: no rhinorrhea  	•	PSYSCHIATRIC: appropriate concern about symptoms Review of Systems:  	•	CONSTITUTIONAL: no fever  	•	SKIN: no rash  	•	RESPIRATORY: shortness of breath  	•	CARDIAC: no chest pain  	•	GI:  no abd pain, no nausea, no vomiting, no diarrhea  	•	GENITO-URINARY:  no dysuria  	•	MUSCULOSKELETAL:  no back pain  	•	NEUROLOGIC: no weakness  	•	ALLERGY: no rhinorrhea  	•	PSYSCHIATRIC: appropriate concern about symptoms

## 2021-06-02 NOTE — ED ADULT TRIAGE NOTE - CHIEF COMPLAINT QUOTE
Pt. to the ED BIB Daughter C/O SOB- Denies Chest Pain/Palpitations- Daughter states patient was just recently diagnosed With Lymphoma and has to get fluid drained- Hx. of HTN ahd DM- Pt. reports being fully vaccinated for Covid 19-- EKG STAT

## 2021-06-02 NOTE — ED PROVIDER NOTE - PMH
Afib    BPH (benign prostatic hyperplasia)    DM (diabetes mellitus)    Hernia    HLD (hyperlipidemia)    HTN (hypertension)    Kidney stone

## 2021-06-02 NOTE — REASON FOR VISIT
[Family Member] : family member [de-identified] : laparoscopic mesenteric mass biopsy [de-identified] : 5/20/21

## 2021-06-02 NOTE — H&P ADULT - HISTORY OF PRESENT ILLNESS
89 y.o. 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  Pt is forgetful, denies any complaints, + chronic LE edema.  Other ROS reviewed and neg

## 2021-06-02 NOTE — ED PROVIDER NOTE - CLINICAL SUMMARY MEDICAL DECISION MAKING FREE TEXT BOX
OCHOA with SOB due to left sided pleural effusion. pt can't walk to bathroom w/o getting SOB. Admit for pleural effusion drainage. Also newly diagnosed with lymphoma so need to consult oncology, per Dr. Noyola

## 2021-06-02 NOTE — CONSULT NOTE ADULT - ATTENDING COMMENTS
Patient well known to thoracic surgery service from previous admission. Recommended a left pleurX catheter due to recurrence of the effusion.   Risks discussed with the patient were: postop pain, bleeding, catheter based infection of the catheter

## 2021-06-02 NOTE — H&P ADULT - ASSESSMENT
89 y.o. 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    1. Reaccumulation of chronic exudative left sided pleural effusion - mild to moderate with minimal drop in SpO2 on exertion only to % - likely malignant with newly diagnosed lymphoma   - CTS eval for thoracocentesis vs pleurex as likely will continue to reaccumulate with known malignancy Dx   - hold eliquis    2. Follicular lymphoma - Onc eval and palliative care as it was cancelled on last admission to wait for biopsy results    3. DMII - HISS + lantus    4. HTN/HLD/BPH - cont current tx    5. 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 GERD - PPI    Discussed with CTS/ED MD   89 y.o. 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    1. Reaccumulation of chronic exudative left sided pleural effusion - mild to moderate with minimal drop in SpO2 on exertion only to % - likely malignant with newly diagnosed lymphoma   - CTS eval for thoracocentesis vs pleurex as likely will continue to reaccumulate with known malignancy Dx   - hold eliquis    2. Follicular lymphoma - Onc eval and palliative care as it was cancelled on last admission to wait for biopsy results    3. DMII - HISS + lantus    4. HTN/HLD/BPH - cont current tx    5. 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 GERD - PPI    7. Hx of atrial flutter as per cardiology on last admission - in SR now    Discussed with CTS/ED MD

## 2021-06-02 NOTE — H&P ADULT - NSHPPHYSICALEXAM_GEN_ALL_CORE
PHYSICAL EXAM:    General: elderly male in no acute distress  Eyes: PERRLA, EOMI; conjunctiva and sclera clear  Head: Normocephalic; atraumatic  ENMT: No nasal discharge; airway clear  Neck: Supple; non tender; no masses  Respiratory: No wheezes, rales or rhonchi, decreased BS at left base slightly  Cardiovascular: S1, S2 irreg  Gastrointestinal: Soft abd, NT, + BS  Genitourinary: No costovertebral angle tenderness  Extremities: No clubbing, cyanosis, + 2 BL LE pitting edema -chronic  Vascular: Peripheral pulses palpable 2+ bilaterally  Neurological: Alert and oriented to place, person  Skin: Warm and dry. Venous stasis dermatosis  Musculoskeletal: Normal gait, tone  Psychiatric: Cooperative and appropriate

## 2021-06-02 NOTE — ASSESSMENT
[FreeTextEntry1] : Follicular Lymphoma GRADE 1 Stage II\par \par He has recovered nicely from the surgery. I have encouraged him to follow up with medical oncology. to discuss next steps. \par \par Recurrent Pleural effusion\par He is SOB at [facility] and given his history it is highly likely the effusion has recurred. We will send him to the ED for CXR and work up of his symptoms. Therapeutic thoracentesis if indicated,

## 2021-06-02 NOTE — ED ADULT NURSE NOTE - NSIMPLEMENTINTERV_GEN_ALL_ED
Implemented All Fall with Harm Risk Interventions:  [place] to call system. Call bell, personal items and telephone within reach. Instruct patient to call for assistance. Room bathroom lighting operational. Non-slip footwear when patient is off stretcher. Physically safe environment: no spills, clutter or unnecessary equipment. Stretcher in lowest position, wheels locked, appropriate side rails in place. Provide visual cue, wrist band, yellow gown, etc. Monitor gait and stability. Monitor for mental status changes and reorient to person, place, and time. Review medications for side effects contributing to fall risk. Reinforce activity limits and safety measures with patient and family. Provide visual clues: red socks.

## 2021-06-02 NOTE — ED PROVIDER NOTE - PROGRESS NOTE DETAILS
morgan: spoke to william who states pt is very sob on exertion & having difficulties with ADL at home due to sob. morgan: Discussed need to admit with patient & discussed risk and benefits.  Patient agreed to admission.  Discussed case w/ admitting doctor - agreed to admit to their service. will place bridge orders. Accepting physician said: APPROVE PT TO MOVE   -prior to inpatient team evaluation

## 2021-06-02 NOTE — ED PROVIDER NOTE - PHYSICAL EXAMINATION

## 2021-06-02 NOTE — PHARMACOTHERAPY INTERVENTION NOTE - COMMENTS
med history complete, reviewed medications with patients med list and confirmed with doctor first med profile, all medication related questions answered med history complete, reviewed medications with patients med list and confirmed with doctor first med profile

## 2021-06-02 NOTE — ED PROVIDER NOTE - CARE PLAN
Statement Selected Principal Discharge DX:	Pleural effusion  Secondary Diagnosis:	OCHOA (dyspnea on exertion)

## 2021-06-02 NOTE — ED PROVIDER NOTE - OBJECTIVE STATEMENT
Pertinent HPI/PMH/PSH/FHx/SHx and Review of Systems contained within  HPI:  Patient p/w CC    PMH/PSH relevant for:  hernia, kidney stone, HLD, HTN, DM, BPH,   ROS negative for: fever, Chest pain, Nausea, vomiting, diarrhea, abdominal pain, dysuria    FamilyHx and SocialHx not otherwise contributory Pertinent HPI/PMH/PSH/FHx/SHx and Review of Systems contained within  HPI:  Patient p/w CC SOB mild x 1 day.  pt feels like he needs fluid around lung drained   PMH/PSH relevant for:  Afib on Eliquis, CKD, CAD, HTN, HLD, DM, BPH, urinary stone, chronic left side lower effusion and chronic lower extremity edema.   ROS negative for: fever, Chest pain,  Nausea, vomiting, diarrhea, abdominal pain, dysuria    FamilyHx and SocialHx not otherwise contributory Pertinent HPI/PMH/PSH/FHx/SHx and Review of Systems contained within  HPI:  Patient p/w CC SOB mild x 1 day.  pt feels like he needs fluid around lung drained   PMH/PSH relevant for:  Afib on Eliquis, CKD, CAD, HTN, HLD, DM, BPH, urinary stone, chronic left side lower effusion and chronic lower extremity edema.   ROS negative for: fever, Chest pain, Nausea, vomiting, diarrhea, abdominal pain, dysuria    FamilyHx and SocialHx not otherwise contributory Pertinent HPI/PMH/PSH/FHx/SHx and Review of Systems contained within  HPI:  Patient p/w CC SOB mild x 1 day. cant walk to bathroom b/c OCHOA.  pt feels like he needs fluid around lung drained. told by dylon gómez to come to ER. pt had recent mass bx & dx w/ lymphoma   PMH/PSH relevant for:  Afib on Eliquis, CKD, CAD, HTN, HLD, DM, BPH, urinary stone, chronic left side lower effusion and chronic lower extremity edema.   ROS negative for: fever, Chest pain, Nausea, vomiting, diarrhea, abdominal pain, dysuria    FamilyHx and SocialHx not otherwise contributory Pertinent HPI/PMH/PSH/FHx/SHx and Review of Systems contained within  HPI:  Patient p/w CC SOB x 1 day. cant walk to bathroom due to OCHOA.  pt feels like he needs fluid around lung drained. appeared very sob at clinic today so told by surgeon dr noyola to come to ER. pt had recent mass bx & dx w/ lymphoma today by Dr Noyola  PMH/PSH relevant for:  Afib on Eliquis, CKD, CAD, HTN, HLD, DM, BPH, urinary stone, chronic left side lower effusion and chronic lower extremity edema.   ROS negative for: fever, Chest pain, Nausea, vomiting, diarrhea, abdominal pain, dysuria    FamilyHx and SocialHx not otherwise contributory

## 2021-06-02 NOTE — ED STATDOCS - PROGRESS NOTE DETAILS
Artur COYLE for ED attending, Dr. Rich: 88 y/o M with PMHx of Afib, CKD, CAD, HTN, HLD, DM, BPH, and urinary stone presents to the ED sent by PCP c/o worsening +SOB. Was dx with lymphoma this morning PTA. Notes associated +lightheadedness, +OCHOA, and +b/l LE edema as well. Has known pleural effusion, last drained end of April. No fever. Allergic to morphine. Will send pt to main ED for further evaluation.

## 2021-06-02 NOTE — HISTORY OF PRESENT ILLNESS
[FreeTextEntry1] : 88 y/o Male with a PMHx of DMII, HTN, hiatal hernia, chronic left pleural effusion, and CKD who originally presented to  s/p unwitnessed fall and weakness at assisted living. He was admitted and CT A/P revealed an incidental finding of a 3.4 x 2.5 cm mesenteric mass in the right rebel abdomen with small adjacent lymph nodes and mild infiltration of the adjacent fat suspicious for neoplasm. Patient had a left thoracentesis on 5/19/21 which drained 1.5 L of fluid. Cytology was negative for malignant cells.\par \par He then underwent a laparoscopic biopsy of the bulky mesenteric mass on 5/20/21 without complication. Surgical pathology revealed a G1 follicular lymphoma. Today he comes in with increasing SOB at rest. He has no difficulty with eating or moving his bowels but feels it is hard to get air. He had a thoracentesis in the hospital for recurrent pleural effusions and it looks like he effusion has recurred.

## 2021-06-02 NOTE — H&P ADULT - NSICDXPASTMEDICALHX_GEN_ALL_CORE_FT
PAST MEDICAL HISTORY:  Afib     BPH (benign prostatic hyperplasia)     DM (diabetes mellitus)     Follicular lymphoma     Hernia     HLD (hyperlipidemia)     HTN (hypertension)     Kidney stone

## 2021-06-02 NOTE — ED ADULT NURSE NOTE - OBJECTIVE STATEMENT
Pt. to the ED BIB Daughter C/O SOB- Denies Chest Pain/Palpitations- Daughter states patient was just recently diagnosed With Lymphoma and has to get fluid drained- Hx. of HTN ahd DM- Pt. reports being fully vaccinated for Covid 19-- EKG STAT  shortness of breath

## 2021-06-03 LAB
ANION GAP SERPL CALC-SCNC: 5 MMOL/L — SIGNIFICANT CHANGE UP (ref 5–17)
APPEARANCE UR: CLEAR — SIGNIFICANT CHANGE UP
BILIRUB UR-MCNC: NEGATIVE — SIGNIFICANT CHANGE UP
BUN SERPL-MCNC: 28 MG/DL — HIGH (ref 7–23)
CALCIUM SERPL-MCNC: 9.3 MG/DL — SIGNIFICANT CHANGE UP (ref 8.5–10.1)
CHLORIDE SERPL-SCNC: 108 MMOL/L — SIGNIFICANT CHANGE UP (ref 96–108)
CO2 SERPL-SCNC: 26 MMOL/L — SIGNIFICANT CHANGE UP (ref 22–31)
COLOR SPEC: YELLOW — SIGNIFICANT CHANGE UP
COVID-19 SPIKE DOMAIN AB INTERP: POSITIVE
COVID-19 SPIKE DOMAIN ANTIBODY RESULT: 67.1 U/ML — HIGH
CREAT SERPL-MCNC: 1.3 MG/DL — SIGNIFICANT CHANGE UP (ref 0.5–1.3)
DIFF PNL FLD: ABNORMAL
GLUCOSE SERPL-MCNC: 189 MG/DL — HIGH (ref 70–99)
GLUCOSE UR QL: 250 MG/DL
HCT VFR BLD CALC: 34.8 % — LOW (ref 39–50)
HGB BLD-MCNC: 11 G/DL — LOW (ref 13–17)
KETONES UR-MCNC: NEGATIVE — SIGNIFICANT CHANGE UP
LEUKOCYTE ESTERASE UR-ACNC: NEGATIVE — SIGNIFICANT CHANGE UP
MCHC RBC-ENTMCNC: 30 PG — SIGNIFICANT CHANGE UP (ref 27–34)
MCHC RBC-ENTMCNC: 31.6 GM/DL — LOW (ref 32–36)
MCV RBC AUTO: 94.8 FL — SIGNIFICANT CHANGE UP (ref 80–100)
NITRITE UR-MCNC: NEGATIVE — SIGNIFICANT CHANGE UP
PH UR: 5 — SIGNIFICANT CHANGE UP (ref 5–8)
PLATELET # BLD AUTO: 276 K/UL — SIGNIFICANT CHANGE UP (ref 150–400)
POTASSIUM SERPL-MCNC: 4.7 MMOL/L — SIGNIFICANT CHANGE UP (ref 3.5–5.3)
POTASSIUM SERPL-SCNC: 4.7 MMOL/L — SIGNIFICANT CHANGE UP (ref 3.5–5.3)
PROT UR-MCNC: NEGATIVE MG/DL — SIGNIFICANT CHANGE UP
RBC # BLD: 3.67 M/UL — LOW (ref 4.2–5.8)
RBC # FLD: 14.6 % — HIGH (ref 10.3–14.5)
SARS-COV-2 IGG+IGM SERPL QL IA: 67.1 U/ML — HIGH
SARS-COV-2 IGG+IGM SERPL QL IA: POSITIVE
SODIUM SERPL-SCNC: 139 MMOL/L — SIGNIFICANT CHANGE UP (ref 135–145)
SP GR SPEC: 1.02 — SIGNIFICANT CHANGE UP (ref 1.01–1.02)
UROBILINOGEN FLD QL: NEGATIVE MG/DL — SIGNIFICANT CHANGE UP
WBC # BLD: 5.72 K/UL — SIGNIFICANT CHANGE UP (ref 3.8–10.5)
WBC # FLD AUTO: 5.72 K/UL — SIGNIFICANT CHANGE UP (ref 3.8–10.5)

## 2021-06-03 PROCEDURE — 71045 X-RAY EXAM CHEST 1 VIEW: CPT | Mod: 26

## 2021-06-03 PROCEDURE — 99232 SBSQ HOSP IP/OBS MODERATE 35: CPT

## 2021-06-03 RX ADMIN — Medication 5 MILLIGRAM(S): at 21:18

## 2021-06-03 RX ADMIN — INSULIN GLARGINE 13 UNIT(S): 100 INJECTION, SOLUTION SUBCUTANEOUS at 21:19

## 2021-06-03 RX ADMIN — TAMSULOSIN HYDROCHLORIDE 0.4 MILLIGRAM(S): 0.4 CAPSULE ORAL at 21:18

## 2021-06-03 RX ADMIN — FINASTERIDE 5 MILLIGRAM(S): 5 TABLET, FILM COATED ORAL at 09:58

## 2021-06-03 RX ADMIN — VALSARTAN 160 MILLIGRAM(S): 80 TABLET ORAL at 09:58

## 2021-06-03 RX ADMIN — Medication 2: at 07:33

## 2021-06-03 RX ADMIN — PANTOPRAZOLE SODIUM 40 MILLIGRAM(S): 20 TABLET, DELAYED RELEASE ORAL at 09:58

## 2021-06-03 RX ADMIN — TRAMADOL HYDROCHLORIDE 50 MILLIGRAM(S): 50 TABLET ORAL at 11:30

## 2021-06-03 RX ADMIN — Medication 4: at 12:14

## 2021-06-03 RX ADMIN — Medication 2: at 16:48

## 2021-06-03 RX ADMIN — Medication 25 MILLIGRAM(S): at 09:58

## 2021-06-03 NOTE — PROGRESS NOTE ADULT - SUBJECTIVE AND OBJECTIVE BOX
Patient is a 89y old  Male who presents with a chief complaint of OCHOA (2021 12:11)      SUBJECTIVE / OVERNIGHT EVENTS:    MEDICATIONS  (STANDING):  dextrose 40% Gel 15 Gram(s) Oral once  dextrose 5%. 1000 milliLiter(s) (50 mL/Hr) IV Continuous <Continuous>  dextrose 5%. 1000 milliLiter(s) (100 mL/Hr) IV Continuous <Continuous>  dextrose 50% Injectable 25 Gram(s) IV Push once  dextrose 50% Injectable 12.5 Gram(s) IV Push once  dextrose 50% Injectable 25 Gram(s) IV Push once  finasteride 5 milliGRAM(s) Oral daily  glucagon  Injectable 1 milliGRAM(s) IntraMuscular once  insulin glargine Injectable (LANTUS) 13 Unit(s) SubCutaneous at bedtime  insulin lispro (ADMELOG) corrective regimen sliding scale   SubCutaneous three times a day before meals  insulin lispro (ADMELOG) corrective regimen sliding scale   SubCutaneous at bedtime  melatonin 5 milliGRAM(s) Oral at bedtime  metoprolol succinate ER 25 milliGRAM(s) Oral daily  pantoprazole    Tablet 40 milliGRAM(s) Oral daily  tamsulosin 0.4 milliGRAM(s) Oral at bedtime  valsartan 160 milliGRAM(s) Oral daily    MEDICATIONS  (PRN):  acetaminophen   Tablet .. 650 milliGRAM(s) Oral every 6 hours PRN Mild Pain (1 - 3)  traMADol 50 milliGRAM(s) Oral every 8 hours PRN Moderate Pain (4 - 6)      CAPILLARY BLOOD GLUCOSE      POCT Blood Glucose.: 206 mg/dL (2021 12:11)  POCT Blood Glucose.: 168 mg/dL (2021 07:31)  POCT Blood Glucose.: 177 mg/dL (2021 21:04)  POCT Blood Glucose.: 208 mg/dL (2021 17:06)    I&O's Summary    2021 07:01  -  2021 07:00  --------------------------------------------------------  IN: 0 mL / OUT: 150 mL / NET: -150 mL        PHYSICAL EXAM:  Vital Signs Last 24 Hrs  T(C): 36.2 (2021 08:15), Max: 36.8 (2021 19:48)  T(F): 97.1 (2021 08:15), Max: 98.2 (2021 19:48)  HR: 56 (2021 08:15) (55 - 70)  BP: 118/51 (2021 08:15) (111/55 - 145/65)  BP(mean): 68 (2021 19:48) (68 - 77)  RR: 18 (2021 08:15) (17 - 18)  SpO2: 97% (2021 08:15) (95% - 98%)  GENERAL: NAD, well-developed  HEAD:  Atraumatic, Normocephalic  EYES: EOMI, PERRLA, conjunctiva and sclera clear  NECK: Supple, No JVD, no LAD  CHEST/LUNG: Clear to auscultation bilaterally; No wheeze, resp unlabored  HEART: Regular rate and rhythm; No murmurs, rubs, or gallops  ABDOMEN: Soft, Nontender, Nondistended; Bowel sounds present, no HSM  EXTREMITIES:  2+ Peripheral Pulses, No clubbing, cyanosis, or edema  PSYCH: AAOx3, normal behavior  NEUROLOGY: non-focal, sensory and cn 2-12 intact  SKIN: No visible rashes or lesions    LABS:                        11.0   5.72  )-----------( 276      ( 2021 11:30 )             34.8     06-03    139  |  108  |  28<H>  ----------------------------<  189<H>  4.7   |  26  |  1.30    Ca    9.3      2021 11:30  Mg     2.4     06-02    TPro  7.1  /  Alb  3.1<L>  /  TBili  0.5  /  DBili  x   /  AST  26  /  ALT  18  /  AlkPhos  95  06-02      CARDIAC MARKERS ( 2021 11:33 )  <0.015 ng/mL / x     / x     / x     / x          Urinalysis Basic - ( 2021 06:30 )    Color: Yellow / Appearance: Clear / S.020 / pH: x  Gluc: x / Ketone: Negative  / Bili: Negative / Urobili: Negative mg/dL   Blood: x / Protein: Negative mg/dL / Nitrite: Negative   Leuk Esterase: Negative / RBC: 11-25 /HPF / WBC 3-5   Sq Epi: x / Non Sq Epi: Occasional / Bacteria: Occasional          RADIOLOGY & ADDITIONAL TESTS:    Imaging Personally Reviewed:    Consultant(s) Notes Reviewed:      Care Discussed with Consultants/Other Providers:

## 2021-06-03 NOTE — PROGRESS NOTE ADULT - SUBJECTIVE AND OBJECTIVE BOX
Patient is a 89y old  Male who presents with a chief complaint of OCHOA (2021 08:14)      SUBJECTIVE / OVERNIGHT EVENTS:    MEDICATIONS  (STANDING):  dextrose 40% Gel 15 Gram(s) Oral once  dextrose 5%. 1000 milliLiter(s) (50 mL/Hr) IV Continuous <Continuous>  dextrose 5%. 1000 milliLiter(s) (100 mL/Hr) IV Continuous <Continuous>  dextrose 50% Injectable 25 Gram(s) IV Push once  dextrose 50% Injectable 12.5 Gram(s) IV Push once  dextrose 50% Injectable 25 Gram(s) IV Push once  finasteride 5 milliGRAM(s) Oral daily  glucagon  Injectable 1 milliGRAM(s) IntraMuscular once  insulin glargine Injectable (LANTUS) 13 Unit(s) SubCutaneous at bedtime  insulin lispro (ADMELOG) corrective regimen sliding scale   SubCutaneous three times a day before meals  insulin lispro (ADMELOG) corrective regimen sliding scale   SubCutaneous at bedtime  melatonin 5 milliGRAM(s) Oral at bedtime  metoprolol succinate ER 25 milliGRAM(s) Oral daily  pantoprazole    Tablet 40 milliGRAM(s) Oral daily  tamsulosin 0.4 milliGRAM(s) Oral at bedtime  valsartan 160 milliGRAM(s) Oral daily    MEDICATIONS  (PRN):  acetaminophen   Tablet .. 650 milliGRAM(s) Oral every 6 hours PRN Mild Pain (1 - 3)  traMADol 50 milliGRAM(s) Oral every 8 hours PRN Moderate Pain (4 - 6)      CAPILLARY BLOOD GLUCOSE      POCT Blood Glucose.: 168 mg/dL (2021 07:31)  POCT Blood Glucose.: 177 mg/dL (2021 21:04)  POCT Blood Glucose.: 208 mg/dL (2021 17:06)    I&O's Summary    2021 07:01  -  2021 07:00  --------------------------------------------------------  IN: 0 mL / OUT: 150 mL / NET: -150 mL        PHYSICAL EXAM:  Vital Signs Last 24 Hrs  T(C): 36.2 (2021 08:15), Max: 36.8 (2021 19:48)  T(F): 97.1 (2021 08:15), Max: 98.2 (2021 19:48)  HR: 56 (2021 08:15) (55 - 70)  BP: 118/51 (2021 08:15) (111/55 - 145/65)  BP(mean): 68 (2021 19:48) (68 - 77)  RR: 18 (2021 08:15) (17 - 18)  SpO2: 97% (2021 08:15) (95% - 98%)  GENERAL: NAD, well-developed  HEAD:  Atraumatic, Normocephalic  EYES: EOMI, PERRLA, conjunctiva and sclera clear  NECK: Supple, No JVD, no LAD  CHEST/LUNG: Clear to auscultation bilaterally; No wheeze, resp unlabored  HEART: Regular rate and rhythm; No murmurs, rubs, or gallops  ABDOMEN: Soft, Nontender, Nondistended; Bowel sounds present, no HSM  EXTREMITIES:  2+ Peripheral Pulses, No clubbing, cyanosis, or edema  PSYCH: AAOx3, normal behavior  NEUROLOGY: non-focal, sensory and cn 2-12 intact  SKIN: No visible rashes or lesions    LABS:                        11.0   5.72  )-----------( 276      ( 2021 11:30 )             34.8     06-03    139  |  108  |  28<H>  ----------------------------<  189<H>  4.7   |  26  |  1.30    Ca    9.3      2021 11:30  Mg     2.4     06-02    TPro  7.1  /  Alb  3.1<L>  /  TBili  0.5  /  DBili  x   /  AST  26  /  ALT  18  /  AlkPhos  95  06-02      CARDIAC MARKERS ( 2021 11:33 )  <0.015 ng/mL / x     / x     / x     / x          Urinalysis Basic - ( 2021 06:30 )    Color: Yellow / Appearance: Clear / S.020 / pH: x  Gluc: x / Ketone: Negative  / Bili: Negative / Urobili: Negative mg/dL   Blood: x / Protein: Negative mg/dL / Nitrite: Negative   Leuk Esterase: Negative / RBC: 11-25 /HPF / WBC 3-5   Sq Epi: x / Non Sq Epi: Occasional / Bacteria: Occasional          RADIOLOGY & ADDITIONAL TESTS:    Imaging Personally Reviewed:    Consultant(s) Notes Reviewed:      Care Discussed with Consultants/Other Providers:   Patient is a 89y old  Male who presents with a chief complaint of OCHOA (2021 08:14)  89 y.o. 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  Pt is forgetful, denies any complaints, + chronic LE edema.  Other ROS reviewed and neg     SUBJECTIVE / OVERNIGHT EVENTS:  6/3- s/e at bedside, no new c/o, breathing ok  CTS note reviewed, pleurx planned for , eliquis on hold    MEDICATIONS  (STANDING):  dextrose 40% Gel 15 Gram(s) Oral once  dextrose 5%. 1000 milliLiter(s) (50 mL/Hr) IV Continuous <Continuous>  dextrose 5%. 1000 milliLiter(s) (100 mL/Hr) IV Continuous <Continuous>  dextrose 50% Injectable 25 Gram(s) IV Push once  dextrose 50% Injectable 12.5 Gram(s) IV Push once  dextrose 50% Injectable 25 Gram(s) IV Push once  finasteride 5 milliGRAM(s) Oral daily  glucagon  Injectable 1 milliGRAM(s) IntraMuscular once  insulin glargine Injectable (LANTUS) 13 Unit(s) SubCutaneous at bedtime  insulin lispro (ADMELOG) corrective regimen sliding scale   SubCutaneous three times a day before meals  insulin lispro (ADMELOG) corrective regimen sliding scale   SubCutaneous at bedtime  melatonin 5 milliGRAM(s) Oral at bedtime  metoprolol succinate ER 25 milliGRAM(s) Oral daily  pantoprazole    Tablet 40 milliGRAM(s) Oral daily  tamsulosin 0.4 milliGRAM(s) Oral at bedtime  valsartan 160 milliGRAM(s) Oral daily    MEDICATIONS  (PRN):  acetaminophen   Tablet .. 650 milliGRAM(s) Oral every 6 hours PRN Mild Pain (1 - 3)  traMADol 50 milliGRAM(s) Oral every 8 hours PRN Moderate Pain (4 - 6)      CAPILLARY BLOOD GLUCOSE      POCT Blood Glucose.: 168 mg/dL (2021 07:31)  POCT Blood Glucose.: 177 mg/dL (2021 21:04)  POCT Blood Glucose.: 208 mg/dL (2021 17:06)    I&O's Summary    2021 07:01  -  2021 07:00  --------------------------------------------------------  IN: 0 mL / OUT: 150 mL / NET: -150 mL        PHYSICAL EXAM:  Vital Signs Last 24 Hrs  T(C): 36.2 (2021 08:15), Max: 36.8 (2021 19:48)  T(F): 97.1 (2021 08:15), Max: 98.2 (2021 19:48)  HR: 56 (2021 08:15) (55 - 70)  BP: 118/51 (2021 08:15) (111/55 - 145/65)  BP(mean): 68 (2021 19:48) (68 - 77)  RR: 18 (2021 08:15) (17 - 18)  SpO2: 97% (2021 08:15) (95% - 98%)  GENERAL: NAD,   HEAD:  Atraumatic, Normocephalic  EYES: EOMI, PERRLA, conjunctiva and sclera clear  NECK: Supple, No JVD, no LAD  CHEST/LUNG: dec bs over Left base, resp unlabored  HEART: Regular rate and rhythm; No murmurs, rubs, or gallops  ABDOMEN: Soft, Nontender, Nondistended; Bowel sounds present, no HSM  EXTREMITIES:  2+ Peripheral Pulses, No clubbing, cyanosis, Chronic LE nonpitting  edema  PSYCH: AAOx3, normal behavior  NEUROLOGY: non-focal, sensory and cn 2-12 intact  SKIN: No visible rashes or lesions    LABS:                        11.0   5.72  )-----------( 276      ( 2021 11:30 )             34.8     06-03    139  |  108  |  28<H>  ----------------------------<  189<H>  4.7   |  26  |  1.30    Ca    9.3      2021 11:30  Mg     2.4     06-02    TPro  7.1  /  Alb  3.1<L>  /  TBili  0.5  /  DBili  x   /  AST  26  /  ALT  18  /  AlkPhos  95  06-02      CARDIAC MARKERS ( 2021 11:33 )  <0.015 ng/mL / x     / x     / x     / x          Urinalysis Basic - ( 2021 06:30 )    Color: Yellow / Appearance: Clear / S.020 / pH: x  Gluc: x / Ketone: Negative  / Bili: Negative / Urobili: Negative mg/dL   Blood: x / Protein: Negative mg/dL / Nitrite: Negative   Leuk Esterase: Negative / RBC: 11-25 /HPF / WBC 3-5   Sq Epi: x / Non Sq Epi: Occasional / Bacteria: Occasional          RADIOLOGY & ADDITIONAL TESTS:    Imaging Personally Reviewed:    Consultant(s) Notes Reviewed:    CTS  Care Discussed with Consultants/Other Providers:

## 2021-06-03 NOTE — PROGRESS NOTE ADULT - SUBJECTIVE AND OBJECTIVE BOX
Subjective:  Pt seen, denies SOB currently on RA. States gets winded at home and has OCHOA.     Vital Signs:  Vital Signs Last 24 Hrs  T(C): 36.3 (06-03-21 @ 16:04), Max: 36.8 (06-02-21 @ 19:48)  T(F): 97.3 (06-03-21 @ 16:04), Max: 98.2 (06-02-21 @ 19:48)  HR: 52 (06-03-21 @ 16:04) (52 - 70)  BP: 127/54 (06-03-21 @ 16:04) (111/55 - 145/65)  RR: 18 (06-03-21 @ 16:04) (18 - 18)  SpO2: 98% (06-03-21 @ 16:04) (95% - 98%) on (O2)    Telemetry/Alarms:    Relevant labs, radiology and Medications reviewed                        11.0   5.72  )-----------( 276      ( 03 Jun 2021 11:30 )             34.8     06-03    139  |  108  |  28<H>  ----------------------------<  189<H>  4.7   |  26  |  1.30    Ca    9.3      03 Jun 2021 11:30  Mg     2.4     06-02    TPro  7.1  /  Alb  3.1<L>  /  TBili  0.5  /  DBili  x   /  AST  26  /  ALT  18  /  AlkPhos  95  06-02      MEDICATIONS  (STANDING):  dextrose 40% Gel 15 Gram(s) Oral once  dextrose 5%. 1000 milliLiter(s) (50 mL/Hr) IV Continuous <Continuous>  dextrose 5%. 1000 milliLiter(s) (100 mL/Hr) IV Continuous <Continuous>  dextrose 50% Injectable 25 Gram(s) IV Push once  dextrose 50% Injectable 12.5 Gram(s) IV Push once  dextrose 50% Injectable 25 Gram(s) IV Push once  finasteride 5 milliGRAM(s) Oral daily  glucagon  Injectable 1 milliGRAM(s) IntraMuscular once  insulin glargine Injectable (LANTUS) 13 Unit(s) SubCutaneous at bedtime  insulin lispro (ADMELOG) corrective regimen sliding scale   SubCutaneous three times a day before meals  insulin lispro (ADMELOG) corrective regimen sliding scale   SubCutaneous at bedtime  melatonin 5 milliGRAM(s) Oral at bedtime  metoprolol succinate ER 25 milliGRAM(s) Oral daily  pantoprazole    Tablet 40 milliGRAM(s) Oral daily  tamsulosin 0.4 milliGRAM(s) Oral at bedtime  valsartan 160 milliGRAM(s) Oral daily    MEDICATIONS  (PRN):  acetaminophen   Tablet .. 650 milliGRAM(s) Oral every 6 hours PRN Mild Pain (1 - 3)  traMADol 50 milliGRAM(s) Oral every 8 hours PRN Moderate Pain (4 - 6)      Physical exam  Gen NAD  Neuro AAOx3  Card RRR  Pulm  decreased left base  Abd soft  Ext warm, edema        I&O's Summary    02 Jun 2021 07:01  -  03 Jun 2021 07:00  --------------------------------------------------------  IN: 0 mL / OUT: 150 mL / NET: -150 mL        Assessment  89y Male  w/ PAST MEDICAL & SURGICAL HISTORY:  DM (diabetes mellitus)    HTN (hypertension)    HLD (hyperlipidemia)    Kidney stone    Hernia    Afib    BPH (benign prostatic hyperplasia)    Follicular lymphoma    H/O left inguinal hernia repair    Endoscopy finding  choked on piece of chiken-had upper endo with clearance of food    History of tibial fracture  R tibial fracture s/p revision with hardware placement ~3-4yrs ago, per pt    admitted with complaints of Patient is a 89y old  Male who presents with a chief complaint of OCHOA (03 Jun 2021 12:11)  .  88 yo male with h/o DM (on insulin), HTN, hiatal hernia, chronic L pleural effusion (of unknown cause), h/o kidney stones, R Tibial/fibular fracture repair with hardwood insertion 3-4yrs ago, CKD2-3, BPH, h/o DVT on eliquis,  s/p pigtail 4/26, exudative now admitted 6/2 w SOB, OCHOA, chronic left effusion recurrence.    NPO p MN  T and S, coags ordered  Left VATS pleurx placement 6/4    Discussed with Cardiothoracic Team at AM rounds.

## 2021-06-04 ENCOUNTER — APPOINTMENT (OUTPATIENT)
Dept: THORACIC SURGERY | Facility: HOSPITAL | Age: 86
End: 2021-06-04

## 2021-06-04 LAB
ANION GAP SERPL CALC-SCNC: 6 MMOL/L — SIGNIFICANT CHANGE UP (ref 5–17)
APTT BLD: 28.5 SEC — SIGNIFICANT CHANGE UP (ref 27.5–35.5)
BUN SERPL-MCNC: 31 MG/DL — HIGH (ref 7–23)
CALCIUM SERPL-MCNC: 8.6 MG/DL — SIGNIFICANT CHANGE UP (ref 8.5–10.1)
CHLORIDE SERPL-SCNC: 110 MMOL/L — HIGH (ref 96–108)
CO2 SERPL-SCNC: 26 MMOL/L — SIGNIFICANT CHANGE UP (ref 22–31)
CREAT SERPL-MCNC: 1.41 MG/DL — HIGH (ref 0.5–1.3)
GLUCOSE SERPL-MCNC: 97 MG/DL — SIGNIFICANT CHANGE UP (ref 70–99)
HCT VFR BLD CALC: 32.6 % — LOW (ref 39–50)
HGB BLD-MCNC: 10.3 G/DL — LOW (ref 13–17)
INR BLD: 0.92 RATIO — SIGNIFICANT CHANGE UP (ref 0.88–1.16)
MCHC RBC-ENTMCNC: 29.9 PG — SIGNIFICANT CHANGE UP (ref 27–34)
MCHC RBC-ENTMCNC: 31.6 GM/DL — LOW (ref 32–36)
MCV RBC AUTO: 94.8 FL — SIGNIFICANT CHANGE UP (ref 80–100)
PLATELET # BLD AUTO: 259 K/UL — SIGNIFICANT CHANGE UP (ref 150–400)
POTASSIUM SERPL-MCNC: 4.1 MMOL/L — SIGNIFICANT CHANGE UP (ref 3.5–5.3)
POTASSIUM SERPL-SCNC: 4.1 MMOL/L — SIGNIFICANT CHANGE UP (ref 3.5–5.3)
PROTHROM AB SERPL-ACNC: 10.8 SEC — SIGNIFICANT CHANGE UP (ref 10.6–13.6)
RBC # BLD: 3.44 M/UL — LOW (ref 4.2–5.8)
RBC # FLD: 14.6 % — HIGH (ref 10.3–14.5)
SODIUM SERPL-SCNC: 142 MMOL/L — SIGNIFICANT CHANGE UP (ref 135–145)
WBC # BLD: 5.55 K/UL — SIGNIFICANT CHANGE UP (ref 3.8–10.5)
WBC # FLD AUTO: 5.55 K/UL — SIGNIFICANT CHANGE UP (ref 3.8–10.5)

## 2021-06-04 PROCEDURE — 99223 1ST HOSP IP/OBS HIGH 75: CPT

## 2021-06-04 PROCEDURE — 71045 X-RAY EXAM CHEST 1 VIEW: CPT | Mod: 26

## 2021-06-04 PROCEDURE — 99232 SBSQ HOSP IP/OBS MODERATE 35: CPT

## 2021-06-04 PROCEDURE — 32550 INSERT PLEURAL CATH: CPT | Mod: LT

## 2021-06-04 RX ORDER — OXYCODONE HYDROCHLORIDE 5 MG/1
5 TABLET ORAL ONCE
Refills: 0 | Status: DISCONTINUED | OUTPATIENT
Start: 2021-06-04 | End: 2021-06-04

## 2021-06-04 RX ORDER — SENNA PLUS 8.6 MG/1
2 TABLET ORAL AT BEDTIME
Refills: 0 | Status: DISCONTINUED | OUTPATIENT
Start: 2021-06-04 | End: 2021-06-10

## 2021-06-04 RX ORDER — OXYCODONE AND ACETAMINOPHEN 5; 325 MG/1; MG/1
1 TABLET ORAL EVERY 4 HOURS
Refills: 0 | Status: DISCONTINUED | OUTPATIENT
Start: 2021-06-04 | End: 2021-06-10

## 2021-06-04 RX ORDER — LIDOCAINE 4 G/100G
1 CREAM TOPICAL DAILY
Refills: 0 | Status: DISCONTINUED | OUTPATIENT
Start: 2021-06-04 | End: 2021-06-10

## 2021-06-04 RX ORDER — OXYCODONE AND ACETAMINOPHEN 5; 325 MG/1; MG/1
2 TABLET ORAL EVERY 6 HOURS
Refills: 0 | Status: DISCONTINUED | OUTPATIENT
Start: 2021-06-04 | End: 2021-06-05

## 2021-06-04 RX ORDER — FENTANYL CITRATE 50 UG/ML
25 INJECTION INTRAVENOUS
Refills: 0 | Status: DISCONTINUED | OUTPATIENT
Start: 2021-06-04 | End: 2021-06-04

## 2021-06-04 RX ORDER — APIXABAN 2.5 MG/1
5 TABLET, FILM COATED ORAL EVERY 12 HOURS
Refills: 0 | Status: DISCONTINUED | OUTPATIENT
Start: 2021-06-05 | End: 2021-06-10

## 2021-06-04 RX ORDER — ONDANSETRON 8 MG/1
4 TABLET, FILM COATED ORAL ONCE
Refills: 0 | Status: DISCONTINUED | OUTPATIENT
Start: 2021-06-04 | End: 2021-06-04

## 2021-06-04 RX ORDER — SODIUM CHLORIDE 9 MG/ML
1000 INJECTION, SOLUTION INTRAVENOUS
Refills: 0 | Status: DISCONTINUED | OUTPATIENT
Start: 2021-06-04 | End: 2021-06-04

## 2021-06-04 RX ADMIN — OXYCODONE AND ACETAMINOPHEN 1 TABLET(S): 5; 325 TABLET ORAL at 12:28

## 2021-06-04 RX ADMIN — FENTANYL CITRATE 25 MICROGRAM(S): 50 INJECTION INTRAVENOUS at 11:00

## 2021-06-04 RX ADMIN — FENTANYL CITRATE 25 MICROGRAM(S): 50 INJECTION INTRAVENOUS at 11:30

## 2021-06-04 RX ADMIN — Medication 1: at 21:17

## 2021-06-04 RX ADMIN — OXYCODONE HYDROCHLORIDE 5 MILLIGRAM(S): 5 TABLET ORAL at 11:00

## 2021-06-04 RX ADMIN — Medication 2: at 16:54

## 2021-06-04 RX ADMIN — FINASTERIDE 5 MILLIGRAM(S): 5 TABLET, FILM COATED ORAL at 12:25

## 2021-06-04 RX ADMIN — OXYCODONE AND ACETAMINOPHEN 2 TABLET(S): 5; 325 TABLET ORAL at 23:05

## 2021-06-04 RX ADMIN — Medication 5 MILLIGRAM(S): at 21:16

## 2021-06-04 RX ADMIN — FENTANYL CITRATE 25 MICROGRAM(S): 50 INJECTION INTRAVENOUS at 11:15

## 2021-06-04 RX ADMIN — Medication 25 MILLIGRAM(S): at 12:25

## 2021-06-04 RX ADMIN — VALSARTAN 160 MILLIGRAM(S): 80 TABLET ORAL at 12:26

## 2021-06-04 RX ADMIN — OXYCODONE AND ACETAMINOPHEN 1 TABLET(S): 5; 325 TABLET ORAL at 13:15

## 2021-06-04 RX ADMIN — TAMSULOSIN HYDROCHLORIDE 0.4 MILLIGRAM(S): 0.4 CAPSULE ORAL at 21:16

## 2021-06-04 RX ADMIN — Medication 4: at 06:10

## 2021-06-04 RX ADMIN — INSULIN GLARGINE 13 UNIT(S): 100 INJECTION, SOLUTION SUBCUTANEOUS at 21:16

## 2021-06-04 RX ADMIN — OXYCODONE HYDROCHLORIDE 5 MILLIGRAM(S): 5 TABLET ORAL at 11:30

## 2021-06-04 RX ADMIN — OXYCODONE AND ACETAMINOPHEN 2 TABLET(S): 5; 325 TABLET ORAL at 15:55

## 2021-06-04 RX ADMIN — OXYCODONE AND ACETAMINOPHEN 2 TABLET(S): 5; 325 TABLET ORAL at 22:05

## 2021-06-04 RX ADMIN — PANTOPRAZOLE SODIUM 40 MILLIGRAM(S): 20 TABLET, DELAYED RELEASE ORAL at 12:25

## 2021-06-04 NOTE — CONSULT NOTE ADULT - SUBJECTIVE AND OBJECTIVE BOX
HPI: Mr. Brooks is an 89y old Male with hx of T2DM, HTN, hiatal hernia, chronic left pleural effusion, CKD, 5th admission in a year, with recent admit - for mesenteric mass later dx as Follicular Lymphoma. Pt now readmitted from surgical follow up appointment for OCHOA. Found to have fluctuating O2 sat with activity and CXR on admission showing reaccumulated L pleural effusion. Palliative Care consulted to assist with establishing GOC.    Met with Mr. Brooks this am. He denied pain. He denied dyspnea at rest, but noted some heaviness in his breathing upon walking to the bathroom, but that this resolved quickly with rest. He noted some weight loss as well, but denied any other sx.     Introduced Palliative Care and reminded him of plan for meeting on last admission, that did not get to happen because he was dc'd- he slightly recalled this. He confirmed that his HCP may have been his son Kenyon on file, but whenever asked who to call, he continues to defer to his daughter Ally. He was open to us reaching out to set up a GOC meeting based on her availability. Called Joanne [phone] after encounter and she remembered our plan to meet and remained open as well. She noted being out of town until next Thursday, but was certainly available for GOC meeting via phone with her father on Monday at 11am.     PAIN: ( )Yes   ( x)No    DYSPNEA: ( x) Yes  ( ) No- none at rest  Level: mod on exertion    PAST MEDICAL & SURGICAL HISTORY:  DM (diabetes mellitus)  HTN (hypertension)  HLD (hyperlipidemia)  Kidney stone  Hernia  Afib  BPH (benign prostatic hyperplasia)  Follicular lymphoma  H/O left inguinal hernia repair  Endoscopy finding  choked on piece of chicken-had upper endo with clearance of food  History of tibial fracture  R tibial fracture s/p revision with hardware placement ~3-4yrs ago, per pt      SOCIAL HX: Lives at [facility], 1 daughter and 2 sons    Hx opiate tolerance ( )YES  (x )NO    Baseline ADLs  (Prior to Admission)  (x ) Independent   ( )Dependent    FAMILY HISTORY:  pt cannot recall any history of disease        Review of Systems:    Anxiety- denies  Depression- denies, says he stays pretty positive  Constipation- denies  Diarrhea- denies  Nausea- denies  Vomiting- denies  Anorexia- denies  Weight Loss-  yes, unsure how much  Cough- occasional dry cough  Secretions- denies  Fatigue- denies  Weakness- denies  Delirium- denies    All other systems reviewed and negative      PHYSICAL EXAM:    Vital Signs Last 24 Hrs  T(C): 36.3 (2021 07:22), Max: 36.6 (2021 23:13)  T(F): 97.4 (2021 07:22), Max: 97.9 (2021 23:13)  HR: 57 (2021 07:22) (52 - 61)  BP: 114/52 (2021 07:22) (114/52 - 127/54)  RR: 18 (2021 07:22) (18 - 18)  SpO2: 97% (2021 07:22) (95% - 98%)  Daily Weight in k.4 (2021 06:28)    PPSV2:  30-40 %    General: Elderly male sitting up in bed watching television, smiling, friendly, NAD  Mental Status: AOx4  HEENT: mmm, +mild temporal wasting  Lungs: dec bl L>r  Cardiac: + s1 s2 rrr  GI: soft nt nd +bs  : voids  MSK/skin: moves all extremities, some chronic venous stasis changes and old surgical scar on RLE, muscle and fat wasting noted in limbs  Neuro: no focal deficits      LABS:                        10.3   5.55  )-----------( 259      ( 2021 07:57 )             32.6     06-04    142  |  110<H>  |  31<H>  ----------------------------<  97  4.1   |  26  |  1.41<H>    Ca    8.6      2021 07:57  Mg     2.4     06-02    TPro  7.1  /  Alb  3.1<L>  /  TBili  0.5  /  DBili  x   /  AST  26  /  ALT  18  /  AlkPhos  95  06-02    PT/INR - ( 2021 07:57 )   PT: 10.8 sec;   INR: 0.92 ratio         PTT - ( 2021 07:57 )  PTT:28.5 sec  Albumin: Albumin, Serum: 3.1 g/dL ( @ 11:33)      Allergies    morphine (Other)    Intolerances      MEDICATIONS  (STANDING):  dextrose 40% Gel 15 Gram(s) Oral once  dextrose 5%. 1000 milliLiter(s) (50 mL/Hr) IV Continuous <Continuous>  dextrose 5%. 1000 milliLiter(s) (100 mL/Hr) IV Continuous <Continuous>  dextrose 50% Injectable 25 Gram(s) IV Push once  dextrose 50% Injectable 12.5 Gram(s) IV Push once  dextrose 50% Injectable 25 Gram(s) IV Push once  finasteride 5 milliGRAM(s) Oral daily  glucagon  Injectable 1 milliGRAM(s) IntraMuscular once  insulin glargine Injectable (LANTUS) 13 Unit(s) SubCutaneous at bedtime  insulin lispro (ADMELOG) corrective regimen sliding scale   SubCutaneous three times a day before meals  insulin lispro (ADMELOG) corrective regimen sliding scale   SubCutaneous at bedtime  melatonin 5 milliGRAM(s) Oral at bedtime  metoprolol succinate ER 25 milliGRAM(s) Oral daily  pantoprazole    Tablet 40 milliGRAM(s) Oral daily  tamsulosin 0.4 milliGRAM(s) Oral at bedtime  valsartan 160 milliGRAM(s) Oral daily    MEDICATIONS  (PRN):  acetaminophen   Tablet .. 650 milliGRAM(s) Oral every 6 hours PRN Mild Pain (1 - 3)  traMADol 50 milliGRAM(s) Oral every 8 hours PRN Moderate Pain (4 - 6)      RADIOLOGY/ADDITIONAL STUDIES:    EXAM:  XR CHEST PORTABLE ROUTINE 1V                        PROCEDURE DATE:  2021    IMPRESSION:   Residual moderate LEFT pleural effusion and/or basilar airspace disease..    MAGDALENA MILTON MD; Attending Radiologist  This document has been electronically signed. Nam  3 2021 12:54PM      EXAM:  XR CHEST PA LAT 2V                        PROCEDURE DATE:  2021      IMPRESSION:    Moderate LEFT effusion and/or basilar airspace consolidation.    MAGDALENA MILTON MD; Attending Radiologist  This document has been electronically signed. 2021  3:33PM  
Surgeon: Israel    Consult requesting by: Melania     HISTORY OF PRESENT ILLNESS:  89 y.o. 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  Pt is forgetful, denies any complaints, + chronic LE edema. (H&P)    At the bedside, patient is in NAD, comfortable  Pleasant but confused during conversation.  Ultrasound was done at the bedside and it was noted to have a moderate side left pleural effusion.  Plan to admit patient for possible thoracentesis vs pleurx insertion             PAST MEDICAL & SURGICAL HISTORY:  DM (diabetes mellitus)    HTN (hypertension)    HLD (hyperlipidemia)    Kidney stone    Hernia    Afib    BPH (benign prostatic hyperplasia)    Follicular lymphoma    H/O left inguinal hernia repair    Endoscopy finding  choked on piece of chiken-had upper endo with clearance of food    History of tibial fracture  R tibial fracture s/p revision with hardware placement ~3-4yrs ago, per pt        MEDICATIONS  (STANDING):  dextrose 40% Gel 15 Gram(s) Oral once  dextrose 5%. 1000 milliLiter(s) (50 mL/Hr) IV Continuous <Continuous>  dextrose 5%. 1000 milliLiter(s) (100 mL/Hr) IV Continuous <Continuous>  dextrose 50% Injectable 25 Gram(s) IV Push once  dextrose 50% Injectable 12.5 Gram(s) IV Push once  dextrose 50% Injectable 25 Gram(s) IV Push once  finasteride 5 milliGRAM(s) Oral daily  glucagon  Injectable 1 milliGRAM(s) IntraMuscular once  insulin glargine Injectable (LANTUS) 13 Unit(s) SubCutaneous at bedtime  insulin lispro (ADMELOG) corrective regimen sliding scale   SubCutaneous three times a day before meals  insulin lispro (ADMELOG) corrective regimen sliding scale   SubCutaneous at bedtime  melatonin 5 milliGRAM(s) Oral at bedtime  metoprolol succinate ER 25 milliGRAM(s) Oral daily  pantoprazole    Tablet 40 milliGRAM(s) Oral daily  tamsulosin 0.4 milliGRAM(s) Oral at bedtime  valsartan 160 milliGRAM(s) Oral daily    MEDICATIONS  (PRN):  acetaminophen   Tablet .. 650 milliGRAM(s) Oral every 6 hours PRN Mild Pain (1 - 3)  traMADol 50 milliGRAM(s) Oral every 8 hours PRN Moderate Pain (4 - 6)    Antiplatelet therapy:  Eliquis                          Last dose/amt: this am     Allergies    morphine (Other)    Intolerances        SOCIAL HISTORY:  Smoker: [ ] Yes  [ x] No        PACK YEARS:                         WHEN QUIT?  ETOH use: [ ] Yes  [x ] No              FREQUENCY / QUANTITY:  Ilicit Drug use:  [ ] Yes  [x ] No  Occupation:  Live with: [employer]   Assisted device use: walker     FAMILY HISTORY:  No pertinent family history in first degree relatives  pt cannot recall any history of disease        Review of Systems  CONSTITUTIONAL:  Fevers[ ] chills[ ] sweats[ ] fatigue[ ] weight loss[ x] weight gain [ ]                                     NEGATIVE [x ]   NEURO:  parathesias[ ] seizures [ ]  syncope [ ]  confusion [ ]                                                                                NEGATIVE[x ]   EYES: glasses[ x]  blurry vision[ ]  discharge[ ] pain[ ] glaucoma [ ]                                                                          NEGATIVE[ ]   ENMT:  difficulty hearing [ ]  vertigo[ ]  dysphagia[ ] epistaxis[ ] recent dental work [ ]                                    NEGATIVE[x ]   CV:  chest pain[ ] palpitations[ ] OCHOA [x ] diaphoresis [ ]                                                                                           NEGATIVE[ ]   RESPIRATORY:  wheezing[ ] SOB[x ] cough [ ] sputum[ ] hemoptysis[ ]                                                                  NEGATIVE[ ]   GI:  nausea[ ]  vomiting [ ]  diarrhea[ ] constipation [ ] melena [ ]                                                                      NEGATIVE[x ]   : hematuria[ ]  dysuria[ ] urgency[ ] incontinence[ ]                                                                                            NEGATIVE[x ]   MUSKULOSKELETAL:  arthritis[ ]  joint swelling [ ] muscle weakness [ ]                                                                NEGATIVE[x ]   SKIN/BREAST:  rash[ ] itching [ ]  hair loss[ ] masses[ ]                                                                                              NEGATIVE[ x]   PSYCH:  dementia [ ] depresion [ ] anxiety[ ]                                                                                                               NEGATIVE[x ]   HEME/LYMPH:  bruises easily[ ] enlarged lymph nodes[ ] tender lymph nodes[ ]                                               NEGATIVE[x ]   ENDOCRINE:  cold intolerance[ ] heat intolerance[ ] polydipsia[ ]                                                                          NEGATIVE[ x]     PHYSICAL EXAM  Vital Signs Last 24 Hrs  T(C): 36.7 (02 Jun 2021 14:52), Max: 36.7 (02 Jun 2021 14:52)  T(F): 98 (02 Jun 2021 14:52), Max: 98 (02 Jun 2021 14:52)  HR: 55 (02 Jun 2021 14:52) (55 - 66)  BP: 129/59 (02 Jun 2021 14:52) (129/59 - 130/69)  BP(mean): 77 (02 Jun 2021 14:52) (77 - 109)  RR: 17 (02 Jun 2021 14:52) (17 - 17)  SpO2: 98% (02 Jun 2021 14:52) (98% - 98%)    CONSTITUTIONAL:                                                                          WNL[x ]  pleasantly confused   Neuro: WNL[ x] Normal exam oriented to person/place & time with no focal motor or sensory  deficits. Other                     Eyes: WNL[ x]   Normal exam of conjunctiva & lids, pupils equally reactive. Other     ENT: WNL[x ]    Normal exam of nasal/oral mucosa with absence of cyanosis. Other  Neck: WNL[x ]  Normal exam of jugular veins, trachea & thyroid. Other  Chest: WNL[ ] Normal lung exam with good air movement absence of wheezes, rales, or rhonchi: Other    decreased lt base                                                                              CV:  Auscultation: normal [x ] S3[ ] S4[ ] Irregular [ ] Rub[ ] Clicks[ ]    Murmurs none:[ x]systolic [ ]  diastolic [ ] holosystolic [ ]  Carotids: No Bruits[x ] Other                 Abdominal Aorta: normal [x ] nonpalpable[ ]Other                                                                                      GI:           WNL[x ] Normal exam of abdomen, liver & spleen with no noted masses or tenderness. Other                                                                                                        Extremities: WNL[ x] Normal no evidence of cyanosis or deformity Edema: none[ ]trace[ ]1+[ ]2+[ ]3+[ ]4+[ ]  Lower Extremity Pulses: Right[2+ ] Left[2+ ]Varicosities[ ]  SKIN :WNL[ x] Normal exam to inspection & palation. Other:                                                          LABS:                        11.4   6.64  )-----------( 281      ( 02 Jun 2021 11:33 )             36.0     06-02    137  |  106  |  17  ----------------------------<  244<H>  5.2   |  25  |  1.34<H>    Ca    8.6      02 Jun 2021 11:33  Mg     2.4     06-02    TPro  7.1  /  Alb  3.1<L>  /  TBili  0.5  /  DBili  x   /  AST  26  /  ALT  18  /  AlkPhos  95  06-02        CARDIAC MARKERS ( 02 Jun 2021 11:33 )  <0.015 ng/mL / x     / x     / x     / x                                            
Patient is a 89y old  Male who presents with a chief complaint of OCHOA (2021 17:59)      HPI:  89 y.o. 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  Pt is forgetful, denies any complaints, + chronic LE edema.  Other ROS reviewed and neg  (2021 16:01)  pt is known to me from last admission  My office has been reaching out to him for the past week to fu on recent mesenteric mass bx done as wella sPF cytology / recurrent fulid accumulation  exudative effusion with 2 prior thoracentesis large volume done  he does not feel sob at rest  OCHOA      PAST MEDICAL & SURGICAL HISTORY:  DM (diabetes mellitus)    HTN (hypertension)    HLD (hyperlipidemia)    Kidney stone    Hernia    Afib    BPH (benign prostatic hyperplasia)    Follicular lymphoma    H/O left inguinal hernia repair    Endoscopy finding  choked on piece of chiken-had upper endo with clearance of food    History of tibial fracture  R tibial fracture s/p revision with hardware placement ~3-4yrs ago, per pt        PREVIOUS DIAGNOSTIC TESTING:      MEDICATIONS  (STANDING):  dextrose 40% Gel 15 Gram(s) Oral once  dextrose 5%. 1000 milliLiter(s) (50 mL/Hr) IV Continuous <Continuous>  dextrose 5%. 1000 milliLiter(s) (100 mL/Hr) IV Continuous <Continuous>  dextrose 50% Injectable 25 Gram(s) IV Push once  dextrose 50% Injectable 12.5 Gram(s) IV Push once  dextrose 50% Injectable 25 Gram(s) IV Push once  finasteride 5 milliGRAM(s) Oral daily  glucagon  Injectable 1 milliGRAM(s) IntraMuscular once  insulin glargine Injectable (LANTUS) 13 Unit(s) SubCutaneous at bedtime  insulin lispro (ADMELOG) corrective regimen sliding scale   SubCutaneous three times a day before meals  insulin lispro (ADMELOG) corrective regimen sliding scale   SubCutaneous at bedtime  melatonin 5 milliGRAM(s) Oral at bedtime  metoprolol succinate ER 25 milliGRAM(s) Oral daily  pantoprazole    Tablet 40 milliGRAM(s) Oral daily  tamsulosin 0.4 milliGRAM(s) Oral at bedtime  valsartan 160 milliGRAM(s) Oral daily    MEDICATIONS  (PRN):  acetaminophen   Tablet .. 650 milliGRAM(s) Oral every 6 hours PRN Mild Pain (1 - 3)  traMADol 50 milliGRAM(s) Oral every 8 hours PRN Moderate Pain (4 - 6)      FAMILY HISTORY:  No pertinent family history in first degree relatives  pt cannot recall any history of disease        SOCIAL HISTORY:  ***    REVIEW OF SYSTEM:  Pertinent items are noted in HPI.    Vital Signs Last 24 Hrs  T(C): 36.2 (2021 20:42), Max: 36.8 (2021 19:48)  T(F): 97.2 (2021 20:42), Max: 98.2 (2021 19:48)  HR: 70 (2021 20:42) (55 - 70)  BP: 145/65 (2021 20:42) (111/55 - 145/65)  BP(mean): 68 (2021 19:48) (68 - 109)  RR: 18 (2021 20:42) (17 - 18)  SpO2: 98% (2021 20:42) (95% - 98%)    I&O's Summary    2021 07:01  -  2021 07:00  --------------------------------------------------------  IN: 0 mL / OUT: 150 mL / NET: -150 mL      PHYSICAL EXAM  General Appearance: cooperative, no acute distress,   HEENT: PERRL, conjunctiva clear, EOM's intact, non injected pharynx, no exudate, TM   normal  Neck: Supple, , no adenopathy, thyroid: not enlarged, no carotid bruit or JVD  Back: Symmetric, no  tenderness,no soft tissue tenderness  Lungs: decreased breath sounds left mid- lower lung filed/ dull ness left base  Heart: Regular rate and rhythm, S1, S2 normal, no murmur, rub or gallop  Abdomen: Soft, non-tender, bowel sounds active , no hepatosplenomegaly  Extremities: no cyanosis or edema, no joint swelling  Skin: Skin color, texture normal, no rashes   Neurologic: non focal    ECG:  < from: 12 Lead ECG (21 @ 10:40) >  Ventricular Rate 69 BPM    Atrial Rate 69 BPM    P-R Interval 212 ms    QRS Duration 88 ms    Q-T Interval 402 ms    QTC Calculation(Bazett) 430 ms    P Axis 22 degrees    R Axis -3 degrees    T Axis 57 degrees    Diagnosis Line Sinus rhythm with 1st degree A-V block  Otherwise normal ECG  No previous ECGs available    < end of copied text >    LABS:                          11.4   6.64  )-----------( 281      ( 2021 11:33 )             36.0     06-02    137  |  106  |  17  ----------------------------<  244<H>  5.2   |  25  |  1.34<H>    Ca    8.6      2021 11:33  Mg     2.4     06-02    TPro  7.1  /  Alb  3.1<L>  /  TBili  0.5  /  DBili  x   /  AST  26  /  ALT  18  /  AlkPhos  95  06-02    CARDIAC MARKERS ( 2021 11:33 )  <0.015 ng/mL / x     / x     / x     / x            Pro BNP  -- 06-02 @ 13:03  D Dimer  249 06- @ 13:03  Pro BNP  239 06-02 @ 11:33  D Dimer  -- 06- @ 11:33      Urinalysis Basic - ( 2021 06:30 )    Color: Yellow / Appearance: Clear / S.020 / pH: x  Gluc: x / Ketone: Negative  / Bili: Negative / Urobili: Negative mg/dL   Blood: x / Protein: Negative mg/dL / Nitrite: Negative   Leuk Esterase: Negative / RBC: 11-25 /HPF / WBC 3-5   Sq Epi: x / Non Sq Epi: Occasional / Bacteria: Occasional        x< from: Xray Chest 2 Views PA/Lat (21 @ 11:19) >    PROCEDURE DATE:  2021          INTERPRETATION:  PA and lateral chest radiographs    COMPARISON: 2021 chest.    CLINICAL INFORMATION: Chest Pain.    FINDINGS:  There is a moderate LEFT pleural effusion and/or basilar airspace consolidation obscuring LEFT diaphragm contour. LEFT upper lobe and  RIGHT lung parenchyma clear.  The airway is midline.    The heart and mediastinum size and configuration are within the limits of normal.    The visualized osseus structures are intact.    IMPRESSION:    Moderate LEFT effusion and/or basilar airspace consolidation.    < end of copied text >      RADIOLOGY & ADDITIONAL STUDIES:    
89-year-old male  Known to me from previous admission May 2021  Please refer to consult note May 14, 2021  89-year-old male living in assisted living for the previous 6-month  Had none with this fall at the at assisted living, brought in by ambulance  PatientOn routine CT scan revealed new mesenteric mass not previously noted  Patient denied fevers drenching night sweats anorexia weight loss abdominal pain,  Daughter states patient did have complaints of back pain    No other visible masses adenopathy seen on CT scan chest abdomen and pelvis    May 20, 2021 surgical biopsy   Accession #[id number]  :Lymph node mesenteric and biopsy for flow follicular lymphoma, grade 1 of 3    Patient also with history of recurrent left pleural effusion  Now readmitted For dyspnea and Noted to have persistent effusion    As per pulmonary note recurrent effusion is an exudate and has undergone at least 2 prior thoracenteses    Patient is currently in PACU after placement of Pleurx catheter    Past medical history includes hypertension, chronic kidney disease, type 2 diabetes with chronic kidney disease, proteinuria, atrial fibrillation, coronary artery disease, hyperlipidemia, atrial fibrillation on Eliquis, forgetfulness, Kidney stone    Social history as per previous note       from: CT Chest No Cont (05.13.21 @ 15:47) >    EXAM:  CT ABDOMEN AND PELVIS                          EXAM:  CT CHEST                            PROCEDURE DATE:  05/13/2021          INTERPRETATION:  CLINICAL INFORMATION: Abdominal trauma. On Eliquis can't walk.    COMPARISON: CT chest 4/25/2021 and CT chest/abdomen/pelvis 4/23/2021    CONTRAST/COMPLICATIONS:  IV Contrast: NONE  Oral Contrast: NONE  Complications: None reported at time of study completion    PROCEDURE:  CT of the Chest, Abdomen and Pelvis was performed.  Sagittal and coronal reformats were performed.    FINDINGS:  CHEST:  LUNGS AND LARGE AIRWAYS: Patent central airways. Opacities in left upper lobe along the major fissure, likely atelectasis. Also see below.  PLEURA: Moderate left pleural effusion with compressive atelectasis of the left lower lobe.  VESSELS: Thoracic aorta normal in caliber with atherosclerotic changes in the thoracic aorta and coronary arteries.  HEART: Heart size is normal. No pericardial effusion.  MEDIASTINUM AND MARIA ELENA: No lymphadenopathy. Small to moderate hiatal hernia.  CHEST WALL AND LOWER NECK: No enlarged axillary lymph nodes.    ABDOMEN AND PELVIS:  LIVER: Within normal limits.  BILE DUCTS: Normal caliber.  GALLBLADDER: Cholecystectomy.  SPLEEN: Within normal limits.  PANCREAS: Atrophicwith fatty infiltration.  ADRENALS: Within normal limits.  KIDNEYS/URETERS: Dilated left renal pelvis with calculi measuring 1.7 x 1.2 cm  and 1.0 x 1.0 cm. Nonobstructing calculus in the mid left kidney measuring 0.5 cm. No right-sided renal calculi. Right extra renal pelvis.    BLADDER: Distended urinary bladder.  REPRODUCTIVE ORGANS: Prostate not enlarged.    BOWEL: Colonic diverticulosis without evidence of diverticulitis. No bowel obstruction. Appendix is normal.  PERITONEUM: 3.4 x 2.5 cm mesenteric mass in the right hemiabdomen with small adjacent lymph nodes and infiltration of the adjacent fat (series 2 image 117).  VESSELS: Abdominal aorta normal in caliber with mild calcified plaque.  RETROPERITONEUM/LYMPH NODES: No lymphadenopathy.  ABDOMINAL WALL: Unremarkable.  BONES: Compression deformity of the superior endplate of L2, unchanged since 4/23/2021. Mild compression deformity of the superior endplate of L1, unchanged. Degenerative changes in the spine. Left hip arthroplasty. Noacute fracture. Old left 10th rib fracture.    IMPRESSION:  No acute intrathoracic or intra-abdominal sequelae of trauma.    Moderate left pleural effusion with compressive atelectasis in the left lower lobe, slightly decreased since 4/25/2021.     3.4 x 2.5 cm mesenteric mass in the right hemiabdomen with small adjacent lymph nodes and mild infiltration of the adjacent fat suspicious for neoplasm; differential includes carcinoid or lymphoma.    The findings were discussed with Dr. Edwards on 5/13/2021 4:21 PM

## 2021-06-04 NOTE — CONSULT NOTE ADULT - ASSESSMENT
89 y.o. 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  Pt is forgetful, denies any complaints, + chronic LE edema.  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  
89y old Male with hx of T2DM, HTN, hiatal hernia, chronic left pleural effusion, CKD, 5th admission in a year, with recent admit 5/14-5/21 for mesenteric mass later dx as Follicular Lymphoma. Pt now readmitted from surgical follow up appointment for OCHOA. Found to have fluctuating O2 sat with activity and CXR on admission showing reaccumulated L pleural effusion. Palliative Care consulted to assist with establishing GOC.    1) Dyspnea: recurrent chronic L Pleural effusion  - recurrent  - last tapped few weeks ago and reaccumulated  - as per pt and record, cytology negative  - pulm notes appreciated- noting dyspnea due to effusion and atelectasis, but adequate oxygenation on room air, and agreement with plan for pleurX  - CTS consulted and plan for VATS, thoracentesis, and pleurX placement today  - breathing comfortably on RA    3) Follicular Lymphoma  - Mesenteric mass dx as Follicular Lymphoma since last admission  - onc consulted, awaiting impression  - pt thinks he may be interested in treatement, and wants to hear his options    4) Debility  - as per care coordination notes pt lives at [facility], independent, but also as per  this am staff at facility also shared that pt having some trouble with administering his own meds with mild decline  - suggest nutrition consult given report of weight loss  - PT consult    5) Prognosis  - guarded  - new dx of lymphoma recurrent chronic effusion, w/ decent functional status (PPS around 40%), some evidence of malnutrition on exam, at inc risk for further complications. Await oncology consult cc: further input on true prognosis    6) GOC/Advanced Directives  - pt has capacity for decision making  - HCP on file naming son Kenyon Brooks ([phone]) but pt defers to his daughter Ally Robles [phone]  - full code, needs to be discussed  - GOC meeting scheduled for Monday 6/7 at 11am as daughter is out of town and agrees that they want to have sub-specialist  input shared before discussion    Thank you for including us in Mr. Brooks's care. Will continue to follow with you.    Tamanna Hay MD  Palliative Care Attending
89 y/o M with new diagnosis lymphoma  with recurrent left pleural effusions now with a moderate left pleural effusion.       PLAN  hold eliquis  will pre op patient for pleurx Friday being that this is his third recurrance    Dr Wood will speak to Family  Cont current management as per primary team  Will follow  CXR in am  DW Thoracic team in am rounds     
89-year-old male with recurrent left sided pleural effusion, as per pulmonary's exudate  Biopsy-proven follicular lymphoma involving mesentery  Highly possible that the recurrent pleural effusion which is leading to fatigue, dyspnea on exertion is related to lymphoma  Would advocate using something such as Rituxan to see if this could put the disease under control    I have reviewed this in detail via telephone with the patient's daughter Ally [phone]  In the hospitalist Dr. Kaylin Hawkins    Plan:  Review above assessment and potential plan with patient  Review with pulmonary medicine and cardiology but they have no other explanation for recurrent pleural effusion  If everyone Is in agreement Transfer to 1 [address]. and initiate palliative care with single agent Rituxan    Check baseline LDH  Iron B12 folic acid haptoglobin in light of anemia

## 2021-06-04 NOTE — PROGRESS NOTE ADULT - SUBJECTIVE AND OBJECTIVE BOX
Patient is a 89y old  Male who presents with a chief complaint of OCHOA (2021 08:14)  89 y.o. 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  Pt is forgetful, denies any complaints, + chronic LE edema.  Other ROS reviewed and neg     SUBJECTIVE / OVERNIGHT EVENTS:  6/3- s/e at bedside, no new c/o, breathing ok  CTS note reviewed, pleurx planned for , eliquis on hold  - s/e, pt in bed, feels comfortable, breathing well, pain controlled. s/p pleurx today  d/w DR Moran regarding possible treatment,       MEDICATIONS  (STANDING):  dextrose 40% Gel 15 Gram(s) Oral once  dextrose 5%. 1000 milliLiter(s) (50 mL/Hr) IV Continuous <Continuous>  dextrose 5%. 1000 milliLiter(s) (100 mL/Hr) IV Continuous <Continuous>  dextrose 50% Injectable 25 Gram(s) IV Push once  dextrose 50% Injectable 12.5 Gram(s) IV Push once  dextrose 50% Injectable 25 Gram(s) IV Push once  finasteride 5 milliGRAM(s) Oral daily  glucagon  Injectable 1 milliGRAM(s) IntraMuscular once  insulin glargine Injectable (LANTUS) 13 Unit(s) SubCutaneous at bedtime  insulin lispro (ADMELOG) corrective regimen sliding scale   SubCutaneous three times a day before meals  insulin lispro (ADMELOG) corrective regimen sliding scale   SubCutaneous at bedtime  melatonin 5 milliGRAM(s) Oral at bedtime  metoprolol succinate ER 25 milliGRAM(s) Oral daily  pantoprazole    Tablet 40 milliGRAM(s) Oral daily  tamsulosin 0.4 milliGRAM(s) Oral at bedtime  valsartan 160 milliGRAM(s) Oral daily    MEDICATIONS  (PRN):  acetaminophen   Tablet .. 650 milliGRAM(s) Oral every 6 hours PRN Mild Pain (1 - 3)  traMADol 50 milliGRAM(s) Oral every 8 hours PRN Moderate Pain (4 - 6)        CAPILLARY BLOOD GLUCOSE  POCT Blood Glucose.: 174 mg/dL (2021 16:51)  POCT Blood Glucose.: 95 mg/dL (2021 10:46)  POCT Blood Glucose.: 203 mg/dL (2021 06:07)  POCT Blood Glucose.: 188 mg/dL (2021 21:17)        PHYSICAL EXAM:  Vital Signs Last 24 Hrs  T(C): 36.4 (2021 15:39), Max: 36.6 (2021 23:13)  T(F): 97.5 (2021 15:39), Max: 97.9 (2021 23:13)  HR: 62 (2021 15:39) (57 - 69)  BP: 104/42 (2021 15:39) (104/42 - 148/65)  BP(mean): --  RR: 18 (2021 15:39) (12 - 18)  SpO2: 99% (2021 15:39) (95% - 100%)  GENERAL: NAD,   HEAD:  Atraumatic, Normocephalic  EYES: EOMI, PERRLA, conjunctiva and sclera clear  NECK: Supple, No JVD, no LAD  CHEST/LUNG: cta b/l, resp unlabored  HEART: Regular rate and rhythm; No murmurs, rubs, or gallops  ABDOMEN: Soft, Nontender, Nondistended; Bowel sounds present, no HSM  EXTREMITIES:  2+ Peripheral Pulses, No clubbing, cyanosis, Chronic LE nonpitting  edema  PSYCH: AAOx3, normal behavior  NEUROLOGY: non-focal, sensory and cn 2-12 intact  SKIN: No visible rashes or lesions    LABS:                 Labs personally reviewed.                          10.3   5.55  )-----------( 259      ( 2021 07:57 )             32.6       06-04    142  |  110<H>  |  31<H>  ----------------------------<  97  4.1   |  26  |  1.41<H>    Ca    8.6      2021 07:57                Urinalysis Basic - ( 2021 06:30 )    Color: Yellow / Appearance: Clear / S.020 / pH: x  Gluc: x / Ketone: Negative  / Bili: Negative / Urobili: Negative mg/dL   Blood: x / Protein: Negative mg/dL / Nitrite: Negative   Leuk Esterase: Negative / RBC: 11-25 /HPF / WBC 3-5   Sq Epi: x / Non Sq Epi: Occasional / Bacteria: Occasional        PT/INR - ( 2021 07:57 )   PT: 10.8 sec;   INR: 0.92 ratio         PTT - ( 2021 07:57 )  PTT:28.5 sec      Culture Results:   No growth at 5 days ( @ 09:30)  Culture Results:   No growth ( @ 09:30)  Culture Results:   <10,000 CFU/mL Normal Urogenital Gerda ( @ 16:12)        RADIOLOGY & ADDITIONAL TESTS:    Imaging Personally Reviewed:    Consultant(s) Notes Reviewed:    CTS, palliative, pulm   Care Discussed with Consultants/Other Providers:  Dr Moran- onc

## 2021-06-04 NOTE — PROGRESS NOTE ADULT - SUBJECTIVE AND OBJECTIVE BOX
Patient is a 89y old  Male who presents with a chief complaint of OCHOA (2021 17:59)      HPI:  89 y.o. 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  Pt is forgetful, denies any complaints, + chronic LE edema.  Other ROS reviewed and neg  (2021 16:01)  pt is known to me from last admission  My office has been reaching out to him for the past week to fu on recent mesenteric mass bx done as wella sPF cytology / recurrent fulid accumulation  exudative effusion with 2 prior thoracentesis large volume done  he does not feel sob at rest  OCHOA    6/4  no distress  appears pleasantly confused    PAST MEDICAL & SURGICAL HISTORY:  DM (diabetes mellitus)    HTN (hypertension)    HLD (hyperlipidemia)    Kidney stone    Hernia    Afib    BPH (benign prostatic hyperplasia)    Follicular lymphoma    H/O left inguinal hernia repair    Endoscopy finding  choked on piece of chiken-had upper endo with clearance of food    History of tibial fracture  R tibial fracture s/p revision with hardware placement ~3-4yrs ago, per pt        PREVIOUS DIAGNOSTIC TESTING:      MEDICATIONS  (STANDING):  dextrose 40% Gel 15 Gram(s) Oral once  dextrose 5%. 1000 milliLiter(s) (50 mL/Hr) IV Continuous <Continuous>  dextrose 5%. 1000 milliLiter(s) (100 mL/Hr) IV Continuous <Continuous>  dextrose 50% Injectable 25 Gram(s) IV Push once  dextrose 50% Injectable 12.5 Gram(s) IV Push once  dextrose 50% Injectable 25 Gram(s) IV Push once  finasteride 5 milliGRAM(s) Oral daily  glucagon  Injectable 1 milliGRAM(s) IntraMuscular once  insulin glargine Injectable (LANTUS) 13 Unit(s) SubCutaneous at bedtime  insulin lispro (ADMELOG) corrective regimen sliding scale   SubCutaneous three times a day before meals  insulin lispro (ADMELOG) corrective regimen sliding scale   SubCutaneous at bedtime  melatonin 5 milliGRAM(s) Oral at bedtime  metoprolol succinate ER 25 milliGRAM(s) Oral daily  pantoprazole    Tablet 40 milliGRAM(s) Oral daily  tamsulosin 0.4 milliGRAM(s) Oral at bedtime  valsartan 160 milliGRAM(s) Oral daily    MEDICATIONS  (PRN):  acetaminophen   Tablet .. 650 milliGRAM(s) Oral every 6 hours PRN Mild Pain (1 - 3)  traMADol 50 milliGRAM(s) Oral every 8 hours PRN Moderate Pain (4 - 6)      FAMILY HISTORY:  No pertinent family history in first degree relatives  pt cannot recall any history of disease        SOCIAL HISTORY:  ***    REVIEW OF SYSTEM:  Pertinent items are noted in HPI.    Vital Signs Last 24 Hrs  T(C): 36.3 (2021 07:22), Max: 36.6 (2021 23:13)  T(F): 97.4 (2021 07:22), Max: 97.9 (2021 23:13)  HR: 57 (2021 07:22) (52 - 61)  BP: 114/52 (2021 07:22) (114/52 - 127/54)  BP(mean): --  RR: 18 (2021 07:22) (18 - 18)  SpO2: 97% (2021 07:22) (95% - 98%)      PHYSICAL EXAM  General Appearance: cooperative, no acute distress,   HEENT: PERRL, conjunctiva clear, EOM's intact, non injected pharynx, no exudate, TM   normal  Neck: Supple, , no adenopathy, thyroid: not enlarged, no carotid bruit or JVD  Back: Symmetric, no  tenderness,no soft tissue tenderness  Lungs: decreased breath sounds left mid- lower lung filed/ dull ness left base  Heart: Regular rate and rhythm, S1, S2 normal, no murmur, rub or gallop  Abdomen: Soft, non-tender, bowel sounds active , no hepatosplenomegaly  Extremities: no cyanosis or edema, no joint swelling  Skin: Skin color, texture normal, no rashes   Neurologic: non focal    ECG:  < from: 12 Lead ECG (21 @ 10:40) >  Ventricular Rate 69 BPM    Atrial Rate 69 BPM    P-R Interval 212 ms    QRS Duration 88 ms    Q-T Interval 402 ms    QTC Calculation(Bazett) 430 ms    P Axis 22 degrees    R Axis -3 degrees    T Axis 57 degrees    Diagnosis Line Sinus rhythm with 1st degree A-V block  Otherwise normal ECG  No previous ECGs available    < end of copied text >    LABS:                          11.4   6.64  )-----------( 281      ( 2021 11:33 )             36.0     06-02    137  |  106  |  17  ----------------------------<  244<H>  5.2   |  25  |  1.34<H>    Ca    8.6      2021 11:33  Mg     2.4     06-02    TPro  7.1  /  Alb  3.1<L>  /  TBili  0.5  /  DBili  x   /  AST  26  /  ALT  18  /  AlkPhos  95  06-02    CARDIAC MARKERS ( 2021 11:33 )  <0.015 ng/mL / x     / x     / x     / x            Pro BNP  -- 06 @ 13:03  D Dimer  249 - @ 13:03  Pro BNP  239 06- @ 11:33  D Dimer  --  @ 11:33      Urinalysis Basic - ( 2021 06:30 )    Color: Yellow / Appearance: Clear / S.020 / pH: x  Gluc: x / Ketone: Negative  / Bili: Negative / Urobili: Negative mg/dL   Blood: x / Protein: Negative mg/dL / Nitrite: Negative   Leuk Esterase: Negative / RBC: 11-25 /HPF / WBC 3-5   Sq Epi: x / Non Sq Epi: Occasional / Bacteria: Occasional        x< from: Xray Chest 2 Views PA/Lat (21 @ 11:19) >    PROCEDURE DATE:  2021          INTERPRETATION:  PA and lateral chest radiographs    COMPARISON: 2021 chest.    CLINICAL INFORMATION: Chest Pain.    FINDINGS:  There is a moderate LEFT pleural effusion and/or basilar airspace consolidation obscuring LEFT diaphragm contour. LEFT upper lobe and  RIGHT lung parenchyma clear.  The airway is midline.    The heart and mediastinum size and configuration are within the limits of normal.    The visualized osseus structures are intact.    IMPRESSION:    Moderate LEFT effusion and/or basilar airspace consolidation.    < end of copied text >      RADIOLOGY & ADDITIONAL STUDIES:

## 2021-06-04 NOTE — PROGRESS NOTE ADULT - SUBJECTIVE AND OBJECTIVE BOX
Subjective:   afebrile, on RA. NPO    Vital Signs:  Vital Signs Last 24 Hrs  T(C): 36.3 (06-04-21 @ 07:22), Max: 36.6 (06-03-21 @ 23:13)  T(F): 97.4 (06-04-21 @ 07:22), Max: 97.9 (06-03-21 @ 23:13)  HR: 57 (06-04-21 @ 07:22) (52 - 61)  BP: 114/52 (06-04-21 @ 07:22) (114/52 - 127/54)  RR: 18 (06-04-21 @ 07:22) (18 - 18)  SpO2: 97% (06-04-21 @ 07:22) (95% - 98%) on (O2)    Telemetry/Alarms:    Relevant labs, radiology and Medications reviewed                        10.3   5.55  )-----------( 259      ( 04 Jun 2021 07:57 )             32.6     06-04    142  |  110<H>  |  31<H>  ----------------------------<  97  4.1   |  26  |  1.41<H>    Ca    8.6      04 Jun 2021 07:57  Mg     2.4     06-02    TPro  7.1  /  Alb  3.1<L>  /  TBili  0.5  /  DBili  x   /  AST  26  /  ALT  18  /  AlkPhos  95  06-02    PT/INR - ( 04 Jun 2021 07:57 )   PT: 10.8 sec;   INR: 0.92 ratio         PTT - ( 04 Jun 2021 07:57 )  PTT:28.5 sec  MEDICATIONS  (STANDING):  dextrose 40% Gel 15 Gram(s) Oral once  dextrose 5%. 1000 milliLiter(s) (50 mL/Hr) IV Continuous <Continuous>  dextrose 5%. 1000 milliLiter(s) (100 mL/Hr) IV Continuous <Continuous>  dextrose 50% Injectable 25 Gram(s) IV Push once  dextrose 50% Injectable 12.5 Gram(s) IV Push once  dextrose 50% Injectable 25 Gram(s) IV Push once  finasteride 5 milliGRAM(s) Oral daily  glucagon  Injectable 1 milliGRAM(s) IntraMuscular once  insulin glargine Injectable (LANTUS) 13 Unit(s) SubCutaneous at bedtime  insulin lispro (ADMELOG) corrective regimen sliding scale   SubCutaneous three times a day before meals  insulin lispro (ADMELOG) corrective regimen sliding scale   SubCutaneous at bedtime  melatonin 5 milliGRAM(s) Oral at bedtime  metoprolol succinate ER 25 milliGRAM(s) Oral daily  pantoprazole    Tablet 40 milliGRAM(s) Oral daily  tamsulosin 0.4 milliGRAM(s) Oral at bedtime  valsartan 160 milliGRAM(s) Oral daily    MEDICATIONS  (PRN):  acetaminophen   Tablet .. 650 milliGRAM(s) Oral every 6 hours PRN Mild Pain (1 - 3)  traMADol 50 milliGRAM(s) Oral every 8 hours PRN Moderate Pain (4 - 6)      Physical exam  Gen NAD  Neuro AAOx3  Card RRR  Pulm  decreased left base  Abd soft  Ext warm, edema    I&O's Summary    03 Jun 2021 07:01  -  04 Jun 2021 07:00  --------------------------------------------------------  IN: 0 mL / OUT: 150 mL / NET: -150 mL        Assessment  89y Male  w/ PAST MEDICAL & SURGICAL HISTORY:  DM (diabetes mellitus)    HTN (hypertension)    HLD (hyperlipidemia)    Kidney stone    Hernia    Afib    BPH (benign prostatic hyperplasia)    Follicular lymphoma    H/O left inguinal hernia repair    Endoscopy finding  choked on piece of chiken-had upper endo with clearance of food    History of tibial fracture  R tibial fracture s/p revision with hardware placement ~3-4yrs ago, per pt    admitted with complaints of Patient is a 89y old  Male who presents with a chief complaint of OCHOA (04 Jun 2021 09:29)  .  88 yo male with h/o DM (on insulin), HTN, hiatal hernia, chronic L pleural effusion (of unknown cause), h/o kidney stones, R Tibial/fibular fracture repair with hardwood insertion 3-4yrs ago, CKD2-3, BPH, h/o DVT on eliquis,  s/p pigtail 4/26, exudative now admitted 6/2 w SOB, OCHOA, chronic left effusion recurrence.    NPO   Left VATS pleurx placement today  home care set up for drainage q M, W, F up to 1L    Discussed with Cardiothoracic Team at AM rounds.

## 2021-06-05 LAB
ANION GAP SERPL CALC-SCNC: 7 MMOL/L — SIGNIFICANT CHANGE UP (ref 5–17)
BUN SERPL-MCNC: 36 MG/DL — HIGH (ref 7–23)
CALCIUM SERPL-MCNC: 8.4 MG/DL — LOW (ref 8.5–10.1)
CHLORIDE SERPL-SCNC: 107 MMOL/L — SIGNIFICANT CHANGE UP (ref 96–108)
CO2 SERPL-SCNC: 26 MMOL/L — SIGNIFICANT CHANGE UP (ref 22–31)
CREAT SERPL-MCNC: 1.52 MG/DL — HIGH (ref 0.5–1.3)
ERYTHROCYTE [SEDIMENTATION RATE] IN BLOOD: 37 MM/HR — HIGH (ref 0–20)
GLUCOSE SERPL-MCNC: 138 MG/DL — HIGH (ref 70–99)
HCT VFR BLD CALC: 30.3 % — LOW (ref 39–50)
HGB BLD-MCNC: 9.4 G/DL — LOW (ref 13–17)
MCHC RBC-ENTMCNC: 29.7 PG — SIGNIFICANT CHANGE UP (ref 27–34)
MCHC RBC-ENTMCNC: 31 GM/DL — LOW (ref 32–36)
MCV RBC AUTO: 95.9 FL — SIGNIFICANT CHANGE UP (ref 80–100)
PLATELET # BLD AUTO: 221 K/UL — SIGNIFICANT CHANGE UP (ref 150–400)
POTASSIUM SERPL-MCNC: 4.3 MMOL/L — SIGNIFICANT CHANGE UP (ref 3.5–5.3)
POTASSIUM SERPL-SCNC: 4.3 MMOL/L — SIGNIFICANT CHANGE UP (ref 3.5–5.3)
RBC # BLD: 3.16 M/UL — LOW (ref 4.2–5.8)
RBC # FLD: 14.6 % — HIGH (ref 10.3–14.5)
SODIUM SERPL-SCNC: 140 MMOL/L — SIGNIFICANT CHANGE UP (ref 135–145)
WBC # BLD: 5.35 K/UL — SIGNIFICANT CHANGE UP (ref 3.8–10.5)
WBC # FLD AUTO: 5.35 K/UL — SIGNIFICANT CHANGE UP (ref 3.8–10.5)

## 2021-06-05 PROCEDURE — 71045 X-RAY EXAM CHEST 1 VIEW: CPT | Mod: 26

## 2021-06-05 PROCEDURE — 99232 SBSQ HOSP IP/OBS MODERATE 35: CPT

## 2021-06-05 RX ORDER — OXYCODONE AND ACETAMINOPHEN 5; 325 MG/1; MG/1
2 TABLET ORAL EVERY 4 HOURS
Refills: 0 | Status: DISCONTINUED | OUTPATIENT
Start: 2021-06-05 | End: 2021-06-10

## 2021-06-05 RX ORDER — MORPHINE SULFATE 50 MG/1
2 CAPSULE, EXTENDED RELEASE ORAL EVERY 6 HOURS
Refills: 0 | Status: DISCONTINUED | OUTPATIENT
Start: 2021-06-05 | End: 2021-06-10

## 2021-06-05 RX ADMIN — LIDOCAINE 1 PATCH: 4 CREAM TOPICAL at 17:35

## 2021-06-05 RX ADMIN — OXYCODONE AND ACETAMINOPHEN 2 TABLET(S): 5; 325 TABLET ORAL at 12:01

## 2021-06-05 RX ADMIN — LIDOCAINE 1 PATCH: 4 CREAM TOPICAL at 21:44

## 2021-06-05 RX ADMIN — FINASTERIDE 5 MILLIGRAM(S): 5 TABLET, FILM COATED ORAL at 09:41

## 2021-06-05 RX ADMIN — OXYCODONE AND ACETAMINOPHEN 2 TABLET(S): 5; 325 TABLET ORAL at 22:35

## 2021-06-05 RX ADMIN — LIDOCAINE 1 PATCH: 4 CREAM TOPICAL at 09:41

## 2021-06-05 RX ADMIN — OXYCODONE AND ACETAMINOPHEN 2 TABLET(S): 5; 325 TABLET ORAL at 21:35

## 2021-06-05 RX ADMIN — APIXABAN 5 MILLIGRAM(S): 2.5 TABLET, FILM COATED ORAL at 21:32

## 2021-06-05 RX ADMIN — Medication 6: at 17:35

## 2021-06-05 RX ADMIN — Medication 2: at 12:04

## 2021-06-05 RX ADMIN — INSULIN GLARGINE 13 UNIT(S): 100 INJECTION, SOLUTION SUBCUTANEOUS at 21:31

## 2021-06-05 RX ADMIN — Medication 5 MILLIGRAM(S): at 21:32

## 2021-06-05 RX ADMIN — APIXABAN 5 MILLIGRAM(S): 2.5 TABLET, FILM COATED ORAL at 09:41

## 2021-06-05 RX ADMIN — OXYCODONE AND ACETAMINOPHEN 2 TABLET(S): 5; 325 TABLET ORAL at 13:00

## 2021-06-05 RX ADMIN — TAMSULOSIN HYDROCHLORIDE 0.4 MILLIGRAM(S): 0.4 CAPSULE ORAL at 21:32

## 2021-06-05 RX ADMIN — SENNA PLUS 2 TABLET(S): 8.6 TABLET ORAL at 21:35

## 2021-06-05 RX ADMIN — PANTOPRAZOLE SODIUM 40 MILLIGRAM(S): 20 TABLET, DELAYED RELEASE ORAL at 09:41

## 2021-06-05 NOTE — PROGRESS NOTE ADULT - SUBJECTIVE AND OBJECTIVE BOX
Subjective: Patient c/o incisional pain. Denies SOB, cough, fever, chills.     Vital Signs:  Vital Signs Last 24 Hrs  T(C): 36.7 (06-05-21 @ 15:24), Max: 36.7 (06-04-21 @ 21:20)  T(F): 98.1 (06-05-21 @ 15:24), Max: 98.1 (06-04-21 @ 21:20)  HR: 68 (06-05-21 @ 15:24) (55 - 68)  BP: 97/47 (06-05-21 @ 15:24) (96/40 - 108/50)  RR: 18 (06-05-21 @ 15:24) (16 - 18)  SpO2: 100% (06-05-21 @ 15:24) (96% - 100%) on (O2)    I&O's Detail    04 Jun 2021 07:01  -  05 Jun 2021 07:00  --------------------------------------------------------  IN:    Other (mL): 650 mL  Total IN: 650 mL    OUT:    Chest Tube (mL): 1360 mL    Voided (mL): 100 mL  Total OUT: 1460 mL    Total NET: -810 mL      05 Jun 2021 07:01  -  05 Jun 2021 16:12  --------------------------------------------------------  IN:    Oral Fluid: 360 mL  Total IN: 360 mL    OUT:    Voided (mL): 250 mL  Total OUT: 250 mL    Total NET: 110 mL          General: NAD  Neurology: Awake, nonfocal  Eyes: Scleras clear, EOMI, Gross vision intact  ENT: Gross hearing intact, grossly patent pharynx, no stridor  Neck: Neck supple, trachea midline, No JVD  Respiratory: CTA B/L  CV: S1S2, no murmurs, rubs or gallops  Abdominal: Soft, NT, ND  Incisions: c/d/i  Tubes: L PleurX capped, dressing c/d/i    Relevant labs, radiology and Medications reviewed                        9.4    5.35  )-----------( 221      ( 05 Jun 2021 07:28 )             30.3     06-05    140  |  107  |  36<H>  ----------------------------<  138<H>  4.3   |  26  |  1.52<H>    Ca    8.4<L>      05 Jun 2021 07:28      PT/INR - ( 04 Jun 2021 07:57 )   PT: 10.8 sec;   INR: 0.92 ratio         PTT - ( 04 Jun 2021 07:57 )  PTT:28.5 sec  MEDICATIONS  (STANDING):  apixaban 5 milliGRAM(s) Oral every 12 hours  dextrose 40% Gel 15 Gram(s) Oral once  dextrose 5%. 1000 milliLiter(s) (50 mL/Hr) IV Continuous <Continuous>  dextrose 5%. 1000 milliLiter(s) (100 mL/Hr) IV Continuous <Continuous>  dextrose 50% Injectable 25 Gram(s) IV Push once  dextrose 50% Injectable 12.5 Gram(s) IV Push once  dextrose 50% Injectable 25 Gram(s) IV Push once  finasteride 5 milliGRAM(s) Oral daily  glucagon  Injectable 1 milliGRAM(s) IntraMuscular once  insulin glargine Injectable (LANTUS) 13 Unit(s) SubCutaneous at bedtime  insulin lispro (ADMELOG) corrective regimen sliding scale   SubCutaneous three times a day before meals  insulin lispro (ADMELOG) corrective regimen sliding scale   SubCutaneous at bedtime  lidocaine   Patch 1 Patch Transdermal daily  melatonin 5 milliGRAM(s) Oral at bedtime  metoprolol succinate ER 25 milliGRAM(s) Oral daily  pantoprazole    Tablet 40 milliGRAM(s) Oral daily  tamsulosin 0.4 milliGRAM(s) Oral at bedtime  valsartan 160 milliGRAM(s) Oral daily    MEDICATIONS  (PRN):  acetaminophen   Tablet .. 650 milliGRAM(s) Oral every 6 hours PRN Mild Pain (1 - 3)  morphine  - Injectable 2 milliGRAM(s) IV Push every 6 hours PRN Severe Pain (7 - 10)  oxycodone    5 mG/acetaminophen 325 mG 1 Tablet(s) Oral every 4 hours PRN Mild Pain (1 - 3)  oxycodone    5 mG/acetaminophen 325 mG 2 Tablet(s) Oral every 4 hours PRN Moderate Pain (4 - 6)  senna 2 Tablet(s) Oral at bedtime PRN Constipation  traMADol 50 milliGRAM(s) Oral every 8 hours PRN Moderate Pain (4 - 6)        Assessment  89y Male  w/ PAST MEDICAL & SURGICAL HISTORY:  DM (diabetes mellitus)    HTN (hypertension)    HLD (hyperlipidemia)    Kidney stone    Hernia    Afib    BPH (benign prostatic hyperplasia)    Follicular lymphoma    H/O left inguinal hernia repair    Endoscopy finding  choked on piece of chiken-had upper endo with clearance of food    History of tibial fracture  R tibial fracture s/p revision with hardware placement ~3-4yrs ago, per pt    admitted with complaints of Patient is a 89y old  Male who presents with a chief complaint of OCHOA (05 Jun 2021 13:37)

## 2021-06-05 NOTE — PROGRESS NOTE ADULT - SUBJECTIVE AND OBJECTIVE BOX
Patient is a 89y old  Male who presents with a chief complaint of OCHOA (2021 17:59)      HPI:  89 y.o. 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  Pt is forgetful, denies any complaints, + chronic LE edema.  Other ROS reviewed and neg  (2021 16:01)  pt is known to me from last admission  My office has been reaching out to him for the past week to fu on recent mesenteric mass bx done as wella sPF cytology / recurrent fulid accumulation  exudative effusion with 2 prior thoracentesis large volume done  he does not feel sob at rest  OCHOA      No acute pulmonary events occurred overnight  Underwent Pleurx         PREVIOUS DIAGNOSTIC TESTING:      MEDICATIONS  (STANDING):  dextrose 40% Gel 15 Gram(s) Oral once  dextrose 5%. 1000 milliLiter(s) (50 mL/Hr) IV Continuous <Continuous>  dextrose 5%. 1000 milliLiter(s) (100 mL/Hr) IV Continuous <Continuous>  dextrose 50% Injectable 25 Gram(s) IV Push once  dextrose 50% Injectable 12.5 Gram(s) IV Push once  dextrose 50% Injectable 25 Gram(s) IV Push once  finasteride 5 milliGRAM(s) Oral daily  glucagon  Injectable 1 milliGRAM(s) IntraMuscular once  insulin glargine Injectable (LANTUS) 13 Unit(s) SubCutaneous at bedtime  insulin lispro (ADMELOG) corrective regimen sliding scale   SubCutaneous three times a day before meals  insulin lispro (ADMELOG) corrective regimen sliding scale   SubCutaneous at bedtime  melatonin 5 milliGRAM(s) Oral at bedtime  metoprolol succinate ER 25 milliGRAM(s) Oral daily  pantoprazole    Tablet 40 milliGRAM(s) Oral daily  tamsulosin 0.4 milliGRAM(s) Oral at bedtime  valsartan 160 milliGRAM(s) Oral daily    MEDICATIONS  (PRN):  acetaminophen   Tablet .. 650 milliGRAM(s) Oral every 6 hours PRN Mild Pain (1 - 3)  traMADol 50 milliGRAM(s) Oral every 8 hours PRN Moderate Pain (4 - 6)      FAMILY HISTORY:  No pertinent family history in first degree relatives  pt cannot recall any history of disease      Vital Signs Last 24 Hrs  T(C): 36.3 (2021 08:19), Max: 36.7 (2021 21:20)  T(F): 97.3 (2021 08:19), Max: 98.1 (2021 21:20)  HR: 55 (2021 08:19) (55 - 69)  BP: 96/40 (2021 08:40) (96/40 - 148/65)  BP(mean): --  RR: 18 (2021 08:19) (12 - 18)  SpO2: 97% (2021 08:19) (96% - 100%)      PHYSICAL EXAM  General Appearance: cooperative, no acute distress,   HEENT: PERRL, conjunctiva clear, EOM's intact, non injected pharynx, no exudate, TM   normal  Neck: Supple, , no adenopathy, thyroid: not enlarged, no carotid bruit or JVD  Back: Symmetric, no  tenderness,no soft tissue tenderness  Lungs: decreased breath sounds left mid- lower lung filed/ dull ness left base, pleurx in left   Heart: Regular rate and rhythm, S1, S2 normal, no murmur, rub or gallop  Abdomen: Soft, non-tender, bowel sounds active , no hepatosplenomegaly  Extremities: no cyanosis or edema, no joint swelling  Skin: Skin color, texture normal, no rashes   Neurologic: non focal    ECG:  < from: 12 Lead ECG (21 @ 10:40) >  Ventricular Rate 69 BPM    Atrial Rate 69 BPM    P-R Interval 212 ms    QRS Duration 88 ms    Q-T Interval 402 ms    QTC Calculation(Bazett) 430 ms    P Axis 22 degrees    R Axis -3 degrees    T Axis 57 degrees    Diagnosis Line Sinus rhythm with 1st degree A-V block  Otherwise normal ECG  No previous ECGs available    < end of copied text >    LABS:                          11.4   6.64  )-----------( 281      ( 2021 11:33 )             36.0     06-02    137  |  106  |  17  ----------------------------<  244<H>  5.2   |  25  |  1.34<H>    Ca    8.6      2021 11:33  Mg     2.4     06-    TPro  7.1  /  Alb  3.1<L>  /  TBili  0.5  /  DBili  x   /  AST  26  /  ALT  18  /  AlkPhos  95  06-02    CARDIAC MARKERS ( 2021 11:33 )  <0.015 ng/mL / x     / x     / x     / x            Pro BNP  --  @ 13:03  D Dimer  249  @ 13:03  Pro BNP  239 06- @ 11:33  D Dimer  --  @ 11:33      Urinalysis Basic - ( 2021 06:30 )    Color: Yellow / Appearance: Clear / S.020 / pH: x  Gluc: x / Ketone: Negative  / Bili: Negative / Urobili: Negative mg/dL   Blood: x / Protein: Negative mg/dL / Nitrite: Negative   Leuk Esterase: Negative / RBC: 11-25 /HPF / WBC 3-5   Sq Epi: x / Non Sq Epi: Occasional / Bacteria: Occasional        x< from: Xray Chest 2 Views PA/Lat (21 @ 11:19) >    PROCEDURE DATE:  2021          INTERPRETATION:  PA and lateral chest radiographs    COMPARISON: 2021 chest.    CLINICAL INFORMATION: Chest Pain.    FINDINGS:  There is a moderate LEFT pleural effusion and/or basilar airspace consolidation obscuring LEFT diaphragm contour. LEFT upper lobe and  RIGHT lung parenchyma clear.  The airway is midline.    The heart and mediastinum size and configuration are within the limits of normal.    The visualized osseus structures are intact.    IMPRESSION:    Moderate LEFT effusion and/or basilar airspace consolidation.    < end of copied text >      RADIOLOGY & ADDITIONAL STUDIES:

## 2021-06-05 NOTE — PHYSICAL THERAPY INITIAL EVALUATION ADULT - GAIT DISTANCE, PT EVAL
Ambulation Distance limited as pt's request due to "apparent SOB" but SpO2 reading 100% on 2L and no signs of respiratory distress/bed to chair/5 feet

## 2021-06-05 NOTE — PROGRESS NOTE ADULT - SUBJECTIVE AND OBJECTIVE BOX
CC: OCHOA  · Subjective and Objective:   Patient is a 89y old  Male who presents with a chief complaint of OCHOA (03 Jun 2021 08:14)  89 y.o. 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  Pt is forgetful, denies any complaints, + chronic LE edema.  Other ROS reviewed and neg     SUBJECTIVE / OVERNIGHT EVENTS:  6/3- s/e at bedside, no new c/o, breathing ok  CTS note reviewed, pleurx planned for 6/4, eliquis on hold  6/4- s/e, pt in bed, feels comfortable, breathing well, pain controlled. s/p pleurx today  d/w DR Moran regarding possible treatment,     6/5: Lying in bed, alert, comfortable at this time.  Pt straight cathed overnight with 900cc drained.     REVIEW OF SYSTEMS: All other review of systems is negative unless indicated above.    Vital Signs Last 24 Hrs  T(C): 36.3 (05 Jun 2021 08:19), Max: 36.7 (04 Jun 2021 21:20)  T(F): 97.3 (05 Jun 2021 08:19), Max: 98.1 (04 Jun 2021 21:20)  HR: 55 (05 Jun 2021 08:19) (55 - 64)  BP: 99/79 (05 Jun 2021 11:54) (96/40 - 108/50)  BP(mean): --  RR: 18 (05 Jun 2021 08:19) (16 - 18)  SpO2: 100% (05 Jun 2021 11:54) (96% - 100%)    GENERAL: NAD,   HEAD:  Atraumatic, Normocephalic  EYES: EOMI, PERRLA, conjunctiva and sclera clear  NECK: Supple, No JVD, no LAD  CHEST/LUNG: cta b/l, resp unlabored  HEART: Regular rate and rhythm; No murmurs, rubs, or gallops  ABDOMEN: Soft, Nontender, Nondistended; Bowel sounds present, no HSM  EXTREMITIES:  2+ Peripheral Pulses, No clubbing, cyanosis, Chronic LE nonpitting  edema  PSYCH: AAOx3, normal behavior  NEUROLOGY: non-focal, sensory and cn 2-12 intact  SKIN: No visible rashes or lesions    MEDICATIONS  (STANDING):  apixaban 5 milliGRAM(s) Oral every 12 hours  dextrose 40% Gel 15 Gram(s) Oral once  dextrose 5%. 1000 milliLiter(s) (50 mL/Hr) IV Continuous <Continuous>  dextrose 5%. 1000 milliLiter(s) (100 mL/Hr) IV Continuous <Continuous>  dextrose 50% Injectable 25 Gram(s) IV Push once  dextrose 50% Injectable 12.5 Gram(s) IV Push once  dextrose 50% Injectable 25 Gram(s) IV Push once  finasteride 5 milliGRAM(s) Oral daily  glucagon  Injectable 1 milliGRAM(s) IntraMuscular once  insulin glargine Injectable (LANTUS) 13 Unit(s) SubCutaneous at bedtime  insulin lispro (ADMELOG) corrective regimen sliding scale   SubCutaneous three times a day before meals  insulin lispro (ADMELOG) corrective regimen sliding scale   SubCutaneous at bedtime  lidocaine   Patch 1 Patch Transdermal daily  melatonin 5 milliGRAM(s) Oral at bedtime  metoprolol succinate ER 25 milliGRAM(s) Oral daily  pantoprazole    Tablet 40 milliGRAM(s) Oral daily  tamsulosin 0.4 milliGRAM(s) Oral at bedtime  valsartan 160 milliGRAM(s) Oral daily    MEDICATIONS  (PRN):  acetaminophen   Tablet .. 650 milliGRAM(s) Oral every 6 hours PRN Mild Pain (1 - 3)  oxycodone    5 mG/acetaminophen 325 mG 1 Tablet(s) Oral every 4 hours PRN Mild Pain (1 - 3)  oxycodone    5 mG/acetaminophen 325 mG 2 Tablet(s) Oral every 6 hours PRN Moderate Pain (4 - 6)  senna 2 Tablet(s) Oral at bedtime PRN Constipation  traMADol 50 milliGRAM(s) Oral every 8 hours PRN Moderate Pain (4 - 6)                                9.4    5.35  )-----------( 221      ( 05 Jun 2021 07:28 )             30.3     06-05    140  |  107  |  36<H>  ----------------------------<  138<H>  4.3   |  26  |  1.52<H>    Ca    8.4<L>      05 Jun 2021 07:28      CAPILLARY BLOOD GLUCOSE      POCT Blood Glucose.: 192 mg/dL (05 Jun 2021 12:00)  POCT Blood Glucose.: 135 mg/dL (05 Jun 2021 08:17)  POCT Blood Glucose.: 265 mg/dL (04 Jun 2021 21:13)  POCT Blood Glucose.: 174 mg/dL (04 Jun 2021 16:51)      PT/INR - ( 04 Jun 2021 07:57 )   PT: 10.8 sec;   INR: 0.92 ratio         PTT - ( 04 Jun 2021 07:57 )  PTT:28.5 sec          Assessment and Plan:  89 y.o. 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    1. Reaccumulation of  left sided pleural effusion - mild to moderate with minimal drop in SpO2 on exertion only to % -  exudative on last admit  suspect malignant with newly diagnosed lymphoma, HF had been ruled out on last admit (seen by cardio, echo wnl)   - CTS eval, s/p thoracentesis, pleurx  today   - resume  eliquis  - pt had TTE on last admit, wnl  - will request cardio to comment if HF definitely ruled out  - appreciate pulm f/u    2. Follicular lymphoma -  per d/w Onc, plan to start pt on Rituxan on 6/7 (monday), transfer to   - pending cytology of pl fluid       3. DMII - HISS + lantus  - monitor FS, titrate insulin as needed    3a. CKD3- appears to be at baseline  - monitor labs     4. HTN/HLD/BPH:  -hold BP meds due to borderline BP, monitor closely     5. 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 GERD - PPI    7.  atrial flutter as per cardiology on last admission - in SR now    dvt ppx:  -eliquis

## 2021-06-05 NOTE — PROGRESS NOTE ADULT - SUBJECTIVE AND OBJECTIVE BOX
Patient sitting up in bed  Appears comfortable  Fatigue  Denies nausea vomiting pain drenching night sweats    PE  Elderly male alert oriented no acute distress  Neck supple  Lungs Left chest tube in place  Otherwise clear  Heart S1-S2  Abdomen soft nontender   extremities without pitting edema      CBC Full  -  ( 05 Jun 2021 07:28 )  WBC Count : 5.35 K/uL  RBC Count : 3.16 M/uL  Hemoglobin : 9.4 g/dL  Hematocrit : 30.3 %  Platelet Count - Automated : 221 K/uL  Mean Cell Volume : 95.9 fl  Mean Cell Hemoglobin : 29.7 pg  Mean Cell Hemoglobin Concentration : 31.0 gm/dL  Auto Neutrophil # : x  Auto Lymphocyte # : x  Auto Monocyte # : x  Auto Eosinophil # : x  Auto Basophil # : x  Auto Neutrophil % : x  Auto Lymphocyte % : x  Auto Monocyte % : x  Auto Eosinophil % : x  Auto Basophil % : x      06-05    140  |  107  |  36<H>  ----------------------------<  138<H>  4.3   |  26  |  1.52<H>    Ca    8.4<L>      05 Jun 2021 07:28            PT/INR - ( 04 Jun 2021 07:57 )   PT: 10.8 sec;   INR: 0.92 ratio         PTT - ( 04 Jun 2021 07:57 )  PTT:28.5 sec    Vital Signs Last 24 Hrs  T(C): 36.3 (05 Jun 2021 08:19), Max: 36.7 (04 Jun 2021 21:20)  T(F): 97.3 (05 Jun 2021 08:19), Max: 98.1 (04 Jun 2021 21:20)  HR: 55 (05 Jun 2021 08:19) (55 - 64)  BP: 99/79 (05 Jun 2021 11:54) (96/40 - 108/50)  BP(mean): --  RR: 18 (05 Jun 2021 08:19) (16 - 18)  SpO2: 100% (05 Jun 2021 11:54) (96% - 100%)

## 2021-06-05 NOTE — PHYSICAL THERAPY INITIAL EVALUATION ADULT - MODALITIES TREATMENT COMMENTS
Pt left OOB in chair in NAD with CBIR; Pt instructed to not get up alone; YEIMI Laura made aware; Chair alarm activated

## 2021-06-06 LAB
ALBUMIN SERPL ELPH-MCNC: 2.4 G/DL — LOW (ref 3.3–5)
ALP SERPL-CCNC: 80 U/L — SIGNIFICANT CHANGE UP (ref 40–120)
ALT FLD-CCNC: 13 U/L — SIGNIFICANT CHANGE UP (ref 12–78)
ANION GAP SERPL CALC-SCNC: 7 MMOL/L — SIGNIFICANT CHANGE UP (ref 5–17)
AST SERPL-CCNC: 11 U/L — LOW (ref 15–37)
BILIRUB SERPL-MCNC: 0.4 MG/DL — SIGNIFICANT CHANGE UP (ref 0.2–1.2)
BUN SERPL-MCNC: 43 MG/DL — HIGH (ref 7–23)
CALCIUM SERPL-MCNC: 8.9 MG/DL — SIGNIFICANT CHANGE UP (ref 8.5–10.1)
CHLORIDE SERPL-SCNC: 109 MMOL/L — HIGH (ref 96–108)
CO2 SERPL-SCNC: 23 MMOL/L — SIGNIFICANT CHANGE UP (ref 22–31)
CREAT SERPL-MCNC: 1.56 MG/DL — HIGH (ref 0.5–1.3)
FERRITIN SERPL-MCNC: 27 NG/ML — LOW (ref 30–400)
FOLATE SERPL-MCNC: 7.7 NG/ML — SIGNIFICANT CHANGE UP
GLUCOSE SERPL-MCNC: 165 MG/DL — HIGH (ref 70–99)
HAPTOGLOB SERPL-MCNC: 202 MG/DL — HIGH (ref 34–200)
HAV IGM SER-ACNC: SIGNIFICANT CHANGE UP
HBV CORE IGM SER-ACNC: SIGNIFICANT CHANGE UP
HBV SURFACE AG SER-ACNC: SIGNIFICANT CHANGE UP
HCT VFR BLD CALC: 31 % — LOW (ref 39–50)
HCV AB S/CO SERPL IA: 0.06 S/CO — SIGNIFICANT CHANGE UP (ref 0–0.99)
HCV AB SERPL-IMP: SIGNIFICANT CHANGE UP
HGB BLD-MCNC: 9.9 G/DL — LOW (ref 13–17)
IRON SATN MFR SERPL: 25 % — SIGNIFICANT CHANGE UP (ref 16–55)
IRON SATN MFR SERPL: 65 UG/DL — SIGNIFICANT CHANGE UP (ref 45–165)
LDH SERPL L TO P-CCNC: 104 U/L — SIGNIFICANT CHANGE UP (ref 84–241)
MCHC RBC-ENTMCNC: 29.9 PG — SIGNIFICANT CHANGE UP (ref 27–34)
MCHC RBC-ENTMCNC: 31.9 GM/DL — LOW (ref 32–36)
MCV RBC AUTO: 93.7 FL — SIGNIFICANT CHANGE UP (ref 80–100)
PLATELET # BLD AUTO: 226 K/UL — SIGNIFICANT CHANGE UP (ref 150–400)
POTASSIUM SERPL-MCNC: 4.2 MMOL/L — SIGNIFICANT CHANGE UP (ref 3.5–5.3)
POTASSIUM SERPL-SCNC: 4.2 MMOL/L — SIGNIFICANT CHANGE UP (ref 3.5–5.3)
PROT SERPL-MCNC: 5.9 GM/DL — LOW (ref 6–8.3)
RBC # BLD: 3.31 M/UL — LOW (ref 4.2–5.8)
RBC # FLD: 14.6 % — HIGH (ref 10.3–14.5)
SARS-COV-2 RNA SPEC QL NAA+PROBE: SIGNIFICANT CHANGE UP
SODIUM SERPL-SCNC: 139 MMOL/L — SIGNIFICANT CHANGE UP (ref 135–145)
TIBC SERPL-MCNC: 264 UG/DL — SIGNIFICANT CHANGE UP (ref 220–430)
UIBC SERPL-MCNC: 199 UG/DL — SIGNIFICANT CHANGE UP (ref 110–370)
WBC # BLD: 5.66 K/UL — SIGNIFICANT CHANGE UP (ref 3.8–10.5)
WBC # FLD AUTO: 5.66 K/UL — SIGNIFICANT CHANGE UP (ref 3.8–10.5)

## 2021-06-06 PROCEDURE — 99232 SBSQ HOSP IP/OBS MODERATE 35: CPT

## 2021-06-06 RX ADMIN — SENNA PLUS 2 TABLET(S): 8.6 TABLET ORAL at 21:07

## 2021-06-06 RX ADMIN — INSULIN GLARGINE 13 UNIT(S): 100 INJECTION, SOLUTION SUBCUTANEOUS at 21:06

## 2021-06-06 RX ADMIN — OXYCODONE AND ACETAMINOPHEN 2 TABLET(S): 5; 325 TABLET ORAL at 21:04

## 2021-06-06 RX ADMIN — PANTOPRAZOLE SODIUM 40 MILLIGRAM(S): 20 TABLET, DELAYED RELEASE ORAL at 09:58

## 2021-06-06 RX ADMIN — Medication 2: at 17:06

## 2021-06-06 RX ADMIN — LIDOCAINE 1 PATCH: 4 CREAM TOPICAL at 10:01

## 2021-06-06 RX ADMIN — TAMSULOSIN HYDROCHLORIDE 0.4 MILLIGRAM(S): 0.4 CAPSULE ORAL at 21:07

## 2021-06-06 RX ADMIN — Medication 4: at 12:15

## 2021-06-06 RX ADMIN — LIDOCAINE 1 PATCH: 4 CREAM TOPICAL at 21:12

## 2021-06-06 RX ADMIN — OXYCODONE AND ACETAMINOPHEN 2 TABLET(S): 5; 325 TABLET ORAL at 22:04

## 2021-06-06 RX ADMIN — LIDOCAINE 1 PATCH: 4 CREAM TOPICAL at 17:07

## 2021-06-06 RX ADMIN — OXYCODONE AND ACETAMINOPHEN 2 TABLET(S): 5; 325 TABLET ORAL at 09:59

## 2021-06-06 RX ADMIN — FINASTERIDE 5 MILLIGRAM(S): 5 TABLET, FILM COATED ORAL at 09:58

## 2021-06-06 RX ADMIN — Medication 2: at 08:06

## 2021-06-06 RX ADMIN — APIXABAN 5 MILLIGRAM(S): 2.5 TABLET, FILM COATED ORAL at 09:58

## 2021-06-06 RX ADMIN — OXYCODONE AND ACETAMINOPHEN 2 TABLET(S): 5; 325 TABLET ORAL at 10:59

## 2021-06-06 RX ADMIN — APIXABAN 5 MILLIGRAM(S): 2.5 TABLET, FILM COATED ORAL at 21:07

## 2021-06-06 RX ADMIN — Medication 5 MILLIGRAM(S): at 21:07

## 2021-06-06 NOTE — PROGRESS NOTE ADULT - SUBJECTIVE AND OBJECTIVE BOX
CC: OCHOA  · Subjective and Objective:   Patient is a 89y old  Male who presents with a chief complaint of OCHOA (03 Jun 2021 08:14)  89 y.o. 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  Pt is forgetful, denies any complaints, + chronic LE edema.  Other ROS reviewed and neg     SUBJECTIVE / OVERNIGHT EVENTS:  6/3- s/e at bedside, no new c/o, breathing ok  CTS note reviewed, pleurx planned for 6/4, eliquis on hold  6/4- s/e, pt in bed, feels comfortable, breathing well, pain controlled. s/p pleurx today  d/w DR Moran regarding possible treatment,     6/5: Lying in bed, alert, comfortable at this time.  Pt straight cathed overnight with 900cc drained.   6/6: sitting in chair, has episodes of shark pain at pleurx site however comfortable at this moment.  Denies fever, chills, n, v.    REVIEW OF SYSTEMS: All other review of systems is negative unless indicated above.    Vital Signs Last 24 Hrs  T(C): 36.8 (06 Jun 2021 07:52), Max: 36.8 (06 Jun 2021 07:52)  T(F): 98.2 (06 Jun 2021 07:52), Max: 98.2 (06 Jun 2021 07:52)  HR: 77 (06 Jun 2021 07:52) (68 - 77)  BP: 111/52 (06 Jun 2021 07:52) (97/47 - 117/64)  BP(mean): --  RR: 16 (05 Jun 2021 20:25) (16 - 18)  SpO2: 98% (06 Jun 2021 07:52) (97% - 100%)    GENERAL: NAD,   HEAD:  Atraumatic, Normocephalic  EYES: EOMI, PERRLA, conjunctiva and sclera clear  NECK: Supple, No JVD, no LAD  CHEST/LUNG: cta b/l, resp unlabored  HEART: Regular rate and rhythm; No murmurs, rubs, or gallops  ABDOMEN: Soft, Nontender, Nondistended; Bowel sounds present, no HSM  EXTREMITIES:  2+ Peripheral Pulses, No clubbing, cyanosis, Chronic LE nonpitting  edema  PSYCH: AAOx3, normal behavior  NEUROLOGY: non-focal, sensory and cn 2-12 intact  SKIN: No visible rashes or lesions    MEDICATIONS  (STANDING):  apixaban 5 milliGRAM(s) Oral every 12 hours  dextrose 40% Gel 15 Gram(s) Oral once  dextrose 5%. 1000 milliLiter(s) (50 mL/Hr) IV Continuous <Continuous>  dextrose 5%. 1000 milliLiter(s) (100 mL/Hr) IV Continuous <Continuous>  dextrose 50% Injectable 25 Gram(s) IV Push once  dextrose 50% Injectable 12.5 Gram(s) IV Push once  dextrose 50% Injectable 25 Gram(s) IV Push once  finasteride 5 milliGRAM(s) Oral daily  glucagon  Injectable 1 milliGRAM(s) IntraMuscular once  insulin glargine Injectable (LANTUS) 13 Unit(s) SubCutaneous at bedtime  insulin lispro (ADMELOG) corrective regimen sliding scale   SubCutaneous three times a day before meals  insulin lispro (ADMELOG) corrective regimen sliding scale   SubCutaneous at bedtime  lidocaine   Patch 1 Patch Transdermal daily  melatonin 5 milliGRAM(s) Oral at bedtime  pantoprazole    Tablet 40 milliGRAM(s) Oral daily  tamsulosin 0.4 milliGRAM(s) Oral at bedtime    MEDICATIONS  (PRN):  acetaminophen   Tablet .. 650 milliGRAM(s) Oral every 6 hours PRN Mild Pain (1 - 3)  morphine  - Injectable 2 milliGRAM(s) IV Push every 6 hours PRN Severe Pain (7 - 10)  oxycodone    5 mG/acetaminophen 325 mG 1 Tablet(s) Oral every 4 hours PRN Mild Pain (1 - 3)  oxycodone    5 mG/acetaminophen 325 mG 2 Tablet(s) Oral every 4 hours PRN Moderate Pain (4 - 6)  senna 2 Tablet(s) Oral at bedtime PRN Constipation  traMADol 50 milliGRAM(s) Oral every 8 hours PRN Moderate Pain (4 - 6)                                9.9    5.66  )-----------( 226      ( 06 Jun 2021 07:36 )             31.0     06-06    139  |  109<H>  |  43<H>  ----------------------------<  165<H>  4.2   |  23  |  1.56<H>    Ca    8.9      06 Jun 2021 07:36    TPro  5.9<L>  /  Alb  2.4<L>  /  TBili  0.4  /  DBili  x   /  AST  11<L>  /  ALT  13  /  AlkPhos  80  06-06    CAPILLARY BLOOD GLUCOSE      POCT Blood Glucose.: 207 mg/dL (06 Jun 2021 12:13)  POCT Blood Glucose.: 168 mg/dL (06 Jun 2021 07:35)  POCT Blood Glucose.: 110 mg/dL (05 Jun 2021 21:27)  POCT Blood Glucose.: 273 mg/dL (05 Jun 2021 17:33)    LIVER FUNCTIONS - ( 06 Jun 2021 07:36 )  Alb: 2.4 g/dL / Pro: 5.9 gm/dL / ALK PHOS: 80 U/L / ALT: 13 U/L / AST: 11 U/L / GGT: x                 Assessment and Plan:  89 y.o. 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    1. Reaccumulation of  left sided pleural effusion - mild to moderate with minimal drop in SpO2 on exertion only to % -  exudative on last admit  suspect malignant with newly diagnosed lymphoma, HF had been ruled out on last admit (seen by cardio, echo wnl)   - CTS eval, s/p thoracentesis, now with pleurx drain    - eliquis  - pt had TTE on last admit, wnl  -pulm following    2. Follicular lymphoma -  per d/w Onc, plan to start pt on Rituxan on 6/7 (monday), transfer to   - pending cytology of pl fluid     Urine retention:  -almaraz placed after multiple episodes of retention      3. DMII - HISS + lantus  - monitor FS, titrate insulin as needed    3a. CKD3- appears to be at baseline  - monitor labs     4. HTN/HLD/BPH:  -hold BP meds due to borderline BP, monitor closely     anemia: Stable    5. 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 GERD - PPI    7.  atrial flutter as per cardiology on last admission - in SR now    dvt ppx:  -eliquis

## 2021-06-06 NOTE — PROVIDER CONTACT NOTE (OTHER) - ASSESSMENT
837mL per bladder scan
Minimal UO this AM. Bladder scan this morning showing 620mL urine in bladder.

## 2021-06-06 NOTE — PROVIDER CONTACT NOTE (OTHER) - BACKGROUND
admitted for pleural effusion
Straight catheterization yesterday morning 6/5, 900mL urine drained yesterday.

## 2021-06-06 NOTE — PROGRESS NOTE ADULT - SUBJECTIVE AND OBJECTIVE BOX
Patient is a 89y old  Male who presents with a chief complaint of OCHOA (2021 17:59)      HPI:  89 y.o. 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  Pt is forgetful, denies any complaints, + chronic LE edema.  Other ROS reviewed and neg  (2021 16:01)  pt is known to me from last admission  My office has been reaching out to him for the past week to fu on recent mesenteric mass bx done as wella sPF cytology / recurrent fulid accumulation  exudative effusion with 2 prior thoracentesis large volume done  he does not feel sob at rest  OCHOA      No acute pulmonary events occurred overnight  Underwent Pleurx   Atrium removed   Noted to have urinary retention         PREVIOUS DIAGNOSTIC TESTING:      MEDICATIONS  (STANDING):  dextrose 40% Gel 15 Gram(s) Oral once  dextrose 5%. 1000 milliLiter(s) (50 mL/Hr) IV Continuous <Continuous>  dextrose 5%. 1000 milliLiter(s) (100 mL/Hr) IV Continuous <Continuous>  dextrose 50% Injectable 25 Gram(s) IV Push once  dextrose 50% Injectable 12.5 Gram(s) IV Push once  dextrose 50% Injectable 25 Gram(s) IV Push once  finasteride 5 milliGRAM(s) Oral daily  glucagon  Injectable 1 milliGRAM(s) IntraMuscular once  insulin glargine Injectable (LANTUS) 13 Unit(s) SubCutaneous at bedtime  insulin lispro (ADMELOG) corrective regimen sliding scale   SubCutaneous three times a day before meals  insulin lispro (ADMELOG) corrective regimen sliding scale   SubCutaneous at bedtime  melatonin 5 milliGRAM(s) Oral at bedtime  metoprolol succinate ER 25 milliGRAM(s) Oral daily  pantoprazole    Tablet 40 milliGRAM(s) Oral daily  tamsulosin 0.4 milliGRAM(s) Oral at bedtime  valsartan 160 milliGRAM(s) Oral daily    MEDICATIONS  (PRN):  acetaminophen   Tablet .. 650 milliGRAM(s) Oral every 6 hours PRN Mild Pain (1 - 3)  traMADol 50 milliGRAM(s) Oral every 8 hours PRN Moderate Pain (4 - 6)      FAMILY HISTORY:  No pertinent family history in first degree relatives  pt cannot recall any history of disease      Vital Signs Last 24 Hrs  T(C): 36.8 (2021 07:52), Max: 36.8 (2021 07:52)  T(F): 98.2 (2021 07:52), Max: 98.2 (2021 07:52)  HR: 77 (2021 07:52) (68 - 77)  BP: 111/52 (2021 07:52) (97/47 - 117/64)  BP(mean): --  RR: 16 (2021 20:25) (16 - 18)  SpO2: 98% (2021 07:52) (97% - 100%)    PHYSICAL EXAM  General Appearance: cooperative, no acute distress,   HEENT: PERRL, conjunctiva clear, EOM's intact, non injected pharynx, no exudate, TM   normal  Neck: Supple, , no adenopathy, thyroid: not enlarged, no carotid bruit or JVD  Back: Symmetric, no  tenderness,no soft tissue tenderness  Lungs: decreased breath sounds left mid- lower lung filed/ dull ness left base, pleurx in left   Heart: Regular rate and rhythm, S1, S2 normal, no murmur, rub or gallop  Abdomen: Soft, non-tender, bowel sounds active , no hepatosplenomegaly  Extremities: no cyanosis or edema, no joint swelling  Skin: Skin color, texture normal, no rashes   Neurologic: non focal    ECG:  < from: 12 Lead ECG (21 @ 10:40) >  Ventricular Rate 69 BPM    Atrial Rate 69 BPM    P-R Interval 212 ms    QRS Duration 88 ms    Q-T Interval 402 ms    QTC Calculation(Bazett) 430 ms    P Axis 22 degrees    R Axis -3 degrees    T Axis 57 degrees    Diagnosis Line Sinus rhythm with 1st degree A-V block  Otherwise normal ECG  No previous ECGs available    < end of copied text >    LABS:                          11.4   6.64  )-----------( 281      ( 2021 11:33 )             36.0     06-02    137  |  106  |  17  ----------------------------<  244<H>  5.2   |  25  |  1.34<H>    Ca    8.6      2021 11:33  Mg     2.4     06-    TPro  7.1  /  Alb  3.1<L>  /  TBili  0.5  /  DBili  x   /  AST  26  /  ALT  18  /  AlkPhos  95  06-02    CARDIAC MARKERS ( 2021 11:33 )  <0.015 ng/mL / x     / x     / x     / x            Pro BNP  -- 06 @ 13:03  D Dimer  249 - @ 13:03  Pro BNP  239 06- @ 11:33  D Dimer  --  @ 11:33      Urinalysis Basic - ( 2021 06:30 )    Color: Yellow / Appearance: Clear / S.020 / pH: x  Gluc: x / Ketone: Negative  / Bili: Negative / Urobili: Negative mg/dL   Blood: x / Protein: Negative mg/dL / Nitrite: Negative   Leuk Esterase: Negative / RBC: 11-25 /HPF / WBC 3-5   Sq Epi: x / Non Sq Epi: Occasional / Bacteria: Occasional        x< from: Xray Chest 2 Views PA/Lat (21 @ 11:19) >    PROCEDURE DATE:  2021          INTERPRETATION:  PA and lateral chest radiographs    COMPARISON: 2021 chest.    CLINICAL INFORMATION: Chest Pain.    FINDINGS:  There is a moderate LEFT pleural effusion and/or basilar airspace consolidation obscuring LEFT diaphragm contour. LEFT upper lobe and  RIGHT lung parenchyma clear.  The airway is midline.    The heart and mediastinum size and configuration are within the limits of normal.    The visualized osseus structures are intact.    IMPRESSION:    Moderate LEFT effusion and/or basilar airspace consolidation.    < end of copied text >      RADIOLOGY & ADDITIONAL STUDIES:

## 2021-06-06 NOTE — PROVIDER CONTACT NOTE (OTHER) - ACTION/TREATMENT ORDERED:
Answering service made aware of consult.
PA notified. Patient is refusing straight catheterization at this time, patient would like to try to urinate on his own. Reassess in 1-2 hours.
Straight catheterization; 900mL urine out.

## 2021-06-07 PROBLEM — C82.90 FOLLICULAR LYMPHOMA, UNSPECIFIED, UNSPECIFIED SITE: Chronic | Status: ACTIVE | Noted: 2021-06-02

## 2021-06-07 PROBLEM — N40.0 BENIGN PROSTATIC HYPERPLASIA WITHOUT LOWER URINARY TRACT SYMPTOMS: Chronic | Status: ACTIVE | Noted: 2021-06-02

## 2021-06-07 PROBLEM — I48.91 UNSPECIFIED ATRIAL FIBRILLATION: Chronic | Status: ACTIVE | Noted: 2021-06-02

## 2021-06-07 LAB
ALBUMIN SERPL ELPH-MCNC: 2.2 G/DL — LOW (ref 3.3–5)
ALP SERPL-CCNC: 74 U/L — SIGNIFICANT CHANGE UP (ref 40–120)
ALT FLD-CCNC: 10 U/L — LOW (ref 12–78)
ANION GAP SERPL CALC-SCNC: 6 MMOL/L — SIGNIFICANT CHANGE UP (ref 5–17)
AST SERPL-CCNC: 7 U/L — LOW (ref 15–37)
BILIRUB SERPL-MCNC: 0.2 MG/DL — SIGNIFICANT CHANGE UP (ref 0.2–1.2)
BUN SERPL-MCNC: 41 MG/DL — HIGH (ref 7–23)
CALCIUM SERPL-MCNC: 8.5 MG/DL — SIGNIFICANT CHANGE UP (ref 8.5–10.1)
CHLORIDE SERPL-SCNC: 108 MMOL/L — SIGNIFICANT CHANGE UP (ref 96–108)
CO2 SERPL-SCNC: 25 MMOL/L — SIGNIFICANT CHANGE UP (ref 22–31)
CREAT SERPL-MCNC: 1.22 MG/DL — SIGNIFICANT CHANGE UP (ref 0.5–1.3)
GLUCOSE SERPL-MCNC: 163 MG/DL — HIGH (ref 70–99)
HCT VFR BLD CALC: 29.6 % — LOW (ref 39–50)
HGB BLD-MCNC: 9.5 G/DL — LOW (ref 13–17)
LDH SERPL L TO P-CCNC: 109 U/L — SIGNIFICANT CHANGE UP (ref 84–241)
MCHC RBC-ENTMCNC: 30.4 PG — SIGNIFICANT CHANGE UP (ref 27–34)
MCHC RBC-ENTMCNC: 32.1 GM/DL — SIGNIFICANT CHANGE UP (ref 32–36)
MCV RBC AUTO: 94.6 FL — SIGNIFICANT CHANGE UP (ref 80–100)
PLATELET # BLD AUTO: 213 K/UL — SIGNIFICANT CHANGE UP (ref 150–400)
POTASSIUM SERPL-MCNC: 4.3 MMOL/L — SIGNIFICANT CHANGE UP (ref 3.5–5.3)
POTASSIUM SERPL-SCNC: 4.3 MMOL/L — SIGNIFICANT CHANGE UP (ref 3.5–5.3)
PROT SERPL-MCNC: 5.7 GM/DL — LOW (ref 6–8.3)
RBC # BLD: 3.13 M/UL — LOW (ref 4.2–5.8)
RBC # FLD: 14.6 % — HIGH (ref 10.3–14.5)
SODIUM SERPL-SCNC: 139 MMOL/L — SIGNIFICANT CHANGE UP (ref 135–145)
WBC # BLD: 4.52 K/UL — SIGNIFICANT CHANGE UP (ref 3.8–10.5)
WBC # FLD AUTO: 4.52 K/UL — SIGNIFICANT CHANGE UP (ref 3.8–10.5)

## 2021-06-07 PROCEDURE — 99233 SBSQ HOSP IP/OBS HIGH 50: CPT | Mod: 25

## 2021-06-07 PROCEDURE — 99231 SBSQ HOSP IP/OBS SF/LOW 25: CPT

## 2021-06-07 PROCEDURE — 99233 SBSQ HOSP IP/OBS HIGH 50: CPT

## 2021-06-07 PROCEDURE — 99497 ADVNCD CARE PLAN 30 MIN: CPT

## 2021-06-07 PROCEDURE — 99498 ADVNCD CARE PLAN ADDL 30 MIN: CPT

## 2021-06-07 RX ORDER — RITUXIMAB 10 MG/ML
735 INJECTION, SOLUTION INTRAVENOUS ONCE
Refills: 0 | Status: DISCONTINUED | OUTPATIENT
Start: 2021-06-07 | End: 2021-06-08

## 2021-06-07 RX ADMIN — TAMSULOSIN HYDROCHLORIDE 0.4 MILLIGRAM(S): 0.4 CAPSULE ORAL at 22:17

## 2021-06-07 RX ADMIN — LIDOCAINE 1 PATCH: 4 CREAM TOPICAL at 09:42

## 2021-06-07 RX ADMIN — Medication 2: at 12:26

## 2021-06-07 RX ADMIN — FINASTERIDE 5 MILLIGRAM(S): 5 TABLET, FILM COATED ORAL at 09:41

## 2021-06-07 RX ADMIN — OXYCODONE AND ACETAMINOPHEN 2 TABLET(S): 5; 325 TABLET ORAL at 12:22

## 2021-06-07 RX ADMIN — LIDOCAINE 1 PATCH: 4 CREAM TOPICAL at 20:27

## 2021-06-07 RX ADMIN — INSULIN GLARGINE 13 UNIT(S): 100 INJECTION, SOLUTION SUBCUTANEOUS at 22:17

## 2021-06-07 RX ADMIN — PANTOPRAZOLE SODIUM 40 MILLIGRAM(S): 20 TABLET, DELAYED RELEASE ORAL at 09:41

## 2021-06-07 RX ADMIN — APIXABAN 5 MILLIGRAM(S): 2.5 TABLET, FILM COATED ORAL at 22:17

## 2021-06-07 RX ADMIN — Medication 5 MILLIGRAM(S): at 22:17

## 2021-06-07 RX ADMIN — Medication 2: at 07:57

## 2021-06-07 RX ADMIN — APIXABAN 5 MILLIGRAM(S): 2.5 TABLET, FILM COATED ORAL at 09:41

## 2021-06-07 NOTE — PROGRESS NOTE ADULT - SUBJECTIVE AND OBJECTIVE BOX
CC: OCHOA  · Subjective and Objective:   Patient is a 89y old  Male who presents with a chief complaint of OCHOA (03 Jun 2021 08:14)  89 y.o. 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  Pt is forgetful, denies any complaints, + chronic LE edema.  Other ROS reviewed and neg     SUBJECTIVE / OVERNIGHT EVENTS:  6/3- s/e at bedside, no new c/o, breathing ok  CTS note reviewed, pleurx planned for 6/4, eliquis on hold  6/4- s/e, pt in bed, feels comfortable, breathing well, pain controlled. s/p pleurx today  d/w DR Moran regarding possible treatment,     6/5: Lying in bed, alert, comfortable at this time.  Pt straight cathed overnight with 900cc drained.   6/6: sitting in chair, has episodes of shark pain at pleurx site however comfortable at this moment.  Denies fever, chills, n, v.  6/7:Sitting in chair, pt met with palliative care today to discuss goals of care.  Pt has some back pain improved with oxy.  No fever, chills.  Plan for rituxan therapy.    REVIEW OF SYSTEMS: All other review of systems is negative unless indicated above.    Vital Signs Last 24 Hrs  T(C): 36.7 (07 Jun 2021 07:46), Max: 36.7 (06 Jun 2021 15:54)  T(F): 98.1 (07 Jun 2021 07:46), Max: 98.1 (06 Jun 2021 15:54)  HR: 66 (07 Jun 2021 07:46) (66 - 71)  BP: 119/57 (07 Jun 2021 07:46) (109/69 - 123/64)  BP(mean): --  RR: 17 (07 Jun 2021 07:46) (16 - 17)  SpO2: 97% (07 Jun 2021 07:46) (97% - 100%)    GENERAL: NAD,   HEAD:  Atraumatic, Normocephalic  EYES: EOMI, PERRLA, conjunctiva and sclera clear  NECK: Supple, No JVD, no LAD  CHEST/LUNG: cta b/l, resp unlabored  HEART: Regular rate and rhythm; No murmurs, rubs, or gallops  ABDOMEN: Soft, Nontender, Nondistended; Bowel sounds present, no HSM  EXTREMITIES:  2+ Peripheral Pulses, No clubbing, cyanosis, Chronic LE nonpitting  edema  PSYCH: AAOx3, normal behavior  NEUROLOGY: non-focal, sensory and cn 2-12 intact  SKIN: No visible rashes or lesions    MEDICATIONS  (STANDING):  apixaban 5 milliGRAM(s) Oral every 12 hours  dextrose 40% Gel 15 Gram(s) Oral once  dextrose 5%. 1000 milliLiter(s) (50 mL/Hr) IV Continuous <Continuous>  dextrose 5%. 1000 milliLiter(s) (100 mL/Hr) IV Continuous <Continuous>  dextrose 50% Injectable 25 Gram(s) IV Push once  dextrose 50% Injectable 12.5 Gram(s) IV Push once  dextrose 50% Injectable 25 Gram(s) IV Push once  finasteride 5 milliGRAM(s) Oral daily  glucagon  Injectable 1 milliGRAM(s) IntraMuscular once  insulin glargine Injectable (LANTUS) 13 Unit(s) SubCutaneous at bedtime  insulin lispro (ADMELOG) corrective regimen sliding scale   SubCutaneous three times a day before meals  insulin lispro (ADMELOG) corrective regimen sliding scale   SubCutaneous at bedtime  lidocaine   Patch 1 Patch Transdermal daily  melatonin 5 milliGRAM(s) Oral at bedtime  pantoprazole    Tablet 40 milliGRAM(s) Oral daily  tamsulosin 0.4 milliGRAM(s) Oral at bedtime    MEDICATIONS  (PRN):  acetaminophen   Tablet .. 650 milliGRAM(s) Oral every 6 hours PRN Mild Pain (1 - 3)  morphine  - Injectable 2 milliGRAM(s) IV Push every 6 hours PRN Severe Pain (7 - 10)  oxycodone    5 mG/acetaminophen 325 mG 1 Tablet(s) Oral every 4 hours PRN Mild Pain (1 - 3)  oxycodone    5 mG/acetaminophen 325 mG 2 Tablet(s) Oral every 4 hours PRN Moderate Pain (4 - 6)  senna 2 Tablet(s) Oral at bedtime PRN Constipation  traMADol 50 milliGRAM(s) Oral every 8 hours PRN Moderate Pain (4 - 6)                                9.9    5.66  )-----------( 226      ( 06 Jun 2021 07:36 )             31.0     06-06    139  |  109<H>  |  43<H>  ----------------------------<  165<H>  4.2   |  23  |  1.56<H>    Ca    8.9      06 Jun 2021 07:36    TPro  5.9<L>  /  Alb  2.4<L>  /  TBili  0.4  /  DBili  x   /  AST  11<L>  /  ALT  13  /  AlkPhos  80  06-06    CAPILLARY BLOOD GLUCOSE      POCT Blood Glucose.: 156 mg/dL (07 Jun 2021 12:07)  POCT Blood Glucose.: 164 mg/dL (07 Jun 2021 07:52)  POCT Blood Glucose.: 216 mg/dL (06 Jun 2021 20:57)  POCT Blood Glucose.: 186 mg/dL (06 Jun 2021 17:05)    LIVER FUNCTIONS - ( 06 Jun 2021 07:36 )  Alb: 2.4 g/dL / Pro: 5.9 gm/dL / ALK PHOS: 80 U/L / ALT: 13 U/L / AST: 11 U/L / GGT: x                 Assessment and Plan:  89 y.o. 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    1. Reaccumulation of  left sided pleural effusion - mild to moderate with minimal drop in SpO2 on exertion only to % -  exudative on last admit  suspect malignant with newly diagnosed lymphoma, HF had been ruled out on last admit (seen by cardio, echo wnl)   - CTS eval, s/p thoracentesis, now with pleurx drain    - eliquis  - pt had TTE on last admit, wnl  -pulm following    2. Follicular lymphoma:  - per d/w Onc, plan to start pt on Rituxan today once transferred to   - oncology following    Urine retention:  -almaraz placed after multiple episodes of retention, continue almaraz care      3. DMII - HISS + lantus  - monitor FS, titrate insulin as needed    3a. CKD3- appears to be at baseline  - monitor labs     4. HTN/HLD/BPH:  -hold BP meds due to borderline BP, monitor closely     anemia: Stable    5. 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 GERD - PPI    7.  atrial flutter as per cardiology on last admission - in SR now    dvt ppx:  -eliquis     Dispo:  -pending final oncology recommendations pt is to go home with home palliative and PT.  Palliative care meeting appreciated to discuss goals of care, molst filled.

## 2021-06-07 NOTE — PROGRESS NOTE ADULT - SUBJECTIVE AND OBJECTIVE BOX
Patient is a 89y old  Male who presents with a chief complaint of OCHOA (2021 17:59)      HPI:  89 y.o. 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  Pt is forgetful, denies any complaints, + chronic LE edema.  Other ROS reviewed and neg  (2021 16:01)  pt is known to me from last admission  My office has been reaching out to him for the past week to fu on recent mesenteric mass bx done as wella sPF cytology / recurrent fulid accumulation  exudative effusion with 2 prior thoracentesis large volume done  he does not feel sob at rest  OCHOA      No acute pulmonary events occurred overnight  Underwent Pleurx   Atrium removed   Noted to have urinary retention       pt is seen and examined with his RN at bedside  no difficulty breathing  asks about going home    PREVIOUS DIAGNOSTIC TESTING:      MEDICATIONS  (STANDING):  dextrose 40% Gel 15 Gram(s) Oral once  dextrose 5%. 1000 milliLiter(s) (50 mL/Hr) IV Continuous <Continuous>  dextrose 5%. 1000 milliLiter(s) (100 mL/Hr) IV Continuous <Continuous>  dextrose 50% Injectable 25 Gram(s) IV Push once  dextrose 50% Injectable 12.5 Gram(s) IV Push once  dextrose 50% Injectable 25 Gram(s) IV Push once  finasteride 5 milliGRAM(s) Oral daily  glucagon  Injectable 1 milliGRAM(s) IntraMuscular once  insulin glargine Injectable (LANTUS) 13 Unit(s) SubCutaneous at bedtime  insulin lispro (ADMELOG) corrective regimen sliding scale   SubCutaneous three times a day before meals  insulin lispro (ADMELOG) corrective regimen sliding scale   SubCutaneous at bedtime  melatonin 5 milliGRAM(s) Oral at bedtime  metoprolol succinate ER 25 milliGRAM(s) Oral daily  pantoprazole    Tablet 40 milliGRAM(s) Oral daily  tamsulosin 0.4 milliGRAM(s) Oral at bedtime  valsartan 160 milliGRAM(s) Oral daily    MEDICATIONS  (PRN):  acetaminophen   Tablet .. 650 milliGRAM(s) Oral every 6 hours PRN Mild Pain (1 - 3)  traMADol 50 milliGRAM(s) Oral every 8 hours PRN Moderate Pain (4 - 6)      FAMILY HISTORY:  No pertinent family history in first degree relatives  pt cannot recall any history of disease      Vital Signs Last 24 Hrs  T(C): 36.7 (2021 20:57), Max: 36.7 (2021 15:54)  T(F): 98 (2021 20:57), Max: 98.1 (2021 15:54)  HR: 68 (2021 20:57) (68 - 71)  BP: 123/64 (2021 20:57) (109/69 - 123/64)  BP(mean): --  RR: 16 (2021 20:57) (16 - 16)  SpO2: 100% (2021 20:57) (99% - 100%)  PHYSICAL EXAM  General Appearance: cooperative, no acute distress,   HEENT: PERRL, conjunctiva clear, EOM's intact, non injected pharynx, no exudate, TM   normal  Neck: Supple, , no adenopathy, thyroid: not enlarged, no carotid bruit or JVD  Back: Symmetric, no  tenderness,no soft tissue tenderness  Lungs: decreased breath sounds left mid- lower lung filed/ dull ness left base, pleurx in left   Heart: Regular rate and rhythm, S1, S2 normal, no murmur, rub or gallop  Abdomen: Soft, non-tender, bowel sounds active , no hepatosplenomegaly  Extremities: no cyanosis or edema, no joint swelling  Skin: Skin color, texture normal, no rashes   Neurologic: non focal    ECG:  < from: 12 Lead ECG (21 @ 10:40) >  Ventricular Rate 69 BPM    Atrial Rate 69 BPM    P-R Interval 212 ms    QRS Duration 88 ms    Q-T Interval 402 ms    QTC Calculation(Bazett) 430 ms    P Axis 22 degrees    R Axis -3 degrees    T Axis 57 degrees    Diagnosis Line Sinus rhythm with 1st degree A-V block  Otherwise normal ECG  No previous ECGs available    < end of copied text >    LABS:                          11.4   6.64  )-----------( 281      ( 2021 11:33 )             36.0     06-02    137  |  106  |  17  ----------------------------<  244<H>  5.2   |  25  |  1.34<H>    Ca    8.6      2021 11:33  Mg     2.4     06-02    TPro  7.1  /  Alb  3.1<L>  /  TBili  0.5  /  DBili  x   /  AST  26  /  ALT  18  /  AlkPhos  95  06-02    CARDIAC MARKERS ( 2021 11:33 )  <0.015 ng/mL / x     / x     / x     / x            Pro BNP  --  @ 13:03  D Dimer  249 - @ 13:03  Pro BNP  239 - @ 11:33  D Dimer  --  @ 11:33      Urinalysis Basic - ( 2021 06:30 )    Color: Yellow / Appearance: Clear / S.020 / pH: x  Gluc: x / Ketone: Negative  / Bili: Negative / Urobili: Negative mg/dL   Blood: x / Protein: Negative mg/dL / Nitrite: Negative   Leuk Esterase: Negative / RBC: 11-25 /HPF / WBC 3-5   Sq Epi: x / Non Sq Epi: Occasional / Bacteria: Occasional        x< from: Xray Chest 2 Views PA/Lat (21 @ 11:19) >    PROCEDURE DATE:  2021          INTERPRETATION:  PA and lateral chest radiographs    COMPARISON: 2021 chest.    CLINICAL INFORMATION: Chest Pain.    FINDINGS:  There is a moderate LEFT pleural effusion and/or basilar airspace consolidation obscuring LEFT diaphragm contour. LEFT upper lobe and  RIGHT lung parenchyma clear.  The airway is midline.    The heart and mediastinum size and configuration are within the limits of normal.    The visualized osseus structures are intact.    IMPRESSION:    Moderate LEFT effusion and/or basilar airspace consolidation.    < end of copied text >      RADIOLOGY & ADDITIONAL STUDIES:

## 2021-06-07 NOTE — GOALS OF CARE CONVERSATION - ADVANCED CARE PLANNING - TREATMENT GUIDELINE COMMENT
MOLST decisions: CPR, limited interventions, trial of intubation/non-invasive ventilation, send to hospital, no feeding tube, trial of IVF, determine use of abx.     * Spent 57 minutes reviewing advanced directives, MOLST, and all dispo options.

## 2021-06-07 NOTE — GOALS OF CARE CONVERSATION - ADVANCED CARE PLANNING - CONVERSATION DETAILS
Met with Mr. Brooks, with his daughter Ally present via phone to continue [facility] conversation. The pt explained that he lives at [facility] for the past 6 months and is doing well there. He denies need for assistance with anything aside from the occasional helping hand to transfer or stand. His daughter confirms this, noting he was fully independent. Pt hopes to return to this functional level, though he is aware that his overall prognosis is effected by new diagnosis of cancer. They were both open to support.     We spent some time reviewing his understanding of his disease. Pt shared knowledge of cancer dx and possibility of treatment. At first, he did not recall having issues with fluid overload, but soon remembered. He was aware of the chronicity of his effusions and likely tie to his underlying malignancy. We also discussed his chronic pain and how this was due to previous vertebral fracture. Dr. Cortés joined us at the end of the conversation and noted intent to follow up on that. Otherwise, we all agreed that the plan now was for initiation of Rituxan today and monitoring pt while here for any side effects, and then when deemed medically stable pt would go home.     Mr. Brooks reiterated his intent to move forward with any treatment options offered. However, he understood the gravity of his diagnosis and the need for clarity of his wishes. He confirmed having an old HCP that named his son Demario, but also that he never asked anyone to call him- preferring clinicians call Ally. Ally confrimed this as well. Suggested we make a new HCP naming her instead, so all clinicians were clear on who represents his wishes- pt and Ally agreed. We also reviewed the MOLsT form to clarify what those wishes would be. We spent time discussing big questions like resuscitation- especially considering how diagnoses of chronic illness effect the likelihood of success in that regard. Likewise, also discussed risk of not being able to be weaned from a ventilator given compromised lung function with chronic effusion. Pt and his daughter both appreciated these risks, but for now he would like a trial of CPR and intubation. However, he was clear, and his daughter repeated this for confirmation- that he would never want to live perpetually on machines, just to be alive. Thus, his daughter understood that if these measures were tried and pt was unable to be weaned, that he would want compassionate liberation and a focus on comfort. He denied feeding tube for the same reason. Remainder of choices listed below and forms copied for records.     Lastly, we reviewed dispo options including [facility] and all levels of home care. In this discussion and MOLST portion we also discussed his options if best-case scenario (effective cancer control and return to good QOL) did not go as planned and either cancer progressed, treatment became untenable, or pt's QOL suffered. We discussed option for comfort focus and hospice at that time and were clear that this was not the appropriate plan now, considering pt's goals. Instead, since goals were more palliative-driven- with a chronic progressive illness, that could be life-limiting, but still wanting disease-modifying therapy. Thus, recommended home palliative +PT as pt was clear that he did not want to go to rehab. Both pt and daughter agreed with this, asking to hear from [facility] about if pt would be able to return to [facility] with his pleurX. Shared plan and concerns with [facility] team who planned to place notation in record, given pt was moving to a new floor. Shared above with RN, reviewed with Dr. Cortés.     *I am out of the office tomorrow and will return to follow up on Wednesday.

## 2021-06-07 NOTE — PROGRESS NOTE ADULT - SUBJECTIVE AND OBJECTIVE BOX
HPI: Pt seen and examined this am in follow up for sx and GOC. He noted mild non-descript ache all over body, attributing this to laying in hospital bed, but denied wanting anything for this, or being able to pin-point discomfort. He was eating breakfast without issues and denied any other sx.     Pt was unable to recall any plan being shared with him, though he did recall speaking with subspecialists. Reminded him of planned GOC meeting today with his daughter present via phone. He would still like to be a part of this so he can get clarity about his plan. Will return at 11am and GOC note will follow.       PAIN: as above    DYSPNEA: denies      ROS:  All other systems reviewed and negative      PHYSICAL EXAM:    Vital Signs Last 24 Hrs  T(C): 36.7 (07 Jun 2021 07:46), Max: 36.7 (06 Jun 2021 15:54)  T(F): 98.1 (07 Jun 2021 07:46), Max: 98.1 (06 Jun 2021 15:54)  HR: 66 (07 Jun 2021 07:46) (66 - 71)  BP: 119/57 (07 Jun 2021 07:46) (109/69 - 123/64)  RR: 17 (07 Jun 2021 07:46) (16 - 17)  SpO2: 97% (07 Jun 2021 07:46) (97% - 100%)  Daily     Daily     PPSV2:  30-40 %    General: Elderly male sitting up in bed watching television, feeding himself breakfast, friendly, NAD  Mental Status: AOx4  HEENT: mmm, +mild temporal wasting  Lungs: improved aeration, pleurX in place on L  Cardiac: + s1 s2 rrr  GI: soft nt nd +bs  : voids  MSK/skin: moves all extremities, some chronic venous stasis changes and old surgical scar on RLE, muscle and fat wasting noted in limbs  Neuro: no focal deficits    LABS:                        9.9    5.66  )-----------( 226      ( 06 Jun 2021 07:36 )             31.0     06-06    139  |  109<H>  |  43<H>  ----------------------------<  165<H>  4.2   |  23  |  1.56<H>    Ca    8.9      06 Jun 2021 07:36    TPro  5.9<L>  /  Alb  2.4<L>  /  TBili  0.4  /  DBili  x   /  AST  11<L>  /  ALT  13  /  AlkPhos  80  06-06      Albumin: Albumin, Serum: 2.4 g/dL (06-06 @ 07:36)      Allergies    morphine (Other)    Intolerances      MEDICATIONS  (STANDING):  apixaban 5 milliGRAM(s) Oral every 12 hours  dextrose 40% Gel 15 Gram(s) Oral once  dextrose 5%. 1000 milliLiter(s) (50 mL/Hr) IV Continuous <Continuous>  dextrose 5%. 1000 milliLiter(s) (100 mL/Hr) IV Continuous <Continuous>  dextrose 50% Injectable 25 Gram(s) IV Push once  dextrose 50% Injectable 12.5 Gram(s) IV Push once  dextrose 50% Injectable 25 Gram(s) IV Push once  finasteride 5 milliGRAM(s) Oral daily  glucagon  Injectable 1 milliGRAM(s) IntraMuscular once  insulin glargine Injectable (LANTUS) 13 Unit(s) SubCutaneous at bedtime  insulin lispro (ADMELOG) corrective regimen sliding scale   SubCutaneous three times a day before meals  insulin lispro (ADMELOG) corrective regimen sliding scale   SubCutaneous at bedtime  lidocaine   Patch 1 Patch Transdermal daily  melatonin 5 milliGRAM(s) Oral at bedtime  pantoprazole    Tablet 40 milliGRAM(s) Oral daily  tamsulosin 0.4 milliGRAM(s) Oral at bedtime    MEDICATIONS  (PRN):  acetaminophen   Tablet .. 650 milliGRAM(s) Oral every 6 hours PRN Mild Pain (1 - 3)  morphine  - Injectable 2 milliGRAM(s) IV Push every 6 hours PRN Severe Pain (7 - 10)  oxycodone    5 mG/acetaminophen 325 mG 1 Tablet(s) Oral every 4 hours PRN Mild Pain (1 - 3)  oxycodone    5 mG/acetaminophen 325 mG 2 Tablet(s) Oral every 4 hours PRN Moderate Pain (4 - 6)  senna 2 Tablet(s) Oral at bedtime PRN Constipation  traMADol 50 milliGRAM(s) Oral every 8 hours PRN Moderate Pain (4 - 6)      RADIOLOGY:    EXAM:  XR CHEST PORTABLE ROUTINE 1V                        PROCEDURE DATE:  06/05/2021      IMPRESSION: LEFT chest tube in place.  Residual LEFT retrocardiac airspace consolidation/LEFT lower lobe atelectasis.    MAGDALENA MILTON MD; Attending Radiologist  This document has been electronically signed. Jun 5 2021  3:35PM

## 2021-06-07 NOTE — PROGRESS NOTE ADULT - SUBJECTIVE AND OBJECTIVE BOX
Subjective:  Pt in bed NAD    T(C): 36.7 (06-07-21 @ 07:46), Max: 36.7 (06-06-21 @ 15:54)  HR: 66 (06-07-21 @ 07:46) (66 - 71)  BP: 119/57 (06-07-21 @ 07:46) (109/69 - 123/64)  ABP: --  ABP(mean): --  RR: 17 (06-07-21 @ 07:46) (16 - 17)  SpO2: 97% (06-07-21 @ 07:46) (97% - 100%) RA   Wt(kg): --  CVP(mm Hg): --  CO: --  CI: --  PA: --                                              Tele: SR           06-06    139  |  109<H>  |  43<H>  ----------------------------<  165<H>  4.2   |  23  |  1.56<H>    Ca    8.9      06 Jun 2021 07:36    TPro  5.9<L>  /  Alb  2.4<L>  /  TBili  0.4  /  DBili  x   /  AST  11<L>  /  ALT  13  /  AlkPhos  80  06-06                               9.9    5.66  )-----------( 226      ( 06 Jun 2021 07:36 )             31.0                 CAPILLARY BLOOD GLUCOSE      POCT Blood Glucose.: 156 mg/dL (07 Jun 2021 12:07)  POCT Blood Glucose.: 164 mg/dL (07 Jun 2021 07:52)  POCT Blood Glucose.: 216 mg/dL (06 Jun 2021 20:57)  POCT Blood Glucose.: 186 mg/dL (06 Jun 2021 17:05)           CXR:  < from: Xray Chest 1 View- PORTABLE-Routine (Xray Chest 1 View- PORTABLE-Routine in AM.) (06.05.21 @ 09:30) >    FINDINGS:  LEFT chest tube overlies LEFT lung base.  The lungs show LEFT retrocardiac airspace consolidation/atelectasis.  RIGHT lung parenchyma clear.  . No pneumothorax.    The  heart is enlarged intransverse diameter. No hilar mass.     Visualized osseous structures are intact.    IMPRESSION: LEFT chest tube in place.  Residual LEFT retrocardiac airspace consolidation/LEFT lower lobe atelectasis.    < end of copied text >          EXAM  Neuro: alert Awake NAD   Pulm:  decreased at bases b/l + Left Pleurx   CV: RRR S1 S2  Abd: soft NT ND  Extremities:  warm well perfused         Assessment:  89yMale    with PAST MEDICAL & SURGICAL HISTORY:  DM (diabetes mellitus)    HTN (hypertension)    HLD (hyperlipidemia)    Kidney stone    Hernia    Afib    BPH (benign prostatic hyperplasia)    Follicular lymphoma    H/O left inguinal hernia repair    Endoscopy finding  choked on piece of chiken-had upper endo with clearance of food    History of tibial fracture  R tibial fracture s/p revision with hardware placement ~3-4yrs ago, per pt          Plan:

## 2021-06-07 NOTE — PROGRESS NOTE ADULT - SUBJECTIVE AND OBJECTIVE BOX
89-year-old male  Recurrent left-sided Pleural effusion  Tap x2 exudate  Now with recurrent pleural effusion thoracic surgery placed Pleurx catheter  Evaluation by cardiology and pulmonary medicine have no etiology of this unilateral recurrent effusion    Patient with mesenteric mass biopsy proven follicular lymphoma    Patient's major complaint is follicular lymphoma, recurrent effusion with dyspnea  Back flank pain possibly related to mesenteric mass    Plan:  Extensive discussion with patient Saturday, again this morning  With his daughter last week and again this evening    I have proposed single agent Rituxan in the Belief That the cause of this recurrent unilateral effusion and discomfort in back is related to lymphoma    Patient has been clear that he would like to try anything that could relieve these problems    Potential toxicities including anaphylactic reaction and death have been reviewed with the patient      Plan patient is now on 1 more thing tomorrow will receive Rituxan 375 mg/m² actual dose 735 mg    If he tolerates this well he can be discharged Wednesday morning and follow-up with us next week for dose #2    This is been reviewed with The hospitalist Dr. Cortés,  As previously noted this is been reviewed with the patient and his daughter x2 and they are comfortable with the strategy as outlined above

## 2021-06-08 PROCEDURE — 99232 SBSQ HOSP IP/OBS MODERATE 35: CPT

## 2021-06-08 RX ORDER — DIPHENHYDRAMINE HCL 50 MG
50 CAPSULE ORAL ONCE
Refills: 0 | Status: COMPLETED | OUTPATIENT
Start: 2021-06-08 | End: 2021-06-08

## 2021-06-08 RX ORDER — MEPERIDINE HYDROCHLORIDE 50 MG/ML
12.5 INJECTION INTRAMUSCULAR; INTRAVENOUS; SUBCUTANEOUS ONCE
Refills: 0 | Status: DISCONTINUED | OUTPATIENT
Start: 2021-06-08 | End: 2021-06-09

## 2021-06-08 RX ORDER — DIPHENHYDRAMINE HCL 50 MG
50 CAPSULE ORAL ONCE
Refills: 0 | Status: DISCONTINUED | OUTPATIENT
Start: 2021-06-08 | End: 2021-06-08

## 2021-06-08 RX ORDER — ACETAMINOPHEN 500 MG
650 TABLET ORAL ONCE
Refills: 0 | Status: COMPLETED | OUTPATIENT
Start: 2021-06-08 | End: 2021-06-08

## 2021-06-08 RX ORDER — RITUXIMAB 10 MG/ML
800 INJECTION, SOLUTION INTRAVENOUS ONCE
Refills: 0 | Status: COMPLETED | OUTPATIENT
Start: 2021-06-08 | End: 2021-06-08

## 2021-06-08 RX ORDER — LIDOCAINE 4 G/100G
2 CREAM TOPICAL DAILY
Refills: 0 | Status: DISCONTINUED | OUTPATIENT
Start: 2021-06-08 | End: 2021-06-10

## 2021-06-08 RX ORDER — FAMOTIDINE 10 MG/ML
20 INJECTION INTRAVENOUS ONCE
Refills: 0 | Status: COMPLETED | OUTPATIENT
Start: 2021-06-08 | End: 2021-06-08

## 2021-06-08 RX ADMIN — TAMSULOSIN HYDROCHLORIDE 0.4 MILLIGRAM(S): 0.4 CAPSULE ORAL at 21:20

## 2021-06-08 RX ADMIN — INSULIN GLARGINE 13 UNIT(S): 100 INJECTION, SOLUTION SUBCUTANEOUS at 21:20

## 2021-06-08 RX ADMIN — PANTOPRAZOLE SODIUM 40 MILLIGRAM(S): 20 TABLET, DELAYED RELEASE ORAL at 09:14

## 2021-06-08 RX ADMIN — OXYCODONE AND ACETAMINOPHEN 2 TABLET(S): 5; 325 TABLET ORAL at 09:14

## 2021-06-08 RX ADMIN — Medication 650 MILLIGRAM(S): at 12:22

## 2021-06-08 RX ADMIN — FAMOTIDINE 20 MILLIGRAM(S): 10 INJECTION INTRAVENOUS at 12:22

## 2021-06-08 RX ADMIN — APIXABAN 5 MILLIGRAM(S): 2.5 TABLET, FILM COATED ORAL at 09:14

## 2021-06-08 RX ADMIN — Medication 2: at 12:01

## 2021-06-08 RX ADMIN — RITUXIMAB 25 MILLIGRAM(S): 10 INJECTION, SOLUTION INTRAVENOUS at 13:12

## 2021-06-08 RX ADMIN — Medication 50 MILLIGRAM(S): at 12:22

## 2021-06-08 RX ADMIN — Medication 5 MILLIGRAM(S): at 21:20

## 2021-06-08 RX ADMIN — APIXABAN 5 MILLIGRAM(S): 2.5 TABLET, FILM COATED ORAL at 21:20

## 2021-06-08 RX ADMIN — Medication 4: at 17:16

## 2021-06-08 RX ADMIN — FINASTERIDE 5 MILLIGRAM(S): 5 TABLET, FILM COATED ORAL at 09:14

## 2021-06-08 NOTE — PROGRESS NOTE ADULT - SUBJECTIVE AND OBJECTIVE BOX
CC: OCHOA  · Subjective and Objective:   Patient is a 89y old  Male who presents with a chief complaint of OCHOA (03 Jun 2021 08:14)  89 y.o. 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  Pt is forgetful, denies any complaints, + chronic LE edema.  Other ROS reviewed and neg     SUBJECTIVE / OVERNIGHT EVENTS:  6/3- s/e at bedside, no new c/o, breathing ok  CTS note reviewed, pleurx planned for 6/4, eliquis on hold  6/4- s/e, pt in bed, feels comfortable, breathing well, pain controlled. s/p pleurx today  d/w DR Moran regarding possible treatment,     6/5: Lying in bed, alert, comfortable at this time.  Pt straight cathed overnight with 900cc drained.   6/6: sitting in chair, has episodes of shark pain at pleurx site however comfortable at this moment.  Denies fever, chills, n, v.  6/7:Sitting in chair, pt met with palliative care today to discuss goals of care.  Pt has some back pain improved with oxy.  No fever, chills.  Plan for rituxan therapy.  6/8 - pt seen and examined,     REVIEW OF SYSTEMS: All other review of systems is negative unless indicated above.    Vital Signs Last 24 Hrs  T(C): 36.3 (06-08-21 @ 16:16), Max: 36.8 (06-08-21 @ 13:40)  T(F): 97.4 (06-08-21 @ 16:16), Max: 98.3 (06-08-21 @ 13:40)  HR: 76 (06-08-21 @ 16:16) (63 - 82)  BP: 131/51 (06-08-21 @ 16:16) (92/68 - 131/56)  RR: 18 (06-08-21 @ 16:16) (16 - 18)  SpO2: 98% (06-08-21 @ 16:16) (97% - 99%)  Wt(kg): --    GENERAL: NAD,   HEAD:  Atraumatic, Normocephalic  EYES: EOMI, PERRLA, conjunctiva and sclera clear  NECK: Supple, No JVD, no LAD  CHEST/LUNG: cta b/l, resp unlabored  HEART: Regular rate and rhythm; No murmurs, rubs, or gallops  ABDOMEN: Soft, Nontender, Nondistended; Bowel sounds present, no HSM  EXTREMITIES:  2+ Peripheral Pulses, No clubbing, cyanosis, Chronic LE nonpitting  edema  PSYCH: AAOx3, normal behavior  NEUROLOGY: non-focal, sensory and cn 2-12 intact  SKIN: No visible rashes or lesions    MEDICATIONS  (STANDING):  apixaban 5 milliGRAM(s) Oral every 12 hours  dextrose 40% Gel 15 Gram(s) Oral once  dextrose 5%. 1000 milliLiter(s) (50 mL/Hr) IV Continuous <Continuous>  dextrose 5%. 1000 milliLiter(s) (100 mL/Hr) IV Continuous <Continuous>  dextrose 50% Injectable 25 Gram(s) IV Push once  dextrose 50% Injectable 12.5 Gram(s) IV Push once  dextrose 50% Injectable 25 Gram(s) IV Push once  finasteride 5 milliGRAM(s) Oral daily  glucagon  Injectable 1 milliGRAM(s) IntraMuscular once  insulin glargine Injectable (LANTUS) 13 Unit(s) SubCutaneous at bedtime  insulin lispro (ADMELOG) corrective regimen sliding scale   SubCutaneous three times a day before meals  insulin lispro (ADMELOG) corrective regimen sliding scale   SubCutaneous at bedtime  lidocaine   Patch 1 Patch Transdermal daily  melatonin 5 milliGRAM(s) Oral at bedtime  pantoprazole    Tablet 40 milliGRAM(s) Oral daily  tamsulosin 0.4 milliGRAM(s) Oral at bedtime    MEDICATIONS  (PRN):  acetaminophen   Tablet .. 650 milliGRAM(s) Oral every 6 hours PRN Mild Pain (1 - 3)  morphine  - Injectable 2 milliGRAM(s) IV Push every 6 hours PRN Severe Pain (7 - 10)  oxycodone    5 mG/acetaminophen 325 mG 1 Tablet(s) Oral every 4 hours PRN Mild Pain (1 - 3)  oxycodone    5 mG/acetaminophen 325 mG 2 Tablet(s) Oral every 4 hours PRN Moderate Pain (4 - 6)  senna 2 Tablet(s) Oral at bedtime PRN Constipation  traMADol 50 milliGRAM(s) Oral every 8 hours PRN Moderate Pain (4 - 6)                                9.9    5.66  )-----------( 226      ( 06 Jun 2021 07:36 )             31.0     06-06    139  |  109<H>  |  43<H>  ----------------------------<  165<H>  4.2   |  23  |  1.56<H>    Ca    8.9      06 Jun 2021 07:36    TPro  5.9<L>  /  Alb  2.4<L>  /  TBili  0.4  /  DBili  x   /  AST  11<L>  /  ALT  13  /  AlkPhos  80  06-06    CAPILLARY BLOOD GLUCOSE      POCT Blood Glucose.: 156 mg/dL (07 Jun 2021 12:07)  POCT Blood Glucose.: 164 mg/dL (07 Jun 2021 07:52)  POCT Blood Glucose.: 216 mg/dL (06 Jun 2021 20:57)  POCT Blood Glucose.: 186 mg/dL (06 Jun 2021 17:05)    LIVER FUNCTIONS - ( 06 Jun 2021 07:36 )  Alb: 2.4 g/dL / Pro: 5.9 gm/dL / ALK PHOS: 80 U/L / ALT: 13 U/L / AST: 11 U/L / GGT: x                 Assessment and Plan:  89 y.o. 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    1. Reaccumulation of  left sided pleural effusion - mild to moderate with minimal drop in SpO2 on exertion only to % -  exudative on last admit  suspect malignant with newly diagnosed lymphoma, HF had been ruled out on last admit (seen by cardio, echo wnl)   - CTS eval, s/p thoracentesis, now with pleurx drain    - eliquis  - pt had TTE on last admit, wnl  -pulm following    2. Follicular lymphoma:  - per d/w Onc, plan to start pt on Rituxan today once transferred to   - oncology following    Urine retention:  -almaraz placed after multiple episodes of retention, continue almaraz care      3. DMII - HISS + lantus  - monitor FS, titrate insulin as needed    3a. CKD3- appears to be at baseline  - monitor labs     4. HTN/HLD/BPH:  -hold BP meds due to borderline BP, monitor closely     anemia: Stable    5. 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 GERD - PPI    7.  atrial flutter as per cardiology on last admission - in SR now    dvt ppx:  -eliquis     Dispo:  -pending final oncology recommendations pt is to go home with home palliative and PT.  Palliative care meeting appreciated to discuss goals of care, molst filled.        CC: OCHOA  · Subjective and Objective:   Patient is a 89y old  Male who presents with a chief complaint of OCHOA (03 Jun 2021 08:14)  89 y.o. 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  Pt is forgetful, denies any complaints, + chronic LE edema.  Other ROS reviewed and neg     SUBJECTIVE / OVERNIGHT EVENTS:  6/3- s/e at bedside, no new c/o, breathing ok  CTS note reviewed, pleurx planned for 6/4, eliquis on hold  6/4- s/e, pt in bed, feels comfortable, breathing well, pain controlled. s/p pleurx today  d/w DR Moran regarding possible treatment,     6/5: Lying in bed, alert, comfortable at this time.  Pt straight cathed overnight with 900cc drained.   6/6: sitting in chair, has episodes of shark pain at pleurx site however comfortable at this moment.  Denies fever, chills, n, v.  6/7:Sitting in chair, pt met with palliative care today to discuss goals of care.  Pt has some back pain improved with oxy.  No fever, chills.  Plan for rituxan therapy.  6/8 - pt seen and examined, + gen weakness, lower back pain, denies cp, dyspnea, abdominal pain, afebrile, plan for Rituxan infusion    REVIEW OF SYSTEMS: All other review of systems is negative unless indicated above.    Vital Signs Last 24 Hrs  T(C): 36.3 (06-08-21 @ 16:16), Max: 36.8 (06-08-21 @ 13:40)  T(F): 97.4 (06-08-21 @ 16:16), Max: 98.3 (06-08-21 @ 13:40)  HR: 76 (06-08-21 @ 16:16) (63 - 82)  BP: 131/51 (06-08-21 @ 16:16) (92/68 - 131/56)  RR: 18 (06-08-21 @ 16:16) (16 - 18)  SpO2: 98% (06-08-21 @ 16:16) (97% - 99%)  Wt(kg): --    GENERAL: NAD,   HEAD:  Atraumatic, Normocephalic  EYES: EOMI, PERRLA, conjunctiva and sclera clear  NECK: Supple, No JVD, no LAD  CHEST/LUNG: cta b/l, resp unlabored  HEART: Regular rate and rhythm; No murmurs, rubs, or gallops  ABDOMEN: Soft, Nontender, Nondistended; Bowel sounds present, no HSM  EXTREMITIES:  2+ Peripheral Pulses, No clubbing, cyanosis, Chronic LE nonpitting  edema  PSYCH: AAOx3, normal behavior  NEUROLOGY: non-focal, sensory and cn 2-12 intact  SKIN: No visible rashes or lesions  < from: 12 Lead ECG (06.02.21 @ 10:40) >  Ventricular Rate 69 BPM    Atrial Rate 69 BPM    P-R Interval 212 ms    QRS Duration 88 ms    Q-T Interval 402 ms    QTC Calculation(Bazett) 430 ms    P Axis 22 degrees    R Axis -3 degrees    T Axis 57 degrees    Diagnosis Line Sinus rhythm with 1st degree A-V block  Otherwise normal ECG  No previous ECGs available    < end of copied text >    Troponin I, Serum (06.02.21 @ 11:33)    Troponin I, Serum: <0.015: High Sensitivity Troponin and new reference  range effective 7/6/2016 ng/mL  06-07    139  |  108  |  41<H>  ----------------------------<  163<H>  4.3   |  25  |  1.22    Ca    8.5      07 Jun 2021 21:28    TPro  5.7<L>  /  Alb  2.2<L>  /  TBili  0.2  /  DBili  x   /  AST  7<L>  /  ALT  10<L>  /  AlkPhos  74  06-07                            9.5    4.52  )-----------( 213      ( 07 Jun 2021 21:28 )             29.6             LIVER FUNCTIONS - ( 07 Jun 2021 21:28 )  Alb: 2.2 g/dL / Pro: 5.7 gm/dL / ALK PHOS: 74 U/L / ALT: 10 U/L / AST: 7 U/L / GGT: x           < from: Xray Chest 1 View- PORTABLE-Routine (Xray Chest 1 View- PORTABLE-Routine in AM.) (06.05.21 @ 09:30) >  FINDINGS:  LEFT chest tube overlies LEFT lung base.  The lungs show LEFT retrocardiac airspace consolidation/atelectasis.  RIGHT lung parenchyma clear.  . No pneumothorax.    The  heart is enlarged intransverse diameter. No hilar mass.     Visualized osseous structures are intact.    IMPRESSION: LEFT chest tube in place.  Residual LEFT retrocardiac airspace consolidation/LEFT lower lobe atelectasis.    < end of copied text >      MEDICATIONS  (STANDING):  apixaban 5 milliGRAM(s) Oral every 12 hours  dextrose 40% Gel 15 Gram(s) Oral once  dextrose 5%. 1000 milliLiter(s) (50 mL/Hr) IV Continuous <Continuous>  dextrose 5%. 1000 milliLiter(s) (100 mL/Hr) IV Continuous <Continuous>  dextrose 50% Injectable 25 Gram(s) IV Push once  dextrose 50% Injectable 12.5 Gram(s) IV Push once  dextrose 50% Injectable 25 Gram(s) IV Push once  finasteride 5 milliGRAM(s) Oral daily  glucagon  Injectable 1 milliGRAM(s) IntraMuscular once  insulin glargine Injectable (LANTUS) 13 Unit(s) SubCutaneous at bedtime  insulin lispro (ADMELOG) corrective regimen sliding scale   SubCutaneous three times a day before meals  insulin lispro (ADMELOG) corrective regimen sliding scale   SubCutaneous at bedtime  lidocaine   Patch 1 Patch Transdermal daily  lidocaine   Patch 2 Patch Transdermal daily  melatonin 5 milliGRAM(s) Oral at bedtime  pantoprazole    Tablet 40 milliGRAM(s) Oral daily  tamsulosin 0.4 milliGRAM(s) Oral at bedtime    MEDICATIONS  (PRN):  acetaminophen   Tablet .. 650 milliGRAM(s) Oral every 6 hours PRN Mild Pain (1 - 3)  meperidine     Injectable 12.5 milliGRAM(s) IV Push once PRN rigors  morphine  - Injectable 2 milliGRAM(s) IV Push every 6 hours PRN Severe Pain (7 - 10)  oxycodone    5 mG/acetaminophen 325 mG 1 Tablet(s) Oral every 4 hours PRN Mild Pain (1 - 3)  oxycodone    5 mG/acetaminophen 325 mG 2 Tablet(s) Oral every 4 hours PRN Moderate Pain (4 - 6)  senna 2 Tablet(s) Oral at bedtime PRN Constipation  traMADol 50 milliGRAM(s) Oral every 8 hours PRN Moderate Pain (4 - 6)

## 2021-06-08 NOTE — PROGRESS NOTE ADULT - SUBJECTIVE AND OBJECTIVE BOX
Patient seen by Dr. Oglesby in 8/2020. Patient's urologist is Dr. Oglesby, please contact Dr. Oglesby' office for this consult. Unit Ambassador/ nurse made aware.  Discussed with Dr. Deshpande

## 2021-06-08 NOTE — PROGRESS NOTE ADULT - SUBJECTIVE AND OBJECTIVE BOX
Patient is a 89y old  Male who presents with a chief complaint of OCHOA (2021 17:59)      HPI:  89 y.o. 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  Pt is forgetful, denies any complaints, + chronic LE edema.  Other ROS reviewed and neg  (2021 16:01)  pt is known to me from last admission  My office has been reaching out to him for the past week to fu on recent mesenteric mass bx done as wella sPF cytology / recurrent fulid accumulation  exudative effusion with 2 prior thoracentesis large volume done  he does not feel sob at rest  OCHOA      No acute pulmonary events occurred overnight  Underwent Pleurx   Atrium removed   Noted to have urinary retention       pt is seen and examined with his RN at bedside  no difficulty breathing  asks about going home    transferred to [address] [place] [address] for trial of Rituxan therapy  pleurex in place  uneventful overnight  oncology eval noted  PREVIOUS DIAGNOSTIC TESTING:      MEDICATIONS  (STANDING):  apixaban 5 milliGRAM(s) Oral every 12 hours  dextrose 40% Gel 15 Gram(s) Oral once  dextrose 5%. 1000 milliLiter(s) (50 mL/Hr) IV Continuous <Continuous>  dextrose 5%. 1000 milliLiter(s) (100 mL/Hr) IV Continuous <Continuous>  dextrose 50% Injectable 25 Gram(s) IV Push once  dextrose 50% Injectable 12.5 Gram(s) IV Push once  dextrose 50% Injectable 25 Gram(s) IV Push once  finasteride 5 milliGRAM(s) Oral daily  glucagon  Injectable 1 milliGRAM(s) IntraMuscular once  insulin glargine Injectable (LANTUS) 13 Unit(s) SubCutaneous at bedtime  insulin lispro (ADMELOG) corrective regimen sliding scale   SubCutaneous three times a day before meals  insulin lispro (ADMELOG) corrective regimen sliding scale   SubCutaneous at bedtime  lidocaine   Patch 1 Patch Transdermal daily  melatonin 5 milliGRAM(s) Oral at bedtime  pantoprazole    Tablet 40 milliGRAM(s) Oral daily  tamsulosin 0.4 milliGRAM(s) Oral at bedtime    MEDICATIONS  (PRN):  acetaminophen   Tablet .. 650 milliGRAM(s) Oral every 6 hours PRN Mild Pain (1 - 3)  traMADol 50 milliGRAM(s) Oral every 8 hours PRN Moderate Pain (4 - 6)      FAMILY HISTORY:  No pertinent family history in first degree relatives  pt cannot recall any history of disease      Vital Signs Last 24 Hrs  T(C): 36.6 (2021 20:05), Max: 36.6 (2021 13:54)  T(F): 97.9 (2021 20:05), Max: 97.9 (2021 20:05)  HR: 66 (2021 20:05) (66 - 86)  BP: 131/56 (2021 20:05) (106/82 - 131/56)  BP(mean): --  RR: 18 (2021 20:05) (18 - 18)  SpO2: 98% (2021 20:05) (96% - 98%)  PHYSICAL EXAM  General Appearance: cooperative, no acute distress,   HEENT: PERRL, conjunctiva clear, EOM's intact, non injected pharynx, no exudate, TM   normal  Neck: Supple, , no adenopathy, thyroid: not enlarged, no carotid bruit or JVD  Back: Symmetric, no  tenderness,no soft tissue tenderness  Lungs: decreased breath sounds left mid- lower lung filed/ dull ness left base, pleurx in left   Heart: Regular rate and rhythm, S1, S2 normal, no murmur, rub or gallop  Abdomen: Soft, non-tender, bowel sounds active , no hepatosplenomegaly  Extremities: no cyanosis or edema, no joint swelling  Skin: Skin color, texture normal, no rashes   Neurologic: non focal    ECG:  < from: 12 Lead ECG (21 @ 10:40) >  Ventricular Rate 69 BPM    Atrial Rate 69 BPM    P-R Interval 212 ms    QRS Duration 88 ms    Q-T Interval 402 ms    QTC Calculation(Bazett) 430 ms    P Axis 22 degrees    R Axis -3 degrees    T Axis 57 degrees    Diagnosis Line Sinus rhythm with 1st degree A-V block  Otherwise normal ECG  No previous ECGs available    < end of copied text >    LABS:                          11.4   6.64  )-----------( 281      ( 2021 11:33 )             36.0     06-    137  |  106  |  17  ----------------------------<  244<H>  5.2   |  25  |  1.34<H>    Ca    8.6      2021 11:33  Mg     2.4     06-    TPro  7.1  /  Alb  3.1<L>  /  TBili  0.5  /  DBili  x   /  AST  26  /  ALT  18  /  AlkPhos  95  06-02    CARDIAC MARKERS ( 2021 11:33 )  <0.015 ng/mL / x     / x     / x     / x            Pro BNP  --  @ 13:03  D Dimer  249  @ 13:03  Pro BNP  239  @ 11:33  D Dimer  --  @ 11:33      Urinalysis Basic - ( 2021 06:30 )    Color: Yellow / Appearance: Clear / S.020 / pH: x  Gluc: x / Ketone: Negative  / Bili: Negative / Urobili: Negative mg/dL   Blood: x / Protein: Negative mg/dL / Nitrite: Negative   Leuk Esterase: Negative / RBC: 11-25 /HPF / WBC 3-5   Sq Epi: x / Non Sq Epi: Occasional / Bacteria: Occasional        x< from: Xray Chest 2 Views PA/Lat (21 @ 11:19) >    PROCEDURE DATE:  2021          INTERPRETATION:  PA and lateral chest radiographs    COMPARISON: 2021 chest.    CLINICAL INFORMATION: Chest Pain.    FINDINGS:  There is a moderate LEFT pleural effusion and/or basilar airspace consolidation obscuring LEFT diaphragm contour. LEFT upper lobe and  RIGHT lung parenchyma clear.  The airway is midline.    The heart and mediastinum size and configuration are within the limits of normal.    The visualized osseus structures are intact.    IMPRESSION:    Moderate LEFT effusion and/or basilar airspace consolidation.    < end of copied text >      RADIOLOGY & ADDITIONAL STUDIES:    < from: Xray Chest 1 View- PORTABLE-Routine (Xray Chest 1 View- PORTABLE-Routine in AM.) (21 @ 09:30) >  INTERPRETATION:  Portable chest radiograph    CLINICAL INFORMATION: LEFT chest tube    TECHNIQUE:  Portable  AP view of the chest was obtained.    COMPARISON: 2021 chest available for review.    FINDINGS:  LEFT chest tube overlies LEFT lung base.  The lungs show LEFT retrocardiac airspace consolidation/atelectasis.  RIGHT lung parenchyma clear.  . No pneumothorax.    The  heart is enlarged intransverse diameter. No hilar mass.     Visualized osseous structures are intact.    IMPRESSION: LEFT chest tube in place.  Residual LEFT retrocardiac airspace consolidation/LEFT lower lobe atelectasis.    < end of copied text >

## 2021-06-08 NOTE — PROGRESS NOTE ADULT - SUBJECTIVE AND OBJECTIVE BOX
Subjective:  Pt seen, c/o incisional pain. Being medicated w percocet.    Vital Signs:  Vital Signs Last 24 Hrs  T(C): 36.6 (06-07-21 @ 20:05), Max: 36.6 (06-07-21 @ 13:54)  T(F): 97.9 (06-07-21 @ 20:05), Max: 97.9 (06-07-21 @ 20:05)  HR: 66 (06-07-21 @ 20:05) (66 - 86)  BP: 131/56 (06-07-21 @ 20:05) (106/82 - 131/56)  RR: 18 (06-07-21 @ 20:05) (18 - 18)  SpO2: 98% (06-07-21 @ 20:05) (96% - 98%) on (O2)    Telemetry/Alarms:    Relevant labs, radiology and Medications reviewed                        9.5    4.52  )-----------( 213      ( 07 Jun 2021 21:28 )             29.6     06-07    139  |  108  |  41<H>  ----------------------------<  163<H>  4.3   |  25  |  1.22    Ca    8.5      07 Jun 2021 21:28    TPro  5.7<L>  /  Alb  2.2<L>  /  TBili  0.2  /  DBili  x   /  AST  7<L>  /  ALT  10<L>  /  AlkPhos  74  06-07      MEDICATIONS  (STANDING):  apixaban 5 milliGRAM(s) Oral every 12 hours  dextrose 40% Gel 15 Gram(s) Oral once  dextrose 5%. 1000 milliLiter(s) (50 mL/Hr) IV Continuous <Continuous>  dextrose 5%. 1000 milliLiter(s) (100 mL/Hr) IV Continuous <Continuous>  dextrose 50% Injectable 25 Gram(s) IV Push once  dextrose 50% Injectable 12.5 Gram(s) IV Push once  dextrose 50% Injectable 25 Gram(s) IV Push once  finasteride 5 milliGRAM(s) Oral daily  glucagon  Injectable 1 milliGRAM(s) IntraMuscular once  insulin glargine Injectable (LANTUS) 13 Unit(s) SubCutaneous at bedtime  insulin lispro (ADMELOG) corrective regimen sliding scale   SubCutaneous three times a day before meals  insulin lispro (ADMELOG) corrective regimen sliding scale   SubCutaneous at bedtime  lidocaine   Patch 1 Patch Transdermal daily  melatonin 5 milliGRAM(s) Oral at bedtime  pantoprazole    Tablet 40 milliGRAM(s) Oral daily  tamsulosin 0.4 milliGRAM(s) Oral at bedtime    MEDICATIONS  (PRN):  acetaminophen   Tablet .. 650 milliGRAM(s) Oral every 6 hours PRN Mild Pain (1 - 3)  morphine  - Injectable 2 milliGRAM(s) IV Push every 6 hours PRN Severe Pain (7 - 10)  oxycodone    5 mG/acetaminophen 325 mG 2 Tablet(s) Oral every 4 hours PRN Moderate Pain (4 - 6)  oxycodone    5 mG/acetaminophen 325 mG 1 Tablet(s) Oral every 4 hours PRN Mild Pain (1 - 3)  senna 2 Tablet(s) Oral at bedtime PRN Constipation  traMADol 50 milliGRAM(s) Oral every 8 hours PRN Moderate Pain (4 - 6)      Physical exam  Gen NAD  Neuro AAox3  Card RRR  Pulm equal  Abd soft, obese  Ext warm, edema, venous stasis changes    Tubes: Left pleurx under sterile dressing, no hematoma palpated.    I&O's Summary    07 Jun 2021 07:01  -  08 Jun 2021 07:00  --------------------------------------------------------  IN: 0 mL / OUT: 1400 mL / NET: -1400 mL        Assessment  89y Male  w/ PAST MEDICAL & SURGICAL HISTORY:  DM (diabetes mellitus)    HTN (hypertension)    HLD (hyperlipidemia)    Kidney stone    Hernia    Afib    BPH (benign prostatic hyperplasia)    Follicular lymphoma    H/O left inguinal hernia repair    Endoscopy finding  choked on piece of chiken-had upper endo with clearance of food    History of tibial fracture  R tibial fracture s/p revision with hardware placement ~3-4yrs ago, per pt    admitted with complaints of Patient is a 89y old  Male who presents with a chief complaint of OCHOA (08 Jun 2021 08:39)    88 yo male with h/o DM (on insulin), HTN, hiatal hernia, chronic L pleural effusion (of unknown cause), h/o kidney stones, R Tibial/fibular fracture repair with hardwood insertion 3-4yrs ago, CKD2-3, BPH, h/o DVT on eliquis,  s/p pigtail 4/26, exudative now admitted 6/2 w SOB, OCHOA, chronic left effusion recurrence. S/P L pleurX placement 6/4.     Drain pleurX three times per week (mon, wed, fri) up to one liter    Discussed with Cardiothoracic Team at AM rounds.

## 2021-06-08 NOTE — CHART NOTE - NSCHARTNOTEFT_GEN_A_CORE
HOME O2 EVALUATION    Pulse Ox Room Air Rest:97    Pulse Ox Room Air Ambulatin    Pulse Ox on O2          L Ambulating:    Pulse Ox Post Ambulation:

## 2021-06-08 NOTE — PROVIDER CONTACT NOTE (OTHER) - REASON
620mL in bladder via bladder scan US
837mL in bladder per ultrasound
Urology consult
Routine consult for Dr. Ny

## 2021-06-09 DIAGNOSIS — N40.0 BENIGN PROSTATIC HYPERPLASIA WITHOUT LOWER URINARY TRACT SYMPTOMS: ICD-10-CM

## 2021-06-09 LAB
24R-OH-CALCIDIOL SERPL-MCNC: 20.8 NG/ML — LOW (ref 30–80)
ALBUMIN SERPL ELPH-MCNC: 2.1 G/DL — LOW (ref 3.3–5)
ALP SERPL-CCNC: 74 U/L — SIGNIFICANT CHANGE UP (ref 40–120)
ALT FLD-CCNC: 10 U/L — LOW (ref 12–78)
ANION GAP SERPL CALC-SCNC: 6 MMOL/L — SIGNIFICANT CHANGE UP (ref 5–17)
AST SERPL-CCNC: 10 U/L — LOW (ref 15–37)
BILIRUB SERPL-MCNC: 0.2 MG/DL — SIGNIFICANT CHANGE UP (ref 0.2–1.2)
BUN SERPL-MCNC: 39 MG/DL — HIGH (ref 7–23)
CALCIUM SERPL-MCNC: 8.4 MG/DL — LOW (ref 8.5–10.1)
CHLORIDE SERPL-SCNC: 111 MMOL/L — HIGH (ref 96–108)
CO2 SERPL-SCNC: 24 MMOL/L — SIGNIFICANT CHANGE UP (ref 22–31)
CREAT SERPL-MCNC: 1.15 MG/DL — SIGNIFICANT CHANGE UP (ref 0.5–1.3)
FERRITIN SERPL-MCNC: 48 NG/ML — SIGNIFICANT CHANGE UP (ref 30–400)
FOLATE SERPL-MCNC: 6 NG/ML — SIGNIFICANT CHANGE UP
GAMMA INTERFERON BACKGROUND BLD IA-ACNC: 0.04 IU/ML — SIGNIFICANT CHANGE UP
GLUCOSE SERPL-MCNC: 151 MG/DL — HIGH (ref 70–99)
HCT VFR BLD CALC: 29.8 % — LOW (ref 39–50)
HGB BLD-MCNC: 9.5 G/DL — LOW (ref 13–17)
IRON SATN MFR SERPL: 20 UG/DL — LOW (ref 45–165)
IRON SATN MFR SERPL: 8 % — LOW (ref 16–55)
M TB IFN-G BLD-IMP: NEGATIVE — SIGNIFICANT CHANGE UP
M TB IFN-G CD4+ BCKGRND COR BLD-ACNC: 0 IU/ML — SIGNIFICANT CHANGE UP
M TB IFN-G CD4+CD8+ BCKGRND COR BLD-ACNC: -0.01 IU/ML — SIGNIFICANT CHANGE UP
MCHC RBC-ENTMCNC: 30.1 PG — SIGNIFICANT CHANGE UP (ref 27–34)
MCHC RBC-ENTMCNC: 31.9 GM/DL — LOW (ref 32–36)
MCV RBC AUTO: 94.3 FL — SIGNIFICANT CHANGE UP (ref 80–100)
PLATELET # BLD AUTO: 183 K/UL — SIGNIFICANT CHANGE UP (ref 150–400)
POTASSIUM SERPL-MCNC: 4.1 MMOL/L — SIGNIFICANT CHANGE UP (ref 3.5–5.3)
POTASSIUM SERPL-SCNC: 4.1 MMOL/L — SIGNIFICANT CHANGE UP (ref 3.5–5.3)
PROT SERPL-MCNC: 5.8 GM/DL — LOW (ref 6–8.3)
QUANT TB PLUS MITOGEN MINUS NIL: 4.32 IU/ML — SIGNIFICANT CHANGE UP
RBC # BLD: 3.16 M/UL — LOW (ref 4.2–5.8)
RBC # FLD: 14.3 % — SIGNIFICANT CHANGE UP (ref 10.3–14.5)
SARS-COV-2 RNA SPEC QL NAA+PROBE: SIGNIFICANT CHANGE UP
SODIUM SERPL-SCNC: 141 MMOL/L — SIGNIFICANT CHANGE UP (ref 135–145)
TIBC SERPL-MCNC: 235 UG/DL — SIGNIFICANT CHANGE UP (ref 220–430)
TSH SERPL-MCNC: 3.76 UU/ML — SIGNIFICANT CHANGE UP (ref 0.34–4.82)
UIBC SERPL-MCNC: 215 UG/DL — SIGNIFICANT CHANGE UP (ref 110–370)
VIT B12 SERPL-MCNC: 498 PG/ML — SIGNIFICANT CHANGE UP (ref 232–1245)
WBC # BLD: 3.5 K/UL — LOW (ref 3.8–10.5)
WBC # FLD AUTO: 3.5 K/UL — LOW (ref 3.8–10.5)

## 2021-06-09 PROCEDURE — 99233 SBSQ HOSP IP/OBS HIGH 50: CPT

## 2021-06-09 PROCEDURE — 99232 SBSQ HOSP IP/OBS MODERATE 35: CPT

## 2021-06-09 RX ORDER — CHOLECALCIFEROL (VITAMIN D3) 125 MCG
2000 CAPSULE ORAL DAILY
Refills: 0 | Status: DISCONTINUED | OUTPATIENT
Start: 2021-06-09 | End: 2021-06-10

## 2021-06-09 RX ADMIN — PANTOPRAZOLE SODIUM 40 MILLIGRAM(S): 20 TABLET, DELAYED RELEASE ORAL at 09:14

## 2021-06-09 RX ADMIN — APIXABAN 5 MILLIGRAM(S): 2.5 TABLET, FILM COATED ORAL at 09:14

## 2021-06-09 RX ADMIN — Medication 5 MILLIGRAM(S): at 21:59

## 2021-06-09 RX ADMIN — Medication 2: at 12:21

## 2021-06-09 RX ADMIN — LIDOCAINE 1 PATCH: 4 CREAM TOPICAL at 19:50

## 2021-06-09 RX ADMIN — Medication 6: at 18:18

## 2021-06-09 RX ADMIN — TAMSULOSIN HYDROCHLORIDE 0.4 MILLIGRAM(S): 0.4 CAPSULE ORAL at 21:59

## 2021-06-09 RX ADMIN — LIDOCAINE 2 PATCH: 4 CREAM TOPICAL at 22:02

## 2021-06-09 RX ADMIN — LIDOCAINE 1 PATCH: 4 CREAM TOPICAL at 22:03

## 2021-06-09 RX ADMIN — LIDOCAINE 2 PATCH: 4 CREAM TOPICAL at 19:50

## 2021-06-09 RX ADMIN — INSULIN GLARGINE 13 UNIT(S): 100 INJECTION, SOLUTION SUBCUTANEOUS at 21:56

## 2021-06-09 RX ADMIN — Medication 2: at 07:56

## 2021-06-09 RX ADMIN — APIXABAN 5 MILLIGRAM(S): 2.5 TABLET, FILM COATED ORAL at 21:58

## 2021-06-09 RX ADMIN — LIDOCAINE 2 PATCH: 4 CREAM TOPICAL at 09:14

## 2021-06-09 RX ADMIN — LIDOCAINE 1 PATCH: 4 CREAM TOPICAL at 09:14

## 2021-06-09 RX ADMIN — FINASTERIDE 5 MILLIGRAM(S): 5 TABLET, FILM COATED ORAL at 09:14

## 2021-06-09 NOTE — PROGRESS NOTE ADULT - SUBJECTIVE AND OBJECTIVE BOX
HPI: Pt seen and examined this am in follow up for sx. Pt feeling well, denies any symptoms after cancer treatment- notes he tolerated it well. He had no complaints and was awaiting word on dc.       PAIN: denies    DYSPNEA: denies      ROS:  All other systems reviewed and negative      PHYSICAL EXAM:    Vital Signs Last 24 Hrs  T(C): 36.7 (08 Jun 2021 21:08), Max: 36.8 (08 Jun 2021 13:40)  T(F): 98.1 (08 Jun 2021 21:08), Max: 98.3 (08 Jun 2021 13:40)  HR: 68 (08 Jun 2021 21:08) (63 - 82)  BP: 123/48 (08 Jun 2021 21:08) (92/68 - 131/51)  RR: 18 (08 Jun 2021 21:08) (16 - 18)  SpO2: 100% (08 Jun 2021 21:08) (97% - 100%)  Daily     Daily     PPSV2:  30-40 %    General: Elderly male sitting up in bed watching television, feeding himself breakfast, friendly, NAD  Mental Status: AOx4  HEENT: mmm, +mild temporal wasting  Lungs: improved aeration, pleurX in place on L  Cardiac: + s1 s2 rrr  GI: soft nt nd +bs  : voids  MSK/skin: moves all extremities, some chronic venous stasis changes and old surgical scar on RLE, muscle and fat wasting noted in limbs  Neuro: no focal deficits      LABS:                        9.5    3.50  )-----------( 183      ( 09 Jun 2021 07:30 )             29.8     06-09    141  |  111<H>  |  39<H>  ----------------------------<  151<H>  4.1   |  24  |  1.15    Ca    8.4<L>      09 Jun 2021 07:30    TPro  5.8<L>  /  Alb  2.1<L>  /  TBili  0.2  /  DBili  x   /  AST  10<L>  /  ALT  10<L>  /  AlkPhos  74  06-09      Albumin: Albumin, Serum: 2.1 g/dL (06-09 @ 07:30)      Allergies    morphine (Other)    Intolerances      MEDICATIONS  (STANDING):  apixaban 5 milliGRAM(s) Oral every 12 hours  dextrose 40% Gel 15 Gram(s) Oral once  dextrose 5%. 1000 milliLiter(s) (50 mL/Hr) IV Continuous <Continuous>  dextrose 5%. 1000 milliLiter(s) (100 mL/Hr) IV Continuous <Continuous>  dextrose 50% Injectable 25 Gram(s) IV Push once  dextrose 50% Injectable 12.5 Gram(s) IV Push once  dextrose 50% Injectable 25 Gram(s) IV Push once  finasteride 5 milliGRAM(s) Oral daily  glucagon  Injectable 1 milliGRAM(s) IntraMuscular once  insulin glargine Injectable (LANTUS) 13 Unit(s) SubCutaneous at bedtime  insulin lispro (ADMELOG) corrective regimen sliding scale   SubCutaneous three times a day before meals  insulin lispro (ADMELOG) corrective regimen sliding scale   SubCutaneous at bedtime  lidocaine   Patch 1 Patch Transdermal daily  lidocaine   Patch 2 Patch Transdermal daily  melatonin 5 milliGRAM(s) Oral at bedtime  pantoprazole    Tablet 40 milliGRAM(s) Oral daily  tamsulosin 0.4 milliGRAM(s) Oral at bedtime    MEDICATIONS  (PRN):  acetaminophen   Tablet .. 650 milliGRAM(s) Oral every 6 hours PRN Mild Pain (1 - 3)  meperidine     Injectable 12.5 milliGRAM(s) IV Push once PRN rigors  morphine  - Injectable 2 milliGRAM(s) IV Push every 6 hours PRN Severe Pain (7 - 10)  oxycodone    5 mG/acetaminophen 325 mG 2 Tablet(s) Oral every 4 hours PRN Moderate Pain (4 - 6)  oxycodone    5 mG/acetaminophen 325 mG 1 Tablet(s) Oral every 4 hours PRN Mild Pain (1 - 3)  senna 2 Tablet(s) Oral at bedtime PRN Constipation  traMADol 50 milliGRAM(s) Oral every 8 hours PRN Moderate Pain (4 - 6)

## 2021-06-09 NOTE — PROGRESS NOTE ADULT - SUBJECTIVE AND OBJECTIVE BOX
CHIEF COMPLAINT: urinary retention    HISTORY OF PRESENT ILLNESS: Pt with long term history of bph treated with proscar and flomax. Pt this admission developed urinary retention and has reportedly failed voiding attempts. Currently with almaraz. Pt comfortable.    PAST MEDICAL & SURGICAL HISTORY:  DM (diabetes mellitus)    HTN (hypertension)    HLD (hyperlipidemia)    Kidney stone    Hernia    Afib    BPH (benign prostatic hyperplasia)    Follicular lymphoma    H/O left inguinal hernia repair    Endoscopy finding  choked on piece of chiken-had upper endo with clearance of food    History of tibial fracture  R tibial fracture s/p revision with hardware placement ~3-4yrs ago, per pt        REVIEW OF SYSTEMS:Same previous  MEDICATIONS  (STANDING):  apixaban 5 milliGRAM(s) Oral every 12 hours  dextrose 40% Gel 15 Gram(s) Oral once  dextrose 5%. 1000 milliLiter(s) (50 mL/Hr) IV Continuous <Continuous>  dextrose 5%. 1000 milliLiter(s) (100 mL/Hr) IV Continuous <Continuous>  dextrose 50% Injectable 25 Gram(s) IV Push once  dextrose 50% Injectable 12.5 Gram(s) IV Push once  dextrose 50% Injectable 25 Gram(s) IV Push once  finasteride 5 milliGRAM(s) Oral daily  glucagon  Injectable 1 milliGRAM(s) IntraMuscular once  insulin glargine Injectable (LANTUS) 13 Unit(s) SubCutaneous at bedtime  insulin lispro (ADMELOG) corrective regimen sliding scale   SubCutaneous three times a day before meals  insulin lispro (ADMELOG) corrective regimen sliding scale   SubCutaneous at bedtime  lidocaine   Patch 1 Patch Transdermal daily  lidocaine   Patch 2 Patch Transdermal daily  melatonin 5 milliGRAM(s) Oral at bedtime  pantoprazole    Tablet 40 milliGRAM(s) Oral daily  tamsulosin 0.4 milliGRAM(s) Oral at bedtime    MEDICATIONS  (PRN):  acetaminophen   Tablet .. 650 milliGRAM(s) Oral every 6 hours PRN Mild Pain (1 - 3)  meperidine     Injectable 12.5 milliGRAM(s) IV Push once PRN rigors  morphine  - Injectable 2 milliGRAM(s) IV Push every 6 hours PRN Severe Pain (7 - 10)  oxycodone    5 mG/acetaminophen 325 mG 2 Tablet(s) Oral every 4 hours PRN Moderate Pain (4 - 6)  oxycodone    5 mG/acetaminophen 325 mG 1 Tablet(s) Oral every 4 hours PRN Mild Pain (1 - 3)  senna 2 Tablet(s) Oral at bedtime PRN Constipation  traMADol 50 milliGRAM(s) Oral every 8 hours PRN Moderate Pain (4 - 6)      PE: almaraz in place, urine k[place]    Allergies    morphine (Other)    Intolerances        SOCIAL HISTORY:Same previous    FAMILY HISTORY:  No pertinent family history in first degree relatives  pt cannot recall any history of disease        Vital Signs Last 24 Hrs  T(C): 36.7 (09 Jun 2021 08:58), Max: 36.8 (08 Jun 2021 13:40)  T(F): 98.1 (09 Jun 2021 08:58), Max: 98.3 (08 Jun 2021 13:40)  HR: 61 (09 Jun 2021 08:58) (61 - 82)  BP: 140/65 (09 Jun 2021 08:58) (92/68 - 140/65)  BP(mean): --  RR: 18 (09 Jun 2021 08:58) (16 - 18)  SpO2: 99% (09 Jun 2021 08:58) (97% - 100%)    PHYSICAL EXAM:Same previous    LABS:                        9.5    3.50  )-----------( 183      ( 09 Jun 2021 07:30 )             29.8     06-09    141  |  111<H>  |  39<H>  ----------------------------<  151<H>  4.1   |  24  |  1.15    Ca    8.4<L>      09 Jun 2021 07:30    TPro  5.8<L>  /  Alb  2.1<L>  /  TBili  0.2  /  DBili  x   /  AST  10<L>  /  ALT  10<L>  /  AlkPhos  74  06-09        Urine Culture:     RADIOLOGY & ADDITIONAL STUDIES:

## 2021-06-09 NOTE — CHART NOTE - NSCHARTNOTEFT_GEN_A_CORE
89 yo male with h/o DM (on insulin), HTN, hiatal hernia, chronic L pleural effusion (of unknown cause), h/o kidney stones, R Tibial/fibular fracture repair with hardwood insertion 3-4yrs ago, CKD2-3, BPH, h/o DVT on eliquis,  s/p pigtail 4/26, exudative now admitted 6/2 w SOB, OCHOA, chronic left effusion recurrence. S/P L pleurX placement 6/4.     Drain pleurX three times per week (mon, wed, fri) up to one liter    Discussed with Cardiothoracic Team at AM rounds.

## 2021-06-09 NOTE — PROGRESS NOTE ADULT - PROBLEM SELECTOR PLAN 1
Pt with bph treated with flomax and proscar now with urinary retention. He can be discharged home almaraz in place and explore options as outpatient such as long term almaraz vs minimally invasive procedures  that may preclude necessity of anesthesia.

## 2021-06-09 NOTE — PROGRESS NOTE ADULT - SUBJECTIVE AND OBJECTIVE BOX
Patient is a 89y old  Male who presents with a chief complaint of OCHOA (2021 17:59)      HPI:  89 y.o. 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  Pt is forgetful, denies any complaints, + chronic LE edema.  Other ROS reviewed and neg  (2021 16:01)  pt is known to me from last admission  My office has been reaching out to him for the past week to fu on recent mesenteric mass bx done as wella sPF cytology / recurrent fulid accumulation  exudative effusion with 2 prior thoracentesis large volume done  he does not feel sob at rest  OCHOA      No acute pulmonary events occurred overnight  Underwent Pleurx   Atrium removed   Noted to have urinary retention       pt is seen and examined with his RN at bedside  no difficulty breathing  asks about going home    transferred to [address] [place] [address] for trial of Rituxan therapy  pleurex in place  uneventful overnight  oncology eval noted      seen and examined while in bed / RN staff at bedside  he tolerated Rituimab infusion well  no adverse side effects reported  feels good  not using any O2 supplement    PREVIOUS DIAGNOSTIC TESTING:      MEDICATIONS  (STANDING):  apixaban 5 milliGRAM(s) Oral every 12 hours  dextrose 40% Gel 15 Gram(s) Oral once  dextrose 5%. 1000 milliLiter(s) (50 mL/Hr) IV Continuous <Continuous>  dextrose 5%. 1000 milliLiter(s) (100 mL/Hr) IV Continuous <Continuous>  dextrose 50% Injectable 25 Gram(s) IV Push once  dextrose 50% Injectable 12.5 Gram(s) IV Push once  dextrose 50% Injectable 25 Gram(s) IV Push once  finasteride 5 milliGRAM(s) Oral daily  glucagon  Injectable 1 milliGRAM(s) IntraMuscular once  insulin glargine Injectable (LANTUS) 13 Unit(s) SubCutaneous at bedtime  insulin lispro (ADMELOG) corrective regimen sliding scale   SubCutaneous three times a day before meals  insulin lispro (ADMELOG) corrective regimen sliding scale   SubCutaneous at bedtime  lidocaine   Patch 1 Patch Transdermal daily  melatonin 5 milliGRAM(s) Oral at bedtime  pantoprazole    Tablet 40 milliGRAM(s) Oral daily  tamsulosin 0.4 milliGRAM(s) Oral at bedtime    MEDICATIONS  (PRN):  acetaminophen   Tablet .. 650 milliGRAM(s) Oral every 6 hours PRN Mild Pain (1 - 3)  traMADol 50 milliGRAM(s) Oral every 8 hours PRN Moderate Pain (4 - 6)      FAMILY HISTORY:  No pertinent family history in first degree relatives  pt cannot recall any history of disease      Vital Signs Last 24 Hrs  T(C): 36.7 (2021 21:08), Max: 36.8 (2021 13:40)  T(F): 98.1 (2021 21:08), Max: 98.3 (2021 13:40)  HR: 68 (2021 21:08) (63 - 82)  BP: 123/48 (2021 21:08) (92/68 - 131/51)  BP(mean): --  RR: 18 (2021 21:08) (16 - 18)  SpO2: 100% (2021 21:08) (97% - 100%)  PHYSICAL EXAM  General Appearance: cooperative, no acute distress,   HEENT: PERRL, conjunctiva clear, EOM's intact, non injected pharynx, no exudate, TM   normal  Neck: Supple, , no adenopathy, thyroid: not enlarged, no carotid bruit or JVD  Back: Symmetric, no  tenderness,no soft tissue tenderness  Lungs: decreased breath sounds left mid- lower lung filed/ dull ness left base, pleurx in left   Heart: Regular rate and rhythm, S1, S2 normal, no murmur, rub or gallop  Abdomen: Soft, non-tender, bowel sounds active , no hepatosplenomegaly  Extremities: no cyanosis or edema, no joint swelling  Skin: Skin color, texture normal, no rashes   Neurologic: non focal    ECG:  < from: 12 Lead ECG (21 @ 10:40) >  Ventricular Rate 69 BPM    Atrial Rate 69 BPM    P-R Interval 212 ms    QRS Duration 88 ms    Q-T Interval 402 ms    QTC Calculation(Bazett) 430 ms    P Axis 22 degrees    R Axis -3 degrees    T Axis 57 degrees    Diagnosis Line Sinus rhythm with 1st degree A-V block  Otherwise normal ECG  No previous ECGs available    < end of copied text >    LABS:                          11.4   6.64  )-----------( 281      ( 2021 11:33 )             36.0     06-    137  |  106  |  17  ----------------------------<  244<H>  5.2   |  25  |  1.34<H>    Ca    8.6      2021 11:33  Mg     2.4     06-    TPro  7.1  /  Alb  3.1<L>  /  TBili  0.5  /  DBili  x   /  AST  26  /  ALT  18  /  AlkPhos  95  06-02    CARDIAC MARKERS ( 2021 11:33 )  <0.015 ng/mL / x     / x     / x     / x            Pro BNP  --  @ 13:03  D Dimer  249  @ 13:03  Pro BNP  239  @ 11:33  D Dimer  --  @ 11:33      Urinalysis Basic - ( 2021 06:30 )    Color: Yellow / Appearance: Clear / S.020 / pH: x  Gluc: x / Ketone: Negative  / Bili: Negative / Urobili: Negative mg/dL   Blood: x / Protein: Negative mg/dL / Nitrite: Negative   Leuk Esterase: Negative / RBC: 11-25 /HPF / WBC 3-5   Sq Epi: x / Non Sq Epi: Occasional / Bacteria: Occasional        x< from: Xray Chest 2 Views PA/Lat (21 @ 11:19) >    PROCEDURE DATE:  2021          INTERPRETATION:  PA and lateral chest radiographs    COMPARISON: 2021 chest.    CLINICAL INFORMATION: Chest Pain.    FINDINGS:  There is a moderate LEFT pleural effusion and/or basilar airspace consolidation obscuring LEFT diaphragm contour. LEFT upper lobe and  RIGHT lung parenchyma clear.  The airway is midline.    The heart and mediastinum size and configuration are within the limits of normal.    The visualized osseus structures are intact.    IMPRESSION:    Moderate LEFT effusion and/or basilar airspace consolidation.    < end of copied text >      RADIOLOGY & ADDITIONAL STUDIES:    < from: Xray Chest 1 View- PORTABLE-Routine (Xray Chest 1 View- PORTABLE-Routine in AM.) (21 @ 09:30) >  INTERPRETATION:  Portable chest radiograph    CLINICAL INFORMATION: LEFT chest tube    TECHNIQUE:  Portable  AP view of the chest was obtained.    COMPARISON: 2021 chest available for review.    FINDINGS:  LEFT chest tube overlies LEFT lung base.  The lungs show LEFT retrocardiac airspace consolidation/atelectasis.  RIGHT lung parenchyma clear.  . No pneumothorax.    The  heart is enlarged intransverse diameter. No hilar mass.     Visualized osseous structures are intact.    IMPRESSION: LEFT chest tube in place.  Residual LEFT retrocardiac airspace consolidation/LEFT lower lobe atelectasis.    < end of copied text >

## 2021-06-09 NOTE — PROGRESS NOTE ADULT - SUBJECTIVE AND OBJECTIVE BOX
CC: OCHOA  · Subjective and Objective:   Patient is a 89y old  Male who presents with a chief complaint of OCHOA (03 Jun 2021 08:14)  89 y.o. 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  Pt is forgetful, denies any complaints, + chronic LE edema.  Other ROS reviewed and neg     SUBJECTIVE / OVERNIGHT EVENTS:  6/3- s/e at bedside, no new c/o, breathing ok  CTS note reviewed, pleurx planned for 6/4, eliquis on hold  6/4- s/e, pt in bed, feels comfortable, breathing well, pain controlled. s/p pleurx today  d/w DR Moran regarding possible treatment,     6/5: Lying in bed, alert, comfortable at this time.  Pt straight cathed overnight with 900cc drained.   6/6: sitting in chair, has episodes of shark pain at pleurx site however comfortable at this moment.  Denies fever, chills, n, v.  6/7:Sitting in chair, pt met with palliative care today to discuss goals of care.  Pt has some back pain improved with oxy.  No fever, chills.  Plan for rituxan therapy.  6/8 - pt seen and examined, + gen weakness, lower back pain, denies cp, dyspnea, abdominal pain, afebrile, plan for Rituxan infusion  6/9 - pt seen and examined, + weakness, + back pain, tolerating po intake, afebrile, denies cp, dyspnea, cough, plan discussed     REVIEW OF SYSTEMS: All other review of systems is negative unless indicated above.    Vital Signs Last 24 Hrs  T(C): 36.6 (06-09-21 @ 15:05), Max: 36.7 (06-08-21 @ 21:08)  T(F): 97.8 (06-09-21 @ 15:05), Max: 98.1 (06-08-21 @ 21:08)  HR: 75 (06-09-21 @ 15:05) (61 - 75)  BP: 105/86 (06-09-21 @ 15:05) (105/86 - 140/65)  RR: 18 (06-09-21 @ 15:05) (18 - 18)  SpO2: 97% (06-09-21 @ 15:05) (97% - 100%)  Wt(kg): --    GENERAL: NAD,   HEAD:  Atraumatic, Normocephalic  EYES: EOMI, PERRLA, conjunctiva and sclera clear  NECK: Supple, No JVD, no LAD  CHEST/LUNG: cta b/l, resp unlabored  HEART: Regular rate and rhythm; No murmurs, rubs, or gallops  ABDOMEN: Soft, Nontender, Nondistended; Bowel sounds present, no HSM  EXTREMITIES:  2+ Peripheral Pulses, No clubbing, cyanosis, Chronic LE nonpitting  edema  PSYCH: AAOx3, normal behavior  NEUROLOGY: non-focal, sensory and cn 2-12 intact  SKIN: No visible rashes or lesions  < from: 12 Lead ECG (06.02.21 @ 10:40) >  Ventricular Rate 69 BPM    Atrial Rate 69 BPM    P-R Interval 212 ms    QRS Duration 88 ms    Q-T Interval 402 ms    QTC Calculation(Bazett) 430 ms    P Axis 22 degrees    R Axis -3 degrees    T Axis 57 degrees    Diagnosis Line Sinus rhythm with 1st degree A-V block  Otherwise normal ECG  No previous ECGs available    < end of copied text >    Troponin I, Serum (06.02.21 @ 11:33)    Troponin I, Serum: <0.015: High Sensitivity Troponin and new reference  range effective 7/6/2016 ng/mL  06-09    141  |  111<H>  |  39<H>  ----------------------------<  151<H>  4.1   |  24  |  1.15    Ca    8.4<L>      09 Jun 2021 07:30    TPro  5.8<L>  /  Alb  2.1<L>  /  TBili  0.2  /  DBili  x   /  AST  10<L>  /  ALT  10<L>  /  AlkPhos  74  06-09                        9.5    3.50  )-----------( 183      ( 09 Jun 2021 07:30 )             29.8       LIVER FUNCTIONS - ( 09 Jun 2021 07:30 )  Alb: 2.1 g/dL / Pro: 5.8 gm/dL / ALK PHOS: 74 U/L / ALT: 10 U/L / AST: 10 U/L / GGT: x                         LIVER FUNCTIONS - ( 07 Jun 2021 21:28 )  Alb: 2.2 g/dL / Pro: 5.7 gm/dL / ALK PHOS: 74 U/L / ALT: 10 U/L / AST: 7 U/L / GGT: x           < from: Xray Chest 1 View- PORTABLE-Routine (Xray Chest 1 View- PORTABLE-Routine in AM.) (06.05.21 @ 09:30) >  FINDINGS:  LEFT chest tube overlies LEFT lung base.  The lungs show LEFT retrocardiac airspace consolidation/atelectasis.  RIGHT lung parenchyma clear.  . No pneumothorax.    The  heart is enlarged intransverse diameter. No hilar mass.     Visualized osseous structures are intact.    IMPRESSION: LEFT chest tube in place.  Residual LEFT retrocardiac airspace consolidation/LEFT lower lobe atelectasis.    < end of copied text >      MEDICATIONS  (STANDING):  apixaban 5 milliGRAM(s) Oral every 12 hours  dextrose 40% Gel 15 Gram(s) Oral once  dextrose 5%. 1000 milliLiter(s) (50 mL/Hr) IV Continuous <Continuous>  dextrose 5%. 1000 milliLiter(s) (100 mL/Hr) IV Continuous <Continuous>  dextrose 50% Injectable 25 Gram(s) IV Push once  dextrose 50% Injectable 12.5 Gram(s) IV Push once  dextrose 50% Injectable 25 Gram(s) IV Push once  finasteride 5 milliGRAM(s) Oral daily  glucagon  Injectable 1 milliGRAM(s) IntraMuscular once  insulin glargine Injectable (LANTUS) 13 Unit(s) SubCutaneous at bedtime  insulin lispro (ADMELOG) corrective regimen sliding scale   SubCutaneous three times a day before meals  insulin lispro (ADMELOG) corrective regimen sliding scale   SubCutaneous at bedtime  lidocaine   Patch 1 Patch Transdermal daily  lidocaine   Patch 2 Patch Transdermal daily  melatonin 5 milliGRAM(s) Oral at bedtime  pantoprazole    Tablet 40 milliGRAM(s) Oral daily  tamsulosin 0.4 milliGRAM(s) Oral at bedtime    MEDICATIONS  (PRN):  acetaminophen   Tablet .. 650 milliGRAM(s) Oral every 6 hours PRN Mild Pain (1 - 3)  meperidine     Injectable 12.5 milliGRAM(s) IV Push once PRN rigors  morphine  - Injectable 2 milliGRAM(s) IV Push every 6 hours PRN Severe Pain (7 - 10)  oxycodone    5 mG/acetaminophen 325 mG 1 Tablet(s) Oral every 4 hours PRN Mild Pain (1 - 3)  oxycodone    5 mG/acetaminophen 325 mG 2 Tablet(s) Oral every 4 hours PRN Moderate Pain (4 - 6)  senna 2 Tablet(s) Oral at bedtime PRN Constipation  traMADol 50 milliGRAM(s) Oral every 8 hours PRN Moderate Pain (4 - 6)

## 2021-06-10 ENCOUNTER — TRANSCRIPTION ENCOUNTER (OUTPATIENT)
Age: 86
End: 2021-06-10

## 2021-06-10 VITALS
OXYGEN SATURATION: 98 % | DIASTOLIC BLOOD PRESSURE: 47 MMHG | RESPIRATION RATE: 18 BRPM | SYSTOLIC BLOOD PRESSURE: 125 MMHG | TEMPERATURE: 97 F | HEART RATE: 61 BPM

## 2021-06-10 LAB
ANION GAP SERPL CALC-SCNC: 6 MMOL/L — SIGNIFICANT CHANGE UP (ref 5–17)
BASOPHILS # BLD AUTO: 0.03 K/UL — SIGNIFICANT CHANGE UP (ref 0–0.2)
BASOPHILS NFR BLD AUTO: 0.7 % — SIGNIFICANT CHANGE UP (ref 0–2)
BUN SERPL-MCNC: 32 MG/DL — HIGH (ref 7–23)
CALCIUM SERPL-MCNC: 8.2 MG/DL — LOW (ref 8.5–10.1)
CHLORIDE SERPL-SCNC: 110 MMOL/L — HIGH (ref 96–108)
CO2 SERPL-SCNC: 24 MMOL/L — SIGNIFICANT CHANGE UP (ref 22–31)
CREAT SERPL-MCNC: 1.1 MG/DL — SIGNIFICANT CHANGE UP (ref 0.5–1.3)
EOSINOPHIL # BLD AUTO: 0.2 K/UL — SIGNIFICANT CHANGE UP (ref 0–0.5)
EOSINOPHIL NFR BLD AUTO: 4.6 % — SIGNIFICANT CHANGE UP (ref 0–6)
GLUCOSE SERPL-MCNC: 148 MG/DL — HIGH (ref 70–99)
HCT VFR BLD CALC: 30.5 % — LOW (ref 39–50)
HGB BLD-MCNC: 9.8 G/DL — LOW (ref 13–17)
IMM GRANULOCYTES NFR BLD AUTO: 0.5 % — SIGNIFICANT CHANGE UP (ref 0–1.5)
LYMPHOCYTES # BLD AUTO: 0.88 K/UL — LOW (ref 1–3.3)
LYMPHOCYTES # BLD AUTO: 20 % — SIGNIFICANT CHANGE UP (ref 13–44)
MCHC RBC-ENTMCNC: 30.2 PG — SIGNIFICANT CHANGE UP (ref 27–34)
MCHC RBC-ENTMCNC: 32.1 GM/DL — SIGNIFICANT CHANGE UP (ref 32–36)
MCV RBC AUTO: 93.8 FL — SIGNIFICANT CHANGE UP (ref 80–100)
MONOCYTES # BLD AUTO: 0.82 K/UL — SIGNIFICANT CHANGE UP (ref 0–0.9)
MONOCYTES NFR BLD AUTO: 18.7 % — HIGH (ref 2–14)
NEUTROPHILS # BLD AUTO: 2.44 K/UL — SIGNIFICANT CHANGE UP (ref 1.8–7.4)
NEUTROPHILS NFR BLD AUTO: 55.5 % — SIGNIFICANT CHANGE UP (ref 43–77)
PLATELET # BLD AUTO: 189 K/UL — SIGNIFICANT CHANGE UP (ref 150–400)
POTASSIUM SERPL-MCNC: 4 MMOL/L — SIGNIFICANT CHANGE UP (ref 3.5–5.3)
POTASSIUM SERPL-SCNC: 4 MMOL/L — SIGNIFICANT CHANGE UP (ref 3.5–5.3)
RBC # BLD: 3.25 M/UL — LOW (ref 4.2–5.8)
RBC # FLD: 14.3 % — SIGNIFICANT CHANGE UP (ref 10.3–14.5)
SODIUM SERPL-SCNC: 140 MMOL/L — SIGNIFICANT CHANGE UP (ref 135–145)
WBC # BLD: 4.39 K/UL — SIGNIFICANT CHANGE UP (ref 3.8–10.5)
WBC # FLD AUTO: 4.39 K/UL — SIGNIFICANT CHANGE UP (ref 3.8–10.5)

## 2021-06-10 PROCEDURE — 99239 HOSP IP/OBS DSCHRG MGMT >30: CPT

## 2021-06-10 PROCEDURE — 99232 SBSQ HOSP IP/OBS MODERATE 35: CPT

## 2021-06-10 RX ORDER — METOPROLOL TARTRATE 50 MG
1 TABLET ORAL
Qty: 0 | Refills: 0 | DISCHARGE

## 2021-06-10 RX ORDER — VALSARTAN 80 MG/1
1 TABLET ORAL
Qty: 0 | Refills: 0 | DISCHARGE

## 2021-06-10 RX ORDER — LIDOCAINE 4 G/100G
2 CREAM TOPICAL
Qty: 0 | Refills: 0 | DISCHARGE
Start: 2021-06-10

## 2021-06-10 RX ORDER — ACETAMINOPHEN 500 MG
2 TABLET ORAL
Qty: 0 | Refills: 0 | DISCHARGE
Start: 2021-06-10

## 2021-06-10 RX ORDER — CHOLECALCIFEROL (VITAMIN D3) 125 MCG
2000 CAPSULE ORAL
Qty: 0 | Refills: 0 | DISCHARGE
Start: 2021-06-10

## 2021-06-10 RX ORDER — SENNA PLUS 8.6 MG/1
2 TABLET ORAL
Qty: 0 | Refills: 0 | DISCHARGE
Start: 2021-06-10

## 2021-06-10 RX ADMIN — APIXABAN 5 MILLIGRAM(S): 2.5 TABLET, FILM COATED ORAL at 10:13

## 2021-06-10 RX ADMIN — Medication 2000 UNIT(S): at 10:14

## 2021-06-10 RX ADMIN — PANTOPRAZOLE SODIUM 40 MILLIGRAM(S): 20 TABLET, DELAYED RELEASE ORAL at 10:14

## 2021-06-10 RX ADMIN — LIDOCAINE 2 PATCH: 4 CREAM TOPICAL at 10:14

## 2021-06-10 RX ADMIN — OXYCODONE AND ACETAMINOPHEN 1 TABLET(S): 5; 325 TABLET ORAL at 10:22

## 2021-06-10 RX ADMIN — Medication 4: at 11:35

## 2021-06-10 RX ADMIN — FINASTERIDE 5 MILLIGRAM(S): 5 TABLET, FILM COATED ORAL at 10:13

## 2021-06-10 RX ADMIN — Medication 2: at 08:09

## 2021-06-10 RX ADMIN — LIDOCAINE 1 PATCH: 4 CREAM TOPICAL at 10:14

## 2021-06-10 NOTE — DISCHARGE NOTE NURSING/CASE MANAGEMENT/SOCIAL WORK - PATIENT PORTAL LINK FT
You can access the FollowMyHealth Patient Portal offered by [facility] by registering at the following website: [email or web address]. By joining [facility]’s FollowMyHealth portal, you will also be able to view your health information using other applications (apps) compatible with our system.

## 2021-06-10 NOTE — PROGRESS NOTE ADULT - SUBJECTIVE AND OBJECTIVE BOX
Subjective:  Pt seen, w almaraz catheter. Denies SOB, on RA.    Vital Signs:  Vital Signs Last 24 Hrs  T(C): 36.4 (06-10-21 @ 09:11), Max: 36.9 (06-09-21 @ 21:26)  T(F): 97.5 (06-10-21 @ 09:11), Max: 98.4 (06-09-21 @ 21:26)  HR: 60 (06-10-21 @ 09:11) (60 - 78)  BP: 136/50 (06-10-21 @ 09:11) (105/86 - 136/50)  RR: 16 (06-10-21 @ 09:11) (16 - 18)  SpO2: 99% (06-10-21 @ 09:11) (97% - 99%) on (O2)    Telemetry/Alarms:    Relevant labs, radiology and Medications reviewed                        9.8    4.39  )-----------( 189      ( 10 Nam 2021 07:10 )             30.5     06-10    140  |  110<H>  |  32<H>  ----------------------------<  148<H>  4.0   |  24  |  1.10    Ca    8.2<L>      10 Nam 2021 07:10    TPro  5.8<L>  /  Alb  2.1<L>  /  TBili  0.2  /  DBili  x   /  AST  10<L>  /  ALT  10<L>  /  AlkPhos  74  06-09      MEDICATIONS  (STANDING):  apixaban 5 milliGRAM(s) Oral every 12 hours  cholecalciferol 2000 Unit(s) Oral daily  dextrose 40% Gel 15 Gram(s) Oral once  dextrose 5%. 1000 milliLiter(s) (50 mL/Hr) IV Continuous <Continuous>  dextrose 5%. 1000 milliLiter(s) (100 mL/Hr) IV Continuous <Continuous>  dextrose 50% Injectable 12.5 Gram(s) IV Push once  dextrose 50% Injectable 25 Gram(s) IV Push once  dextrose 50% Injectable 25 Gram(s) IV Push once  finasteride 5 milliGRAM(s) Oral daily  glucagon  Injectable 1 milliGRAM(s) IntraMuscular once  insulin glargine Injectable (LANTUS) 13 Unit(s) SubCutaneous at bedtime  insulin lispro (ADMELOG) corrective regimen sliding scale   SubCutaneous three times a day before meals  insulin lispro (ADMELOG) corrective regimen sliding scale   SubCutaneous at bedtime  lidocaine   Patch 1 Patch Transdermal daily  lidocaine   Patch 2 Patch Transdermal daily  melatonin 5 milliGRAM(s) Oral at bedtime  pantoprazole    Tablet 40 milliGRAM(s) Oral daily  tamsulosin 0.4 milliGRAM(s) Oral at bedtime    MEDICATIONS  (PRN):  acetaminophen   Tablet .. 650 milliGRAM(s) Oral every 6 hours PRN Mild Pain (1 - 3)  morphine  - Injectable 2 milliGRAM(s) IV Push every 6 hours PRN Severe Pain (7 - 10)  oxycodone    5 mG/acetaminophen 325 mG 2 Tablet(s) Oral every 4 hours PRN Moderate Pain (4 - 6)  oxycodone    5 mG/acetaminophen 325 mG 1 Tablet(s) Oral every 4 hours PRN Mild Pain (1 - 3)  senna 2 Tablet(s) Oral at bedtime PRN Constipation      Physical exam  Gen NAD  Neuro AAox3  Card RRR  Pulm equal  Abd soft, obese  Ext warm, edema, venous stasis changes    Tubes: Left pleurx under sterile dressing, no hematoma palpated.    I&O's Summary    09 Jun 2021 07:01  -  10 Nam 2021 07:00  --------------------------------------------------------  IN: 0 mL / OUT: 1650 mL / NET: -1650 mL        Assessment  89y Male  w/ PAST MEDICAL & SURGICAL HISTORY:  DM (diabetes mellitus)    HTN (hypertension)    HLD (hyperlipidemia)    Kidney stone    Hernia    Afib    BPH (benign prostatic hyperplasia)    Follicular lymphoma    H/O left inguinal hernia repair    Endoscopy finding  choked on piece of chiken-had upper endo with clearance of food    History of tibial fracture  R tibial fracture s/p revision with hardware placement ~3-4yrs ago, per pt    admitted with complaints of Patient is a 89y old  Male who presents with a chief complaint of OCHOA (10 Nam 2021 08:06)  .  89 yo male with h/o DM (on insulin), HTN, hiatal hernia, chronic L pleural effusion (of unknown cause), h/o kidney stones, R Tibial/fibular fracture repair with hardwood insertion 3-4yrs ago, CKD2-3, BPH, h/o DVT on eliquis,  s/p pigtail 4/26, exudative now admitted 6/2 w SOB, OCHOA, chronic left effusion recurrence. S/P L pleurX placement 6/4.     Drain pleurX three times per week (mon, wed, fri) up to one liter    Discussed with Cardiothoracic Team at AM rounds.

## 2021-06-10 NOTE — PROGRESS NOTE ADULT - PROVIDER SPECIALTY LIST ADULT
Hospitalist
Palliative Care
Pulmonology
Pulmonology
Thoracic Surgery
Hospitalist
Pulmonology
Thoracic Surgery
Thoracic Surgery
Urology
Hospitalist
Hospitalist
Palliative Care
Pulmonology
Thoracic Surgery
Urology
Heme/Onc
Hospitalist
Hospitalist
Heme/Onc
Pulmonology

## 2021-06-10 NOTE — DISCHARGE NOTE PROVIDER - NSDCMRMEDTOKEN_GEN_ALL_CORE_FT
acetaminophen 325 mg oral tablet: 2 tab(s) orally every 6 hours, As needed, Mild Pain (1 - 3)  Admelog SoloStar 100 units/mL injectable solution: Sliding Scale  Basaglar KwikPen 100 units/mL subcutaneous solution: 13 unit(s) subcutaneous once a day (at bedtime)  cholecalciferol oral tablet: 2000 unit(s) orally once a day  Eliquis 5 mg oral tablet: 1 tab(s) orally 2 times a day  finasteride 5 mg oral tablet: 1 tab(s) orally once a day  Flomax 0.4 mg oral capsule: 1 cap(s) orally once a day   glimepiride 2 mg oral tablet: 1 tab(s) orally 2 times a day  lidocaine 5% topical film: Apply topically to affected area once a day  melatonin 5 mg oral tablet: 1 tab(s) orally once a day (at bedtime)  omeprazole 40 mg oral delayed release capsule: 1 cap(s) orally once a day  senna oral tablet: 2 tab(s) orally once a day (at bedtime), As needed, Constipation  Tradjenta 5 mg oral tablet: 1 tab(s) orally once a day  traMADol 50 mg oral tablet: 1 tab(s) orally once a day

## 2021-06-10 NOTE — DISCHARGE NOTE PROVIDER - CARE PROVIDER_API CALL
MARK Rivera (DO)  Pulmonary Disease; Sleep Medicine  [address]  [place] [address]  Phone: [phone]  Fax: [phone]  Follow Up Time: 1 week    Luisito Deshpande)  Urology  [address] [facility], [address] [place] [address]  Phone: [phone]  Fax: [phone]  Follow Up Time: 1 week    Ho Ny)  Medical Oncology  [address] [facility], [address] [place] [address]  Phone: [phone]  Fax: [phone]  Follow Up Time: 1 week    PCP,   Phone: (   )    -  Fax: (   )    -  Follow Up Time: 1-3 days

## 2021-06-10 NOTE — PROGRESS NOTE ADULT - ASSESSMENT
88 yo male with h/o DM (on insulin), HTN, hiatal hernia, chronic L pleural effusion (of unknown cause), h/o kidney stones, R Tibial/fibular fracture repair with hardwood insertion 3-4yrs ago, CKD2-3, BPH, h/o DVT on eliquis,  s/p pigtail 4/26, exudative now admitted 6/2 w SOB, OCHOA, chronic left effusion recurrence. S/P L pleurX placement 6/4.     PleurX capped   Drain pleurX three times per week (mon, wed, fri) up to one liter  Will require home care  Pain control  IS/OOB/PT    Discussed with Dr. Wood
89 y.o. 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  Pt is forgetful, denies any complaints, + chronic LE edema.  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
89 y.o. 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  Pt is forgetful, denies any complaints, + chronic LE edema.  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
89-year-old male with  Mesenteric mass biopsy proven follicular lymphoma grade 1  Recurrent left pleural effusion, s/p Thoracentesis x2 now with Pleurx catheter in place    I have explained to the patient that I believe his follicular lymphoma may be causing this recurrent Unilateral Exudate  I proposed treatment with monoclonal antibody rituximab  Patient states that the recurrent exudate is very problematic and he wants anything that can be done to stop it from reoccurring    I explained that rituximab is a monoclonal antibody used for malignancies  I discussed the potential toxicities which can even include an anaphylactic reaction leading to death although this is very rare  I explained the first dose can be given in the hospital or in the outpatient setting and that certainly future doses would be given in our office  off [facility]  I have reviewed this with his daughter Ally via telephone last night and I suggested he speak to her about it as well    Plan  Check serologies for hepatitis B and C and QuantiFERON gold prior to administration of rituximab  Check iron B12 folate haptoglobin in light of anemia  If all is good transfer to [facility] and administer first dose of rituximab Monday
89y old Male with hx of T2DM, HTN, hiatal hernia, chronic left pleural effusion, CKD, 5th admission in a year, with recent admit 5/14-5/21 for mesenteric mass later dx as Follicular Lymphoma. Pt now readmitted from surgical follow up appointment for OCHOA. Found to have fluctuating O2 sat with activity and CXR on admission showing reaccumulated L pleural effusion. Palliative Care consulted to assist with establishing GOC.    1) Dyspnea: recurrent chronic L Pleural effusion  - recurrent  - last tapped few weeks ago and reaccumulated  - as per pt and record, cytology negative  - pulm notes appreciated- noting dyspnea due to effusion and atelectasis, but adequate oxygenation on room air, and agreement with plan for pleurX  - CTS consulted- s/p VATS, thoracentesis, and pleurX placemen  - breathing comfortably on RA    3) Pain  - seems to be after pleurX placement  - c/w percocet 1-2 tabs prn mild and mod pain respectively  - ok to c/w morphine, but pt has not needed this  - would stick with po meds, as pt tolerating without issues    4) Follicular Lymphoma  - Mesenteric mass dx as Follicular Lymphoma since last admission  - onc consulted, plan for initiation of Rituxan here today?  - pt thinks he may be interested in treatement, and wants to hear his options    5) Debility  - as per care coordination notes pt lives at [facility], independent, but also as per  this am staff at facility also shared that pt having some trouble with administering his own meds with mild decline  - suggest nutrition consult given report of weight loss   - PT consulted - min assist, rec for skilled nursing + PT vs. JOELLE    6) Prognosis  - guarded  - new dx of lymphoma recurrent chronic effusion, w/ decent functional status (PPS around 40%), some evidence of malnutrition on exam, at inc risk for further complications. Await oncology consult cc: further input on true prognosis    7) GOC/Advanced Directives  - pt has capacity for decision making  - HCP on file naming son Kenyon Brooks ([id number]) but pt defers to his daughter Ally Robles [phone]  - full code, needs to be discussed  - GOC meeting scheduled for today at 11am as daughter. See GOC note that will follow    Thank you for including us in Mr. Brooks's care. Will continue to follow with you.    Tamanna Hay MD  Palliative Care Attending
Gastroparesis: Care Instructions Your Care Instructions When you have gastroparesis, your stomach takes a lot longer to empty. This delay can cause belly pain, bloating, and belching. It also can cause hiccups, heartburn, nausea or vomiting. You may not feel like eating. These symptoms may come and go. They most often occur during and after meals. You may feel full after only a few bites of food. This condition occurs when the nerves to the stomach don't work properly. Diabetes is the most common cause of this nerve damage. Gastroparesis can make it harder to control your blood sugar levels. But keeping your blood sugar levels under control may help with your symptoms. Parkinson's disease, stroke, and some medicines can also cause this condition. Home treatment can often help. Follow-up care is a key part of your treatment and safety. Be sure to make and go to all appointments, and call your doctor if you are having problems. It's also a good idea to know your test results and keep a list of the medicines you take. How can you care for yourself at home? · Eat several small meals each day rather than three large meals. · Eat foods that are low in fiber and fat. · If your doctor suggests it, take medicines that help the stomach empty more quickly. These are called motility agents. When should you call for help? Call your doctor now or seek immediate medical care if: 
  · You are vomiting.  
  · You have new or worse belly pain.  
  · You have a fever.  
  · You cannot pass stools or gas. Watch closely for changes in your health, and be sure to contact your doctor if you have any problems. Where can you learn more? Go to http://www.gray.com/ Enter M106 in the search box to learn more about \"Gastroparesis: Care Instructions. \" Current as of: April 15, 2020               Content Version: 12.6 © 3070-9219 [employer].
· Assessment	  89 y.o. 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    1. Reaccumulation of  left sided pleural effusion - mild to moderate with minimal drop in SpO2 on exertion only to % -  exudative on last admit  suspect malignant with newly diagnosed lymphoma, HF had been ruled out on last admit (seen by cardio, echo wnl)   - CTS eval, s/p thoracentesis, pleurx  today   - resume  eliquis  - pt had TTE on last admit, wnl  - will request cardio to comment if HF definitely ruled out  - appreciate pulm f/u    2. Follicular lymphoma -  per d/w Onc, plan to start pt on Rituxan on 6/7 (monday), transfer to   - pending cytology of pl fluid   -am labs    3. DMII - HISS + lantus  - monitor FS, titrate insulin as needed    3a. CKD3- appears to be at baseline  - monitor labs     4. HTN/HLD/BPH - cont current tx    5. 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 GERD - PPI    7.  atrial flutter as per cardiology on last admission - in SR now
89y old Male with hx of T2DM, HTN, hiatal hernia, chronic left pleural effusion, CKD, 5th admission in a year, with recent admit 5/14-5/21 for mesenteric mass later dx as Follicular Lymphoma. Pt now readmitted from surgical follow up appointment for OCHOA. Found to have fluctuating O2 sat with activity and CXR on admission showing reaccumulated L pleural effusion. Palliative Care consulted to assist with establishing GOC.    1) Dyspnea: recurrent chronic L Pleural effusion  - recurrent  - last tapped few weeks ago and reaccumulated  - as per pt and record, cytology negative  - pulm notes appreciated- noting dyspnea due to effusion and atelectasis, but adequate oxygenation on room air, and agreement with plan for pleurX  - CTS consulted- s/p VATS, thoracentesis, and pleurX placemen  - breathing comfortably on RA    3) Pain  - seems to be after pleurX placement  - c/w percocet 1-2 tabs prn mild and mod pain respectively  - ok to c/w morphine, but pt has not needed this  - would stick with po meds, as pt tolerating without issues    4) Follicular Lymphoma  - Mesenteric mass dx as Follicular Lymphoma since last admission  - onc consulted, plan for initiation of Rituxan here. Tolerated well    5) Debility  - as per care coordination notes pt lives at [facility], independent, but also as per  this am staff at facility also shared that pt having some trouble with administering his own meds with mild decline  - suggest nutrition consult given report of weight loss   - PT consulted - min assist, rec for [facility] + PT vs. JOELLE    6) Prognosis  - guarded  - new dx of lymphoma recurrent chronic effusion, w/ decent functional status (PPS around 40%), some evidence of malnutrition on exam, at inc risk for further complications. Await oncology consult cc: further input on true prognosis    7) GOC/Advanced Directives  - pt has capacity for decision making  - HCP on file naming son Kenyon Brooks ([id number]) but pt defers to his daughter Ally Robles [phone]  - MOLST completed 6/7: CPR, limited, trial of intubation, send to hospital, no feeding tube, trial of IVF, determine use of abx  - GOC meeting held 6/7: plan for return to [facility] with home pall when deemed medically stable for dc. See GOC note from that day for further details.    Thank you for including us in Mr. Brooks's care. Will continue to follow with you.    Tamanna Hay MD  Palliative Care Attending  
Care instructions adapted under license by [employer] (which disclaims liability or warranty for this information). If you have questions about a medical condition or this instruction, always ask your healthcare professional. [address] any warranty or liability for your use of this information.
89 y.o. 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  Pt is forgetful, denies any complaints, + chronic LE edema.  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  no clinical evidence of CHF / Pneumonia  elevated D Dimer (bordferline) is non specific / on AC  Pleurx placed 6/4    fu appt with Dr FAULKNER as outpt in 2 weeks  fu cxr reveiwed  plan for Rituxan on 1 [place]  monitor clinically  DC planning tomorrow with Pleurex drainage #x per week / monitor CT output  6/9-6/10  seen and examined while in bed / RN staff at bedside  he tolerated Rituimab infusion well  no adverse side effects reported  urinary retention with Gu eval in progress  not using any O2 supplement  DC planning as per primary team  Monitor pleurex output TIW  see Dr FAULKNER in 2 weeks
89 y.o. 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  Pt is forgetful, denies any complaints, + chronic LE edema.  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
89 y.o. 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  Pt is forgetful, denies any complaints, + chronic LE edema.  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
88 yo male with h/o DM (on insulin), HTN, hiatal hernia, chronic L pleural effusion (of unknown cause), h/o kidney stones, R Tibial/fibular fracture repair with hardwood insertion 3-4yrs ago, CKD2-3, BPH, h/o DVT on eliquis,  s/p pigtail 4/26, exudative now admitted 6/2 w SOB, OCHOA, chronic left effusion recurrence. S/P L pleurX placement 6/4.     PleurX capped today  Drain pleurX three times per week (mon, wed, fri) up to one liter  Will require home care  Pain control  IS/OOB/PT    Discussed with Dr. Israel Burks PA  Thoracic #[id number]
 89 y.o. 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     Recurrent of  left sided exudative pleural effusion s/p Pleurx  - mild to moderate with minimal drop in SpO2 on exertion only to % -  exudative on last admit  suspect malignant with newly diagnosed lymphoma, HF had been ruled out on last admit (seen by cardio, echo wnl)   - CTS eval, s/p thoracentesis, now with pleurx drain , drain care-  MWF drain up to 1 L    - eliquis,  pt had TTE on last admit, wnl  -pulm following Dr. Abrams , check O2 on ambulation  Mesenteric mass bx confirmed  Follicular lymphoma  - per d/w Onc, plan to start pt on Rituxan today once transferred to   - oncology following  Urine retention s/p Almaraz   -almaraz placed after multiple episodes of retention, continue almaraz care, c/w flomax and finasteride   - urology consult   DMII with A1C 8.7 - HISS + lantus  - monitor FS, titrate insulin as needed  CKD3- appears to be at baseline   - monitor labs   Chronic LE edema with Hx of DVTs in the past on DOACs eliquis  GERD - PPI  atrial flutter as per cardiology on last admission - in SR now  anemia: Stable  Generalized weakness - check vitamin D, B12, folate, PT daily, d/c planning     dvt ppx:  -eliquis     Dispo:  -pending final oncology recommendations pt is to go home with home palliative and PT.    Palliative care meeting appreciated to discuss goals of care, full code      
 89 y.o. 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     Recurrent of  left sided exudative pleural effusion s/p Pleurx  - mild to moderate with minimal drop in SpO2 on exertion only to % -  exudative on last admit  suspect malignant with newly diagnosed lymphoma, HF had been ruled out on last admit (seen by cardio, echo wnl)   - CTS eval, s/p thoracentesis, now with pleurx drain , drain care-  MWF drain up to 1 L    - eliquis,  pt had TTE on last admit, wnl  -pulm following Dr. Abrams , check O2 on ambulation - no need for O2  Mesenteric mass bx confirmed  Follicular lymphoma  - per d/w Onc, plan to start pt on Rituxan today once transferred to   - oncology following d/w Dr. Zavala - ok to hold rituxan while in [facility] if patient needs [facility]   Urine retention s/p Almaraz   -almaraz placed after multiple episodes of retention, continue almaraz care, c/w flomax and finasteride   - urology consult - outpatient voiding trial    DMII with A1C 8.7 - HISS + lantus   - monitor FS, titrate insulin as needed  CKD3- appears to be at baseline   - monitor labs   Chronic LE edema with Hx of DVTs in the past on DOACs eliquis  GERD - PPI  atrial flutter as per cardiology on last admission - in SR now  anemia: Stable  Generalized weakness  Vitamin D deficiency   - start vitamin D  2000   - check vB12, folate, PT daily, d/c planning     dvt ppx:  -eliquis     Dispo: lives in assisted living, will need to be able to take care of Almaraz, most likely will go to [facility] , COVID neg 6/9   Palliative care meeting appreciated to discuss goals of care, full code      
89 y.o. 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  Pt is forgetful, denies any complaints, + chronic LE edema.  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  
89 y.o. 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  Pt is forgetful, denies any complaints, + chronic LE edema.  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
· Assessment	  89 y.o. 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    1. Reaccumulation of chronic exudative left sided pleural effusion - mild to moderate with minimal drop in SpO2 on exertion only to % - likely malignant with newly diagnosed lymphoma   - CTS eval, planned pleurex  6/4   - hold eliquis    2. Follicular lymphoma - Onc eval and palliative care as it was cancelled on last admission to wait for biopsy results    3. DMII - HISS + lantus    4. HTN/HLD/BPH - cont current tx    5. 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 GERD - PPI    7. Hx of atrial flutter as per cardiology on last admission - in SR now

## 2021-06-10 NOTE — DISCHARGE NOTE PROVIDER - PROVIDER TOKENS
PROVIDER:[TOKEN:[[id number]],FOLLOWUP:[1 week]],PROVIDER:[TOKEN:[[id number]],FOLLOWUP:[1 week]],PROVIDER:[TOKEN:[[id number]],FOLLOWUP:[1 week]],FREE:[LAST:[PCP],PHONE:[(   )    -],FAX:[(   )    -],FOLLOWUP:[1-3 days]]

## 2021-06-10 NOTE — PROGRESS NOTE ADULT - NSICDXPILOT_GEN_ALL_CORE
[place]

## 2021-06-10 NOTE — DISCHARGE NOTE PROVIDER - CARE PROVIDERS DIRECT ADDRESSES
,DirectAddress_Unknown,[email or web address],DirectAddress_Unknown

## 2021-06-10 NOTE — DISCHARGE NOTE PROVIDER - NSDCCPCAREPLAN_GEN_ALL_CORE_FT
PRINCIPAL DISCHARGE DIAGNOSIS  Diagnosis: Pleural effusion  Assessment and Plan of Treatment: s/p right pleurex, drain MWF up to 1 L , follow upw [facility] pulmonologist within 1 week      SECONDARY DISCHARGE DIAGNOSES  Diagnosis: BPH (benign prostatic hyperplasia)  Assessment and Plan of Treatment: s/p Almaraz, almaraz care, follow upw [facility] urologist within 1 week    Diagnosis: Lymphoma  Assessment and Plan of Treatment: s/p Rituxan, follow up with Dr. Zavala  within 1-2 week for further management    Diagnosis: Vitamin D deficiency  Assessment and Plan of Treatment: take vitamin D 2000, check level in 4 weeks

## 2021-06-10 NOTE — PROGRESS NOTE ADULT - SUBJECTIVE AND OBJECTIVE BOX
Patient is a 89y old  Male who presents with a chief complaint of OCHOA (2021 17:59)      HPI:  89 y.o. 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  Pt is forgetful, denies any complaints, + chronic LE edema.  Other ROS reviewed and neg  (2021 16:01)  pt is known to me from last admission  My office has been reaching out to him for the past week to fu on recent mesenteric mass bx done as wella sPF cytology / recurrent fulid accumulation  exudative effusion with 2 prior thoracentesis large volume done  he does not feel sob at rest  OCHOA      No acute pulmonary events occurred overnight  Underwent Pleurx   Atrium removed   Noted to have urinary retention       pt is seen and examined with his RN at bedside  no difficulty breathing  asks about going home    transferred to [address] [place] [address] for trial of Rituxan therapy  pleurex in place  uneventful overnight  oncology eval noted      seen and examined while in bed / RN staff at bedside  he tolerated Rituimab infusion well  no adverse side effects reported  feels good  not using any O2 supplement  6/10  data discussed with DR Oglesby  urinary retention issues  PREVIOUS DIAGNOSTIC TESTING:      MEDICATIONS  (STANDING):  apixaban 5 milliGRAM(s) Oral every 12 hours  cholecalciferol 2000 Unit(s) Oral daily  dextrose 40% Gel 15 Gram(s) Oral once  dextrose 5%. 1000 milliLiter(s) (100 mL/Hr) IV Continuous <Continuous>  dextrose 5%. 1000 milliLiter(s) (50 mL/Hr) IV Continuous <Continuous>  dextrose 50% Injectable 25 Gram(s) IV Push once  dextrose 50% Injectable 12.5 Gram(s) IV Push once  dextrose 50% Injectable 25 Gram(s) IV Push once  finasteride 5 milliGRAM(s) Oral daily  glucagon  Injectable 1 milliGRAM(s) IntraMuscular once  insulin glargine Injectable (LANTUS) 13 Unit(s) SubCutaneous at bedtime  insulin lispro (ADMELOG) corrective regimen sliding scale   SubCutaneous three times a day before meals  insulin lispro (ADMELOG) corrective regimen sliding scale   SubCutaneous at bedtime  lidocaine   Patch 1 Patch Transdermal daily  lidocaine   Patch 2 Patch Transdermal daily  melatonin 5 milliGRAM(s) Oral at bedtime  pantoprazole    Tablet 40 milliGRAM(s) Oral daily  tamsulosin 0.4 milliGRAM(s) Oral at bedtime      MEDICATIONS  (PRN):  acetaminophen   Tablet .. 650 milliGRAM(s) Oral every 6 hours PRN Mild Pain (1 - 3)  traMADol 50 milliGRAM(s) Oral every 8 hours PRN Moderate Pain (4 - 6)      FAMILY HISTORY:  No pertinent family history in first degree relatives  pt cannot recall any history of disease      Vital Signs Last 24 Hrs  T(C): 36.9 (2021 21:26), Max: 36.9 (2021 21:26)  T(F): 98.4 (2021 21:26), Max: 98.4 (2021 21:26)  HR: 78 (2021 21:26) (61 - 78)  BP: 131/50 (2021 21:26) (105/86 - 140/65)  BP(mean): --  RR: 18 (2021 21:26) (18 - 18)  SpO2: 97% (2021 21:26) (97% - 99%)  PHYSICAL EXAM  General Appearance: cooperative, no acute distress,   HEENT: PERRL, conjunctiva clear, EOM's intact, non injected pharynx, no exudate, TM   normal  Neck: Supple, , no adenopathy, thyroid: not enlarged, no carotid bruit or JVD  Back: Symmetric, no  tenderness,no soft tissue tenderness  Lungs: decreased breath sounds left mid- lower lung filed/ dull ness left base, pleurx in left   Heart: Regular rate and rhythm, S1, S2 normal, no murmur, rub or gallop  Abdomen: Soft, non-tender, bowel sounds active , no hepatosplenomegaly  Extremities: no cyanosis or edema, no joint swelling  Skin: Skin color, texture normal, no rashes   Neurologic: non focal    ECG:  < from: 12 Lead ECG (21 @ 10:40) >  Ventricular Rate 69 BPM    Atrial Rate 69 BPM    P-R Interval 212 ms    QRS Duration 88 ms    Q-T Interval 402 ms    QTC Calculation(Bazett) 430 ms    P Axis 22 degrees    R Axis -3 degrees    T Axis 57 degrees    Diagnosis Line Sinus rhythm with 1st degree A-V block  Otherwise normal ECG  No previous ECGs available    < end of copied text >    LABS:                          11.4   6.64  )-----------( 281      ( 2021 11:33 )             36.0     06-02    137  |  106  |  17  ----------------------------<  244<H>  5.2   |  25  |  1.34<H>    Ca    8.6      2021 11:33  Mg     2.4     06-02    TPro  7.1  /  Alb  3.1<L>  /  TBili  0.5  /  DBili  x   /  AST  26  /  ALT  18  /  AlkPhos  95  06-02    CARDIAC MARKERS ( 2021 11:33 )  <0.015 ng/mL / x     / x     / x     / x            Pro BNP  -- 06 @ 13:03  D Dimer  249 - @ 13:03  Pro BNP  239 - @ 11:33  D Dimer  --  @ 11:33      Urinalysis Basic - ( 2021 06:30 )    Color: Yellow / Appearance: Clear / S.020 / pH: x  Gluc: x / Ketone: Negative  / Bili: Negative / Urobili: Negative mg/dL   Blood: x / Protein: Negative mg/dL / Nitrite: Negative   Leuk Esterase: Negative / RBC: 11-25 /HPF / WBC 3-5   Sq Epi: x / Non Sq Epi: Occasional / Bacteria: Occasional        x< from: Xray Chest 2 Views PA/Lat (21 @ 11:19) >    PROCEDURE DATE:  2021          INTERPRETATION:  PA and lateral chest radiographs    COMPARISON: 2021 chest.    CLINICAL INFORMATION: Chest Pain.    FINDINGS:  There is a moderate LEFT pleural effusion and/or basilar airspace consolidation obscuring LEFT diaphragm contour. LEFT upper lobe and  RIGHT lung parenchyma clear.  The airway is midline.    The heart and mediastinum size and configuration are within the limits of normal.    The visualized osseus structures are intact.    IMPRESSION:    Moderate LEFT effusion and/or basilar airspace consolidation.    < end of copied text >      RADIOLOGY & ADDITIONAL STUDIES:    < from: Xray Chest 1 View- PORTABLE-Routine (Xray Chest 1 View- PORTABLE-Routine in AM.) (21 @ 09:30) >  INTERPRETATION:  Portable chest radiograph    CLINICAL INFORMATION: LEFT chest tube    TECHNIQUE:  Portable  AP view of the chest was obtained.    COMPARISON: 2021 chest available for review.    FINDINGS:  LEFT chest tube overlies LEFT lung base.  The lungs show LEFT retrocardiac airspace consolidation/atelectasis.  RIGHT lung parenchyma clear.  . No pneumothorax.    The  heart is enlarged intransverse diameter. No hilar mass.     Visualized osseous structures are intact.    IMPRESSION: LEFT chest tube in place.  Residual LEFT retrocardiac airspace consolidation/LEFT lower lobe atelectasis.    < end of copied text >

## 2021-06-10 NOTE — PROGRESS NOTE ADULT - REASON FOR ADMISSION
OCHOA
OCOHA
OCHOA
OCHOA  Recurrent pleural effusion  Follicular lymph
OCHOA

## 2021-06-17 NOTE — ED PROVIDER NOTE - MUSCULOSKELETAL, MLM
17-Jun-2021 03:17
Spine appears normal, range of motion is not limited, no muscle or joint tenderness

## 2021-06-19 DIAGNOSIS — E55.9 VITAMIN D DEFICIENCY, UNSPECIFIED: ICD-10-CM

## 2021-06-19 DIAGNOSIS — R33.8 OTHER RETENTION OF URINE: ICD-10-CM

## 2021-06-19 DIAGNOSIS — E78.5 HYPERLIPIDEMIA, UNSPECIFIED: ICD-10-CM

## 2021-06-19 DIAGNOSIS — Z79.01 LONG TERM (CURRENT) USE OF ANTICOAGULANTS: ICD-10-CM

## 2021-06-19 DIAGNOSIS — Z88.5 ALLERGY STATUS TO NARCOTIC AGENT: ICD-10-CM

## 2021-06-19 DIAGNOSIS — N40.1 BENIGN PROSTATIC HYPERPLASIA WITH LOWER URINARY TRACT SYMPTOMS: ICD-10-CM

## 2021-06-19 DIAGNOSIS — C82.93 FOLLICULAR LYMPHOMA, UNSPECIFIED, INTRA-ABDOMINAL LYMPH NODES: ICD-10-CM

## 2021-06-19 DIAGNOSIS — I48.91 UNSPECIFIED ATRIAL FIBRILLATION: ICD-10-CM

## 2021-06-19 DIAGNOSIS — Z79.4 LONG TERM (CURRENT) USE OF INSULIN: ICD-10-CM

## 2021-06-19 DIAGNOSIS — E44.0 MODERATE PROTEIN-CALORIE MALNUTRITION: ICD-10-CM

## 2021-06-19 DIAGNOSIS — I87.8 OTHER SPECIFIED DISORDERS OF VEINS: ICD-10-CM

## 2021-06-19 DIAGNOSIS — I25.10 ATHEROSCLEROTIC HEART DISEASE OF NATIVE CORONARY ARTERY WITHOUT ANGINA PECTORIS: ICD-10-CM

## 2021-06-19 DIAGNOSIS — E11.22 TYPE 2 DIABETES MELLITUS WITH DIABETIC CHRONIC KIDNEY DISEASE: ICD-10-CM

## 2021-06-19 DIAGNOSIS — I12.9 HYPERTENSIVE CHRONIC KIDNEY DISEASE WITH STAGE 1 THROUGH STAGE 4 CHRONIC KIDNEY DISEASE, OR UNSPECIFIED CHRONIC KIDNEY DISEASE: ICD-10-CM

## 2021-06-19 DIAGNOSIS — J98.11 ATELECTASIS: ICD-10-CM

## 2021-06-19 DIAGNOSIS — I48.92 UNSPECIFIED ATRIAL FLUTTER: ICD-10-CM

## 2021-06-19 LAB
CULTURE RESULTS: SIGNIFICANT CHANGE UP
SPECIMEN SOURCE: SIGNIFICANT CHANGE UP

## 2021-07-03 ENCOUNTER — EMERGENCY (EMERGENCY)
Facility: HOSPITAL | Age: 86
LOS: 0 days | Discharge: HOME | End: 2021-07-03
Attending: EMERGENCY MEDICINE
Payer: MEDICARE

## 2021-07-03 VITALS
SYSTOLIC BLOOD PRESSURE: 137 MMHG | HEART RATE: 77 BPM | OXYGEN SATURATION: 100 % | RESPIRATION RATE: 18 BRPM | TEMPERATURE: 98 F | DIASTOLIC BLOOD PRESSURE: 76 MMHG

## 2021-07-03 VITALS — WEIGHT: 197.09 LBS | HEIGHT: 72 IN

## 2021-07-03 DIAGNOSIS — Z79.4 LONG TERM (CURRENT) USE OF INSULIN: ICD-10-CM

## 2021-07-03 DIAGNOSIS — Z79.01 LONG TERM (CURRENT) USE OF ANTICOAGULANTS: ICD-10-CM

## 2021-07-03 DIAGNOSIS — Z86.718 PERSONAL HISTORY OF OTHER VENOUS THROMBOSIS AND EMBOLISM: ICD-10-CM

## 2021-07-03 DIAGNOSIS — Z79.899 OTHER LONG TERM (CURRENT) DRUG THERAPY: ICD-10-CM

## 2021-07-03 DIAGNOSIS — N40.1 BENIGN PROSTATIC HYPERPLASIA WITH LOWER URINARY TRACT SYMPTOMS: ICD-10-CM

## 2021-07-03 DIAGNOSIS — I48.91 UNSPECIFIED ATRIAL FIBRILLATION: ICD-10-CM

## 2021-07-03 DIAGNOSIS — N23 UNSPECIFIED RENAL COLIC: ICD-10-CM

## 2021-07-03 DIAGNOSIS — Z88.2 ALLERGY STATUS TO SULFONAMIDES: ICD-10-CM

## 2021-07-03 DIAGNOSIS — E78.5 HYPERLIPIDEMIA, UNSPECIFIED: ICD-10-CM

## 2021-07-03 DIAGNOSIS — E11.9 TYPE 2 DIABETES MELLITUS WITHOUT COMPLICATIONS: ICD-10-CM

## 2021-07-03 DIAGNOSIS — R10.32 LEFT LOWER QUADRANT PAIN: ICD-10-CM

## 2021-07-03 DIAGNOSIS — R19.8 OTHER SPECIFIED SYMPTOMS AND SIGNS INVOLVING THE DIGESTIVE SYSTEM AND ABDOMEN: Chronic | ICD-10-CM

## 2021-07-03 DIAGNOSIS — Z98.890 OTHER SPECIFIED POSTPROCEDURAL STATES: Chronic | ICD-10-CM

## 2021-07-03 DIAGNOSIS — Z88.6 ALLERGY STATUS TO ANALGESIC AGENT: ICD-10-CM

## 2021-07-03 DIAGNOSIS — Z86.19 PERSONAL HISTORY OF OTHER INFECTIOUS AND PARASITIC DISEASES: ICD-10-CM

## 2021-07-03 DIAGNOSIS — Z87.81 PERSONAL HISTORY OF (HEALED) TRAUMATIC FRACTURE: Chronic | ICD-10-CM

## 2021-07-03 DIAGNOSIS — I10 ESSENTIAL (PRIMARY) HYPERTENSION: ICD-10-CM

## 2021-07-03 LAB
ALBUMIN SERPL ELPH-MCNC: 2.5 G/DL — LOW (ref 3.3–5)
ALP SERPL-CCNC: 100 U/L — SIGNIFICANT CHANGE UP (ref 40–120)
ALT FLD-CCNC: 11 U/L — LOW (ref 12–78)
ANION GAP SERPL CALC-SCNC: 7 MMOL/L — SIGNIFICANT CHANGE UP (ref 5–17)
APPEARANCE UR: ABNORMAL
AST SERPL-CCNC: 9 U/L — LOW (ref 15–37)
BASOPHILS # BLD AUTO: 0.02 K/UL — SIGNIFICANT CHANGE UP (ref 0–0.2)
BASOPHILS NFR BLD AUTO: 0.2 % — SIGNIFICANT CHANGE UP (ref 0–2)
BILIRUB SERPL-MCNC: 0.3 MG/DL — SIGNIFICANT CHANGE UP (ref 0.2–1.2)
BILIRUB UR-MCNC: NEGATIVE — SIGNIFICANT CHANGE UP
BUN SERPL-MCNC: 21 MG/DL — SIGNIFICANT CHANGE UP (ref 7–23)
CALCIUM SERPL-MCNC: 8.7 MG/DL — SIGNIFICANT CHANGE UP (ref 8.5–10.1)
CHLORIDE SERPL-SCNC: 105 MMOL/L — SIGNIFICANT CHANGE UP (ref 96–108)
CO2 SERPL-SCNC: 23 MMOL/L — SIGNIFICANT CHANGE UP (ref 22–31)
COLOR SPEC: YELLOW — SIGNIFICANT CHANGE UP
CREAT SERPL-MCNC: 1.14 MG/DL — SIGNIFICANT CHANGE UP (ref 0.5–1.3)
DIFF PNL FLD: ABNORMAL
EOSINOPHIL # BLD AUTO: 0.13 K/UL — SIGNIFICANT CHANGE UP (ref 0–0.5)
EOSINOPHIL NFR BLD AUTO: 1.4 % — SIGNIFICANT CHANGE UP (ref 0–6)
GLUCOSE SERPL-MCNC: 223 MG/DL — HIGH (ref 70–99)
GLUCOSE UR QL: 1000 MG/DL
HCT VFR BLD CALC: 37.2 % — LOW (ref 39–50)
HGB BLD-MCNC: 11.6 G/DL — LOW (ref 13–17)
IMM GRANULOCYTES NFR BLD AUTO: 0.3 % — SIGNIFICANT CHANGE UP (ref 0–1.5)
KETONES UR-MCNC: NEGATIVE — SIGNIFICANT CHANGE UP
LEUKOCYTE ESTERASE UR-ACNC: ABNORMAL
LYMPHOCYTES # BLD AUTO: 0.5 K/UL — LOW (ref 1–3.3)
LYMPHOCYTES # BLD AUTO: 5.6 % — LOW (ref 13–44)
MCHC RBC-ENTMCNC: 29 PG — SIGNIFICANT CHANGE UP (ref 27–34)
MCHC RBC-ENTMCNC: 31.2 GM/DL — LOW (ref 32–36)
MCV RBC AUTO: 93 FL — SIGNIFICANT CHANGE UP (ref 80–100)
MONOCYTES # BLD AUTO: 0.68 K/UL — SIGNIFICANT CHANGE UP (ref 0–0.9)
MONOCYTES NFR BLD AUTO: 7.6 % — SIGNIFICANT CHANGE UP (ref 2–14)
NEUTROPHILS # BLD AUTO: 7.61 K/UL — HIGH (ref 1.8–7.4)
NEUTROPHILS NFR BLD AUTO: 84.9 % — HIGH (ref 43–77)
NITRITE UR-MCNC: NEGATIVE — SIGNIFICANT CHANGE UP
PH UR: 8 — SIGNIFICANT CHANGE UP (ref 5–8)
PLATELET # BLD AUTO: 255 K/UL — SIGNIFICANT CHANGE UP (ref 150–400)
POTASSIUM SERPL-MCNC: 4.2 MMOL/L — SIGNIFICANT CHANGE UP (ref 3.5–5.3)
POTASSIUM SERPL-SCNC: 4.2 MMOL/L — SIGNIFICANT CHANGE UP (ref 3.5–5.3)
PROT SERPL-MCNC: 6.9 GM/DL — SIGNIFICANT CHANGE UP (ref 6–8.3)
PROT UR-MCNC: 30 MG/DL
RBC # BLD: 4 M/UL — LOW (ref 4.2–5.8)
RBC # FLD: 14.3 % — SIGNIFICANT CHANGE UP (ref 10.3–14.5)
SARS-COV-2 RNA SPEC QL NAA+PROBE: SIGNIFICANT CHANGE UP
SODIUM SERPL-SCNC: 135 MMOL/L — SIGNIFICANT CHANGE UP (ref 135–145)
SP GR SPEC: 1.01 — SIGNIFICANT CHANGE UP (ref 1.01–1.02)
TROPONIN I SERPL-MCNC: <0.015 NG/ML — SIGNIFICANT CHANGE UP (ref 0.01–0.04)
UROBILINOGEN FLD QL: NEGATIVE MG/DL — SIGNIFICANT CHANGE UP
WBC # BLD: 8.97 K/UL — SIGNIFICANT CHANGE UP (ref 3.8–10.5)
WBC # FLD AUTO: 8.97 K/UL — SIGNIFICANT CHANGE UP (ref 3.8–10.5)

## 2021-07-03 PROCEDURE — 93005 ELECTROCARDIOGRAM TRACING: CPT

## 2021-07-03 PROCEDURE — 74177 CT ABD & PELVIS W/CONTRAST: CPT | Mod: 26,MA

## 2021-07-03 PROCEDURE — 87086 URINE CULTURE/COLONY COUNT: CPT

## 2021-07-03 PROCEDURE — 85025 COMPLETE CBC W/AUTO DIFF WBC: CPT

## 2021-07-03 PROCEDURE — 99284 EMERGENCY DEPT VISIT MOD MDM: CPT | Mod: 25

## 2021-07-03 PROCEDURE — U0005: CPT

## 2021-07-03 PROCEDURE — U0003: CPT

## 2021-07-03 PROCEDURE — 87186 SC STD MICRODIL/AGAR DIL: CPT

## 2021-07-03 PROCEDURE — 36415 COLL VENOUS BLD VENIPUNCTURE: CPT

## 2021-07-03 PROCEDURE — 80053 COMPREHEN METABOLIC PANEL: CPT

## 2021-07-03 PROCEDURE — 81001 URINALYSIS AUTO W/SCOPE: CPT

## 2021-07-03 PROCEDURE — 84484 ASSAY OF TROPONIN QUANT: CPT

## 2021-07-03 PROCEDURE — 93010 ELECTROCARDIOGRAM REPORT: CPT

## 2021-07-03 PROCEDURE — 99285 EMERGENCY DEPT VISIT HI MDM: CPT

## 2021-07-03 PROCEDURE — 74177 CT ABD & PELVIS W/CONTRAST: CPT

## 2021-07-03 PROCEDURE — 96365 THER/PROPH/DIAG IV INF INIT: CPT | Mod: XU

## 2021-07-03 PROCEDURE — 96366 THER/PROPH/DIAG IV INF ADDON: CPT

## 2021-07-03 PROCEDURE — 96375 TX/PRO/DX INJ NEW DRUG ADDON: CPT

## 2021-07-03 RX ORDER — CEPHALEXIN 500 MG
500 CAPSULE ORAL ONCE
Refills: 0 | Status: COMPLETED | OUTPATIENT
Start: 2021-07-03 | End: 2021-07-03

## 2021-07-03 RX ORDER — OXYCODONE HYDROCHLORIDE 5 MG/1
1 TABLET ORAL
Qty: 9 | Refills: 0
Start: 2021-07-03 | End: 2021-07-05

## 2021-07-03 RX ORDER — ONDANSETRON 8 MG/1
4 TABLET, FILM COATED ORAL ONCE
Refills: 0 | Status: COMPLETED | OUTPATIENT
Start: 2021-07-03 | End: 2021-07-03

## 2021-07-03 RX ORDER — CEPHALEXIN 500 MG
1 CAPSULE ORAL
Qty: 14 | Refills: 0
Start: 2021-07-03 | End: 2021-07-09

## 2021-07-03 RX ORDER — HYDROMORPHONE HYDROCHLORIDE 2 MG/ML
0.5 INJECTION INTRAMUSCULAR; INTRAVENOUS; SUBCUTANEOUS ONCE
Refills: 0 | Status: DISCONTINUED | OUTPATIENT
Start: 2021-07-03 | End: 2021-07-03

## 2021-07-03 RX ORDER — SODIUM CHLORIDE 9 MG/ML
1000 INJECTION INTRAMUSCULAR; INTRAVENOUS; SUBCUTANEOUS ONCE
Refills: 0 | Status: COMPLETED | OUTPATIENT
Start: 2021-07-03 | End: 2021-07-03

## 2021-07-03 RX ORDER — OXYCODONE HYDROCHLORIDE 5 MG/1
5 TABLET ORAL ONCE
Refills: 0 | Status: DISCONTINUED | OUTPATIENT
Start: 2021-07-03 | End: 2021-07-03

## 2021-07-03 RX ORDER — ACETAMINOPHEN 500 MG
1000 TABLET ORAL ONCE
Refills: 0 | Status: COMPLETED | OUTPATIENT
Start: 2021-07-03 | End: 2021-07-03

## 2021-07-03 RX ADMIN — Medication 1000 MILLIGRAM(S): at 19:51

## 2021-07-03 RX ADMIN — SODIUM CHLORIDE 1000 MILLILITER(S): 9 INJECTION INTRAMUSCULAR; INTRAVENOUS; SUBCUTANEOUS at 16:59

## 2021-07-03 RX ADMIN — Medication 1000 MILLIGRAM(S): at 20:30

## 2021-07-03 RX ADMIN — HYDROMORPHONE HYDROCHLORIDE 0.5 MILLIGRAM(S): 2 INJECTION INTRAMUSCULAR; INTRAVENOUS; SUBCUTANEOUS at 20:28

## 2021-07-03 RX ADMIN — OXYCODONE HYDROCHLORIDE 5 MILLIGRAM(S): 5 TABLET ORAL at 20:28

## 2021-07-03 RX ADMIN — Medication 400 MILLIGRAM(S): at 17:00

## 2021-07-03 RX ADMIN — ONDANSETRON 4 MILLIGRAM(S): 8 TABLET, FILM COATED ORAL at 17:00

## 2021-07-03 RX ADMIN — OXYCODONE HYDROCHLORIDE 5 MILLIGRAM(S): 5 TABLET ORAL at 20:30

## 2021-07-03 RX ADMIN — HYDROMORPHONE HYDROCHLORIDE 0.5 MILLIGRAM(S): 2 INJECTION INTRAMUSCULAR; INTRAVENOUS; SUBCUTANEOUS at 20:30

## 2021-07-03 RX ADMIN — Medication 500 MILLIGRAM(S): at 20:28

## 2021-07-03 NOTE — ED ADULT TRIAGE NOTE - CHIEF COMPLAINT QUOTE
PT MARTI FROM [facility] C/O RLQ PAIN X 2 HRS. PT BIBEMS FROM [facility] C/O LLQ PAIN X 2 HRS, +nausea,

## 2021-07-03 NOTE — ED PROVIDER NOTE - PROGRESS NOTE DETAILS
Artur Staley: Spoke with urology attending, Dr. Deshpande, no emergent issue for the stones seen on ct scan, given normal labs, if pt can be pain controlled can d/c and will follow up with outpatient

## 2021-07-03 NOTE — ED PROVIDER NOTE - CARE PLAN
Principal Discharge DX:	Abdominal pain   Principal Discharge DX:	Abdominal pain  Secondary Diagnosis:	Renal colic on left side

## 2021-07-03 NOTE — ED PROVIDER NOTE - OBJECTIVE STATEMENT
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 States he was laying in bed when it started. +nausea. Denies vomiting, diarrhea, and trouble urinating. Full code per MOLST form.

## 2021-07-03 NOTE — ED PROVIDER NOTE - NSFOLLOWUPINSTRUCTIONS_ED_ALL_ED_FT
Log Out.      LMN-1 CareNotes®     :  [facility]  	                       KIDNEY STONES - AfterCare(R) Instructions(ER/ED)           Kidney Stones    WHAT YOU NEED TO KNOW:    Kidney stones form in the urinary system when the water and waste in your urine are out of balance. When this happens, certain types of waste crystals separate from the urine. The crystals build up and form kidney stones. You may have more than one kidney stone.    Kidney Stones          DISCHARGE INSTRUCTIONS:    Return to the emergency department if:   •You are vomiting and it is not relieved with medicine.          Call your doctor or kidney specialist if:   •You have a fever.      •You have trouble urinating.      •You see blood in your urine.      •You have severe pain.      •You have any questions or concerns about your condition or care.      Medicines: You may need any of the following:  •NSAIDs, such as ibuprofen, help decrease swelling, pain, and fever. This medicine is available with or without a doctor's order. NSAIDs can cause stomach bleeding or kidney problems in certain people. If you take blood thinner medicine, always ask your healthcare provider if NSAIDs are safe for you. Always read the medicine label and follow directions.      •Acetaminophen decreases pain and fever. It is available without a doctor's order. Ask how much to take and how often to take it. Follow directions. Read the labels of all other medicines you are using to see if they also contain acetaminophen, or ask your doctor or pharmacist. Acetaminophen can cause liver damage if not taken correctly. Do not use more than 4 grams (4,000 milligrams) total of acetaminophen in one day.       •Prescription pain medicine may be given. Ask your healthcare provider how to take this medicine safely. Some prescription pain medicines contain acetaminophen. Do not take other medicines that contain acetaminophen without talking to your healthcare provider. Too much acetaminophen may cause liver damage. Prescription pain medicine may cause constipation. Ask your healthcare provider how to prevent or treat constipation.       •Medicines to balance your electrolytes may be needed.      •Take your medicine as directed. Contact your healthcare provider if you think your medicine is not helping or if you have side effects. Tell him or her if you are allergic to any medicine. Keep a list of the medicines, vitamins, and herbs you take. Include the amounts, and when and why you take them. Bring the list or the pill bottles to follow-up visits. Carry your medicine list with you in case of an emergency.      What you can do to manage kidney stones:   •Drink more liquids. Your healthcare provider may tell you to drink at least 8 to 12 (eight-ounce) cups of liquids each day. This helps flush out the kidney stones when you urinate. Water is the best liquid to drink.      •Strain your urine every time you go to the bathroom. Urinate through a strainer or a piece of thin cloth to catch the stones. Take the stones to your healthcare provider so they can be sent to the lab for tests. This will help your healthcare providers plan the best treatment for you.  Look for Stones in the Filter           •Eat a variety of healthy foods. Healthy foods include fruits, vegetables, whole-grain breads, low-fat dairy products, beans, and fish. You may need to limit how much sodium (salt) or protein you eat. Ask for information about the best foods for you.  Healthy Foods           •Be physically active as directed. Your stones may pass more easily if you stay active. Physical activity can also help you manage your weight. Ask about the best activities for you.  Black Family Walking for Exercise           After you pass the kidney stones: Your healthcare provider may order a 24-hour urine test. Results from a 24-hour urine test will help your healthcare provider plan ways to prevent more stones from forming. Your healthcare provider will give you more instructions.    Follow up with your doctor or kidney specialist as directed: Write down your questions so you remember to ask them during your visits.       © Copyright [employer] 2021           back to top                          © Copyright [employer] 2021 Take keflex as prescribed next 7 days.    Take oxycodone as prescribed if needed for pain.     Call , urology, to arrange follow up.        KIDNEY STONES - AfterCare(R) Instructions(ER/ED)           Kidney Stones    WHAT YOU NEED TO KNOW:    Kidney stones form in the urinary system when the water and waste in your urine are out of balance. When this happens, certain types of waste crystals separate from the urine. The crystals build up and form kidney stones. You may have more than one kidney stone.    Kidney Stones          DISCHARGE INSTRUCTIONS:    Return to the emergency department if:   •You are vomiting and it is not relieved with medicine.          Call your doctor or kidney specialist if:   •You have a fever.      •You have trouble urinating.      •You see blood in your urine.      •You have severe pain.      •You have any questions or concerns about your condition or care.      Medicines: You may need any of the following:  •NSAIDs, such as ibuprofen, help decrease swelling, pain, and fever. This medicine is available with or without a doctor's order. NSAIDs can cause stomach bleeding or kidney problems in certain people. If you take blood thinner medicine, always ask your healthcare provider if NSAIDs are safe for you. Always read the medicine label and follow directions.      •Acetaminophen decreases pain and fever. It is available without a doctor's order. Ask how much to take and how often to take it. Follow directions. Read the labels of all other medicines you are using to see if they also contain acetaminophen, or ask your doctor or pharmacist. Acetaminophen can cause liver damage if not taken correctly. Do not use more than 4 grams (4,000 milligrams) total of acetaminophen in one day.       •Prescription pain medicine may be given. Ask your healthcare provider how to take this medicine safely. Some prescription pain medicines contain acetaminophen. Do not take other medicines that contain acetaminophen without talking to your healthcare provider. Too much acetaminophen may cause liver damage. Prescription pain medicine may cause constipation. Ask your healthcare provider how to prevent or treat constipation.       •Medicines to balance your electrolytes may be needed.      •Take your medicine as directed. Contact your healthcare provider if you think your medicine is not helping or if you have side effects. Tell him or her if you are allergic to any medicine. Keep a list of the medicines, vitamins, and herbs you take. Include the amounts, and when and why you take them. Bring the list or the pill bottles to follow-up visits. Carry your medicine list with you in case of an emergency.      What you can do to manage kidney stones:   •Drink more liquids. Your healthcare provider may tell you to drink at least 8 to 12 (eight-ounce) cups of liquids each day. This helps flush out the kidney stones when you urinate. Water is the best liquid to drink.      •Strain your urine every time you go to the bathroom. Urinate through a strainer or a piece of thin cloth to catch the stones. Take the stones to your healthcare provider so they can be sent to the lab for tests. This will help your healthcare providers plan the best treatment for you.  Look for Stones in the Filter           •Eat a variety of healthy foods. Healthy foods include fruits, vegetables, whole-grain breads, low-fat dairy products, beans, and fish. You may need to limit how much sodium (salt) or protein you eat. Ask for information about the best foods for you.  Healthy Foods           •Be physically active as directed. Your stones may pass more easily if you stay active. Physical activity can also help you manage your weight. Ask about the best activities for you.  Black Family Walking for Exercise           After you pass the kidney stones: Your healthcare provider may order a 24-hour urine test. Results from a 24-hour urine test will help your healthcare provider plan ways to prevent more stones from forming. Your healthcare provider will give you more instructions.    Follow up with your doctor or kidney specialist as directed: Write down your questions so you remember to ask them during your visits.       © Copyright [employer] 2021           back to top                          © Copyright [employer] 2021

## 2021-07-03 NOTE — ED PROVIDER NOTE - CONSTITUTIONAL, MLM
Pale, chronically ill of hearing, awake, alert, oriented to person, place, time/situation and in no apparent distress. normal...

## 2021-07-03 NOTE — ED ADULT NURSE NOTE - OBJECTIVE STATEMENT
pt presents to the ED with abd pain with nausea for 1 hour. pt denies fevers, SOB, chest pain. pt states that this has happen before and workup showed no findings. pt with chronic indwelling almaraz. pt states that he recently had changed. do not change at this time as per Dr. Staley. as per Dr. Staley, sent UA/culture from current almaraz.

## 2021-07-03 NOTE — ED PROVIDER NOTE - CARDIAC, MLM
Normal rate, regular rhythm.  Heart sounds S1, S2.  No murmurs, rubs or gallops, L chest axillary Pleurx site clean, dry, intact Normal rate, regular rhythm.  Heart sounds S1, S2.   L chest axillary Pleurx site clean, dry, intact

## 2021-07-03 NOTE — ED PROVIDER NOTE - CARE PROVIDER_API CALL
Natanael Oglesby)  Urology  [address] [place] [address]  Phone: [phone]  Fax: [phone]  Follow Up Time:

## 2021-07-03 NOTE — ED PROVIDER NOTE - PMH
Afib    BPH (benign prostatic hyperplasia)    DM (diabetes mellitus)    Follicular lymphoma    Hernia    HLD (hyperlipidemia)    HTN (hypertension)    Kidney stone

## 2021-07-03 NOTE — PROVIDER CONTACT NOTE (OTHER) - SITUATION
paged @1914, @1942 Dr. Staley spoke to Dr. Deshpande (oncall urology)  paged @1914, @1942, returned page @1947

## 2021-07-04 ENCOUNTER — INPATIENT (INPATIENT)
Facility: HOSPITAL | Age: 86
LOS: 10 days | Discharge: HOSPICE | DRG: 854 | End: 2021-07-15
Attending: FAMILY MEDICINE | Admitting: HOSPITALIST
Payer: MEDICARE

## 2021-07-04 VITALS
HEIGHT: 72 IN | DIASTOLIC BLOOD PRESSURE: 51 MMHG | RESPIRATION RATE: 19 BRPM | SYSTOLIC BLOOD PRESSURE: 91 MMHG | HEART RATE: 115 BPM | WEIGHT: 154.98 LBS | OXYGEN SATURATION: 92 % | TEMPERATURE: 98 F

## 2021-07-04 DIAGNOSIS — Z98.890 OTHER SPECIFIED POSTPROCEDURAL STATES: Chronic | ICD-10-CM

## 2021-07-04 DIAGNOSIS — R19.8 OTHER SPECIFIED SYMPTOMS AND SIGNS INVOLVING THE DIGESTIVE SYSTEM AND ABDOMEN: Chronic | ICD-10-CM

## 2021-07-04 DIAGNOSIS — Z87.81 PERSONAL HISTORY OF (HEALED) TRAUMATIC FRACTURE: Chronic | ICD-10-CM

## 2021-07-04 LAB
ALBUMIN SERPL ELPH-MCNC: 1.9 G/DL — LOW (ref 3.3–5)
ALP SERPL-CCNC: 95 U/L — SIGNIFICANT CHANGE UP (ref 40–120)
ALT FLD-CCNC: 25 U/L — SIGNIFICANT CHANGE UP (ref 12–78)
ANION GAP SERPL CALC-SCNC: 9 MMOL/L — SIGNIFICANT CHANGE UP (ref 5–17)
APTT BLD: 30.8 SEC — SIGNIFICANT CHANGE UP (ref 27.5–35.5)
AST SERPL-CCNC: 43 U/L — HIGH (ref 15–37)
BILIRUB SERPL-MCNC: 0.4 MG/DL — SIGNIFICANT CHANGE UP (ref 0.2–1.2)
BUN SERPL-MCNC: 45 MG/DL — HIGH (ref 7–23)
CALCIUM SERPL-MCNC: 8.3 MG/DL — LOW (ref 8.5–10.1)
CHLORIDE SERPL-SCNC: 102 MMOL/L — SIGNIFICANT CHANGE UP (ref 96–108)
CO2 SERPL-SCNC: 22 MMOL/L — SIGNIFICANT CHANGE UP (ref 22–31)
CREAT SERPL-MCNC: 3 MG/DL — HIGH (ref 0.5–1.3)
GLUCOSE SERPL-MCNC: 370 MG/DL — HIGH (ref 70–99)
INR BLD: 1.7 RATIO — HIGH (ref 0.88–1.16)
LACTATE SERPL-SCNC: 3.4 MMOL/L — HIGH (ref 0.7–2)
POTASSIUM SERPL-MCNC: 4.7 MMOL/L — SIGNIFICANT CHANGE UP (ref 3.5–5.3)
POTASSIUM SERPL-SCNC: 4.7 MMOL/L — SIGNIFICANT CHANGE UP (ref 3.5–5.3)
PROT SERPL-MCNC: 6 GM/DL — SIGNIFICANT CHANGE UP (ref 6–8.3)
PROTHROM AB SERPL-ACNC: 19.2 SEC — HIGH (ref 10.6–13.6)
SODIUM SERPL-SCNC: 133 MMOL/L — LOW (ref 135–145)

## 2021-07-04 PROCEDURE — 93010 ELECTROCARDIOGRAM REPORT: CPT

## 2021-07-04 PROCEDURE — 74176 CT ABD & PELVIS W/O CONTRAST: CPT | Mod: 26,MA

## 2021-07-04 PROCEDURE — 71045 X-RAY EXAM CHEST 1 VIEW: CPT | Mod: 26

## 2021-07-04 PROCEDURE — 99291 CRITICAL CARE FIRST HOUR: CPT

## 2021-07-04 RX ORDER — CEFEPIME 1 G/1
2000 INJECTION, POWDER, FOR SOLUTION INTRAMUSCULAR; INTRAVENOUS ONCE
Refills: 0 | Status: COMPLETED | OUTPATIENT
Start: 2021-07-04 | End: 2021-07-04

## 2021-07-04 RX ORDER — SODIUM CHLORIDE 9 MG/ML
2200 INJECTION, SOLUTION INTRAVENOUS ONCE
Refills: 0 | Status: COMPLETED | OUTPATIENT
Start: 2021-07-04 | End: 2021-07-04

## 2021-07-04 RX ORDER — VANCOMYCIN HCL 1 G
1000 VIAL (EA) INTRAVENOUS ONCE
Refills: 0 | Status: COMPLETED | OUTPATIENT
Start: 2021-07-04 | End: 2021-07-04

## 2021-07-04 RX ORDER — ACETAMINOPHEN 500 MG
1000 TABLET ORAL ONCE
Refills: 0 | Status: COMPLETED | OUTPATIENT
Start: 2021-07-04 | End: 2021-07-04

## 2021-07-04 RX ORDER — SODIUM CHLORIDE 9 MG/ML
1000 INJECTION, SOLUTION INTRAVENOUS ONCE
Refills: 0 | Status: COMPLETED | OUTPATIENT
Start: 2021-07-04 | End: 2021-07-04

## 2021-07-04 RX ORDER — CEFTRIAXONE 500 MG/1
1000 INJECTION, POWDER, FOR SOLUTION INTRAMUSCULAR; INTRAVENOUS ONCE
Refills: 0 | Status: DISCONTINUED | OUTPATIENT
Start: 2021-07-04 | End: 2021-07-04

## 2021-07-04 RX ADMIN — Medication 400 MILLIGRAM(S): at 23:58

## 2021-07-04 RX ADMIN — SODIUM CHLORIDE 2200 MILLILITER(S): 9 INJECTION, SOLUTION INTRAVENOUS at 22:41

## 2021-07-04 RX ADMIN — Medication 250 MILLIGRAM(S): at 23:57

## 2021-07-04 RX ADMIN — CEFEPIME 100 MILLIGRAM(S): 1 INJECTION, POWDER, FOR SOLUTION INTRAMUSCULAR; INTRAVENOUS at 22:38

## 2021-07-04 NOTE — ED PROVIDER NOTE - OBJECTIVE STATEMENT
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 States he was laying in bed when it started. +nausea. Denies vomiting, diarrhea, and trouble urinating. Full code per MOLST form.  She was seen in the ED yesterday and had a CTAP with IV contrast that showed moderate left hydronephrosis and stranding due to a large UPJ stone and e/o pyelonephritis.  Pt sent back to the ED today for fever and septic workup.

## 2021-07-04 NOTE — ED ADULT TRIAGE NOTE - CHIEF COMPLAINT QUOTE
Patient from nursing home for Tmax of 102.  A&Ox0.  Was here yesterday.  Yamilex in place on arrival.

## 2021-07-04 NOTE — ED ADULT NURSE NOTE - NSIMPLEMENTINTERV_GEN_ALL_ED
Implemented All Fall with Harm Risk Interventions:  [place] to call system. Call bell, personal items and telephone within reach. Instruct patient to call for assistance. Room bathroom lighting operational. Non-slip footwear when patient is off stretcher. Physically safe environment: no spills, clutter or unnecessary equipment. Stretcher in lowest position, wheels locked, appropriate side rails in place. Provide visual cue, wrist band, yellow gown, etc. Monitor gait and stability. Monitor for mental status changes and reorient to person, place, and time. Review medications for side effects contributing to fall risk. Reinforce activity limits and safety measures with patient and family. Provide visual clues: red socks.

## 2021-07-04 NOTE — ED PROVIDER NOTE - CLINICAL SUMMARY MEDICAL DECISION MAKING FREE TEXT BOX
PT p/w fever and tachycardia on 2nd visit in 24 hours with e/o UTI and ureterolithiasis on prior visit.  Plan: septic workup with 30cc/kg bolus, broad spectrum abx, CBC, lactate, blood cultures, urology consult, admission.

## 2021-07-04 NOTE — ED ADULT NURSE NOTE - OBJECTIVE STATEMENT
Patient from nursing home for Tmax of 102.  A&Ox0.  Was here yesterday.  Kaminski in place on arrival. Upon arrival v/s were unstable, pt systolic BP in the 80s and Diastolic in the 50s, pt is tachycardic at a rate of 115 bpm. rectal temp is 102.9. Sp02 was 90 on room air, placed pt on 2L NC. cardiac monitor is in places and is Sinus Tachycardic. EKG performed at bedside. airway is patent and shows no s/s of respiratory distress, Pt has peritoneal cath in abd. Sepsis workup started, 18g in left forearm, 22g in right wrist. LR fluids administered

## 2021-07-05 DIAGNOSIS — N13.2 HYDRONEPHROSIS WITH RENAL AND URETERAL CALCULOUS OBSTRUCTION: ICD-10-CM

## 2021-07-05 DIAGNOSIS — N12 TUBULO-INTERSTITIAL NEPHRITIS, NOT SPECIFIED AS ACUTE OR CHRONIC: ICD-10-CM

## 2021-07-05 LAB
A1C WITH ESTIMATED AVERAGE GLUCOSE RESULT: 9.6 % — HIGH (ref 4–5.6)
APPEARANCE UR: ABNORMAL
BASOPHILS # BLD AUTO: 0 K/UL — SIGNIFICANT CHANGE UP (ref 0–0.2)
BASOPHILS # BLD AUTO: 0 K/UL — SIGNIFICANT CHANGE UP (ref 0–0.2)
BASOPHILS NFR BLD AUTO: 0 % — SIGNIFICANT CHANGE UP (ref 0–2)
BASOPHILS NFR BLD AUTO: 0 % — SIGNIFICANT CHANGE UP (ref 0–2)
BILIRUB UR-MCNC: NEGATIVE — SIGNIFICANT CHANGE UP
COLOR SPEC: YELLOW — SIGNIFICANT CHANGE UP
COVID-19 SPIKE DOMAIN AB INTERP: POSITIVE
COVID-19 SPIKE DOMAIN ANTIBODY RESULT: 38.5 U/ML — HIGH
DIFF PNL FLD: ABNORMAL
E FAECALIS DNA BLD POS QL NAA+NON-PROBE: SIGNIFICANT CHANGE UP
ELLIPTOCYTES BLD QL SMEAR: SLIGHT — SIGNIFICANT CHANGE UP
EOSINOPHIL # BLD AUTO: 0 K/UL — SIGNIFICANT CHANGE UP (ref 0–0.5)
EOSINOPHIL # BLD AUTO: 0 K/UL — SIGNIFICANT CHANGE UP (ref 0–0.5)
EOSINOPHIL NFR BLD AUTO: 0 % — SIGNIFICANT CHANGE UP (ref 0–6)
EOSINOPHIL NFR BLD AUTO: 0 % — SIGNIFICANT CHANGE UP (ref 0–6)
ESTIMATED AVERAGE GLUCOSE: 229 MG/DL — HIGH (ref 68–114)
GLUCOSE UR QL: 250 MG/DL
GRAM STN FLD: SIGNIFICANT CHANGE UP
GRAM STN FLD: SIGNIFICANT CHANGE UP
HCT VFR BLD CALC: 31.5 % — LOW (ref 39–50)
HCT VFR BLD CALC: 33.9 % — LOW (ref 39–50)
HGB BLD-MCNC: 10.2 G/DL — LOW (ref 13–17)
HGB BLD-MCNC: 11 G/DL — LOW (ref 13–17)
KETONES UR-MCNC: ABNORMAL
LACTATE SERPL-SCNC: 2.8 MMOL/L — HIGH (ref 0.7–2)
LACTATE SERPL-SCNC: 3.6 MMOL/L — HIGH (ref 0.7–2)
LEUKOCYTE ESTERASE UR-ACNC: ABNORMAL
LYMPHOCYTES # BLD AUTO: 0.17 K/UL — LOW (ref 1–3.3)
LYMPHOCYTES # BLD AUTO: 0.28 K/UL — LOW (ref 1–3.3)
LYMPHOCYTES # BLD AUTO: 1 % — LOW (ref 13–44)
LYMPHOCYTES # BLD AUTO: 2 % — LOW (ref 13–44)
MANUAL SMEAR VERIFICATION: SIGNIFICANT CHANGE UP
MCHC RBC-ENTMCNC: 28.6 PG — SIGNIFICANT CHANGE UP (ref 27–34)
MCHC RBC-ENTMCNC: 29.1 PG — SIGNIFICANT CHANGE UP (ref 27–34)
MCHC RBC-ENTMCNC: 32.4 GM/DL — SIGNIFICANT CHANGE UP (ref 32–36)
MCHC RBC-ENTMCNC: 32.4 GM/DL — SIGNIFICANT CHANGE UP (ref 32–36)
MCV RBC AUTO: 88.3 FL — SIGNIFICANT CHANGE UP (ref 80–100)
MCV RBC AUTO: 89.7 FL — SIGNIFICANT CHANGE UP (ref 80–100)
METAMYELOCYTES # FLD: 1 % — HIGH (ref 0–0)
METHOD TYPE: SIGNIFICANT CHANGE UP
MONOCYTES # BLD AUTO: 0 K/UL — SIGNIFICANT CHANGE UP (ref 0–0.9)
MONOCYTES # BLD AUTO: 0.17 K/UL — SIGNIFICANT CHANGE UP (ref 0–0.9)
MONOCYTES NFR BLD AUTO: 0 % — LOW (ref 2–14)
MONOCYTES NFR BLD AUTO: 1 % — LOW (ref 2–14)
MYELOCYTES NFR BLD: 3 % — HIGH (ref 0–0)
NEUTROPHILS # BLD AUTO: 12.97 K/UL — HIGH (ref 1.8–7.4)
NEUTROPHILS # BLD AUTO: 16.92 K/UL — HIGH (ref 1.8–7.4)
NEUTROPHILS NFR BLD AUTO: 75 % — SIGNIFICANT CHANGE UP (ref 43–77)
NEUTROPHILS NFR BLD AUTO: 90 % — HIGH (ref 43–77)
NEUTS BAND # BLD: 4 % — SIGNIFICANT CHANGE UP (ref 0–8)
NITRITE UR-MCNC: NEGATIVE — SIGNIFICANT CHANGE UP
NRBC # BLD: 0 /100 — SIGNIFICANT CHANGE UP (ref 0–0)
NRBC # BLD: SIGNIFICANT CHANGE UP /100 WBCS (ref 0–0)
NRBC # BLD: SIGNIFICANT CHANGE UP /100 WBCS (ref 0–0)
OVALOCYTES BLD QL SMEAR: SLIGHT — SIGNIFICANT CHANGE UP
P AERUGINOSA DNA BLD POS NAA+NON-PROBE: SIGNIFICANT CHANGE UP
PH UR: 5 — SIGNIFICANT CHANGE UP (ref 5–8)
PLAT MORPH BLD: NORMAL — SIGNIFICANT CHANGE UP
PLATELET # BLD AUTO: 149 K/UL — LOW (ref 150–400)
PLATELET # BLD AUTO: 221 K/UL — SIGNIFICANT CHANGE UP (ref 150–400)
POIKILOCYTOSIS BLD QL AUTO: SLIGHT — SIGNIFICANT CHANGE UP
PROT UR-MCNC: 100 MG/DL
RBC # BLD: 3.51 M/UL — LOW (ref 4.2–5.8)
RBC # BLD: 3.84 M/UL — LOW (ref 4.2–5.8)
RBC # FLD: 14.6 % — HIGH (ref 10.3–14.5)
RBC # FLD: 14.7 % — HIGH (ref 10.3–14.5)
RBC BLD AUTO: ABNORMAL
SARS-COV-2 IGG+IGM SERPL QL IA: 38.5 U/ML — HIGH
SARS-COV-2 IGG+IGM SERPL QL IA: POSITIVE
SARS-COV-2 RNA SPEC QL NAA+PROBE: SIGNIFICANT CHANGE UP
SCHISTOCYTES BLD QL AUTO: SLIGHT — SIGNIFICANT CHANGE UP
SP GR SPEC: 1.01 — SIGNIFICANT CHANGE UP (ref 1.01–1.02)
SPECIMEN SOURCE: SIGNIFICANT CHANGE UP
SPECIMEN SOURCE: SIGNIFICANT CHANGE UP
UROBILINOGEN FLD QL: NEGATIVE MG/DL — SIGNIFICANT CHANGE UP
WBC # BLD: 13.8 K/UL — HIGH (ref 3.8–10.5)
WBC # BLD: 17.27 K/UL — HIGH (ref 3.8–10.5)
WBC # FLD AUTO: 13.8 K/UL — HIGH (ref 3.8–10.5)
WBC # FLD AUTO: 17.27 K/UL — HIGH (ref 3.8–10.5)

## 2021-07-05 PROCEDURE — 83605 ASSAY OF LACTIC ACID: CPT

## 2021-07-05 PROCEDURE — 80048 BASIC METABOLIC PNL TOTAL CA: CPT

## 2021-07-05 PROCEDURE — 36415 COLL VENOUS BLD VENIPUNCTURE: CPT

## 2021-07-05 PROCEDURE — 83036 HEMOGLOBIN GLYCOSYLATED A1C: CPT

## 2021-07-05 PROCEDURE — 82962 GLUCOSE BLOOD TEST: CPT

## 2021-07-05 PROCEDURE — 80053 COMPREHEN METABOLIC PANEL: CPT

## 2021-07-05 PROCEDURE — 87116 MYCOBACTERIA CULTURE: CPT

## 2021-07-05 PROCEDURE — 87015 SPECIMEN INFECT AGNT CONCNTJ: CPT

## 2021-07-05 PROCEDURE — 97116 GAIT TRAINING THERAPY: CPT | Mod: GP

## 2021-07-05 PROCEDURE — 99223 1ST HOSP IP/OBS HIGH 75: CPT | Mod: 25

## 2021-07-05 PROCEDURE — 97162 PT EVAL MOD COMPLEX 30 MIN: CPT | Mod: GP

## 2021-07-05 PROCEDURE — 97530 THERAPEUTIC ACTIVITIES: CPT | Mod: GP

## 2021-07-05 PROCEDURE — 85027 COMPLETE CBC AUTOMATED: CPT

## 2021-07-05 PROCEDURE — 76000 FLUOROSCOPY <1 HR PHYS/QHP: CPT

## 2021-07-05 PROCEDURE — 93005 ELECTROCARDIOGRAM TRACING: CPT

## 2021-07-05 PROCEDURE — 87077 CULTURE AEROBIC IDENTIFY: CPT

## 2021-07-05 PROCEDURE — 52352 CYSTOURETERO W/STONE REMOVE: CPT | Mod: LT

## 2021-07-05 PROCEDURE — 87102 FUNGUS ISOLATION CULTURE: CPT

## 2021-07-05 PROCEDURE — 99223 1ST HOSP IP/OBS HIGH 75: CPT

## 2021-07-05 PROCEDURE — 87086 URINE CULTURE/COLONY COUNT: CPT

## 2021-07-05 PROCEDURE — 87040 BLOOD CULTURE FOR BACTERIA: CPT

## 2021-07-05 PROCEDURE — 87186 SC STD MICRODIL/AGAR DIL: CPT

## 2021-07-05 PROCEDURE — 81001 URINALYSIS AUTO W/SCOPE: CPT

## 2021-07-05 PROCEDURE — 87206 SMEAR FLUORESCENT/ACID STAI: CPT

## 2021-07-05 PROCEDURE — 93010 ELECTROCARDIOGRAM REPORT: CPT

## 2021-07-05 PROCEDURE — 83735 ASSAY OF MAGNESIUM: CPT

## 2021-07-05 PROCEDURE — C2617: CPT

## 2021-07-05 PROCEDURE — 86769 SARS-COV-2 COVID-19 ANTIBODY: CPT

## 2021-07-05 PROCEDURE — 85025 COMPLETE CBC W/AUTO DIFF WBC: CPT

## 2021-07-05 PROCEDURE — 12345: CPT | Mod: NC

## 2021-07-05 RX ORDER — PANTOPRAZOLE SODIUM 20 MG/1
40 TABLET, DELAYED RELEASE ORAL
Refills: 0 | Status: DISCONTINUED | OUTPATIENT
Start: 2021-07-05 | End: 2021-07-15

## 2021-07-05 RX ORDER — INSULIN LISPRO 100/ML
3 VIAL (ML) SUBCUTANEOUS ONCE
Refills: 0 | Status: COMPLETED | OUTPATIENT
Start: 2021-07-05 | End: 2021-07-05

## 2021-07-05 RX ORDER — SODIUM CHLORIDE 9 MG/ML
1000 INJECTION, SOLUTION INTRAVENOUS
Refills: 0 | Status: DISCONTINUED | OUTPATIENT
Start: 2021-07-05 | End: 2021-07-11

## 2021-07-05 RX ORDER — CHOLECALCIFEROL (VITAMIN D3) 125 MCG
2000 CAPSULE ORAL DAILY
Refills: 0 | Status: DISCONTINUED | OUTPATIENT
Start: 2021-07-05 | End: 2021-07-15

## 2021-07-05 RX ORDER — LANOLIN ALCOHOL/MO/W.PET/CERES
3 CREAM (GRAM) TOPICAL AT BEDTIME
Refills: 0 | Status: DISCONTINUED | OUTPATIENT
Start: 2021-07-05 | End: 2021-07-15

## 2021-07-05 RX ORDER — ONDANSETRON 8 MG/1
4 TABLET, FILM COATED ORAL ONCE
Refills: 0 | Status: DISCONTINUED | OUTPATIENT
Start: 2021-07-05 | End: 2021-07-05

## 2021-07-05 RX ORDER — LINAGLIPTIN 5 MG/1
1 TABLET, FILM COATED ORAL
Qty: 0 | Refills: 0 | DISCHARGE

## 2021-07-05 RX ORDER — SODIUM CHLORIDE 9 MG/ML
1000 INJECTION INTRAMUSCULAR; INTRAVENOUS; SUBCUTANEOUS
Refills: 0 | Status: DISCONTINUED | OUTPATIENT
Start: 2021-07-05 | End: 2021-07-05

## 2021-07-05 RX ORDER — CEFTRIAXONE 500 MG/1
1000 INJECTION, POWDER, FOR SOLUTION INTRAMUSCULAR; INTRAVENOUS EVERY 24 HOURS
Refills: 0 | Status: DISCONTINUED | OUTPATIENT
Start: 2021-07-05 | End: 2021-07-05

## 2021-07-05 RX ORDER — DEXTROSE 50 % IN WATER 50 %
15 SYRINGE (ML) INTRAVENOUS ONCE
Refills: 0 | Status: DISCONTINUED | OUTPATIENT
Start: 2021-07-05 | End: 2021-07-11

## 2021-07-05 RX ORDER — DEXTROSE 50 % IN WATER 50 %
12.5 SYRINGE (ML) INTRAVENOUS ONCE
Refills: 0 | Status: DISCONTINUED | OUTPATIENT
Start: 2021-07-05 | End: 2021-07-11

## 2021-07-05 RX ORDER — DEXTROSE 50 % IN WATER 50 %
25 SYRINGE (ML) INTRAVENOUS ONCE
Refills: 0 | Status: DISCONTINUED | OUTPATIENT
Start: 2021-07-05 | End: 2021-07-11

## 2021-07-05 RX ORDER — INSULIN GLARGINE 100 [IU]/ML
13 INJECTION, SOLUTION SUBCUTANEOUS
Qty: 0 | Refills: 0 | DISCHARGE

## 2021-07-05 RX ORDER — FENTANYL CITRATE 50 UG/ML
25 INJECTION INTRAVENOUS
Refills: 0 | Status: DISCONTINUED | OUTPATIENT
Start: 2021-07-05 | End: 2021-07-05

## 2021-07-05 RX ORDER — HEPARIN SODIUM 5000 [USP'U]/ML
5000 INJECTION INTRAVENOUS; SUBCUTANEOUS EVERY 8 HOURS
Refills: 0 | Status: DISCONTINUED | OUTPATIENT
Start: 2021-07-05 | End: 2021-07-06

## 2021-07-05 RX ORDER — INSULIN GLARGINE 100 [IU]/ML
10 INJECTION, SOLUTION SUBCUTANEOUS AT BEDTIME
Refills: 0 | Status: DISCONTINUED | OUTPATIENT
Start: 2021-07-05 | End: 2021-07-07

## 2021-07-05 RX ORDER — ACETAMINOPHEN 500 MG
650 TABLET ORAL EVERY 6 HOURS
Refills: 0 | Status: DISCONTINUED | OUTPATIENT
Start: 2021-07-05 | End: 2021-07-05

## 2021-07-05 RX ORDER — FENTANYL CITRATE 50 UG/ML
50 INJECTION INTRAVENOUS
Refills: 0 | Status: DISCONTINUED | OUTPATIENT
Start: 2021-07-05 | End: 2021-07-05

## 2021-07-05 RX ORDER — BETHANECHOL CHLORIDE 25 MG
1 TABLET ORAL
Qty: 0 | Refills: 0 | DISCHARGE

## 2021-07-05 RX ORDER — INSULIN LISPRO 100/ML
VIAL (ML) SUBCUTANEOUS
Refills: 0 | Status: DISCONTINUED | OUTPATIENT
Start: 2021-07-05 | End: 2021-07-11

## 2021-07-05 RX ORDER — ACETAMINOPHEN 500 MG
650 TABLET ORAL EVERY 6 HOURS
Refills: 0 | Status: DISCONTINUED | OUTPATIENT
Start: 2021-07-05 | End: 2021-07-15

## 2021-07-05 RX ORDER — GLUCAGON INJECTION, SOLUTION 0.5 MG/.1ML
1 INJECTION, SOLUTION SUBCUTANEOUS ONCE
Refills: 0 | Status: DISCONTINUED | OUTPATIENT
Start: 2021-07-05 | End: 2021-07-11

## 2021-07-05 RX ORDER — CEFTRIAXONE 500 MG/1
1000 INJECTION, POWDER, FOR SOLUTION INTRAMUSCULAR; INTRAVENOUS
Refills: 0 | Status: DISCONTINUED | OUTPATIENT
Start: 2021-07-05 | End: 2021-07-06

## 2021-07-05 RX ORDER — TRAMADOL HYDROCHLORIDE 50 MG/1
50 TABLET ORAL DAILY
Refills: 0 | Status: DISCONTINUED | OUTPATIENT
Start: 2021-07-05 | End: 2021-07-12

## 2021-07-05 RX ORDER — SODIUM CHLORIDE 9 MG/ML
1000 INJECTION INTRAMUSCULAR; INTRAVENOUS; SUBCUTANEOUS
Refills: 0 | Status: DISCONTINUED | OUTPATIENT
Start: 2021-07-05 | End: 2021-07-07

## 2021-07-05 RX ORDER — GLIMEPIRIDE 1 MG
1 TABLET ORAL
Qty: 0 | Refills: 0 | DISCHARGE

## 2021-07-05 RX ORDER — FINASTERIDE 5 MG/1
5 TABLET, FILM COATED ORAL DAILY
Refills: 0 | Status: DISCONTINUED | OUTPATIENT
Start: 2021-07-05 | End: 2021-07-15

## 2021-07-05 RX ORDER — TAMSULOSIN HYDROCHLORIDE 0.4 MG/1
0.4 CAPSULE ORAL AT BEDTIME
Refills: 0 | Status: DISCONTINUED | OUTPATIENT
Start: 2021-07-05 | End: 2021-07-15

## 2021-07-05 RX ORDER — ONDANSETRON 8 MG/1
4 TABLET, FILM COATED ORAL EVERY 6 HOURS
Refills: 0 | Status: DISCONTINUED | OUTPATIENT
Start: 2021-07-05 | End: 2021-07-15

## 2021-07-05 RX ORDER — INSULIN GLARGINE 100 [IU]/ML
16 INJECTION, SOLUTION SUBCUTANEOUS
Qty: 0 | Refills: 0 | DISCHARGE

## 2021-07-05 RX ADMIN — SODIUM CHLORIDE 150 MILLILITER(S): 9 INJECTION INTRAMUSCULAR; INTRAVENOUS; SUBCUTANEOUS at 06:57

## 2021-07-05 RX ADMIN — Medication 3: at 17:10

## 2021-07-05 RX ADMIN — TAMSULOSIN HYDROCHLORIDE 0.4 MILLIGRAM(S): 0.4 CAPSULE ORAL at 21:32

## 2021-07-05 RX ADMIN — CEFTRIAXONE 1000 MILLIGRAM(S): 500 INJECTION, POWDER, FOR SOLUTION INTRAMUSCULAR; INTRAVENOUS at 21:32

## 2021-07-05 RX ADMIN — SODIUM CHLORIDE 100 MILLILITER(S): 9 INJECTION INTRAMUSCULAR; INTRAVENOUS; SUBCUTANEOUS at 14:41

## 2021-07-05 RX ADMIN — Medication 2000 UNIT(S): at 09:20

## 2021-07-05 RX ADMIN — TRAMADOL HYDROCHLORIDE 50 MILLIGRAM(S): 50 TABLET ORAL at 09:21

## 2021-07-05 RX ADMIN — FINASTERIDE 5 MILLIGRAM(S): 5 TABLET, FILM COATED ORAL at 09:21

## 2021-07-05 RX ADMIN — SODIUM CHLORIDE 1000 MILLILITER(S): 9 INJECTION, SOLUTION INTRAVENOUS at 00:12

## 2021-07-05 RX ADMIN — HEPARIN SODIUM 5000 UNIT(S): 5000 INJECTION INTRAVENOUS; SUBCUTANEOUS at 21:32

## 2021-07-05 RX ADMIN — PANTOPRAZOLE SODIUM 40 MILLIGRAM(S): 20 TABLET, DELAYED RELEASE ORAL at 09:21

## 2021-07-05 RX ADMIN — INSULIN GLARGINE 10 UNIT(S): 100 INJECTION, SOLUTION SUBCUTANEOUS at 22:04

## 2021-07-05 RX ADMIN — Medication 3: at 11:47

## 2021-07-05 RX ADMIN — Medication 3 UNIT(S): at 14:17

## 2021-07-05 RX ADMIN — Medication 5: at 06:57

## 2021-07-05 NOTE — H&P ADULT - ASSESSMENT
89 y/o M presents for decreased urination and a temperature of 104F found to have a left uteropelvic junction calculus and left hydronephrosis.     1. Severe sepsis secondary to complicated UTI and left uteropelvic junction calculus   - Admit to CICU   - s/p 30 cc/kg of IVF   - Maintenance fluids: NS at 150 ml/hr   - Monitor BP closely, if MAP < 65 will need to consider transfer to ICU for pressor support   - Follow up UA, BCx x 2, UCx results   - On Ceftriaxone for UTI (UA from 7/3 was positive for UTI)   - Infectious disease consult (Dr. Av Duong)   - Urology consult (Dr. Luisito Couch)  - NPO for possible urological/IR guided intervention (pt may need percutaneous nephrostomy)   - IV tylenol for temperatures > 100.4F and pain (pt is allergic to morphine)     3. ANJANA on CKD   - Cr 3.0     2. Urinary retention secondary to left uteropelvic junction calculus   - Kaminski catheter exchanged   - Strict I+O's     3. 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     1. Severe sepsis secondary to complicated UTI and left uteropelvic junction calculus   - Admit to CICU   - s/p 30 cc/kg of IVF   - Maintenance fluids: NS at 150 ml/hr   - Monitor BP closely, if MAP < 65 will need to consider transfer to ICU for pressor support   - Follow up UA, BCx x 2, UCx results   - On Ceftriaxone for UTI (UA from 7/3 was positive for UTI)   - Infectious disease consult (Dr. Av Duong)   - Urology consult (Dr. Luisito Couch)  - NPO for possible urological/IR guided intervention (pt may need percutaneous nephrostomy)   - IV tylenol for temperatures > 100.4F and pain (pt is allergic to morphine)     3. ANJANA on CKD   - Cr 3.0 (Baseline ~1.10)   - Monitor Cr closely   - Avoid nephrotoxic medications   - Holding Eliquis   - May need to consider renal consult     2. Urinary retention secondary to left uteropelvic junction calculus   - Kaminski catheter exchanged   - Strict I+O's     3. 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 DVT ppx   - Heparin 5,000 units Q8H  88 y/o M presents for decreased urination and a temperature of 104F found to have a left uteropelvic junction calculus and left hydronephrosis.     1. Severe sepsis secondary to complicated UTI and left uteropelvic junction calculus   - Admit to CICU   - s/p 30 cc/kg of IVF   - Maintenance fluids: NS at 150 ml/hr   - Monitor BP closely, if MAP < 65 will need to consider transfer to ICU for pressor support   - Follow up UA, BCx x 2, UCx results   - On Ceftriaxone for UTI (UA from 7/3 was positive for UTI)   - Infectious disease consult (Dr. Av Duong)   - Urology consult (Dr. Luisito Couch)  - NPO for possible urological/IR guided intervention (pt may need percutaneous nephrostomy)   - IV tylenol for temperatures > 100.4F and pain (pt is allergic to morphine and there are no alternative IV medications at this time)    3. ANJANA on CKD   - Cr 3.0 (Cr 1.14 on 7/3/21)   - Monitor Cr closely   - Avoid nephrotoxic medications   - Holding Eliquis   - May need to consider renal consult   - Continue IVF     2. Urinary retention secondary to left uteropelvic junction calculus   - Kaminski catheter exchanged   - Strict I+O's     3. 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 Started on Insulin Sliding Scale.   - Follow up EKG and CXR reads.     4. DVT ppx   - Heparin 5,000 units Q8H

## 2021-07-05 NOTE — BRIEF OPERATIVE NOTE - NSICDXBRIEFPROCEDURE_GEN_ALL_CORE_FT
PROCEDURES:  Cystoscopy with insertion of ureteral stent in adult 05-Jul-2021 13:23:49 Left Luisito Deshpande

## 2021-07-05 NOTE — DIETITIAN INITIAL EVALUATION ADULT. - PERTINENT LABORATORY DATA
07-05    134<L>  |  105  |  47<H>  ----------------------------<  342<H>  4.6   |  20<L>  |  2.89<H>    Ca    7.6<L>      05 Jul 2021 08:33    TPro  4.9<L>  /  Alb  1.4<L>  /  TBili  0.3  /  DBili  x   /  AST  35  /  ALT  23  /  AlkPhos  102  07-05  BMI: BMI (kg/m2): 21 (07-04-21 @ 21:47)  HbA1c: A1C with Estimated Average Glucose Result: 8.7 % (04-26-21 @ 06:33)    Glucose: POCT Blood Glucose.: 300 mg/dL (07-05-21 @ 11:30)    Vitamin B12, Serum: 498 pg/mL (06-09-21 @ 07:30)  Folate, Serum: 6.0 ng/mL (06-09-21 @ 07:30)  Vitamin D, 25-Hydroxy: 20.8 ng/mL (06-09-21 @ 07:30)  Iron Total, Serum: 20 ug/dL (06-09-21 @ 07:30)  Iron Total, Serum: 65 ug/dL (06-06-21 @ 07:36)  Folate, Serum: 7.7 ng/mL (06-06-21 @ 07:36)  Iron Total, Serum: 52 ug/dL (05-15-21 @ 08:57)  Folate, Serum: 10.3 ng/mL (05-15-21 @ 08:57)  Vitamin B12, Serum: 292 pg/mL (05-15-21 @ 08:57)

## 2021-07-05 NOTE — BRIEF OPERATIVE NOTE - NSICDXBRIEFPOSTOP_GEN_ALL_CORE_FT
POST-OP DIAGNOSIS:  Ureteral stone with hydronephrosis 05-Jul-2021 13:23:36 Left Luisito Deshpande

## 2021-07-05 NOTE — H&P ADULT - NSICDXPASTMEDICALHX_GEN_ALL_CORE_FT
PAST MEDICAL HISTORY:  Afib     Anemia     Benign prostatic hyperplasia with urinary retention     BPH (benign prostatic hyperplasia)     DM (diabetes mellitus)     Follicular lymphoma     H/O chest tube placement     Hernia     History of DVT in adulthood     HLD (hyperlipidemia)     HTN (hypertension)     Kidney stone     Malignant pleural effusion     Protein calorie malnutrition     Stage 3 chronic kidney disease

## 2021-07-05 NOTE — CONSULT NOTE ADULT - SUBJECTIVE AND OBJECTIVE BOX
CHIEF COMPLAINT:    HISTORY OF PRESENT ILLNESS:    PAST MEDICAL & SURGICAL HISTORY:  DM (diabetes mellitus)    HTN (hypertension)    HLD (hyperlipidemia)    Kidney stone    Hernia    Afib    BPH (benign prostatic hyperplasia)    Follicular lymphoma    Malignant pleural effusion    H/O chest tube placement    Benign prostatic hyperplasia with urinary retention    Protein calorie malnutrition    Anemia    Stage 3 chronic kidney disease    History of DVT in adulthood    H/O left inguinal hernia repair    Endoscopy finding  choked on piece of chiken-had upper endo with clearance of food    History of tibial fracture  R tibial fracture s/p revision with hardware placement ~3-4yrs ago, per pt        REVIEW OF SYSTEMS:    CONSTITUTIONAL: No weakness, fevers or chills  EYES/ENT: No visual changes;  No vertigo or throat pain   NECK: No pain or stiffness  RESPIRATORY: No cough, wheezing, hemoptysis, No shortness of breath  CARDIOVASCULAR: No chest pain or palpitations  GASTROINTESTINAL: No abdominal or flank pain, No nausea, vomiting, diarrhea or constipation  GENITOURINARY: See HPI  NEUROLOGICAL: No numbness or weakness  SKIN: No rashes or lesions   All other review of systems is negative unless indicated above.    MEDICATIONS  (STANDING):  cefTRIAXone Injectable. 1000 milliGRAM(s) IV Push <User Schedule>  cholecalciferol Oral Tab/Cap - Peds 2000 Unit(s) Oral daily  dextrose 40% Gel 15 Gram(s) Oral once  dextrose 5%. 1000 milliLiter(s) (50 mL/Hr) IV Continuous <Continuous>  dextrose 5%. 1000 milliLiter(s) (100 mL/Hr) IV Continuous <Continuous>  dextrose 50% Injectable 25 Gram(s) IV Push once  dextrose 50% Injectable 12.5 Gram(s) IV Push once  dextrose 50% Injectable 25 Gram(s) IV Push once  finasteride 5 milliGRAM(s) Oral daily  glucagon  Injectable 1 milliGRAM(s) IntraMuscular once  heparin   Injectable 5000 Unit(s) SubCutaneous every 8 hours  insulin lispro (ADMELOG) corrective regimen sliding scale   SubCutaneous three times a day before meals  melatonin 3 milliGRAM(s) Oral at bedtime  pantoprazole    Tablet 40 milliGRAM(s) Oral before breakfast  sodium chloride 0.9%. 1000 milliLiter(s) (150 mL/Hr) IV Continuous <Continuous>  tamsulosin 0.4 milliGRAM(s) Oral at bedtime  traMADol 50 milliGRAM(s) Oral daily    MEDICATIONS  (PRN):  acetaminophen   Tablet .. 650 milliGRAM(s) Oral every 6 hours PRN Temp greater or equal to 38.5C (101.3F), Mild Pain (1 - 3)  ondansetron Injectable 4 milliGRAM(s) IV Push every 6 hours PRN Nausea      Allergies    morphine (Other)    Intolerances        SOCIAL HISTORY:    FAMILY HISTORY:  No pertinent family history in first degree relatives  pt cannot recall any history of disease        Vital Signs Last 24 Hrs  T(C): 37.6 (2021 08:00), Max: 39.4 (2021 21:52)  T(F): 99.6 (2021 08:00), Max: 102.9 (2021 21:52)  HR: 86 (2021 11:00) (79 - 115)  BP: 108/57 (2021 11:00) (83/43 - 128/52)  BP(mean): 69 (2021 11:00) (55 - 80)  RR: 25 (2021 11:00) (19 - 34)  SpO2: 97% (2021 11:00) (90% - 100%)    PHYSICAL EXAM:    Constitutional: No acute distress  HEENT: EOMI, Normal Hearing  Neck: Supple  Back: No costovertebral angle tenderness  Respiratory: Normal respiratory effort    Cardiovascular: Normal peripheral circulation   Abd: Soft, non distended, non tender  : Kaminski to drainage bag- clear urine   Extremities: No peripheral edema  Neurological: No focal deficits  Psychiatric: Normal mood, normal affect  Musculoskeletal: Moving all 4 extremities  Skin: No rashes    LABS:                        10.2   13.80 )-----------( 149      ( 2021 06:50 )             31.5     07-05    134<L>  |  105  |  47<H>  ----------------------------<  342<H>  4.6   |  20<L>  |  2.89<H>    Ca    7.6<L>      2021 08:33    TPro  4.9<L>  /  Alb  1.4<L>  /  TBili  0.3  /  DBili  x   /  AST  35  /  ALT  23  /  AlkPhos  102  07-05    PT/INR - ( 2021 22:18 )   PT: 19.2 sec;   INR: 1.70 ratio         PTT - ( 2021 22:18 )  PTT:30.8 sec  Urinalysis Basic - ( 2021 02:41 )    Color: Yellow / Appearance: very cloudy / S.015 / pH: x  Gluc: x / Ketone: Trace  / Bili: Negative / Urobili: Negative mg/dL   Blood: x / Protein: 100 mg/dL / Nitrite: Negative   Leuk Esterase: Moderate / RBC: 11-25 /HPF / WBC >50   Sq Epi: x / Non Sq Epi: Occasional / Bacteria: Moderate      Urine Culture:     RADIOLOGY & ADDITIONAL STUDIES: CHIEF COMPLAINT:  Left ureteropelvic calculus    HISTORY OF PRESENT ILLNESS:  88 yo M presented from [facility] for decreased urinary output and a temperature of 104F. The patient reported having significant abdominal pain.   The patient was previously seen in the ER at [facility] on 7/3/2021 and diagnosed with a ureteropelvic junction stone with moderate left hydronephrosis and pyelonephritis and he was discharged on Keflex.   In ER: Low BP. WBC 17.27, Hb 11, Bands 23, INR 1.70, Lactate: 3.4 to 3.8.  CT abd/pelvis showed a 1.4 x 0.8 cm calculus in the uteropelvic junction, left hydronephrosis, bladder wall thickening suggesting cystitis, mesenteric mass 2.5 x 2.0 cm and left paracolic gutter fluid.     PAST MEDICAL & SURGICAL HISTORY:  DM (diabetes mellitus)    HTN (hypertension)    HLD (hyperlipidemia)    Kidney stone    Hernia    Afib    BPH (benign prostatic hyperplasia)    Follicular lymphoma    Malignant pleural effusion    H/O chest tube placement    Benign prostatic hyperplasia with urinary retention    Protein calorie malnutrition    Anemia    Stage 3 chronic kidney disease    History of DVT in adulthood    H/O left inguinal hernia repair    Endoscopy finding  choked on piece of chiken-had upper endo with clearance of food    History of tibial fracture  R tibial fracture s/p revision with hardware placement ~3-4yrs ago, per pt        REVIEW OF SYSTEMS:   All other review of systems is negative unless indicated above.    MEDICATIONS  (STANDING):  cefTRIAXone Injectable. 1000 milliGRAM(s) IV Push <User Schedule>  cholecalciferol Oral Tab/Cap - Peds 2000 Unit(s) Oral daily  dextrose 40% Gel 15 Gram(s) Oral once  dextrose 5%. 1000 milliLiter(s) (50 mL/Hr) IV Continuous <Continuous>  dextrose 5%. 1000 milliLiter(s) (100 mL/Hr) IV Continuous <Continuous>  dextrose 50% Injectable 25 Gram(s) IV Push once  dextrose 50% Injectable 12.5 Gram(s) IV Push once  dextrose 50% Injectable 25 Gram(s) IV Push once  finasteride 5 milliGRAM(s) Oral daily  glucagon  Injectable 1 milliGRAM(s) IntraMuscular once  heparin   Injectable 5000 Unit(s) SubCutaneous every 8 hours  insulin lispro (ADMELOG) corrective regimen sliding scale   SubCutaneous three times a day before meals  melatonin 3 milliGRAM(s) Oral at bedtime  pantoprazole    Tablet 40 milliGRAM(s) Oral before breakfast  sodium chloride 0.9%. 1000 milliLiter(s) (150 mL/Hr) IV Continuous <Continuous>  tamsulosin 0.4 milliGRAM(s) Oral at bedtime  traMADol 50 milliGRAM(s) Oral daily    MEDICATIONS  (PRN):  acetaminophen   Tablet .. 650 milliGRAM(s) Oral every 6 hours PRN Temp greater or equal to 38.5C (101.3F), Mild Pain (1 - 3)  ondansetron Injectable 4 milliGRAM(s) IV Push every 6 hours PRN Nausea      Allergies    morphine (Other)    Intolerances        SOCIAL HISTORY:    FAMILY HISTORY:  No pertinent family history in first degree relatives  pt cannot recall any history of disease        Vital Signs Last 24 Hrs  T(C): 37.6 (2021 08:00), Max: 39.4 (2021 21:52)  T(F): 99.6 (2021 08:00), Max: 102.9 (2021 21:52)  HR: 86 (2021 11:00) (79 - 115)  BP: 108/57 (2021 11:00) (83/43 - 128/52)  BP(mean): 69 (2021 11:00) (55 - 80)  RR: 25 (2021 11:00) (19 - 34)  SpO2: 97% (2021 11:00) (90% - 100%)    PHYSICAL EXAM:    Constitutional: No acute distress  HEENT: EOMI, Normal Hearing  Neck: Supple  Back: No costovertebral angle tenderness  Respiratory: Normal respiratory effort    Cardiovascular: Normal peripheral circulation   Abd: Soft, non distended, non tender  : Kaminski to drainage bag- clear urine   Extremities: No peripheral edema  Neurological: No focal deficits  Psychiatric: Normal mood, normal affect  Musculoskeletal: Moving all 4 extremities  Skin: No rashes    LABS:                        10.2   13.80 )-----------( 149      ( 2021 06:50 )             31.5     07-05    134<L>  |  105  |  47<H>  ----------------------------<  342<H>  4.6   |  20<L>  |  2.89<H>    Ca    7.6<L>      2021 08:33    TPro  4.9<L>  /  Alb  1.4<L>  /  TBili  0.3  /  DBili  x   /  AST  35  /  ALT  23  /  AlkPhos  102  07-05    PT/INR - ( 2021 22:18 )   PT: 19.2 sec;   INR: 1.70 ratio         PTT - ( 2021 22:18 )  PTT:30.8 sec  Urinalysis Basic - ( 2021 02:41 )    Color: Yellow / Appearance: very cloudy / S.015 / pH: x  Gluc: x / Ketone: Trace  / Bili: Negative / Urobili: Negative mg/dL   Blood: x / Protein: 100 mg/dL / Nitrite: Negative   Leuk Esterase: Moderate / RBC: 11-25 /HPF / WBC >50   Sq Epi: x / Non Sq Epi: Occasional / Bacteria: Moderate      < from: CT Abdomen and Pelvis No Cont (21 @ 23:40) >  EXAM:  CT ABDOMEN AND PELVIS                            PROCEDURE DATE:  2021          INTERPRETATION:  CLINICAL INFORMATION: Septic with known hydronephrosis from large left UPJ stone.    COMPARISON: None.    CONTRAST/COMPLICATIONS:  IV Contrast: NONE  Evaluation of the visceral organs is limited without intravenous contrast  Oral Contrast: NONE  Complications: None reported at time of study completion    PROCEDURE:  CT of the Abdomen and Pelvis was performed.  Sagittal and coronal reformats were performed.    FINDINGS:  LOWER CHEST: Small left and trace right pleural effusions with adjacent compressive atelectasis, not significantly changed. Left chest tube not significantly changed in position. Coronary artery calcifications. Smallhiatal hernia.    LIVER: Within normal limits.  BILE DUCTS: Normal caliber.  GALLBLADDER: Cholecystectomy.  SPLEEN: Within normal limits.  PANCREAS: Atrophic.  ADRENALS: Indeterminate 1.1 cm right adrenal nodule. Left adrenal gland within normal limits.  KIDNEYS/URETERS: Excreted intravenous contrast in bilateral renal collecting systems. Redemonstration of a 1.4 x 0.8 cm calculus in the left uteropelvic junction which blends with the excreted intravenous contrast in the left renal pelvis. Moderate left hydronephrosis and left perinephric inflammatory change, not significant changed. Retained contrast in the left renal cortex.    BLADDER: Collapsed around a Kaminski catheter. Intravesicular excreted contrast. Redemonstration of circumferential wall thickening.  REPRODUCTIVE ORGANS: Prostate within normal limits for size.    BOWEL: No bowel obstruction or inflammation. Colonic diverticulosis without diverticulitis. Colonic diverticulosis without diverticulitis. Appendix is normal.  PERITONEUM: Increase in complex left paracolic gutter free fluid. Redemonstration of a mesenteric mass measuring 2.5 x 2.0 cm.  VESSELS: Within normal limits.  RETROPERITONEUM/LYMPH NODES: No lymphadenopathy.  ABDOMINAL WALL: Small bilateral fat-containing inguinal hernias. Tiny fat-containing umbilical hernia.  BONES: Degenerative changes of the spine. Redemonstration of L1 and L2 superior endplate compression fracture deformities. Partially visualized total right hip arthroplasty.    IMPRESSION:  1. Moderate left hydronephrosis and perinephric inflammatory change secondary to a 1.4 x 0.8 cm calculus in the left ureteropelvic junction, not significantly changed.  2. Increase in complex left paracolic gutter free fluid.  3. Redemonstration of circumferential urinary bladder wall thickening which could be due to a cystitis.  4. Redemonstration of a mesenteric mass.    < end of copied text >

## 2021-07-05 NOTE — CHART NOTE - NSCHARTNOTEFT_GEN_A_CORE
Pt adm today, seen and eaxm, looks dry and weak; lactate noted- repeat after IVF  Add SWE[place]Y post op  AM labs  OR today

## 2021-07-05 NOTE — ED ADULT NURSE REASSESSMENT NOTE - NS ED NURSE REASSESS COMMENT FT1
pt is stable, v/s WNL, cardiac monitor reads NS rhythm, airway is patent and pt does not shows s/s of respiratory distress.

## 2021-07-05 NOTE — DIETITIAN INITIAL EVALUATION ADULT. - PERTINENT MEDS FT
MEDICATIONS  (STANDING):  cefTRIAXone Injectable. 1000 milliGRAM(s) IV Push <User Schedule>  cholecalciferol Oral Tab/Cap - Peds 2000 Unit(s) Oral daily  dextrose 40% Gel 15 Gram(s) Oral once  dextrose 5%. 1000 milliLiter(s) (50 mL/Hr) IV Continuous <Continuous>  dextrose 5%. 1000 milliLiter(s) (100 mL/Hr) IV Continuous <Continuous>  dextrose 50% Injectable 25 Gram(s) IV Push once  dextrose 50% Injectable 12.5 Gram(s) IV Push once  dextrose 50% Injectable 25 Gram(s) IV Push once  finasteride 5 milliGRAM(s) Oral daily  glucagon  Injectable 1 milliGRAM(s) IntraMuscular once  heparin   Injectable 5000 Unit(s) SubCutaneous every 8 hours  insulin lispro (ADMELOG) corrective regimen sliding scale   SubCutaneous three times a day before meals  melatonin 3 milliGRAM(s) Oral at bedtime  pantoprazole    Tablet 40 milliGRAM(s) Oral before breakfast  sodium chloride 0.9%. 1000 milliLiter(s) (100 mL/Hr) IV Continuous <Continuous>  tamsulosin 0.4 milliGRAM(s) Oral at bedtime  traMADol 50 milliGRAM(s) Oral daily    MEDICATIONS  (PRN):  acetaminophen   Tablet .. 650 milliGRAM(s) Oral every 6 hours PRN Temp greater or equal to 38.5C (101.3F), Mild Pain (1 - 3)  ondansetron Injectable 4 milliGRAM(s) IV Push every 6 hours PRN Nausea

## 2021-07-05 NOTE — DIETITIAN INITIAL EVALUATION ADULT. - OTHER INFO
88yo male with PMH significant for Afib, DM, follicular lymphoma, hernia, HLD, HTN, kidney stone p/w decreased urination and temp of 104 found to have Lt uteropelvic junction calculus and Lt hydronephrosis.  Pt admitted with severe sepsis 2/2 c/b UTI and Lt uteropelvic junction calculus.  ANJANA on CKD.

## 2021-07-05 NOTE — PROVIDER CONTACT NOTE (OTHER) - SITUATION
calculus at left uteropelvic junction, left hydronephrosis    left message with ans service for dr to see pt in the morning

## 2021-07-05 NOTE — DIETITIAN INITIAL EVALUATION ADULT. - ADD RECOMMEND
1) add nepro BID to optimize PO intake 2) add MVI with minerals daily to ensure 100% RDI met 3) daily wt checks to track/trend changes

## 2021-07-05 NOTE — CONSULT NOTE ADULT - REASON FOR ADMISSION
Severe sepsis secondary to left uteropelvic calculus Severe sepsis secondary to left ureteropelvic calculus

## 2021-07-05 NOTE — H&P ADULT - NSHPREVIEWOFSYSTEMS_GEN_ALL_CORE
Constitutional: positive for fatigue, positive for fever, negative for chills, negative for decreased appetite.  Skin: negative for rashes.   Cardiovascular: negative for chest pain, negative for palpitations.   Respiratory: negative for shortness of breath, negative for cough.   Gastrointestinal: positive for abdominal pain, negative for nausea, negative for vomiting, negative for diarrhea, negative for constipation  Genitourinary: positive for urinary retention, negative for burning on urination,   Musculoskeletal: negative for muscle/joint pain, negative for decreased range of motion.   Neurological: negative for loss of consciousness, negative for motor weakness, negative for sensory deficits.   Psychiatric: negative for depression, negative for anxiety.

## 2021-07-05 NOTE — H&P ADULT - NSHPPHYSICALEXAM_GEN_ALL_CORE
stretcher
Physical Exam    ICU Vital Signs Last 24 Hrs  T(C): 39.4 (04 Jul 2021 21:52), Max: 39.4 (04 Jul 2021 21:52)  T(F): 102.9 (04 Jul 2021 21:52), Max: 102.9 (04 Jul 2021 21:52)  HR: 110 (04 Jul 2021 21:52) (110 - 115)  BP: 92/58 (04 Jul 2021 21:52) (91/51 - 92/58)  BP(mean): 69 (04 Jul 2021 21:52) (69 - 69)  ABP: --  ABP(mean): --  RR: 19 (04 Jul 2021 21:47) (19 - 19)  SpO2: 95% (04 Jul 2021 21:52) (92% - 95%)    General: Awake, cooperative with exam. In acute distress.   Skin: Pale, warm, dry.   Eyes: Pupils equal and reactive to light. Extraocular eye movements intact. No conjunctival injection, discharge, or scleral icterus.   HEENT: Atraumatic, normocephalic. Dry mucus membranes. No oral lesions.  Cardiology: Normal S1, S2. No murmurs, rubs, or gallops. Regular rate and rhythm.   Respiratory: Lungs clear to ascultation bilaterally. Good air exchange. No wheezes, rales, or rhonchi. Normal chest expansion. Left Pleurx catheter.   Gastrointestinal: Positive bowel sounds. Tenderness on palpation of the abdomen diffusely. Non-distended. No guarding, rigidity, or rebound tenderness. No hepatosplenomegaly.   Extremities: No peripheral edema bilaterally. Dorsalis pedis pulses 2+ bilaterally.   Neurological: A+Ox2 (person, place). Confused on examination. Cranial nerves 2-12 intact. Normal speech. No facial droop. No focal neurological deficits.

## 2021-07-05 NOTE — H&P ADULT - HISTORY OF PRESENT ILLNESS
89 y/[facility] M [facility] [place] for decreased urinary output and a temperature of 104F. The patient reports having significant abdominal pain and "pain all over." He is a poor historian and unable to provide much of the history. The patient was previously seen in the ER at [facility] on 7/3/2021 and diagnosed with a kidney stone and discharged on Keflex.     ER course: Laboratory studies significant for WBC 17.27, Hb 11, Bands 23, INR 1.70, Lactate trend from 3.4 to 3.8, Sodium 133, Cr 3.00 today (baseline ~1.10), AST 43. CT abd/pelvis showed a 1.4 x 0.8 cm calculus in the uteropelvic junction, left hydronephrosis, bladder wall thickening suggesting cystitis, mesenteric mass 2.5 x 2.0 cm.  89 y/[facility] M [facility] [place] for decreased urinary output and a temperature of 104F. The patient reports having significant abdominal pain and "pain all over." He is a poor historian and unable to provide much of the history. The patient was previously seen in the ER at [facility] on 7/3/2021 and diagnosed with a kidney stone and discharged on Keflex.     ER course: Laboratory studies significant for WBC 17.27, Hb 11, Bands 23, INR 1.70, Lactate trend from 3.4 to 3.8, Sodium 133, Cr 3.00 today (baseline ~1.10), AST 43. CT abd/pelvis showed a 1.4 x 0.8 cm calculus in the uteropelvic junction, left hydronephrosis, bladder wall thickening suggesting cystitis, mesenteric mass 2.5 x 2.0 cm.  89 y/o M [facility] [place] for decreased urinary output and a temperature of 104F. The patient reports having significant abdominal pain and "pain all over." He is a poor historian and unable to provide much of the history. The patient was previously seen in the ER at [facility] on 7/3/2021 and diagnosed with a kidney stone and discharged on Keflex.     ER course: Laboratory studies significant for WBC 17.27, Hb 11, Bands 23, INR 1.70, Lactate trend from 3.4 to 3.8, Sodium 133, Cr 3.00 today (baseline ~1.10), AST 43. CT abd/pelvis showed a 1.4 x 0.8 cm calculus in the uteropelvic junction, left hydronephrosis, bladder wall thickening suggesting cystitis, mesenteric mass 2.5 x 2.0 cm.  88 y/[facility] M [facility] [place] for decreased urinary output and a temperature of 104F. The patient reports having significant abdominal pain and "pain all over." He is a poor historian and unable to provide much of the history. The patient was previously seen in the ER at [facility] on 7/3/2021 and diagnosed with a uteropelvic junction stone with moderate left hydronephrosis and pyelonephritis and he was discharged on Keflex.     ER course: Laboratory studies significant for WBC 17.27, Hb 11, Bands 23, INR 1.70, Lactate trend from 3.4 to 3.8, Sodium 133, Cr 3.00 today (baseline ~1.10), AST 43. CT abd/pelvis showed a 1.4 x 0.8 cm calculus in the uteropelvic junction, left hydronephrosis, bladder wall thickening suggesting cystitis, mesenteric mass 2.5 x 2.0 cm.  88 y/[facility] M [facility] [place] for decreased urinary output and a temperature of 104F. The patient reports having significant abdominal pain and "pain all over." He is a poor historian and unable to provide much of the history. The patient was previously seen in the ER at [facility] on 7/3/2021 and diagnosed with a uteropelvic junction stone with moderate left hydronephrosis and pyelonephritis and he was discharged on Keflex.     ER course: Vitals signficant for BP 83/43 which improved to 104/54 after 30 cc/kg of fluids. Laboratory studies significant for WBC 17.27, Hb 11, Bands 23, INR 1.70, Lactate trend from 3.4 to 3.8, Sodium 133, Cr 3.00 today (baseline ~1.10), AST 43. CT abd/pelvis showed a 1.4 x 0.8 cm calculus in the uteropelvic junction, left hydronephrosis, bladder wall thickening suggesting cystitis, mesenteric mass 2.5 x 2.0 cm. The patient was given 1 dose of Vancomycin, 1 dose of Cefepime 30 cc/kg of IVF, and IV Tylenol. He is being admitted to the floor for further evaluation.

## 2021-07-05 NOTE — CONSULT NOTE ADULT - ASSESSMENT
Spoke with Daughter Oseas   Oglesby is Uro  Wants me to take care of father.     Discussed treatment options and recommended Cystoscopy and ureteral stent placement.   Booked.  Spoke with Daughter Kelly.   Dr Oglesby is his Urologist. Known h/o kidney stone.   Wants me to take care of father.     Discussed treatment options: Nephrostomy tube Vs Cystoscopy and ureteral stent placement.   Discussed risks and benefits each. Discussed the procedure and need for definitive management of kidney stones at later date.   Daughter agreeable to proceed with Cystoscopy and ureteral stent placement.   Booked for OR.     Continue antibiotics, follow cultures, treat per sensitivity.

## 2021-07-05 NOTE — PATIENT PROFILE ADULT - STATED REASON FOR ADMISSION
pt unable to verbalize. pt noted to have temp 104 at [facility].

## 2021-07-05 NOTE — BRIEF OPERATIVE NOTE - COMMENTS
Follow up appointment after discharge to discuss definitive management of kidney stones.   Daughter aware.

## 2021-07-06 LAB
-  AMIKACIN: SIGNIFICANT CHANGE UP
-  AZTREONAM: SIGNIFICANT CHANGE UP
-  CEFEPIME: SIGNIFICANT CHANGE UP
-  CEFTAZIDIME: SIGNIFICANT CHANGE UP
-  CIPROFLOXACIN: SIGNIFICANT CHANGE UP
-  GENTAMICIN: SIGNIFICANT CHANGE UP
-  IMIPENEM: SIGNIFICANT CHANGE UP
-  LEVOFLOXACIN: SIGNIFICANT CHANGE UP
-  MEROPENEM: SIGNIFICANT CHANGE UP
-  PIPERACILLIN/TAZOBACTAM: SIGNIFICANT CHANGE UP
-  TOBRAMYCIN: SIGNIFICANT CHANGE UP
A1C WITH ESTIMATED AVERAGE GLUCOSE RESULT: 9.8 % — HIGH (ref 4–5.6)
ANION GAP SERPL CALC-SCNC: 5 MMOL/L — SIGNIFICANT CHANGE UP (ref 5–17)
BUN SERPL-MCNC: 49 MG/DL — HIGH (ref 7–23)
CALCIUM SERPL-MCNC: 7.3 MG/DL — LOW (ref 8.5–10.1)
CHLORIDE SERPL-SCNC: 108 MMOL/L — SIGNIFICANT CHANGE UP (ref 96–108)
CO2 SERPL-SCNC: 24 MMOL/L — SIGNIFICANT CHANGE UP (ref 22–31)
CREAT SERPL-MCNC: 2.13 MG/DL — HIGH (ref 0.5–1.3)
CULTURE RESULTS: SIGNIFICANT CHANGE UP
ESTIMATED AVERAGE GLUCOSE: 235 MG/DL — HIGH (ref 68–114)
GLUCOSE SERPL-MCNC: 252 MG/DL — HIGH (ref 70–99)
HCT VFR BLD CALC: 28.7 % — LOW (ref 39–50)
HGB BLD-MCNC: 9.3 G/DL — LOW (ref 13–17)
MCHC RBC-ENTMCNC: 29.2 PG — SIGNIFICANT CHANGE UP (ref 27–34)
MCHC RBC-ENTMCNC: 32.4 GM/DL — SIGNIFICANT CHANGE UP (ref 32–36)
MCV RBC AUTO: 90 FL — SIGNIFICANT CHANGE UP (ref 80–100)
METHOD TYPE: SIGNIFICANT CHANGE UP
ORGANISM # SPEC MICROSCOPIC CNT: SIGNIFICANT CHANGE UP
ORGANISM # SPEC MICROSCOPIC CNT: SIGNIFICANT CHANGE UP
PLATELET # BLD AUTO: 125 K/UL — LOW (ref 150–400)
POTASSIUM SERPL-MCNC: 4.9 MMOL/L — SIGNIFICANT CHANGE UP (ref 3.5–5.3)
POTASSIUM SERPL-SCNC: 4.9 MMOL/L — SIGNIFICANT CHANGE UP (ref 3.5–5.3)
RBC # BLD: 3.19 M/UL — LOW (ref 4.2–5.8)
RBC # FLD: 15.2 % — HIGH (ref 10.3–14.5)
SODIUM SERPL-SCNC: 137 MMOL/L — SIGNIFICANT CHANGE UP (ref 135–145)
SPECIMEN SOURCE: SIGNIFICANT CHANGE UP
WBC # BLD: 15.17 K/UL — HIGH (ref 3.8–10.5)
WBC # FLD AUTO: 15.17 K/UL — HIGH (ref 3.8–10.5)

## 2021-07-06 PROCEDURE — 99233 SBSQ HOSP IP/OBS HIGH 50: CPT

## 2021-07-06 PROCEDURE — 99232 SBSQ HOSP IP/OBS MODERATE 35: CPT

## 2021-07-06 RX ORDER — CEFEPIME 1 G/1
INJECTION, POWDER, FOR SOLUTION INTRAMUSCULAR; INTRAVENOUS
Refills: 0 | Status: DISCONTINUED | OUTPATIENT
Start: 2021-07-06 | End: 2021-07-06

## 2021-07-06 RX ORDER — CEFEPIME 1 G/1
2000 INJECTION, POWDER, FOR SOLUTION INTRAMUSCULAR; INTRAVENOUS EVERY 12 HOURS
Refills: 0 | Status: DISCONTINUED | OUTPATIENT
Start: 2021-07-06 | End: 2021-07-06

## 2021-07-06 RX ORDER — APIXABAN 2.5 MG/1
5 TABLET, FILM COATED ORAL EVERY 12 HOURS
Refills: 0 | Status: DISCONTINUED | OUTPATIENT
Start: 2021-07-06 | End: 2021-07-15

## 2021-07-06 RX ORDER — MIDODRINE HYDROCHLORIDE 2.5 MG/1
5 TABLET ORAL THREE TIMES A DAY
Refills: 0 | Status: DISCONTINUED | OUTPATIENT
Start: 2021-07-06 | End: 2021-07-07

## 2021-07-06 RX ORDER — CEFEPIME 1 G/1
1000 INJECTION, POWDER, FOR SOLUTION INTRAMUSCULAR; INTRAVENOUS EVERY 12 HOURS
Refills: 0 | Status: DISCONTINUED | OUTPATIENT
Start: 2021-07-06 | End: 2021-07-06

## 2021-07-06 RX ORDER — INSULIN LISPRO 100/ML
20 VIAL (ML) SUBCUTANEOUS ONCE
Refills: 0 | Status: COMPLETED | OUTPATIENT
Start: 2021-07-06 | End: 2021-07-06

## 2021-07-06 RX ORDER — CEFEPIME 1 G/1
1000 INJECTION, POWDER, FOR SOLUTION INTRAMUSCULAR; INTRAVENOUS ONCE
Refills: 0 | Status: COMPLETED | OUTPATIENT
Start: 2021-07-06 | End: 2021-07-06

## 2021-07-06 RX ORDER — PIPERACILLIN AND TAZOBACTAM 4; .5 G/20ML; G/20ML
3.38 INJECTION, POWDER, LYOPHILIZED, FOR SOLUTION INTRAVENOUS EVERY 8 HOURS
Refills: 0 | Status: DISCONTINUED | OUTPATIENT
Start: 2021-07-06 | End: 2021-07-12

## 2021-07-06 RX ADMIN — Medication 3 MILLIGRAM(S): at 21:13

## 2021-07-06 RX ADMIN — Medication 2: at 16:24

## 2021-07-06 RX ADMIN — Medication 2000 UNIT(S): at 09:43

## 2021-07-06 RX ADMIN — TAMSULOSIN HYDROCHLORIDE 0.4 MILLIGRAM(S): 0.4 CAPSULE ORAL at 21:13

## 2021-07-06 RX ADMIN — Medication 20 UNIT(S): at 11:38

## 2021-07-06 RX ADMIN — PIPERACILLIN AND TAZOBACTAM 25 GRAM(S): 4; .5 INJECTION, POWDER, LYOPHILIZED, FOR SOLUTION INTRAVENOUS at 21:13

## 2021-07-06 RX ADMIN — PANTOPRAZOLE SODIUM 40 MILLIGRAM(S): 20 TABLET, DELAYED RELEASE ORAL at 09:43

## 2021-07-06 RX ADMIN — HEPARIN SODIUM 5000 UNIT(S): 5000 INJECTION INTRAVENOUS; SUBCUTANEOUS at 06:20

## 2021-07-06 RX ADMIN — CEFEPIME 1000 MILLIGRAM(S): 1 INJECTION, POWDER, FOR SOLUTION INTRAMUSCULAR; INTRAVENOUS at 09:43

## 2021-07-06 RX ADMIN — MIDODRINE HYDROCHLORIDE 5 MILLIGRAM(S): 2.5 TABLET ORAL at 16:26

## 2021-07-06 RX ADMIN — INSULIN GLARGINE 10 UNIT(S): 100 INJECTION, SOLUTION SUBCUTANEOUS at 21:13

## 2021-07-06 RX ADMIN — Medication 3: at 07:47

## 2021-07-06 RX ADMIN — FINASTERIDE 5 MILLIGRAM(S): 5 TABLET, FILM COATED ORAL at 09:43

## 2021-07-06 RX ADMIN — TRAMADOL HYDROCHLORIDE 50 MILLIGRAM(S): 50 TABLET ORAL at 09:43

## 2021-07-06 RX ADMIN — APIXABAN 5 MILLIGRAM(S): 2.5 TABLET, FILM COATED ORAL at 21:14

## 2021-07-06 RX ADMIN — PIPERACILLIN AND TAZOBACTAM 25 GRAM(S): 4; .5 INJECTION, POWDER, LYOPHILIZED, FOR SOLUTION INTRAVENOUS at 13:25

## 2021-07-06 NOTE — PROGRESS NOTE ADULT - SUBJECTIVE AND OBJECTIVE BOX
Pt seen at bedside. Patient without any acute complaints. Pt is s/p cystoscopy, left ureteral stent placement on 7/5/21.    PE  General: No distress, No anxiety  VITALS  T(C): 36.2 (07-06-21 @ 12:02), Max: 36.2 (07-06-21 @ 12:02)  HR: 83 (07-06-21 @ 15:00) (71 - 94)  BP: 107/49 (07-06-21 @ 15:00) (81/47 - 121/49)  RR: 23 (07-06-21 @ 15:00) (15 - 23)  SpO2: 97% (07-06-21 @ 15:00) (90% - 100%)            Skin     : No jaundice, No lesions, No swelling  HEENT: No icterus , EOM full , No epistaxis  Abdo:   : Soft, Non tender, No guarding, No distension  Back    : No CVAT b/l  Extremity: No calf tenderness   Genitalia Male: Kaminski with yellow urine         LABS                        9.3    15.17 )-----------( 125      ( 06 Jul 2021 05:55 )             28.7   07-06    137  |  108  |  49<H>  ----------------------------<  252<H>  4.9   |  24  |  2.13<H>    Ca    7.3<L>      06 Jul 2021 05:55    TPro  4.9<L>  /  Alb  1.4<L>  /  TBili  0.3  /  DBili  x   /  AST  35  /  ALT  23  /  AlkPhos  102  07-05

## 2021-07-06 NOTE — PROGRESS NOTE ADULT - SUBJECTIVE AND OBJECTIVE BOX
Pt seen at bedside. Patient without any acute complaints. Pt is s/p cystoscopy, left ureteral stent placement on 7/5/21.                Skin     : No jaundice, No lesions, No swelling  HEENT: No icterus , EOM full , No epistaxis  Abdo:   : Soft, Non tender, No guarding, No distension  Back    : No CVAT b/l  Extremity: No calf tenderness   Genitalia Male: Kaminski with yellow urine         LABS                        9.3    15.17 )-----------( 125      ( 06 Jul 2021 05:55 )             28.7   07-06    137  |  108  |  49<H>  ----------------------------<  252<H>  4.9   |  24  |  2.13<H>    Ca    7.3<L>      06 Jul 2021 05:55    TPro  4.9<L>  /  Alb  1.4<L>  /  TBili  0.3  /  DBili  x   /  AST  35  /  ALT  23  /  AlkPhos  102  07-05    1. Severe sepsis secondary to complicated UTI and left uteropelvic junction calculus   s/p stent  Ps Aer. bacteremia change abx    3. ANJANA on CKD   - Cr 3.0 (Cr 1.14 on 7/3/21)   - Monitor Cr closely   - Avoid nephrotoxic medications       4. 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 DM   uncontrolled; continue to adjust S[place]Y

## 2021-07-06 NOTE — PHYSICAL THERAPY INITIAL EVALUATION ADULT - IMPAIRMENTS CONTRIBUTING TO GAIT DEVIATIONS, PT EVAL
c/o back pain after standing/ambulating,relieved with sitting /lying down ?suggestive of stenosis/pain/decreased strength

## 2021-07-06 NOTE — PHYSICAL THERAPY INITIAL EVALUATION ADULT - GENERAL OBSERVATIONS, REHAB EVAL
supine in bed with B Flowtrons, HM,pulse oximeter,BP cuff,awake,alert,Ox4,pleasant and cooperative ,though admits to low energy ,feeling very "old"

## 2021-07-06 NOTE — PHYSICAL THERAPY INITIAL EVALUATION ADULT - DIAGNOSIS, PT EVAL
severe sepsis due to L ureteropelvic stone (1.4x.8cm) ,moderate L hydronephrosis ,perinephric inflammatory changes,+urine cultures GNRs Pseudomonas Aeruginosa  /+blood cultures GPCs in pairs/chains ,Pseudomonas Aeruginosa ,E Faecalis, follicular lymphoma

## 2021-07-06 NOTE — PHYSICAL THERAPY INITIAL EVALUATION ADULT - LEVEL OF INDEPENDENCE: SUPINE/SIT, REHAB EVAL
c/o mild dizziness on coming to sit/moderate assist (50% patients effort)/maximum assist (25% patients effort)

## 2021-07-06 NOTE — PHYSICAL THERAPY INITIAL EVALUATION ADULT - LIVES WITH, PROFILE
pt had resided at [facility] ~6 months prior to hospitalization June 2 2021; has been attending [facility]

## 2021-07-06 NOTE — PHYSICAL THERAPY INITIAL EVALUATION ADULT - PATIENT PROFILE REVIEW, REHAB EVAL
pt was f/b Palliative Medicine on prior admission 6/2-6/10/21 for recurrent L pleural effusion s/p VATs /Pleurex catheter placement L CW pt was f/b Palliative Medicine on prior admission 6/2-6/10/21 for recurrent L pleural effusion s/p VATs /Pleurex catheter placement L CW; pt known to me from prior episode of inpatient care 2/2021 with recurrent food impaction in esophagus/dysphagia

## 2021-07-06 NOTE — PHARMACOTHERAPY INTERVENTION NOTE - COMMENTS
Prescriber requested dosing help for cefepime to treat bacteremia with pseudomonal coverage, CrCl of 23.3. Recommended 1 gram q12h.

## 2021-07-06 NOTE — PHYSICAL THERAPY INITIAL EVALUATION ADULT - PLANNED THERAPY INTERVENTIONS, PT EVAL
home safety/fall prevention education/bed mobility training/gait training/ROM/strengthening/transfer training

## 2021-07-06 NOTE — CONSULT NOTE ADULT - ASSESSMENT
89 y/o Male with h/o A.fib, HTN, BPH, lymphoma, HL, HTN, kidney stones [facility] [place] [facility] decreased urinary output and a temperature of 104F. The patient reports having significant abdominal pain and "pain all over." He is a poor historian and unable to provide much of the history. The patient was previously seen in the ER at [facility] on 7/3/2021 and diagnosed with a uteropelvic junction stone with moderate left hydronephrosis and pyelonephritis and he was discharged on Keflex PO. In ER he received Vancomycin and Cefepime.    1. Febrile syndrome. Sepsis with PSAE and EN spp. Pyuria. UTI with PSAE. Kidney stones. ARF  -leukocytosis  -obtain BC x 2, urine c/s   -start zosyn 3.375 gm IV q8h  -reason for abx use and side effects reviewed with patient; monitor BMP  -consider urology evaluation  -old chart reviewed to assess prior cultures  -monitor temps  -f/u CBC  -supportive care  2. Other issues:   -care per medicine     88 y/o Male with h/o A.fib, HTN, BPH, lymphoma, HL, HTN, kidney stones [facility] [place] [facility] decreased urinary output and a temperature of 104F. The patient reports having significant abdominal pain and "pain all over." He is a poor historian and unable to provide much of the history. The patient was previously seen in the ER at [facility] on 7/3/2021 and diagnosed with a uteropelvic junction stone with moderate left hydronephrosis and pyelonephritis and he was discharged on Keflex PO. In ER he received Vancomycin and Cefepime.    1. Febrile syndrome. Sepsis with PSAE and EN spp. Pyuria. UTI with PSAE. Kidney stones s/p left ureteral stent placement. ARF  -leukocytosis  -obtain BC x 2, urine c/s   -start zosyn 3.375 gm IV q8h  -reason for abx use and side effects reviewed with patient; monitor BMP  -urology evaluation appreciated  -old chart reviewed to assess prior cultures  -monitor temps  -f/u CBC  -supportive care  2. Other issues:   -care per medicine

## 2021-07-06 NOTE — PHYSICAL THERAPY INITIAL EVALUATION ADULT - PERTINENT HX OF CURRENT PROBLEM, REHAB EVAL
decreased urine output and [id number] with abdominal discomfort and "pain all over"from [facility] decreased urine output and [id number] with abdominal discomfort and "pain all over" from [facility]

## 2021-07-06 NOTE — PHYSICAL THERAPY INITIAL EVALUATION ADULT - ACTIVE RANGE OF MOTION EXAMINATION, REHAB EVAL
IBAN BLEs c/o stiffness ,has mild discomfort L knee with flexion and c/o R legs pain sporadically/bilateral upper extremity Active ROM was WFL (within functional limits)/bilateral  lower extremity Active ROM was WFL (within functional limits)

## 2021-07-06 NOTE — PHYSICAL THERAPY INITIAL EVALUATION ADULT - PATIENT/FAMILY/SIGNIFICANT OTHER GOALS STATEMENT, PT EVAL
pt hopes that MD will be able to "blast" his kidney/bladder stones (has had lithotripsy of 2 bladder stones  in past Dr Oglesby )

## 2021-07-06 NOTE — CONSULT NOTE ADULT - SUBJECTIVE AND OBJECTIVE BOX
Patient is a 89y old  Male who presents with a chief complaint of Severe sepsis secondary to left ureteropelvic calculus     HPI:  89 y/o Male with h/o A.fib, HTN, BPH, lymphoma, HL, HTN, kidney stones was admitted on  from [facility] for decreased urinary output and a temperature of 104F. The patient reports having significant abdominal pain and "pain all over." He is a poor historian and unable to provide much of the history. The patient was previously seen in the ER at [facility] on 7/3/2021 and diagnosed with a uteropelvic junction stone with moderate left hydronephrosis and pyelonephritis and he was discharged on Keflex PO. In ER he received Vancomycin and Cefepime.    Past Medical History:  Afib    BPH (benign prostatic hyperplasia)    DM (diabetes mellitus)    Follicular lymphoma    Hernia    HLD (hyperlipidemia)    HTN (hypertension)    Kidney stone.     Past Surgical History:  Endoscopy finding  choked on piece of chiken-had upper endo with clearance of food  H/O left inguinal hernia repair    History of tibial fracture  R tibial fracture s/p revision with hardware placement ~3-4yrs ago, per pt.    Meds: per reconciliation sheet, noted below  MEDICATIONS  (STANDING):  apixaban 5 milliGRAM(s) Oral every 12 hours  cefepime  Injectable. 1000 milliGRAM(s) IV Push every 12 hours  cefepime  Injectable.      cholecalciferol Oral Tab/Cap - Peds 2000 Unit(s) Oral daily  dextrose 40% Gel 15 Gram(s) Oral once  dextrose 5%. 1000 milliLiter(s) (50 mL/Hr) IV Continuous <Continuous>  dextrose 5%. 1000 milliLiter(s) (100 mL/Hr) IV Continuous <Continuous>  dextrose 50% Injectable 25 Gram(s) IV Push once  dextrose 50% Injectable 12.5 Gram(s) IV Push once  dextrose 50% Injectable 25 Gram(s) IV Push once  finasteride 5 milliGRAM(s) Oral daily  glucagon  Injectable 1 milliGRAM(s) IntraMuscular once  insulin glargine Injectable (LANTUS) 10 Unit(s) SubCutaneous at bedtime  insulin lispro (ADMELOG) corrective regimen sliding scale   SubCutaneous three times a day before meals  melatonin 3 milliGRAM(s) Oral at bedtime  midodrine. 5 milliGRAM(s) Oral three times a day  pantoprazole    Tablet 40 milliGRAM(s) Oral before breakfast  sodium chloride 0.9%. 1000 milliLiter(s) (100 mL/Hr) IV Continuous <Continuous>  tamsulosin 0.4 milliGRAM(s) Oral at bedtime  traMADol 50 milliGRAM(s) Oral daily    MEDICATIONS  (PRN):  acetaminophen   Tablet .. 650 milliGRAM(s) Oral every 6 hours PRN Temp greater or equal to 38.5C (101.3F), Mild Pain (1 - 3)  ondansetron Injectable 4 milliGRAM(s) IV Push every 6 hours PRN Nausea    Allergies  morphine (Other)  Intolerances    Social: no smoking, no alcohol, no illegal drugs; no recent travel, no exposure to TB  FAMILY HISTORY:  No pertinent family history in first degree relatives  pt cannot recall any history of disease      no history of premature cardiovascular disease in first degree relatives    ROS: the patient is confused; dose not seem in pain  All other systems reviewed and are negative    Vital Signs Last 24 Hrs  T(C): 35.8 (2021 08:06), Max: 36.2 (2021 13:16)  T(F): 96.5 (2021 08:06), Max: 97.1 (2021 13:16)  HR: 88 (2021 11:00) (71 - 94)  BP: 109/54 (2021 11:00) (81/47 - 121/49)  BP(mean): 67 (2021 11:00) (55 - 83)  RR: 23 (2021 11:00) (15 - 23)  SpO2: 94% (2021 11:00) (90% - 100%)  Daily     Daily Weight in k.9 (2021 01:53)    PE:    Constitutional:  No acute distress  HEENT: NC/AT, EOMI, PERRLA, conjunctivae clear; ears and nose atraumatic; pharynx benign  Neck: supple; thyroid not palpable  Back: no tenderness  Respiratory: respiratory effort normal; clear to auscultation  Cardiovascular: S1S2 regular, no murmurs  Abdomen: soft, not tender, not distended, positive BS; no liver or spleen organomegaly  Genitourinary: no suprapubic tenderness  Lymphatic: no LN palpable  Musculoskeletal: no muscle tenderness, no joint swelling or tenderness  Extremities: no pedal edema  Neurological/ Psychiatric: alert, judgement and insight impaired; moving all extremities  Skin: no rashes; no palpable lesions    Labs: all available labs reviewed                        9.3    15.17 )-----------( 125      ( 2021 05:55 )             28.7     07-06    137  |  108  |  49<H>  ----------------------------<  252<H>  4.9   |  24  |  2.13<H>    Ca    7.3<L>      2021 05:55    TPro  4.9<L>  /  Alb  1.4<L>  /  TBili  0.3  /  DBili  x   /  AST  35  /  ALT  23  /  AlkPhos  102  07-05     LIVER FUNCTIONS - ( 2021 08:33 )  Alb: 1.4 g/dL / Pro: 4.9 gm/dL / ALK PHOS: 102 U/L / ALT: 23 U/L / AST: 35 U/L / GGT: x           Urinalysis Basic - ( 2021 02:41 )    Color: Yellow / Appearance: very cloudy / S.015 / pH: x  Gluc: x / Ketone: Trace  / Bili: Negative / Urobili: Negative mg/dL   Blood: x / Protein: 100 mg/dL / Nitrite: Negative   Leuk Esterase: Moderate / RBC: 11-25 /HPF / WBC >50   Sq Epi: x / Non Sq Epi: Occasional / Bacteria: Moderate      Culture - Fungal, Other (collected 2021 13:51)  Source: .Other LEFT URETHERAL  Preliminary Report (2021 07:41):    Testing in progress    Culture - Acid Fast - Urine (collected 2021 13:51)  Source: .Urine    Culture - Urine (collected 2021 02:41)  Source: .Urine None  Preliminary Report (2021 11:40):    50,000 - 99,000 CFU/mL Pseudomonas aeruginosa    Culture - Blood (collected 2021 22:18)  Source: .Blood Blood-Peripheral  Gram Stain (2021 22:15):    Growth in aerobic and anaerobic bottles: Gram Positive Cocci in Pairs and    Chains    Growth in aerobic bottle: Gram Negative Rods  Preliminary Report (2021 22:16):    Growth in aerobic and anaerobic bottles: Gram Positive Cocci in Pairs and    Chains    Growth in aerobic bottle: Gram Negative Rods    Culture - Blood (collected 2021 22:18)  Source: .Blood Blood-Peripheral  Gram Stain (2021 22:06):    Growth in aerobic and anaerobic bottles: Gram Positive Cocci in Pairs and    Chains    Growth in aerobic bottle: Gram Negative Rods  Preliminary Report (2021 22:07):    Growth in aerobic and anaerobic bottles: Gram Positive Cocci in Pairs and    Chains    Growth in aerobic bottle: Gram Negative Rods  Organism: Blood Culture PCR (2021 22:29)      -  Enterococcus faecalis: Detec      -  Pseudomonas aeruginosa: Detec      Method Type: PCR    Culture - Urine (collected 2021 16:54)  Source: .Urine None  Preliminary Report (2021 12:16):    >100,000 CFU/ml Gram Negative Rods        COVID-19 PCR: NotDetec (21 @ 22:18)  COVID-19 PCR: NotDetec (21 @ 17:07)          Radiology: all available radiological tests reviewed    Advanced directives addressed: full resuscitation Patient is a 89y old  Male who presents with a chief complaint of Severe sepsis secondary to left ureteropelvic calculus     HPI:  89 y/o Male with h/o A.fib, HTN, BPH, lymphoma, HL, HTN, kidney stones was admitted on  from [facility] [place] for decreased urinary output and a temperature of 104F. The patient reports having significant abdominal pain and "pain all over." He is a poor historian and unable to provide much of the history. The patient was previously seen in the ER at [facility] on 7/3/2021 and diagnosed with a uteropelvic junction stone with moderate left hydronephrosis and pyelonephritis and he was discharged on Keflex PO. In ER he received Vancomycin and Cefepime.  He underwent ureteral stent placement.     Past Medical History:  Afib    BPH (benign prostatic hyperplasia)    DM (diabetes mellitus)    Follicular lymphoma    Hernia    HLD (hyperlipidemia)    HTN (hypertension)    Kidney stone.     Past Surgical History:  Endoscopy finding  choked on piece of chiken-had upper endo with clearance of food  H/O left inguinal hernia repair    History of tibial fracture  R tibial fracture s/p revision with hardware placement ~3-4yrs ago, per pt.    Meds: per reconciliation sheet, noted below  MEDICATIONS  (STANDING):  apixaban 5 milliGRAM(s) Oral every 12 hours  cefepime  Injectable. 1000 milliGRAM(s) IV Push every 12 hours  cefepime  Injectable.      cholecalciferol Oral Tab/Cap - Peds 2000 Unit(s) Oral daily  dextrose 40% Gel 15 Gram(s) Oral once  dextrose 5%. 1000 milliLiter(s) (50 mL/Hr) IV Continuous <Continuous>  dextrose 5%. 1000 milliLiter(s) (100 mL/Hr) IV Continuous <Continuous>  dextrose 50% Injectable 25 Gram(s) IV Push once  dextrose 50% Injectable 12.5 Gram(s) IV Push once  dextrose 50% Injectable 25 Gram(s) IV Push once  finasteride 5 milliGRAM(s) Oral daily  glucagon  Injectable 1 milliGRAM(s) IntraMuscular once  insulin glargine Injectable (LANTUS) 10 Unit(s) SubCutaneous at bedtime  insulin lispro (ADMELOG) corrective regimen sliding scale   SubCutaneous three times a day before meals  melatonin 3 milliGRAM(s) Oral at bedtime  midodrine. 5 milliGRAM(s) Oral three times a day  pantoprazole    Tablet 40 milliGRAM(s) Oral before breakfast  sodium chloride 0.9%. 1000 milliLiter(s) (100 mL/Hr) IV Continuous <Continuous>  tamsulosin 0.4 milliGRAM(s) Oral at bedtime  traMADol 50 milliGRAM(s) Oral daily    MEDICATIONS  (PRN):  acetaminophen   Tablet .. 650 milliGRAM(s) Oral every 6 hours PRN Temp greater or equal to 38.5C (101.3F), Mild Pain (1 - 3)  ondansetron Injectable 4 milliGRAM(s) IV Push every 6 hours PRN Nausea    Allergies  morphine (Other)  Intolerances    Social: no smoking, no alcohol, no illegal drugs; no recent travel, no exposure to TB  FAMILY HISTORY:  No pertinent family history in first degree relatives  pt cannot recall any history of disease      no history of premature cardiovascular disease in first degree relatives    ROS: the patient is confused; dose not seem in pain  All other systems reviewed and are negative    Vital Signs Last 24 Hrs  T(C): 35.8 (2021 08:06), Max: 36.2 (2021 13:16)  T(F): 96.5 (2021 08:06), Max: 97.1 (2021 13:16)  HR: 88 (2021 11:00) (71 - 94)  BP: 109/54 (2021 11:00) (81/47 - 121/49)  BP(mean): 67 (2021 11:00) (55 - 83)  RR: 23 (2021 11:00) (15 - 23)  SpO2: 94% (2021 11:00) (90% - 100%)  Daily     Daily Weight in k.9 (2021 01:53)    PE:    Constitutional:  No acute distress  HEENT: NC/AT, EOMI, PERRLA, conjunctivae clear; ears and nose atraumatic; pharynx benign  Neck: supple; thyroid not palpable  Back: no tenderness  Respiratory: respiratory effort normal; clear to auscultation  Cardiovascular: S1S2 regular, no murmurs  Abdomen: soft, not tender, not distended, positive BS; no liver or spleen organomegaly  Genitourinary: no suprapubic tenderness  Lymphatic: no LN palpable  Musculoskeletal: no muscle tenderness, no joint swelling or tenderness  Extremities: no pedal edema  Neurological/ Psychiatric: alert, judgement and insight impaired; moving all extremities  Skin: no rashes; no palpable lesions    Labs: all available labs reviewed                        9.3    15.17 )-----------( 125      ( 2021 05:55 )             28.7     07-06    137  |  108  |  49<H>  ----------------------------<  252<H>  4.9   |  24  |  2.13<H>    Ca    7.3<L>      2021 05:55    TPro  4.9<L>  /  Alb  1.4<L>  /  TBili  0.3  /  DBili  x   /  AST  35  /  ALT  23  /  AlkPhos  102  07-05     LIVER FUNCTIONS - ( 2021 08:33 )  Alb: 1.4 g/dL / Pro: 4.9 gm/dL / ALK PHOS: 102 U/L / ALT: 23 U/L / AST: 35 U/L / GGT: x           Urinalysis Basic - ( 2021 02:41 )    Color: Yellow / Appearance: very cloudy / S.015 / pH: x  Gluc: x / Ketone: Trace  / Bili: Negative / Urobili: Negative mg/dL   Blood: x / Protein: 100 mg/dL / Nitrite: Negative   Leuk Esterase: Moderate / RBC: 11-25 /HPF / WBC >50   Sq Epi: x / Non Sq Epi: Occasional / Bacteria: Moderate      Culture - Fungal, Other (collected 2021 13:51)  Source: .Other LEFT URETHERAL  Preliminary Report (2021 07:41):    Testing in progress    Culture - Acid Fast - Urine (collected 2021 13:51)  Source: .Urine    Culture - Urine (collected 2021 02:41)  Source: .Urine None  Preliminary Report (2021 11:40):    50,000 - 99,000 CFU/mL Pseudomonas aeruginosa    Culture - Blood (collected 2021 22:18)  Source: .Blood Blood-Peripheral  Gram Stain (2021 22:15):    Growth in aerobic and anaerobic bottles: Gram Positive Cocci in Pairs and    Chains    Growth in aerobic bottle: Gram Negative Rods  Preliminary Report (2021 22:16):    Growth in aerobic and anaerobic bottles: Gram Positive Cocci in Pairs and    Chains    Growth in aerobic bottle: Gram Negative Rods    Culture - Blood (collected 2021 22:18)  Source: .Blood Blood-Peripheral  Gram Stain (2021 22:06):    Growth in aerobic and anaerobic bottles: Gram Positive Cocci in Pairs and    Chains    Growth in aerobic bottle: Gram Negative Rods  Preliminary Report (2021 22:07):    Growth in aerobic and anaerobic bottles: Gram Positive Cocci in Pairs and    Chains    Growth in aerobic bottle: Gram Negative Rods  Organism: Blood Culture PCR (2021 22:29)      -  Enterococcus faecalis: Detec      -  Pseudomonas aeruginosa: Detec      Method Type: PCR    Culture - Urine (collected 2021 16:54)  Source: .Urine None  Preliminary Report (2021 12:16):    >100,000 CFU/ml Gram Negative Rods        COVID-19 PCR: NotDetec (21 @ 22:18)  COVID-19 PCR: NotDetec (21 @ 17:07)      Radiology: all available radiological tests reviewed    < from: CT Abdomen and Pelvis No Cont (21 @ 23:40) >  1. Moderate left hydronephrosis and perinephric inflammatory change secondary to a 1.4 x 0.8 cm calculus in the left ureteropelvic junction, not significantly changed.  2. Increase in complex left paracolic gutter free fluid.  3. Redemonstration of circumferential urinary bladder wall thickening which could be due to a cystitis.  4. Redemonstration of a mesenteric mass.    < end of copied text >      Advanced directives addressed: full resuscitation

## 2021-07-06 NOTE — PHYSICAL THERAPY INITIAL EVALUATION ADULT - ADDITIONAL COMMENTS
per daughter Ally pt had been mostly independent at assisted living ,recommended to use RW on last admission per daughter Ally pt had been mostly independent at assisted living ,recommended to use RW on last admission,when he first entered assisted living he was independent ambulator with a cane PRN

## 2021-07-06 NOTE — PHYSICAL THERAPY INITIAL EVALUATION ADULT - PRECAUTIONS/LIMITATIONS, REHAB EVAL
L Pleurex catheter placed 6/4/21 for recurrent L pleural effusion drained 3x/wk up to 1L; h/o chronic pain associated with prior vertebral fx/fall precautions pt has had fall in past at home 8/12/20 and a fall at [facility] 4/23/21 landing on L side; also reports a fall in front [place] of hospital while leaving in WC ; L Pleurex catheter placed 6/4/21 for recurrent L pleural effusion drained 3x/wk up to 1L; h/o chronic pain associated with prior vertebral fx/fall precautions

## 2021-07-07 LAB
-  AMIKACIN: SIGNIFICANT CHANGE UP
-  AMIKACIN: SIGNIFICANT CHANGE UP
-  AMPICILLIN: SIGNIFICANT CHANGE UP
-  AZTREONAM: SIGNIFICANT CHANGE UP
-  AZTREONAM: SIGNIFICANT CHANGE UP
-  CEFEPIME: SIGNIFICANT CHANGE UP
-  CEFEPIME: SIGNIFICANT CHANGE UP
-  CEFTAZIDIME: SIGNIFICANT CHANGE UP
-  CEFTAZIDIME: SIGNIFICANT CHANGE UP
-  CIPROFLOXACIN: SIGNIFICANT CHANGE UP
-  CIPROFLOXACIN: SIGNIFICANT CHANGE UP
-  GENTAMICIN SYNERGY: SIGNIFICANT CHANGE UP
-  GENTAMICIN: SIGNIFICANT CHANGE UP
-  GENTAMICIN: SIGNIFICANT CHANGE UP
-  IMIPENEM: SIGNIFICANT CHANGE UP
-  IMIPENEM: SIGNIFICANT CHANGE UP
-  LEVOFLOXACIN: SIGNIFICANT CHANGE UP
-  LEVOFLOXACIN: SIGNIFICANT CHANGE UP
-  MEROPENEM: SIGNIFICANT CHANGE UP
-  MEROPENEM: SIGNIFICANT CHANGE UP
-  PIPERACILLIN/TAZOBACTAM: SIGNIFICANT CHANGE UP
-  PIPERACILLIN/TAZOBACTAM: SIGNIFICANT CHANGE UP
-  TOBRAMYCIN: SIGNIFICANT CHANGE UP
-  TOBRAMYCIN: SIGNIFICANT CHANGE UP
-  VANCOMYCIN: SIGNIFICANT CHANGE UP
ANION GAP SERPL CALC-SCNC: 7 MMOL/L — SIGNIFICANT CHANGE UP (ref 5–17)
BUN SERPL-MCNC: 50 MG/DL — HIGH (ref 7–23)
CALCIUM SERPL-MCNC: 7.3 MG/DL — LOW (ref 8.5–10.1)
CHLORIDE SERPL-SCNC: 112 MMOL/L — HIGH (ref 96–108)
CO2 SERPL-SCNC: 21 MMOL/L — LOW (ref 22–31)
CREAT SERPL-MCNC: 1.6 MG/DL — HIGH (ref 0.5–1.3)
CULTURE RESULTS: SIGNIFICANT CHANGE UP
GLUCOSE SERPL-MCNC: 263 MG/DL — HIGH (ref 70–99)
HCT VFR BLD CALC: 25.8 % — LOW (ref 39–50)
HGB BLD-MCNC: 8.4 G/DL — LOW (ref 13–17)
MCHC RBC-ENTMCNC: 29 PG — SIGNIFICANT CHANGE UP (ref 27–34)
MCHC RBC-ENTMCNC: 32.6 GM/DL — SIGNIFICANT CHANGE UP (ref 32–36)
MCV RBC AUTO: 89 FL — SIGNIFICANT CHANGE UP (ref 80–100)
METHOD TYPE: SIGNIFICANT CHANGE UP
ORGANISM # SPEC MICROSCOPIC CNT: SIGNIFICANT CHANGE UP
PLATELET # BLD AUTO: 123 K/UL — LOW (ref 150–400)
POTASSIUM SERPL-MCNC: 4.3 MMOL/L — SIGNIFICANT CHANGE UP (ref 3.5–5.3)
POTASSIUM SERPL-SCNC: 4.3 MMOL/L — SIGNIFICANT CHANGE UP (ref 3.5–5.3)
RBC # BLD: 2.9 M/UL — LOW (ref 4.2–5.8)
RBC # FLD: 15.2 % — HIGH (ref 10.3–14.5)
SODIUM SERPL-SCNC: 140 MMOL/L — SIGNIFICANT CHANGE UP (ref 135–145)
SPECIMEN SOURCE: SIGNIFICANT CHANGE UP
WBC # BLD: 13.42 K/UL — HIGH (ref 3.8–10.5)
WBC # FLD AUTO: 13.42 K/UL — HIGH (ref 3.8–10.5)

## 2021-07-07 PROCEDURE — 99233 SBSQ HOSP IP/OBS HIGH 50: CPT

## 2021-07-07 RX ORDER — INSULIN GLARGINE 100 [IU]/ML
20 INJECTION, SOLUTION SUBCUTANEOUS AT BEDTIME
Refills: 0 | Status: DISCONTINUED | OUTPATIENT
Start: 2021-07-07 | End: 2021-07-07

## 2021-07-07 RX ORDER — INSULIN GLARGINE 100 [IU]/ML
30 INJECTION, SOLUTION SUBCUTANEOUS AT BEDTIME
Refills: 0 | Status: DISCONTINUED | OUTPATIENT
Start: 2021-07-07 | End: 2021-07-08

## 2021-07-07 RX ORDER — INSULIN GLARGINE 100 [IU]/ML
10 INJECTION, SOLUTION SUBCUTANEOUS ONCE
Refills: 0 | Status: COMPLETED | OUTPATIENT
Start: 2021-07-07 | End: 2021-07-07

## 2021-07-07 RX ADMIN — Medication 2000 UNIT(S): at 10:00

## 2021-07-07 RX ADMIN — APIXABAN 5 MILLIGRAM(S): 2.5 TABLET, FILM COATED ORAL at 10:00

## 2021-07-07 RX ADMIN — Medication 3 MILLIGRAM(S): at 21:38

## 2021-07-07 RX ADMIN — Medication 4: at 18:18

## 2021-07-07 RX ADMIN — Medication 4: at 08:41

## 2021-07-07 RX ADMIN — PANTOPRAZOLE SODIUM 40 MILLIGRAM(S): 20 TABLET, DELAYED RELEASE ORAL at 08:49

## 2021-07-07 RX ADMIN — Medication 5: at 11:52

## 2021-07-07 RX ADMIN — TAMSULOSIN HYDROCHLORIDE 0.4 MILLIGRAM(S): 0.4 CAPSULE ORAL at 21:38

## 2021-07-07 RX ADMIN — INSULIN GLARGINE 10 UNIT(S): 100 INJECTION, SOLUTION SUBCUTANEOUS at 11:52

## 2021-07-07 RX ADMIN — APIXABAN 5 MILLIGRAM(S): 2.5 TABLET, FILM COATED ORAL at 21:38

## 2021-07-07 RX ADMIN — FINASTERIDE 5 MILLIGRAM(S): 5 TABLET, FILM COATED ORAL at 10:00

## 2021-07-07 RX ADMIN — INSULIN GLARGINE 30 UNIT(S): 100 INJECTION, SOLUTION SUBCUTANEOUS at 21:37

## 2021-07-07 RX ADMIN — PIPERACILLIN AND TAZOBACTAM 25 GRAM(S): 4; .5 INJECTION, POWDER, LYOPHILIZED, FOR SOLUTION INTRAVENOUS at 13:38

## 2021-07-07 RX ADMIN — INSULIN GLARGINE 10 UNIT(S): 100 INJECTION, SOLUTION SUBCUTANEOUS at 18:19

## 2021-07-07 RX ADMIN — PIPERACILLIN AND TAZOBACTAM 25 GRAM(S): 4; .5 INJECTION, POWDER, LYOPHILIZED, FOR SOLUTION INTRAVENOUS at 21:38

## 2021-07-07 RX ADMIN — PIPERACILLIN AND TAZOBACTAM 25 GRAM(S): 4; .5 INJECTION, POWDER, LYOPHILIZED, FOR SOLUTION INTRAVENOUS at 05:09

## 2021-07-07 RX ADMIN — TRAMADOL HYDROCHLORIDE 50 MILLIGRAM(S): 50 TABLET ORAL at 10:00

## 2021-07-07 NOTE — PROGRESS NOTE ADULT - SUBJECTIVE AND OBJECTIVE BOX
89 [facility] output and a temperature of 104F. The patient reports having significant abdominal pain and "pain all over." He is a poor historian and unable to provide much of the history. The patient was previously seen in the ER at [facility] on 7/3/2021 and diagnosed with a uteropelvic junction stone with moderate left hydronephrosis and pyelonephritis and he was discharged on Keflex.     Patient without any acute complaints. Pt is s/p cystoscopy, left ureteral stent placement on 7/5/21. Pseudomonas bacteremia and UTI with Enterococcus ; overall improvement    Vital Signs Last 24 Hrs  T(C): 36.6 (07 Jul 2021 12:02), Max: 36.8 (07 Jul 2021 00:09)  T(F): 97.8 (07 Jul 2021 12:02), Max: 98.3 (07 Jul 2021 00:09)  HR: 84 (07 Jul 2021 12:00) (68 - 91)  BP: 129/65 (07 Jul 2021 12:00) (98/43 - 140/90)  BP(mean): 81 (07 Jul 2021 12:00) (52 - 117)  RR: 23 (07 Jul 2021 12:00) (17 - 26)  SpO2: 97% (07 Jul 2021 12:00) (94% - 100%)                HEENT: No icterus , EOM full , No epistaxis  neck supple no jvd  chest CTA  CVS s1s2 reg  Abdo:   : Soft, Non tender, No guarding, No distension  Back    : No CVAT b/l  Extremity: No calf tenderness   Genitalia Male: Kaminski with yellow urine  Skin     : No jaundice, No lesions, No swelling                           8.4    13.42 )-----------( 123      ( 07 Jul 2021 05:15 )             25.8   07-07    140  |  112<H>  |  50<H>  ----------------------------<  263<H>  4.3   |  21<L>  |  1.60<H>    Ca    7.3<L>      07 Jul 2021 05:15        * Severe sepsis secondary to complicated UTI and left uteropelvic junction calculus   s/p stent  Ps Aer. 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 The patient reports having significant abdominal pain and "pain all over." He is a poor historian and unable to provide much of the history. The patient was previously seen in the ER at [facility] on 7/3/2021 and diagnosed with a uteropelvic junction stone with moderate left hydronephrosis and pyelonephritis and he was discharged on Keflex.     Patient without any acute complaints. Pt is s/p cystoscopy, left ureteral stent placement on 7/5/21. Pseudomonas bacteremia and UTI with Enterococcus ; overall improvement    Vital Signs Last 24 Hrs  T(C): 36.6 (07 Jul 2021 12:02), Max: 36.8 (07 Jul 2021 00:09)  T(F): 97.8 (07 Jul 2021 12:02), Max: 98.3 (07 Jul 2021 00:09)  HR: 84 (07 Jul 2021 12:00) (68 - 91)  BP: 129/65 (07 Jul 2021 12:00) (98/43 - 140/90)  BP(mean): 81 (07 Jul 2021 12:00) (52 - 117)  RR: 23 (07 Jul 2021 12:00) (17 - 26)  SpO2: 97% (07 Jul 2021 12:00) (94% - 100%)                HEENT: No icterus , EOM full , No epistaxis  neck supple no jvd  chest CTA  CVS s1s2 reg  Abdo:   : Soft, Non tender, No guarding, No distension  Back    : No CVAT b/l  Extremity: No calf tenderness   Genitalia Male: Kaminski with yellow urine  Skin     : No jaundice, No lesions, No swelling                           8.4    13.42 )-----------( 123      ( 07 Jul 2021 05:15 )             25.8   07-07    140  |  112<H>  |  50<H>  ----------------------------<  263<H>  4.3   |  21<L>  |  1.60<H>    Ca    7.3<L>      07 Jul 2021 05:15        * Severe sepsis secondary to complicated UTI and left uteropelvic junction calculus   s/p stent  Ps Aer. 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

## 2021-07-07 NOTE — PROGRESS NOTE ADULT - SUBJECTIVE AND OBJECTIVE BOX
Date of service: 21 @ 09:56    Lying in bed in NAD  Weak looking  Alert and mild confused  Denies pain    ROS: no fever or chills; denies dizziness, no HA, no SOB or cough, no abdominal pain, no diarrhea or constipation; no legs pain, no rashes    MEDICATIONS  (STANDING):  apixaban 5 milliGRAM(s) Oral every 12 hours  cholecalciferol Oral Tab/Cap - Peds 2000 Unit(s) Oral daily  dextrose 40% Gel 15 Gram(s) Oral once  dextrose 5%. 1000 milliLiter(s) (50 mL/Hr) IV Continuous <Continuous>  dextrose 5%. 1000 milliLiter(s) (100 mL/Hr) IV Continuous <Continuous>  dextrose 50% Injectable 25 Gram(s) IV Push once  dextrose 50% Injectable 12.5 Gram(s) IV Push once  dextrose 50% Injectable 25 Gram(s) IV Push once  finasteride 5 milliGRAM(s) Oral daily  glucagon  Injectable 1 milliGRAM(s) IntraMuscular once  insulin glargine Injectable (LANTUS) 10 Unit(s) SubCutaneous once  insulin glargine Injectable (LANTUS) 20 Unit(s) SubCutaneous at bedtime  insulin lispro (ADMELOG) corrective regimen sliding scale   SubCutaneous three times a day before meals  melatonin 3 milliGRAM(s) Oral at bedtime  midodrine. 5 milliGRAM(s) Oral three times a day  pantoprazole    Tablet 40 milliGRAM(s) Oral before breakfast  piperacillin/tazobactam IVPB.. 3.375 Gram(s) IV Intermittent every 8 hours  sodium chloride 0.9%. 1000 milliLiter(s) (100 mL/Hr) IV Continuous <Continuous>  tamsulosin 0.4 milliGRAM(s) Oral at bedtime  traMADol 50 milliGRAM(s) Oral daily    Vital Signs Last 24 Hrs  T(C): 35.9 (2021 08:26), Max: 36.8 (2021 00:09)  T(F): 96.7 (2021 08:26), Max: 98.3 (2021 00:09)  HR: 70 (2021 06:00) (68 - 91)  BP: 122/50 (2021 06:00) (97/48 - 125/57)  BP(mean): 69 (2021 06:00) (52 - 74)  RR: 23 (2021 06:00) (17 - 23)  SpO2: 98% (2021 06:00) (93% - 99%)     Physical exam:    Constitutional:  No acute distress  HEENT: NC/AT, EOMI, PERRLA, conjunctivae clear  Neck: supple; thyroid not palpable  Back: no tenderness  Respiratory: respiratory effort normal; clear to auscultation  Cardiovascular: S1S2 regular, no murmurs  Abdomen: soft, not tender, not distended, positive BS  Genitourinary: no suprapubic tenderness  Lymphatic: no LN palpable  Musculoskeletal: no muscle tenderness, no joint swelling or tenderness  Extremities: no pedal edema  Neurological/ Psychiatric: alert, moving all extremities  Skin: no rashes; no palpable lesions    Labs: reviewed                        8.4    13.42 )-----------( 123      ( 2021 05:15 )             25.8     07-07    140  |  112<H>  |  50<H>  ----------------------------<  263<H>  4.3   |  21<L>  |  1.60<H>    Ca    7.3<L>      2021 05:15                        9.3    15.17 )-----------( 125      ( 2021 05:55 )             28.7     07-06    137  |  108  |  49<H>  ----------------------------<  252<H>  4.9   |  24  |  2.13<H>    Ca    7.3<L>      2021 05:55    TPro  4.9<L>  /  Alb  1.4<L>  /  TBili  0.3  /  DBili  x   /  AST  35  /  ALT  23  /  AlkPhos  102  07-05     LIVER FUNCTIONS - ( 2021 08:33 )  Alb: 1.4 g/dL / Pro: 4.9 gm/dL / ALK PHOS: 102 U/L / ALT: 23 U/L / AST: 35 U/L / GGT: x           Urinalysis Basic - ( 2021 02:41 )    Color: Yellow / Appearance: very cloudy / S.015 / pH: x  Gluc: x / Ketone: Trace  / Bili: Negative / Urobili: Negative mg/dL   Blood: x / Protein: 100 mg/dL / Nitrite: Negative   Leuk Esterase: Moderate / RBC: 11-25 /HPF / WBC >50   Sq Epi: x / Non Sq Epi: Occasional / Bacteria: Moderate      Culture - Fungal, Other (collected 2021 13:51)  Source: .Other LEFT URETHERAL  Preliminary Report (2021 07:41):    Testing in progress    Culture - Acid Fast - Urine (collected 2021 13:51)  Source: .Urine    Culture - Urine (collected 2021 02:41)  Source: .Urine None  Preliminary Report (2021 11:40):    50,000 - 99,000 CFU/mL Pseudomonas aeruginosa    Culture - Blood (collected 2021 22:18)  Source: .Blood Blood-Peripheral  Gram Stain (2021 22:15):    Growth in aerobic and anaerobic bottles: Gram Positive Cocci in Pairs and    Chains    Growth in aerobic bottle: Gram Negative Rods  Preliminary Report (2021 22:16):    Growth in aerobic and anaerobic bottles: Gram Positive Cocci in Pairs and    Chains    Growth in aerobic bottle: Gram Negative Rods    Culture - Blood (collected 2021 22:18)  Source: .Blood Blood-Peripheral  Gram Stain (2021 22:06):    Growth in aerobic and anaerobic bottles: Gram Positive Cocci in Pairs and    Chains    Growth in aerobic bottle: Gram Negative Rods  Preliminary Report (2021 22:07):    Growth in aerobic and anaerobic bottles: Gram Positive Cocci in Pairs and    Chains    Growth in aerobic bottle: Gram Negative Rods  Organism: Blood Culture PCR (2021 22:29)      -  Enterococcus faecalis: Detec      -  Pseudomonas aeruginosa: Detec      Method Type: PCR    Culture - Urine (collected 2021 16:54)  Source: .Urine None  Preliminary Report (2021 12:16):    >100,000 CFU/ml Gram Negative Rods        COVID-19 PCR: NotDetec (21 @ 22:18)  COVID-19 PCR: NotDetec (21 @ 17:07)      Radiology: all available radiological tests reviewed    < from: CT Abdomen and Pelvis No Cont (21 @ 23:40) >  1. Moderate left hydronephrosis and perinephric inflammatory change secondary to a 1.4 x 0.8 cm calculus in the left ureteropelvic junction, not significantly changed.  2. Increase in complex left paracolic gutter free fluid.  3. Redemonstration of circumferential urinary bladder wall thickening which could be due to a cystitis.  4. Redemonstration of a mesenteric mass.    < end of copied text >      Advanced directives addressed: full resuscitation

## 2021-07-08 LAB
ANION GAP SERPL CALC-SCNC: 8 MMOL/L — SIGNIFICANT CHANGE UP (ref 5–17)
BUN SERPL-MCNC: 37 MG/DL — HIGH (ref 7–23)
CALCIUM SERPL-MCNC: 7.4 MG/DL — LOW (ref 8.5–10.1)
CHLORIDE SERPL-SCNC: 107 MMOL/L — SIGNIFICANT CHANGE UP (ref 96–108)
CO2 SERPL-SCNC: 22 MMOL/L — SIGNIFICANT CHANGE UP (ref 22–31)
CREAT SERPL-MCNC: 1.34 MG/DL — HIGH (ref 0.5–1.3)
GLUCOSE SERPL-MCNC: 304 MG/DL — HIGH (ref 70–99)
HCT VFR BLD CALC: 26.4 % — LOW (ref 39–50)
HGB BLD-MCNC: 8.5 G/DL — LOW (ref 13–17)
MCHC RBC-ENTMCNC: 28.8 PG — SIGNIFICANT CHANGE UP (ref 27–34)
MCHC RBC-ENTMCNC: 32.2 GM/DL — SIGNIFICANT CHANGE UP (ref 32–36)
MCV RBC AUTO: 89.5 FL — SIGNIFICANT CHANGE UP (ref 80–100)
PLATELET # BLD AUTO: 128 K/UL — LOW (ref 150–400)
POTASSIUM SERPL-MCNC: 3.5 MMOL/L — SIGNIFICANT CHANGE UP (ref 3.5–5.3)
POTASSIUM SERPL-SCNC: 3.5 MMOL/L — SIGNIFICANT CHANGE UP (ref 3.5–5.3)
RBC # BLD: 2.95 M/UL — LOW (ref 4.2–5.8)
RBC # FLD: 15.1 % — HIGH (ref 10.3–14.5)
SODIUM SERPL-SCNC: 137 MMOL/L — SIGNIFICANT CHANGE UP (ref 135–145)
WBC # BLD: 9.47 K/UL — SIGNIFICANT CHANGE UP (ref 3.8–10.5)
WBC # FLD AUTO: 9.47 K/UL — SIGNIFICANT CHANGE UP (ref 3.8–10.5)

## 2021-07-08 PROCEDURE — 99497 ADVNCD CARE PLAN 30 MIN: CPT | Mod: 25

## 2021-07-08 PROCEDURE — 99223 1ST HOSP IP/OBS HIGH 75: CPT | Mod: 25

## 2021-07-08 PROCEDURE — 99498 ADVNCD CARE PLAN ADDL 30 MIN: CPT

## 2021-07-08 PROCEDURE — 99233 SBSQ HOSP IP/OBS HIGH 50: CPT

## 2021-07-08 RX ORDER — INSULIN GLARGINE 100 [IU]/ML
25 INJECTION, SOLUTION SUBCUTANEOUS AT BEDTIME
Refills: 0 | Status: DISCONTINUED | OUTPATIENT
Start: 2021-07-08 | End: 2021-07-10

## 2021-07-08 RX ADMIN — Medication 3: at 18:12

## 2021-07-08 RX ADMIN — PIPERACILLIN AND TAZOBACTAM 25 GRAM(S): 4; .5 INJECTION, POWDER, LYOPHILIZED, FOR SOLUTION INTRAVENOUS at 06:17

## 2021-07-08 RX ADMIN — INSULIN GLARGINE 25 UNIT(S): 100 INJECTION, SOLUTION SUBCUTANEOUS at 21:36

## 2021-07-08 RX ADMIN — PIPERACILLIN AND TAZOBACTAM 25 GRAM(S): 4; .5 INJECTION, POWDER, LYOPHILIZED, FOR SOLUTION INTRAVENOUS at 21:35

## 2021-07-08 RX ADMIN — Medication 1: at 13:05

## 2021-07-08 RX ADMIN — APIXABAN 5 MILLIGRAM(S): 2.5 TABLET, FILM COATED ORAL at 21:35

## 2021-07-08 RX ADMIN — FINASTERIDE 5 MILLIGRAM(S): 5 TABLET, FILM COATED ORAL at 10:43

## 2021-07-08 RX ADMIN — Medication 2000 UNIT(S): at 10:43

## 2021-07-08 RX ADMIN — TRAMADOL HYDROCHLORIDE 50 MILLIGRAM(S): 50 TABLET ORAL at 10:42

## 2021-07-08 RX ADMIN — PIPERACILLIN AND TAZOBACTAM 25 GRAM(S): 4; .5 INJECTION, POWDER, LYOPHILIZED, FOR SOLUTION INTRAVENOUS at 13:05

## 2021-07-08 RX ADMIN — Medication 3 MILLIGRAM(S): at 21:35

## 2021-07-08 RX ADMIN — PANTOPRAZOLE SODIUM 40 MILLIGRAM(S): 20 TABLET, DELAYED RELEASE ORAL at 06:17

## 2021-07-08 RX ADMIN — TAMSULOSIN HYDROCHLORIDE 0.4 MILLIGRAM(S): 0.4 CAPSULE ORAL at 21:35

## 2021-07-08 RX ADMIN — APIXABAN 5 MILLIGRAM(S): 2.5 TABLET, FILM COATED ORAL at 10:43

## 2021-07-08 NOTE — PROGRESS NOTE ADULT - SUBJECTIVE AND OBJECTIVE BOX
Date of service: 21 @ 14:03    Lying in bed in NAD  Has urinary frequency  Denies pain    ROS: no fever or chills; denies dizziness, no HA, no SOB or cough, no abdominal pain, no diarrhea or constipation; no legs pain, no rashes    MEDICATIONS  (STANDING):  apixaban 5 milliGRAM(s) Oral every 12 hours  cholecalciferol Oral Tab/Cap - Peds 2000 Unit(s) Oral daily  dextrose 40% Gel 15 Gram(s) Oral once  dextrose 5%. 1000 milliLiter(s) (100 mL/Hr) IV Continuous <Continuous>  dextrose 5%. 1000 milliLiter(s) (50 mL/Hr) IV Continuous <Continuous>  dextrose 50% Injectable 25 Gram(s) IV Push once  dextrose 50% Injectable 12.5 Gram(s) IV Push once  dextrose 50% Injectable 25 Gram(s) IV Push once  finasteride 5 milliGRAM(s) Oral daily  glucagon  Injectable 1 milliGRAM(s) IntraMuscular once  insulin glargine Injectable (LANTUS) 25 Unit(s) SubCutaneous at bedtime  insulin lispro (ADMELOG) corrective regimen sliding scale   SubCutaneous three times a day before meals  melatonin 3 milliGRAM(s) Oral at bedtime  pantoprazole    Tablet 40 milliGRAM(s) Oral before breakfast  piperacillin/tazobactam IVPB.. 3.375 Gram(s) IV Intermittent every 8 hours  tamsulosin 0.4 milliGRAM(s) Oral at bedtime  traMADol 50 milliGRAM(s) Oral daily    Vital Signs Last 24 Hrs  T(C): 36.3 (2021 12:26), Max: 36.7 (2021 15:33)  T(F): 97.4 (2021 12:26), Max: 98.1 (2021 19:46)  HR: 80 (2021 13:00) (64 - 84)  BP: 133/53 (2021 13:00) (112/54 - 154/76)  BP(mean): 73 (2021 13:00) (67 - 113)  RR: 22 (2021 13:00) (18 - 29)  SpO2: 95% (2021 00:00) (95% - 99%)     Physical exam:    Constitutional:  No acute distress  HEENT: NC/AT, EOMI, PERRLA, conjunctivae clear  Neck: supple; thyroid not palpable  Back: no tenderness  Respiratory: respiratory effort normal; decreased BS at bases  Cardiovascular: S1S2 regular, no murmurs  Abdomen: soft, not tender, not distended, positive BS  Genitourinary: no suprapubic tenderness  Lymphatic: no LN palpable  Musculoskeletal: no muscle tenderness, no joint swelling or tenderness  Extremities: no pedal edema  Neurological/ Psychiatric: alert, moving all extremities  Skin: no rashes; no palpable lesions    Labs: reviewed                        8.5    9.47  )-----------( 128      ( 2021 06:34 )             26.4     07-08    137  |  107  |  37<H>  ----------------------------<  304<H>  3.5   |  22  |  1.34<H>    Ca    7.4<L>      2021 06:34                        8.4    13.42 )-----------( 123      ( 2021 05:15 )             25.8     07-07    140  |  112<H>  |  50<H>  ----------------------------<  263<H>  4.3   |  21<L>  |  1.60<H>    Ca    7.3<L>      2021 05:15                        9.3    15.17 )-----------( 125      ( 2021 05:55 )             28.7     07-06    137  |  108  |  49<H>  ----------------------------<  252<H>  4.9   |  24  |  2.13<H>    Ca    7.3<L>      2021 05:55    TPro  4.9<L>  /  Alb  1.4<L>  /  TBili  0.3  /  DBili  x   /  AST  35  /  ALT  23  /  AlkPhos  102  07-05     LIVER FUNCTIONS - ( 2021 08:33 )  Alb: 1.4 g/dL / Pro: 4.9 gm/dL / ALK PHOS: 102 U/L / ALT: 23 U/L / AST: 35 U/L / GGT: x           Urinalysis Basic - ( 2021 02:41 )    Color: Yellow / Appearance: very cloudy / S.015 / pH: x  Gluc: x / Ketone: Trace  / Bili: Negative / Urobili: Negative mg/dL   Blood: x / Protein: 100 mg/dL / Nitrite: Negative   Leuk Esterase: Moderate / RBC: 11-25 /HPF / WBC >50   Sq Epi: x / Non Sq Epi: Occasional / Bacteria: Moderate      Culture - Fungal, Other (collected 2021 13:51)  Source: .Other LEFT URETHERAL  Preliminary Report (2021 07:41):    Testing in progress    Culture - Acid Fast - Urine (collected 2021 13:51)  Source: .Urine  Preliminary Report (2021 15:05):    Culture is being performed.    Culture - Urine (collected 2021 13:51)  Source: .Urine left ureteral  Preliminary Report (2021 19:21):    Moderate Pseudomonas aeruginosa    Moderate Enterococcus faecalis  Organism: Pseudomonas aeruginosa (2021 16:51)  Organism: Pseudomonas aeruginosa (2021 16:51)      -  Amikacin: S <=16      -  Aztreonam: S <=4      -  Cefepime: S 4      -  Ceftazidime: S <=1      -  Ciprofloxacin: S <=0.25      -  Gentamicin: S 4      -  Imipenem: S 2      -  Levofloxacin: S <=0.5      -  Meropenem: S <=1      -  Piperacillin/Tazobactam: S <=8      -  Tobramycin: S <=2      Method Type: BALA    Culture - Urine (collected 2021 02:41)  Source: .Urine None  Final Report (2021 05:21):    50,000 - 99,000 CFU/mL Pseudomonas aeruginosa    <10,000 CFU/ml Normal Urogenital aminata present  Organism: Pseudomonas aeruginosa (2021 05:21)  Organism: Pseudomonas aeruginosa (2021 05:21)      -  Amikacin: S <=16      -  Aztreonam: S <=4      -  Cefepime: S <=2      -  Ceftazidime: S <=1      -  Ciprofloxacin: S <=0.25      -  Gentamicin: S <=2      -  Imipenem: S <=1      -  Levofloxacin: S <=0.5      -  Meropenem: S <=1      -  Piperacillin/Tazobactam: S <=8      -  Tobramycin: S <=2      Method Type: BALA    Culture - Blood (collected 2021 22:18)  Source: .Blood Blood-Peripheral  Gram Stain (2021 22:15):    Growth in aerobic and anaerobic bottles: Gram Positive Cocci in Pairs and    Chains    Growth in aerobic bottle: Gram Negative Rods  Final Report (2021 20:29):    Growth in aerobic and anaerobic bottles: Enterococcus faecalis    Growth in aerobic and anaerobic bottles: Pseudomonas aeruginosa    See previous culture [id number]    Culture - Blood (collected 2021 22:18)  Source: .Blood Blood-Peripheral  Gram Stain (2021 22:06):    Growth in aerobic and anaerobic bottles: Gram Positive Cocci in Pairs and    Chains    Growth in aerobic bottle: Gram Negative Rods  Final Report (2021 20:28):    Growth in aerobic and anaerobic bottles: Enterococcus faecalis    Growth in aerobic and anaerobic bottles: Pseudomonas aeruginosa  Enterococcus faecalis  Pseudomonas aeruginosa (2021 20:28)  Organism: Pseudomonas aeruginosa (2021 20:28)      -  Amikacin: S <=16      -  Aztreonam: S <=4      -  Cefepime: S <=2      -  Ceftazidime: S <=1      -  Ciprofloxacin: S <=0.25      -  Gentamicin: S <=2      -  Imipenem: S <=1      -  Levofloxacin: S <=0.5      -  Meropenem: S <=1      -  Piperacillin/Tazobactam: S <=8      -  Tobramycin: S <=2      Method Type: BALA  Organism: Enterococcus faecalis (2021 20:28)      -  Ampicillin: S <=2 Predicts results to ampicillin/sulbactam, amoxacillin-clavulanate and  piperacillin-tazobactam.      -  Gentamicin synergy: S      -  Vancomycin: S 2      Method Type: BALA  Organism: Blood Culture PCR (2021 20:28)      -  Enterococcus faecalis: Detec      -  Pseudomonas aeruginosa: Detec      Method Type: PCR    Culture - Urine (collected 2021 16:54)  Source: .Urine None  Final Report (2021 18:49):    >100,000 CFU/ml Pseudomonas aeruginosa  Organism: Pseudomonas aeruginosa (2021 18:49)  Organism: Pseudomonas aeruginosa (2021 18:49)      -  Amikacin: S <=16      -  Aztreonam: S <=4      -  Cefepime: S <=2      -  Ceftazidime: S <=1      -  Ciprofloxacin: S <=0.25      -  Gentamicin: S <=2      -  Imipenem: S <=1      -  Levofloxacin: S <=0.5      -  Meropenem: S <=1      -  Piperacillin/Tazobactam: S <=8      -  Tobramycin: S <=2      Method Type: BALA        COVID-19 PCR: NotDetec (21 @ 22:18)  COVID-19 PCR: NotDetec (21 @ 17:07)      Radiology: all available radiological tests reviewed    < from: CT Abdomen and Pelvis No Cont (21 @ 23:40) >  1. Moderate left hydronephrosis and perinephric inflammatory change secondary to a 1.4 x 0.8 cm calculus in the left ureteropelvic junction, not significantly changed.  2. Increase in complex left paracolic gutter free fluid.  3. Redemonstration of circumferential urinary bladder wall thickening which could be due to a cystitis.  4. Redemonstration of a mesenteric mass.    < end of copied text >      Advanced directives addressed: full resuscitation

## 2021-07-08 NOTE — CONSULT NOTE ADULT - CONVERSATION DETAILS
Called Mr. Brooks's daughter/HCP Ally in follow up to recent [facility] conversations. Ally recalled our talks. She affirmed that the last time we conversed the pt had gotten his cancer diagnosis and proceeded with Rituxan, which he tolerated well. Also noted that pt had been dealing with recurrent effusions requiring pleurX. Since then, Ally noted that things were going pretty well, she thought pt was improving. However, after speaking with Dr. Alatorre today, she shares a new understanding of just how ill her father is overall. She was open to help with discerning how to proceed considering this.     Ally was not exactly clear on what was going on medically, though understood he had a blockage that was fixed and was no longer a candidate for further systemic therapy at this time due to poor functional status. We took time to review pt's symptoms since admission, how this was related to UPJ stone, required stent, led to improved Cr back to more normal, and IV abx helped to normalize WBC count. We reviewed all the different medical issues that make pt's overall prognosis poor and she agreed that he is more complicated than he was before. She also agreed that the likelihood of the pt improving without further complication to a point that he would be strong enough to reconsider chemo is unlikely.    Given this, and pt's shared perspective on rehab and sadness about his daughter having to watch him have recurrent issues, we went on to discuss possible plans moving forward. Explained that at this point, the plan depends on the focus of care. noted that some families are able to absorb all the issues, but are not ready to focus on comfort- preferring medical stabilization and return to either [facility] or [facility]. However, other families, note the suffering that has already incurred and are clear that they want to forgo further suffering by pursuing a full comfort focus with hospice. Discussed how these are not simple choices, and more complicated by the fact that she already knew retirement would not take pt back with almaraz (though TOV is planned and there is possibility that he could have almaraz removed). Since option for hospice is predicated on pt being able to return to retirement, simplified plan by suggesting we have floor SW see if retirement would even be willing to take pt back due to some concern that he may need a higher level of care. If they say yes, then Ally will have to decide if she wants to pursue hospice, but if they say no, then this makes the choice easier- in that the only other viable dc plan would have to be [facility] with LTC transition. We discussed how if the latter option was in place, that hospice would not be able to be added right away, and if complication arose, pt would likely be sent back to hospital. Reassured her though that teams here, just as we are now, would still be able to guide her back to comfort plan if needed at that time.     This was understandably a great deal for Ally to absorb, but she was brave in pushing forward. She also was on board with revising MOLST decisions considering pt's greater risk for complication. She agreed with recommendations for DNR and DNI, with remainder of choices left in place while we await final word on possible dispo options.      Ultimately, plan is to c/w current interventions while allen OBRIEN clarifies dispo options. Spoke with allne Bowling who will make these calls and let primary team and pt's daughter know the outcome. In the interim, new MOLST copied for our records and for daughter when she comes to see pt today- which we encouraged, both for pt's happiness and daughter to have a chance to review this with her father and feel more confident in her choices. We will continue to follow with you. Above discussed with YEIMI Gavin and Dr. Alatorre.

## 2021-07-08 NOTE — CONSULT NOTE ADULT - SUBJECTIVE AND OBJECTIVE BOX
HPI: Mr. Brooks is an 89y old Male with hx of T2DM, HTN, hiatal hernia, chronic left pleural effusion, CKD3, 6th admission in a year, with recent dx of Follicular Lymphoma (2021). Now admitted  from [facility] with c/o decreased urine output, diffuse pain, fever 104. Of note, pt recently in ED 7/3 for UPJ stone, hydro, and pyelo. Pt now s/p cystoscopy w/ L ureteral stent placement on , Enterococcus UTI and further c/b Pseudomonal bacteremia. Palliative Care consulted to assist with establishing GOC, known to our service from previous admissions.    Met with Mr. Brooks this afternoon, he did not recall previous conversations, but was open to talking. He was able to orient fully, though seemed to have limited insight into details of care and his onc plan/dx (saying that cancer was dealt with already). He notes feeling much better than when he came in-sharing that he was in so much discomfort that he did not even know his name. He denies pain or dyspnea now. He denies all sx of discomfort.     He recalled that he always defers to his daughter for decision making. He also was unaware that his son Kenyon is listed on file as his HCP, and was open to making a new HCP naming his daughter (completed). He became tearful when his daughter was mentioned. He explained that he was tired of her seeing him like this and was also sad that he has not been able to see her for a week. Reassured him that visitation is less strict here now and that this would be shared with his daughter, who is welcome to visit. Also attended to his sentiment that he is tired of her having to see him here and the recurrent admissions. He explained that he did not like [facility] and would be happier returning to [facility], since it was going really well there. Noted that some people prefer to go to there home atmosphere and focus on comfort, which he seemed interested in, though he deferred ultimately to whatever his daughter thought was best. Reiterated intent to call her and pt calmed by the end of the encounter.    PAIN: ( )Yes   (x )No    DYSPNEA: ( ) Yes  (x ) No    PAST MEDICAL & SURGICAL HISTORY:  DM (diabetes mellitus)    HTN (hypertension)    HLD (hyperlipidemia)    Kidney stone    Hernia    Afib    BPH (benign prostatic hyperplasia)    Follicular lymphoma    Malignant pleural effusion    H/O chest tube placement    Benign prostatic hyperplasia with urinary retention    Protein calorie malnutrition    Anemia    Stage 3 chronic kidney disease    History of DVT in adulthood    H/O left inguinal hernia repair    Endoscopy finding  choked on piece of chiken-had upper endo with clearance of food    History of tibial fracture  R tibial fracture s/p revision with hardware placement ~3-4yrs ago, per pt        SOCIAL HX: Lives at [facility]    Hx opiate tolerance (x )YES  ( )NO    Baseline ADLs  (Prior to Admission)  ( ) Independent   ( x)Dependent    FAMILY HISTORY:  pt cannot recall any history of disease        Review of Systems:    Anxiety- denies  Depression- denies, despite tears  Constipation- denies  Diarrhea- denies  Nausea- denies  Vomiting- denies  Anorexia- denies  Weight Loss-  unsure  Cough- denies  Secretions- denies  Fatigue- yes  Weakness- yes  Delirium- at times forgetful    All other systems reviewed and negative      PHYSICAL EXAM:    Vital Signs Last 24 Hrs  T(C): 36.3 (2021 12:26), Max: 36.7 (2021 15:33)  T(F): 97.4 (2021 12:26), Max: 98.1 (2021 19:46)  HR: 67 (2021 06:00) (67 - 91)  BP: 125/55 (2021 06:00) (112/54 - 154/76)  BP(mean): 73 (2021 06:00) (68 - 94)  RR: 21 (2021 06:00) (18 - 29)  SpO2: 95% (2021 00:00) (95% - 99%)  Daily     Daily Weight in k.5 (2021 06:22)    PPSV2:  30 %    General: Elderly male sitting up in bed watching television, feeding himself breakfast, friendly, NAD  Mental Status: AOx4  HEENT: mmm, +mild temporal wasting  Lungs: improved aeration, pleurX in place on L  Cardiac: + s1 s2 rrr  GI: soft nt nd +bs  : almaraz  MSK/skin: moves all extremities, some chronic venous stasis changes and old surgical scar on RLE, muscle and fat wasting noted in limbs  Neuro: no focal deficits      LABS:                        8.5    9.47  )-----------( 128      ( 2021 06:34 )             26.4         137  |  107  |  37<H>  ----------------------------<  304<H>  3.5   |  22  |  1.34<H>    Ca    7.4<L>      2021 06:34        Albumin: Albumin, Serum: 1.4 g/dL ( @ 08:33)      Allergies    morphine (Other)    Intolerances      MEDICATIONS  (STANDING):  apixaban 5 milliGRAM(s) Oral every 12 hours  cholecalciferol Oral Tab/Cap - Peds 2000 Unit(s) Oral daily  dextrose 40% Gel 15 Gram(s) Oral once  dextrose 5%. 1000 milliLiter(s) (50 mL/Hr) IV Continuous <Continuous>  dextrose 5%. 1000 milliLiter(s) (100 mL/Hr) IV Continuous <Continuous>  dextrose 50% Injectable 25 Gram(s) IV Push once  dextrose 50% Injectable 12.5 Gram(s) IV Push once  dextrose 50% Injectable 25 Gram(s) IV Push once  finasteride 5 milliGRAM(s) Oral daily  glucagon  Injectable 1 milliGRAM(s) IntraMuscular once  insulin glargine Injectable (LANTUS) 25 Unit(s) SubCutaneous at bedtime  insulin lispro (ADMELOG) corrective regimen sliding scale   SubCutaneous three times a day before meals  melatonin 3 milliGRAM(s) Oral at bedtime  pantoprazole    Tablet 40 milliGRAM(s) Oral before breakfast  piperacillin/tazobactam IVPB.. 3.375 Gram(s) IV Intermittent every 8 hours  tamsulosin 0.4 milliGRAM(s) Oral at bedtime  traMADol 50 milliGRAM(s) Oral daily    MEDICATIONS  (PRN):  acetaminophen   Tablet .. 650 milliGRAM(s) Oral every 6 hours PRN Temp greater or equal to 38.5C (101.3F), Mild Pain (1 - 3)  ondansetron Injectable 4 milliGRAM(s) IV Push every 6 hours PRN Nausea      RADIOLOGY/ADDITIONAL STUDIES:    EXAM:  CT ABDOMEN AND PELVIS                        PROCEDURE DATE:  2021      IMPRESSION:  1. Moderate left hydronephrosis and perinephric inflammatory change secondary to a 1.4 x 0.8 cm calculus in the left ureteropelvic junction, not significantly changed.  2. Increase in complex left paracolic gutter free fluid.  3. Redemonstration of circumferential urinary bladder wall thickening which could be due to a cystitis.  4. Redemonstration of a mesenteric mass.    MIRELA FIGUEROA MD; Attending Radiologist

## 2021-07-08 NOTE — CONSULT NOTE ADULT - TREATMENT GUIDELINE COMMENT
MOLST decisions: DNR, limited interventions, DNI, (skipped hospitalization question), no feeding tube, trial of IVF, determine us of abx     *Due to the patient’s health status and restrictions on visitation during the Public Health Emergency, the Advance Care Planning service was performed via phone with patient’s HCP/daughter Ally

## 2021-07-08 NOTE — PROGRESS NOTE ADULT - SUBJECTIVE AND OBJECTIVE BOX
89 y/o M [facility] for decreased urinary output and a temperature of 104F. The patient reports having significant abdominal pain and "pain all over." He is a poor historian and unable to provide much of the history. The patient was previously seen in the ER at [facility] on 7/3/2021 and diagnosed with a uteropelvic junction stone with moderate left hydronephrosis and pyelonephritis and he was discharged on Keflex.     Patient without any acute complaints. Pt is s/p cystoscopy, left ureteral stent placement on 7/5/21. Pseudomonas bacteremia and UTI with Enterococcus ; overall improvement    Meds:    acetaminophen   Tablet .. 650 milliGRAM(s) Oral every 6 hours PRN  apixaban 5 milliGRAM(s) Oral every 12 hours  cholecalciferol Oral Tab/Cap - Peds 2000 Unit(s) Oral daily  dextrose 40% Gel 15 Gram(s) Oral once  dextrose 5%. 1000 milliLiter(s) IV Continuous <Continuous>  dextrose 5%. 1000 milliLiter(s) IV Continuous <Continuous>  dextrose 50% Injectable 25 Gram(s) IV Push once  dextrose 50% Injectable 12.5 Gram(s) IV Push once  dextrose 50% Injectable 25 Gram(s) IV Push once  finasteride 5 milliGRAM(s) Oral daily  glucagon  Injectable 1 milliGRAM(s) IntraMuscular once  insulin glargine Injectable (LANTUS) 30 Unit(s) SubCutaneous at bedtime  insulin lispro (ADMELOG) corrective regimen sliding scale   SubCutaneous three times a day before meals  melatonin 3 milliGRAM(s) Oral at bedtime  ondansetron Injectable 4 milliGRAM(s) IV Push every 6 hours PRN  pantoprazole    Tablet 40 milliGRAM(s) Oral before breakfast  piperacillin/tazobactam IVPB.. 3.375 Gram(s) IV Intermittent every 8 hours  tamsulosin 0.4 milliGRAM(s) Oral at bedtime  traMADol 50 milliGRAM(s) Oral daily      T(C): 36.7 (07-08-21 @ 08:10), Max: 36.7 (07-07-21 @ 15:33)  HR: 67 (07-08-21 @ 06:00) (67 - 91)  BP: 125/55 (07-08-21 @ 06:00) (112/54 - 154/76)  RR: 21 (07-08-21 @ 06:00) (18 - 29)  SpO2: 95% (07-08-21 @ 00:00) (95% - 99%)    Input/Output      07-07-21 @ 07:01  -  07-08-21 @ 07:00  --------------------------------------------------------  IN: 200 mL / OUT: 2800 mL / NET: -2600 mL    07-08-21 @ 07:01  -  07-08-21 @ 12:21  --------------------------------------------------------  IN: 0 mL / OUT: 500 mL / NET: -500 mL    MEDICATIONS  (STANDING):  apixaban 5 milliGRAM(s) Oral every 12 hours  finasteride 5 milliGRAM(s) Oral daily  glucagon  Injectable 1 milliGRAM(s) IntraMuscular once  insulin glargine Injectable (LANTUS) 30 Unit(s) SubCutaneous at bedtime  insulin lispro (ADMELOG) corrective regimen sliding scale   SubCutaneous three times a day before meals  melatonin 3 milliGRAM(s) Oral at bedtime  pantoprazole    Tablet 40 milliGRAM(s) Oral before breakfast  piperacillin/tazobactam IVPB.. 3.375 Gram(s) IV Intermittent every 8 hours  tamsulosin 0.4 milliGRAM(s) Oral at bedtime  traMADol 50 milliGRAM(s) Oral daily                HEENT: No icterus , EOM full , No epistaxis  neck supple no jvd  chest CTA  CVS s1s2 reg  Abdo:   : Soft, Non tender, No guarding, No distension  Back    : No CVAT b/l  Extremity: No calf tenderness   Genitalia Male: Kaminski with yellow urine  Skin     : No jaundice, No lesions, No swelling                                      8.5    9.47  )-----------( 128      ( 08 Jul 2021 06:34 )             26.4   07-08    137  |  107  |  37<H>  ----------------------------<  304<H>  3.5   |  22  |  1.34<H>    Ca    7.4<L>      08 Jul 2021 06:34            * Severe sepsis secondary to complicated UTI and left uteropelvic junction calculus   s/p stent  Ps Aer. bacteremia  abx  changed to Zosyn to cover Enterococcus   resolving ANJANA      * History of Mesenteric mass s/p bx found to be Follicular Lymphoma, malignant pleural effusion s/p left Pleurx placement,   consult hematology case d/w Heme- no Rx due to debilitated state    * Atrial fibrillation on Eliquis, ,     * DM   better controlled after multiple adjustments Insulin  decrease SWEENEY to 25 with hold parameters    Needs PT  case d/w daughter aware no chemo and that Pall will f/up re: hospice. Pt is very deconditioned and not motivated to participate in PT

## 2021-07-08 NOTE — CONSULT NOTE ADULT - ASSESSMENT
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 Now admitted 7/6 from [facility] with c/o decreased urine output, diffuse pain, fever 104. Of note, pt recently in ED 7/3 for UPJ stone, hydro, and pyelo. Pt now s/p cystoscopy w/ L ureteral stent placement on 7/5, Enterococcus UTI and further c/b Pseudomonal bacteremia. Palliative Care consulted to assist with establishing GOC, known to our service from previous admissions.    1) Pain  - seems to have been acute 2/2 to stone  - now resolved after stent placement  - c/w conservative management with prn Tylenol and tramadol    2) Severe sepsis 2/2 to UTI, bacteremia, and UPJ stone  - ID notes appreciated- leukocytosis improving, c/w monitoring, c/w IV abx  - urology notes appreciated- s/p cystoscopy and L ureteral stent with improved sx, plan for TOV before dc, flomax  - Cr improving  - afebrile  - c/w monitoring    3) Follicular Lymphoma  - Mesenteric mass dx as Follicular Lymphoma on recent admission  - Rituxan initiated on last admission  - however, according to Dr. Alatorre who conversed with Dr. Ny- pt no longer a candidate for systemic treatment due to poor performance status  - Dr. Alatorre shared this with family    4) Debility  - as per care coordination notes pt lives at [facility], plan to either return there or to [facility], though pt not keen on the latter  - nutrition consulted , awaiting impression on malnutrition  - PT consulted - min assist, rec for [facility]    5) Prognosis  - poor  - new dx of lymphoma recurrent chronic effusion, w/ declining functional status (PPS now< 40%), some evidence of malnutrition on exam, also with urological complications likely due to underlying cancer, albumin 1.4, multiple hospitalizations; given above and paucity of tx options for underlying cancer, pt may be well-suited for hospice if family on board    7) GOC/Advanced Directives  - pt has capacity for decision making  - HCP on file naming son Kenyon Brooks ([phone]) but pt defers to his daughter Ally Robles [phone]  - MOLST completed 6/7: CPR, limited, trial of intubation, send to hospital, no feeding tube, trial of IVF, determine use of abx --> need to readdress  - GOC meeting held today with daughter- awaiting word from floor SW cc: possibility of hospice at [facility] vs. [facility]/LTC transition. See GOC section for further details    Thank you for including us in Mr. Brooks's care. Will continue to follow with you.    Tamanna Hay MD  Palliative Care Attending

## 2021-07-09 ENCOUNTER — TRANSCRIPTION ENCOUNTER (OUTPATIENT)
Age: 86
End: 2021-07-09

## 2021-07-09 LAB
ANION GAP SERPL CALC-SCNC: 7 MMOL/L — SIGNIFICANT CHANGE UP (ref 5–17)
BUN SERPL-MCNC: 28 MG/DL — HIGH (ref 7–23)
CALCIUM SERPL-MCNC: 7.8 MG/DL — LOW (ref 8.5–10.1)
CHLORIDE SERPL-SCNC: 111 MMOL/L — HIGH (ref 96–108)
CO2 SERPL-SCNC: 23 MMOL/L — SIGNIFICANT CHANGE UP (ref 22–31)
CREAT SERPL-MCNC: 1.21 MG/DL — SIGNIFICANT CHANGE UP (ref 0.5–1.3)
GLUCOSE SERPL-MCNC: 153 MG/DL — HIGH (ref 70–99)
HCT VFR BLD CALC: 26.4 % — LOW (ref 39–50)
HGB BLD-MCNC: 8.4 G/DL — LOW (ref 13–17)
MCHC RBC-ENTMCNC: 28.6 PG — SIGNIFICANT CHANGE UP (ref 27–34)
MCHC RBC-ENTMCNC: 31.8 GM/DL — LOW (ref 32–36)
MCV RBC AUTO: 89.8 FL — SIGNIFICANT CHANGE UP (ref 80–100)
PLATELET # BLD AUTO: 136 K/UL — LOW (ref 150–400)
POTASSIUM SERPL-MCNC: 3.7 MMOL/L — SIGNIFICANT CHANGE UP (ref 3.5–5.3)
POTASSIUM SERPL-SCNC: 3.7 MMOL/L — SIGNIFICANT CHANGE UP (ref 3.5–5.3)
RBC # BLD: 2.94 M/UL — LOW (ref 4.2–5.8)
RBC # FLD: 15.1 % — HIGH (ref 10.3–14.5)
SODIUM SERPL-SCNC: 141 MMOL/L — SIGNIFICANT CHANGE UP (ref 135–145)
WBC # BLD: 8.08 K/UL — SIGNIFICANT CHANGE UP (ref 3.8–10.5)
WBC # FLD AUTO: 8.08 K/UL — SIGNIFICANT CHANGE UP (ref 3.8–10.5)

## 2021-07-09 PROCEDURE — 99232 SBSQ HOSP IP/OBS MODERATE 35: CPT

## 2021-07-09 PROCEDURE — 99497 ADVNCD CARE PLAN 30 MIN: CPT

## 2021-07-09 PROCEDURE — 99233 SBSQ HOSP IP/OBS HIGH 50: CPT | Mod: 25

## 2021-07-09 RX ORDER — INSULIN GLARGINE 100 [IU]/ML
25 INJECTION, SOLUTION SUBCUTANEOUS
Qty: 0 | Refills: 0 | DISCHARGE

## 2021-07-09 RX ORDER — INSULIN GLARGINE 100 [IU]/ML
16 INJECTION, SOLUTION SUBCUTANEOUS
Qty: 0 | Refills: 0 | DISCHARGE

## 2021-07-09 RX ORDER — INSULIN LISPRO 100/ML
4 VIAL (ML) SUBCUTANEOUS ONCE
Refills: 0 | Status: COMPLETED | OUTPATIENT
Start: 2021-07-09 | End: 2021-07-09

## 2021-07-09 RX ADMIN — TAMSULOSIN HYDROCHLORIDE 0.4 MILLIGRAM(S): 0.4 CAPSULE ORAL at 21:36

## 2021-07-09 RX ADMIN — INSULIN GLARGINE 25 UNIT(S): 100 INJECTION, SOLUTION SUBCUTANEOUS at 22:00

## 2021-07-09 RX ADMIN — Medication 2000 UNIT(S): at 09:34

## 2021-07-09 RX ADMIN — PANTOPRAZOLE SODIUM 40 MILLIGRAM(S): 20 TABLET, DELAYED RELEASE ORAL at 06:11

## 2021-07-09 RX ADMIN — TRAMADOL HYDROCHLORIDE 50 MILLIGRAM(S): 50 TABLET ORAL at 11:06

## 2021-07-09 RX ADMIN — Medication 1: at 08:36

## 2021-07-09 RX ADMIN — Medication 3 MILLIGRAM(S): at 23:48

## 2021-07-09 RX ADMIN — PIPERACILLIN AND TAZOBACTAM 25 GRAM(S): 4; .5 INJECTION, POWDER, LYOPHILIZED, FOR SOLUTION INTRAVENOUS at 13:40

## 2021-07-09 RX ADMIN — TRAMADOL HYDROCHLORIDE 50 MILLIGRAM(S): 50 TABLET ORAL at 09:34

## 2021-07-09 RX ADMIN — PIPERACILLIN AND TAZOBACTAM 25 GRAM(S): 4; .5 INJECTION, POWDER, LYOPHILIZED, FOR SOLUTION INTRAVENOUS at 21:36

## 2021-07-09 RX ADMIN — FINASTERIDE 5 MILLIGRAM(S): 5 TABLET, FILM COATED ORAL at 09:35

## 2021-07-09 RX ADMIN — Medication 4: at 12:34

## 2021-07-09 RX ADMIN — APIXABAN 5 MILLIGRAM(S): 2.5 TABLET, FILM COATED ORAL at 21:36

## 2021-07-09 RX ADMIN — APIXABAN 5 MILLIGRAM(S): 2.5 TABLET, FILM COATED ORAL at 09:34

## 2021-07-09 RX ADMIN — PIPERACILLIN AND TAZOBACTAM 25 GRAM(S): 4; .5 INJECTION, POWDER, LYOPHILIZED, FOR SOLUTION INTRAVENOUS at 06:11

## 2021-07-09 RX ADMIN — Medication 4: at 17:23

## 2021-07-09 RX ADMIN — Medication 4 UNIT(S): at 22:00

## 2021-07-09 NOTE — GOALS OF CARE CONVERSATION - ADVANCED CARE PLANNING - CONVERSATION DETAILS
Called Ally for follow up on dispo choice and she noted that the family has discussed it and would prefer to send pt home ([facility]) with hospice if possible. We discussed that referral would be sent to agency that [facility] works with and up to agency to ultimately determine eligiblity. Prepared her for possibility that [facility] may require aids, that hospice aids are not guarenteed up front due to shortages, but that she could privately pay for aids from the agencies that hospice works with until hospice aid can be provided. Also noted that if found ineligible that the pt could return to [facility] with home palliative which could also be a bridge to hospice if he declines further. She agrees with this, preferring pt return to [facility] either way, as he hated [facility] and was happier at [facility]. Noted intent to share this with floor CAMILLE and Dr. Alatorre and pt would be dc'd when that dc plan was solidly in place.

## 2021-07-09 NOTE — GOALS OF CARE CONVERSATION - ADVANCED CARE PLANNING - TREATMENT GUIDELINE COMMENT
c/w current interventions while awaiting dispo     * Due to the patient’s health status and restrictions on visitation during the Public Health Emergency, the Advance Care Planning service was performed via phone with patient’s daughter/hcp Ally for 16 minutes

## 2021-07-09 NOTE — DISCHARGE NOTE PROVIDER - HOSPITAL COURSE
89 [facility] M [facility] for decreased urinary output and a temperature of 104F. The patient reports having significant abdominal pain and "pain all over." He is a poor historian and unable to provide much of the history. The patient was previously seen in the ER at [facility] on 7/3/2021 and diagnosed with a uteropelvic junction stone with moderate left hydronephrosis and pyelonephritis and he was discharged on Keflex.     Patient without any acute complaints. Pt is s/p cystoscopy, left ureteral stent placement on 7/5/21. Pseudomonas bacteremia and UTI with Enterococcus ; overall improvement        HEENT: No icterus , EOM full , No epistaxis  neck supple no jvd  chest CTA  CVS s1s2 reg  Abdo:   : Soft, Non tender, No guarding, No distension  Back    : No CVAT b/l  Extremity: No calf tenderness   Genitalia Male: Kaminski with yellow urine  Skin     : No jaundice, No lesions, No swelling                                      8.5    9.47  )-----------( 128      ( 08 Jul 2021 06:34 )             26.4   07-08    137  |  107  |  37<H>  ----------------------------<  304<H>  3.5   |  22  |  1.34<H>    Ca    7.4<L>      08 Jul 2021 06:34            * Severe sepsis secondary to complicated UTI and left uteropelvic junction calculus   s/p stent  Ps Aer. bacteremia  abx  changed to Zosyn to cover Enterococcus   resolving ANJANA      * History of Mesenteric mass s/p bx found to be Follicular Lymphoma, malignant pleural effusion s/p left Pleurx placement,   consult hematology case d/w Heme- no Rx due to debilitated state    * Atrial fibrillation on Eliquis, ,     * DM   better controlled after multiple adjustments Insulin  decrease SWEENEY to 25 with hold parameters    Needs PT  case d/w daughter aware no chemo and that Pall will f/up re: hospice. Pt is very deconditioned and not motivated to participate in PT    Case d/w Dr Duong dc on PO Cipro 500 mg for total 14 days- until 7/20 last day of abx       89 [facility] for decreased urinary output and a temperature of 104F. The patient reports having significant abdominal pain and "pain all over." He is a poor historian and unable to provide much of the history. The patient was previously seen in the ER at [facility] on 7/3/2021 and diagnosed with a uteropelvic junction stone with moderate left hydronephrosis and pyelonephritis and he was discharged on Keflex.     Patient without any acute complaints. Pt is s/p cystoscopy, left ureteral stent placement on 7/5/21. Pseudomonas bacteremia and UTI with Enterococcus ; overall improvement      * Severe sepsis secondary to complicated UTI and left uteropelvic junction calculus   s/p stent and Pseudomonas Aer. bacteremia - 7 more days of ciprofloxacin - last day 7/20  resolving ANJANA, cont florastor for 7 days    * History of Mesenteric mass s/p bx found to be Follicular Lymphoma, malignant pleural effusion s/p left Pleurx placement,   consult hematology case d/w Heme- no Rx due to debilitated state    * Atrial fibrillation on Eliquis, ,     * DM - labile, cont lantus and HISS    case d/w daughter aware no chemo and that Pall will f/up re: hospice. Pt is very deconditioned and not motivated to participate in PT    Case d/w Dr Duong dc on PO Cipro 500 mg for total 14 days- until 7/20 last day of abx

## 2021-07-09 NOTE — DISCHARGE NOTE NURSING/CASE MANAGEMENT/SOCIAL WORK - NSDCVIVACCINE_GEN_ALL_CORE_FT
influenza, injectable, quadrivalent, preservative free; 24-Feb-2021 06:19; Cathy Sorensen (RN); [employer]; [id number] (Exp. Date: 30-Jun-2021); IntraMuscular; Deltoid Right.; 0.5 milliLiter(s); VIS (VIS Published: 15-Aug-2019, VIS Presented: 24-Feb-2021);

## 2021-07-09 NOTE — DISCHARGE NOTE PROVIDER - NSDCMRMEDTOKEN_GEN_ALL_CORE_FT
acetaminophen 325 mg oral tablet: 2 tab(s) orally every 6 hours, As needed, Mild Pain (1 - 3)  Admelog SoloStar 100 units/mL injectable solution: Sliding Scale  Basaglar KwikPen 100 units/mL subcutaneous solution: 25 unit(s) subcutaneous once a day (at bedtime)  bethanechol 25 mg oral tablet: 1 tab(s) orally 3 times a day  cholecalciferol oral tablet: 2000 unit(s) orally once a day  Eliquis 5 mg oral tablet: 1 tab(s) orally 2 times a day  finasteride 5 mg oral tablet: 1 tab(s) orally once a day  Flomax 0.4 mg oral capsule: 1 cap(s) orally once a day   melatonin 5 mg oral tablet: 1 tab(s) orally once a day (at bedtime)  omeprazole 40 mg oral delayed release capsule: 1 cap(s) orally once a day  traMADol 50 mg oral tablet: 1 tab(s) orally once a day   acetaminophen 325 mg oral tablet: 2 tab(s) orally every 6 hours, As needed, Mild Pain (1 - 3)  Admelog SoloStar 100 units/mL injectable solution: Sliding Scale  Basaglar KwikPen 100 units/mL subcutaneous solution: 25 unit(s) subcutaneous once a day (at bedtime)  bethanechol 25 mg oral tablet: 1 tab(s) orally 3 times a day  cholecalciferol oral tablet: 2000 unit(s) orally once a day  ciprofloxacin 250 mg oral tablet: 1 tab(s) orally 2 times a day stop after 7 days - 7/19/21  Eliquis 5 mg oral tablet: 1 tab(s) orally 2 times a day  finasteride 5 mg oral tablet: 1 tab(s) orally once a day  Flomax 0.4 mg oral capsule: 1 cap(s) orally once a day   melatonin 5 mg oral tablet: 1 tab(s) orally once a day (at bedtime)  omeprazole 40 mg oral delayed release capsule: 1 cap(s) orally once a day  saccharomyces boulardii lyo 250 mg oral capsule: 1 cap(s) orally 2 times a day  traMADol 50 mg oral tablet: 1 tab(s) orally once a day

## 2021-07-09 NOTE — DISCHARGE NOTE PROVIDER - NSDCCPCAREPLAN_GEN_ALL_CORE_FT
PRINCIPAL DISCHARGE DIAGNOSIS  Diagnosis: Pyelonephritis  Assessment and Plan of Treatment: s/p stent; needs PO Cipro 500 mg until 7/20      SECONDARY DISCHARGE DIAGNOSES  Diagnosis: Ureterolithiasis  Assessment and Plan of Treatment:

## 2021-07-09 NOTE — PROGRESS NOTE ADULT - SUBJECTIVE AND OBJECTIVE BOX
HPI: Pt seen and examined this am in follow up for sx. Pt notes diarrhea this am, last one an hour ago. Denies pain, dyspnea or any other sx. Says he is otherwise comfortable.    Pt recalls talking with his daughter yesterday, but denies recalling details of conversation or talking about his plan with her. Reassured him that we would coordinate with his daughter for support.       PAIN: denies    DYSPNEA: denies      ROS:  All other systems reviewed and negative      PHYSICAL EXAM:    Vital Signs Last 24 Hrs  T(C): 36.8 (2021 08:18), Max: 37.1 (2021 21:00)  T(F): 98.3 (2021 08:18), Max: 98.8 (2021 21:00)  HR: 85 (2021 10:00) (64 - 85)  BP: 151/59 (2021 10:00) (114/42 - 163/72)  BP(mean): 82 (2021 10:00) (60 - 113)  RR: 22 (2021 10:00) (17 - 24)  SpO2: 96% (2021 10:00) (93% - 98%)  Daily Weight in k.3 (2021 06:29)    PPSV2:  30 %    General: Elderly male sitting up in bed watching television, feeding himself breakfast, friendly, NAD  Mental Status: AOx4  HEENT: mmm, +mild temporal wasting  Lungs: improved aeration, pleurX in place on L  Cardiac: + s1 s2 rrr  GI: soft nt nd +bs  : almaraz  MSK/skin: moves all extremities, some chronic venous stasis changes and old surgical scar on RLE, muscle and fat wasting noted in limbs  Neuro: no focal deficits    LABS:                        8.4    8.08  )-----------( 136      ( 2021 07:24 )             26.4     07-09    141  |  111<H>  |  28<H>  ----------------------------<  153<H>  3.7   |  23  |  1.21    Ca    7.8<L>      2021 07:24        Albumin: Albumin, Serum: 1.4 g/dL ( @ 08:33)      Allergies    morphine (Other)    Intolerances      MEDICATIONS  (STANDING):  apixaban 5 milliGRAM(s) Oral every 12 hours  cholecalciferol Oral Tab/Cap - Peds 2000 Unit(s) Oral daily  dextrose 40% Gel 15 Gram(s) Oral once  dextrose 5%. 1000 milliLiter(s) (50 mL/Hr) IV Continuous <Continuous>  dextrose 5%. 1000 milliLiter(s) (100 mL/Hr) IV Continuous <Continuous>  dextrose 50% Injectable 25 Gram(s) IV Push once  dextrose 50% Injectable 12.5 Gram(s) IV Push once  dextrose 50% Injectable 25 Gram(s) IV Push once  finasteride 5 milliGRAM(s) Oral daily  glucagon  Injectable 1 milliGRAM(s) IntraMuscular once  insulin glargine Injectable (LANTUS) 25 Unit(s) SubCutaneous at bedtime  insulin lispro (ADMELOG) corrective regimen sliding scale   SubCutaneous three times a day before meals  melatonin 3 milliGRAM(s) Oral at bedtime  pantoprazole    Tablet 40 milliGRAM(s) Oral before breakfast  piperacillin/tazobactam IVPB.. 3.375 Gram(s) IV Intermittent every 8 hours  tamsulosin 0.4 milliGRAM(s) Oral at bedtime  traMADol 50 milliGRAM(s) Oral daily    MEDICATIONS  (PRN):  acetaminophen   Tablet .. 650 milliGRAM(s) Oral every 6 hours PRN Temp greater or equal to 38.5C (101.3F), Mild Pain (1 - 3)  ondansetron Injectable 4 milliGRAM(s) IV Push every 6 hours PRN Nausea

## 2021-07-09 NOTE — PROGRESS NOTE ADULT - SUBJECTIVE AND OBJECTIVE BOX
Date of service: 21 @ 14:01    Lying in bed in NAD  Alert and verbal; mild confused  Denies pain  Has urinary frequency    ROS: no fever or chills; limited    MEDICATIONS  (STANDING):  apixaban 5 milliGRAM(s) Oral every 12 hours  cholecalciferol Oral Tab/Cap - Peds 2000 Unit(s) Oral daily  dextrose 40% Gel 15 Gram(s) Oral once  dextrose 5%. 1000 milliLiter(s) (50 mL/Hr) IV Continuous <Continuous>  dextrose 5%. 1000 milliLiter(s) (100 mL/Hr) IV Continuous <Continuous>  dextrose 50% Injectable 25 Gram(s) IV Push once  dextrose 50% Injectable 12.5 Gram(s) IV Push once  dextrose 50% Injectable 25 Gram(s) IV Push once  finasteride 5 milliGRAM(s) Oral daily  glucagon  Injectable 1 milliGRAM(s) IntraMuscular once  insulin glargine Injectable (LANTUS) 25 Unit(s) SubCutaneous at bedtime  insulin lispro (ADMELOG) corrective regimen sliding scale   SubCutaneous three times a day before meals  melatonin 3 milliGRAM(s) Oral at bedtime  pantoprazole    Tablet 40 milliGRAM(s) Oral before breakfast  piperacillin/tazobactam IVPB.. 3.375 Gram(s) IV Intermittent every 8 hours  tamsulosin 0.4 milliGRAM(s) Oral at bedtime  traMADol 50 milliGRAM(s) Oral daily    Vital Signs Last 24 Hrs  T(C): 36.8 (2021 12:24), Max: 37.1 (2021 21:00)  T(F): 98.2 (2021 12:24), Max: 98.8 (2021 21:00)  HR: 85 (2021 10:00) (64 - 85)  BP: 151/59 (2021 10:00) (114/42 - 163/72)  BP(mean): 82 (2021 10:00) (60 - 108)  RR: 22 (2021 10:00) (17 - 24)  SpO2: 96% (2021 10:00) (93% - 98%)     Physical exam:    Constitutional:  No acute distress  HEENT: NC/AT, EOMI, PERRLA, conjunctivae clear  Neck: supple; thyroid not palpable  Back: no tenderness  Respiratory: respiratory effort normal; decreased BS at bases  Cardiovascular: S1S2 regular, no murmurs  Abdomen: soft, not tender, not distended, positive BS  Genitourinary: no suprapubic tenderness  Lymphatic: no LN palpable  Musculoskeletal: no muscle tenderness, no joint swelling or tenderness  Extremities: no pedal edema  Neurological/ Psychiatric: alert, moving all extremities  Skin: no rashes; no palpable lesions    Labs: reviewed                        8.4    8.08  )-----------( 136      ( 2021 07:24 )             26.4     07-    141  |  111<H>  |  28<H>  ----------------------------<  153<H>  3.7   |  23  |  1.21    Ca    7.8<L>      2021 07:24               8.4    13.42 )-----------( 123      ( 2021 05:15 )             25.8     07-07    140  |  112<H>  |  50<H>  ----------------------------<  263<H>  4.3   |  21<L>  |  1.60<H>    Ca    7.3<L>      2021 05:15                        9.3    15.17 )-----------( 125      ( 2021 05:55 )             28.7     07-06    137  |  108  |  49<H>  ----------------------------<  252<H>  4.9   |  24  |  2.13<H>    Ca    7.3<L>      2021 05:55    TPro  4.9<L>  /  Alb  1.4<L>  /  TBili  0.3  /  DBili  x   /  AST  35  /  ALT  23  /  AlkPhos  102  07-05     LIVER FUNCTIONS - ( 2021 08:33 )  Alb: 1.4 g/dL / Pro: 4.9 gm/dL / ALK PHOS: 102 U/L / ALT: 23 U/L / AST: 35 U/L / GGT: x           Urinalysis Basic - ( 2021 02:41 )    Color: Yellow / Appearance: very cloudy / S.015 / pH: x  Gluc: x / Ketone: Trace  / Bili: Negative / Urobili: Negative mg/dL   Blood: x / Protein: 100 mg/dL / Nitrite: Negative   Leuk Esterase: Moderate / RBC: 11-25 /HPF / WBC >50   Sq Epi: x / Non Sq Epi: Occasional / Bacteria: Moderate      Culture - Fungal, Other (collected 2021 13:51)  Source: .Other LEFT URETHERAL  Preliminary Report (2021 07:41):    Testing in progress    Culture - Acid Fast - Urine (collected 2021 13:51)  Source: .Urine  Preliminary Report (2021 15:05):    Culture is being performed.    Culture - Urine (collected 2021 13:51)  Source: .Urine left ureteral  Preliminary Report (2021 19:21):    Moderate Pseudomonas aeruginosa    Moderate Enterococcus faecalis  Organism: Pseudomonas aeruginosa (2021 16:51)  Organism: Pseudomonas aeruginosa (2021 16:51)      -  Amikacin: S <=16      -  Aztreonam: S <=4      -  Cefepime: S 4      -  Ceftazidime: S <=1      -  Ciprofloxacin: S <=0.25      -  Gentamicin: S 4      -  Imipenem: S 2      -  Levofloxacin: S <=0.5      -  Meropenem: S <=1      -  Piperacillin/Tazobactam: S <=8      -  Tobramycin: S <=2      Method Type: BALA    Culture - Urine (collected 2021 02:41)  Source: .Urine None  Final Report (2021 05:21):    50,000 - 99,000 CFU/mL Pseudomonas aeruginosa    <10,000 CFU/ml Normal Urogenital aminata present  Organism: Pseudomonas aeruginosa (2021 05:21)  Organism: Pseudomonas aeruginosa (2021 05:21)      -  Amikacin: S <=16      -  Aztreonam: S <=4      -  Cefepime: S <=2      -  Ceftazidime: S <=1      -  Ciprofloxacin: S <=0.25      -  Gentamicin: S <=2      -  Imipenem: S <=1      -  Levofloxacin: S <=0.5      -  Meropenem: S <=1      -  Piperacillin/Tazobactam: S <=8      -  Tobramycin: S <=2      Method Type: BALA    Culture - Blood (collected 2021 22:18)  Source: .Blood Blood-Peripheral  Gram Stain (2021 22:15):    Growth in aerobic and anaerobic bottles: Gram Positive Cocci in Pairs and    Chains    Growth in aerobic bottle: Gram Negative Rods  Final Report (2021 20:29):    Growth in aerobic and anaerobic bottles: Enterococcus faecalis    Growth in aerobic and anaerobic bottles: Pseudomonas aeruginosa    See previous culture [id number]    Culture - Blood (collected 2021 22:18)  Source: .Blood Blood-Peripheral  Gram Stain (2021 22:06):    Growth in aerobic and anaerobic bottles: Gram Positive Cocci in Pairs and    Chains    Growth in aerobic bottle: Gram Negative Rods  Final Report (2021 20:28):    Growth in aerobic and anaerobic bottles: Enterococcus faecalis    Growth in aerobic and anaerobic bottles: Pseudomonas aeruginosa  Enterococcus faecalis  Pseudomonas aeruginosa (2021 20:28)  Organism: Pseudomonas aeruginosa (2021 20:28)      -  Amikacin: S <=16      -  Aztreonam: S <=4      -  Cefepime: S <=2      -  Ceftazidime: S <=1      -  Ciprofloxacin: S <=0.25      -  Gentamicin: S <=2      -  Imipenem: S <=1      -  Levofloxacin: S <=0.5      -  Meropenem: S <=1      -  Piperacillin/Tazobactam: S <=8      -  Tobramycin: S <=2      Method Type: BALA  Organism: Enterococcus faecalis (2021 20:28)      -  Ampicillin: S <=2 Predicts results to ampicillin/sulbactam, amoxacillin-clavulanate and  piperacillin-tazobactam.      -  Gentamicin synergy: S      -  Vancomycin: S 2      Method Type: BALA  Organism: Blood Culture PCR (2021 20:28)      -  Enterococcus faecalis: Detec      -  Pseudomonas aeruginosa: Detec      Method Type: PCR    Culture - Urine (collected 2021 16:54)  Source: .Urine None  Final Report (2021 18:49):    >100,000 CFU/ml Pseudomonas aeruginosa  Organism: Pseudomonas aeruginosa (2021 18:49)  Organism: Pseudomonas aeruginosa (2021 18:49)      -  Amikacin: S <=16      -  Aztreonam: S <=4      -  Cefepime: S <=2      -  Ceftazidime: S <=1      -  Ciprofloxacin: S <=0.25      -  Gentamicin: S <=2      -  Imipenem: S <=1      -  Levofloxacin: S <=0.5      -  Meropenem: S <=1      -  Piperacillin/Tazobactam: S <=8      -  Tobramycin: S <=2      Method Type: BALA        COVID-19 PCR: NotDetec (21 @ 22:18)  COVID-19 PCR: NotDetec (21 @ 17:07)      Radiology: all available radiological tests reviewed    < from: CT Abdomen and Pelvis No Cont (21 @ 23:40) >  1. Moderate left hydronephrosis and perinephric inflammatory change secondary to a 1.4 x 0.8 cm calculus in the left ureteropelvic junction, not significantly changed.  2. Increase in complex left paracolic gutter free fluid.  3. Redemonstration of circumferential urinary bladder wall thickening which could be due to a cystitis.  4. Redemonstration of a mesenteric mass.    < end of copied text >      Advanced directives addressed: full resuscitation

## 2021-07-09 NOTE — PROGRESS NOTE ADULT - SUBJECTIVE AND OBJECTIVE BOX
88 y/o M [facility] for decreased urinary output and a temperature of 104F. The patient reports having significant abdominal pain and "pain all over." He is a poor historian and unable to provide much of the history. The patient was previously seen in the ER at [facility] on 7/3/2021 and diagnosed with a uteropelvic junction stone with moderate left hydronephrosis and pyelonephritis and he was discharged on Keflex.     Patient without any acute complaints. Pt is s/p cystoscopy, left ureteral stent placement on 7/5/21. Pseudomonas bacteremia and UTI with Enterococcus ; overall improvement        HEENT: No icterus , EOM full , No epistaxis  neck supple no jvd  chest CTA  CVS s1s2 reg  Abdo:   : Soft, Non tender, No guarding, No distension  Back    : No CVAT b/l  Extremity: No calf tenderness   Genitalia Male: Kaminski with yellow urine  Skin     : No jaundice, No lesions, No swelling                                      8.5    9.47  )-----------( 128      ( 08 Jul 2021 06:34 )             26.4   07-08    137  |  107  |  37<H>  ----------------------------<  304<H>  3.5   |  22  |  1.34<H>    Ca    7.4<L>      08 Jul 2021 06:34            * Severe sepsis secondary to complicated UTI and left uteropelvic junction calculus   s/p stent  Ps Aer. bacteremia  abx  changed to Zosyn to cover Enterococcus   resolving ANJANA      * History of Mesenteric mass s/p bx found to be Follicular Lymphoma, malignant pleural effusion s/p left Pleurx placement,   consult hematology case d/w Heme- no Rx due to debilitated state    * Atrial fibrillation on Eliquis, ,     * DM   better controlled after multiple adjustments Insulin  decrease SWEENEY to 25 with hold parameters    Needs PT  case d/w daughter aware no chemo and that Pall will f/up re: hospice. Pt is very deconditioned and not motivated to participate in PT    Case d/w Dr Ad red on PO Cipro for total 14 days

## 2021-07-10 LAB
ANION GAP SERPL CALC-SCNC: 7 MMOL/L — SIGNIFICANT CHANGE UP (ref 5–17)
BUN SERPL-MCNC: 24 MG/DL — HIGH (ref 7–23)
CALCIUM SERPL-MCNC: 7.9 MG/DL — LOW (ref 8.5–10.1)
CHLORIDE SERPL-SCNC: 109 MMOL/L — HIGH (ref 96–108)
CO2 SERPL-SCNC: 23 MMOL/L — SIGNIFICANT CHANGE UP (ref 22–31)
CREAT SERPL-MCNC: 1.24 MG/DL — SIGNIFICANT CHANGE UP (ref 0.5–1.3)
GLUCOSE SERPL-MCNC: 210 MG/DL — HIGH (ref 70–99)
POTASSIUM SERPL-MCNC: 3.6 MMOL/L — SIGNIFICANT CHANGE UP (ref 3.5–5.3)
POTASSIUM SERPL-SCNC: 3.6 MMOL/L — SIGNIFICANT CHANGE UP (ref 3.5–5.3)
SODIUM SERPL-SCNC: 139 MMOL/L — SIGNIFICANT CHANGE UP (ref 135–145)

## 2021-07-10 PROCEDURE — 99232 SBSQ HOSP IP/OBS MODERATE 35: CPT

## 2021-07-10 RX ORDER — INSULIN GLARGINE 100 [IU]/ML
35 INJECTION, SOLUTION SUBCUTANEOUS AT BEDTIME
Refills: 0 | Status: DISCONTINUED | OUTPATIENT
Start: 2021-07-10 | End: 2021-07-11

## 2021-07-10 RX ADMIN — PANTOPRAZOLE SODIUM 40 MILLIGRAM(S): 20 TABLET, DELAYED RELEASE ORAL at 06:19

## 2021-07-10 RX ADMIN — Medication 4: at 13:00

## 2021-07-10 RX ADMIN — PIPERACILLIN AND TAZOBACTAM 25 GRAM(S): 4; .5 INJECTION, POWDER, LYOPHILIZED, FOR SOLUTION INTRAVENOUS at 13:01

## 2021-07-10 RX ADMIN — APIXABAN 5 MILLIGRAM(S): 2.5 TABLET, FILM COATED ORAL at 21:34

## 2021-07-10 RX ADMIN — PIPERACILLIN AND TAZOBACTAM 25 GRAM(S): 4; .5 INJECTION, POWDER, LYOPHILIZED, FOR SOLUTION INTRAVENOUS at 06:19

## 2021-07-10 RX ADMIN — Medication 2: at 09:05

## 2021-07-10 RX ADMIN — INSULIN GLARGINE 35 UNIT(S): 100 INJECTION, SOLUTION SUBCUTANEOUS at 21:34

## 2021-07-10 RX ADMIN — Medication 4: at 16:23

## 2021-07-10 RX ADMIN — TRAMADOL HYDROCHLORIDE 50 MILLIGRAM(S): 50 TABLET ORAL at 09:06

## 2021-07-10 RX ADMIN — TAMSULOSIN HYDROCHLORIDE 0.4 MILLIGRAM(S): 0.4 CAPSULE ORAL at 21:35

## 2021-07-10 RX ADMIN — FINASTERIDE 5 MILLIGRAM(S): 5 TABLET, FILM COATED ORAL at 09:05

## 2021-07-10 RX ADMIN — PIPERACILLIN AND TAZOBACTAM 25 GRAM(S): 4; .5 INJECTION, POWDER, LYOPHILIZED, FOR SOLUTION INTRAVENOUS at 21:34

## 2021-07-10 RX ADMIN — Medication 2000 UNIT(S): at 09:06

## 2021-07-10 RX ADMIN — APIXABAN 5 MILLIGRAM(S): 2.5 TABLET, FILM COATED ORAL at 09:06

## 2021-07-10 NOTE — PROGRESS NOTE ADULT - SUBJECTIVE AND OBJECTIVE BOX
89 y/o M presented from [facility] for decreased urinary output and a temperature of 104F. The patient reports having significant abdominal pain and "pain all over." He is a poor historian and unable to provide much of the history. The patient was previously seen in the ER at [facility] on 7/3/2021 and diagnosed with a uteropelvic junction stone with moderate left hydronephrosis and pyelonephritis and he was discharged on Keflex.     Patient without any acute complaints. Pt is s/p cystoscopy, left ureteral stent placement on 7/5/21. Pseudomonas bacteremia and UTI with Enterococcus ; overall improvement but because not a candidate for chemo was ref. to Hospice        HEENT: No icterus , EOM full , No epistaxis  neck supple no jvd  chest CTA; pleurx in place  CVS s1s2 reg  Abdo:   : Soft, Non tender, No guarding, No distension  Back    : No CVAT b/l  Extremity: No calf tenderness   Genitalia Male: Kaminski with yellow urine  Skin     : No jaundice, No lesions, No swelling                                      8.5    9.47  )-----------( 128      ( 08 Jul 2021 06:34 )             26.4   07-08    137  |  107  |  37<H>  ----------------------------<  304<H>  3.5   |  22  |  1.34<H>    Ca    7.4<L>      08 Jul 2021 06:34      MEDICATIONS  (STANDING):  apixaban 5 milliGRAM(s) Oral every 12 hours  finasteride 5 milliGRAM(s) Oral daily  glucagon  Injectable 1 milliGRAM(s) IntraMuscular once  insulin glargine Injectable (LANTUS) 35 Unit(s) SubCutaneous at bedtime  insulin lispro (ADMELOG) corrective regimen sliding scale   SubCutaneous three times a day before meals  melatonin 3 milliGRAM(s) Oral at bedtime  pantoprazole    Tablet 40 milliGRAM(s) Oral before breakfast  piperacillin/tazobactam IVPB.. 3.375 Gram(s) IV Intermittent every 8 hours  tamsulosin 0.4 milliGRAM(s) Oral at bedtime  traMADol 50 milliGRAM(s) Oral daily        * Severe sepsis secondary to complicated UTI and left uteropelvic junction calculus   s/p stent  Ps Aer. 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 If he has a Kaminski he might not be able to return to [place] if she has a Kaminski and he will have to go to Rehab and transition from there    Case d/w Dr Duong dc on PO Cipro for total 14 days- date in the dc summary

## 2021-07-11 PROCEDURE — 99232 SBSQ HOSP IP/OBS MODERATE 35: CPT

## 2021-07-11 RX ORDER — INSULIN LISPRO 100/ML
VIAL (ML) SUBCUTANEOUS
Refills: 0 | Status: DISCONTINUED | OUTPATIENT
Start: 2021-07-11 | End: 2021-07-15

## 2021-07-11 RX ORDER — INSULIN LISPRO 100/ML
3 VIAL (ML) SUBCUTANEOUS
Refills: 0 | Status: DISCONTINUED | OUTPATIENT
Start: 2021-07-11 | End: 2021-07-15

## 2021-07-11 RX ORDER — SODIUM CHLORIDE 9 MG/ML
1000 INJECTION, SOLUTION INTRAVENOUS
Refills: 0 | Status: DISCONTINUED | OUTPATIENT
Start: 2021-07-11 | End: 2021-07-15

## 2021-07-11 RX ORDER — INSULIN GLARGINE 100 [IU]/ML
35 INJECTION, SOLUTION SUBCUTANEOUS AT BEDTIME
Refills: 0 | Status: DISCONTINUED | OUTPATIENT
Start: 2021-07-11 | End: 2021-07-15

## 2021-07-11 RX ORDER — GLUCAGON INJECTION, SOLUTION 0.5 MG/.1ML
1 INJECTION, SOLUTION SUBCUTANEOUS ONCE
Refills: 0 | Status: DISCONTINUED | OUTPATIENT
Start: 2021-07-11 | End: 2021-07-15

## 2021-07-11 RX ORDER — DEXTROSE 50 % IN WATER 50 %
25 SYRINGE (ML) INTRAVENOUS ONCE
Refills: 0 | Status: DISCONTINUED | OUTPATIENT
Start: 2021-07-11 | End: 2021-07-15

## 2021-07-11 RX ORDER — DEXTROSE 50 % IN WATER 50 %
12.5 SYRINGE (ML) INTRAVENOUS ONCE
Refills: 0 | Status: DISCONTINUED | OUTPATIENT
Start: 2021-07-11 | End: 2021-07-15

## 2021-07-11 RX ORDER — INSULIN LISPRO 100/ML
VIAL (ML) SUBCUTANEOUS AT BEDTIME
Refills: 0 | Status: DISCONTINUED | OUTPATIENT
Start: 2021-07-11 | End: 2021-07-15

## 2021-07-11 RX ORDER — DEXTROSE 50 % IN WATER 50 %
15 SYRINGE (ML) INTRAVENOUS ONCE
Refills: 0 | Status: DISCONTINUED | OUTPATIENT
Start: 2021-07-11 | End: 2021-07-15

## 2021-07-11 RX ADMIN — Medication 3 MILLIGRAM(S): at 01:18

## 2021-07-11 RX ADMIN — Medication 3 MILLIGRAM(S): at 21:28

## 2021-07-11 RX ADMIN — TAMSULOSIN HYDROCHLORIDE 0.4 MILLIGRAM(S): 0.4 CAPSULE ORAL at 21:28

## 2021-07-11 RX ADMIN — PIPERACILLIN AND TAZOBACTAM 25 GRAM(S): 4; .5 INJECTION, POWDER, LYOPHILIZED, FOR SOLUTION INTRAVENOUS at 13:03

## 2021-07-11 RX ADMIN — FINASTERIDE 5 MILLIGRAM(S): 5 TABLET, FILM COATED ORAL at 09:24

## 2021-07-11 RX ADMIN — Medication 1: at 08:31

## 2021-07-11 RX ADMIN — PIPERACILLIN AND TAZOBACTAM 25 GRAM(S): 4; .5 INJECTION, POWDER, LYOPHILIZED, FOR SOLUTION INTRAVENOUS at 05:48

## 2021-07-11 RX ADMIN — APIXABAN 5 MILLIGRAM(S): 2.5 TABLET, FILM COATED ORAL at 21:28

## 2021-07-11 RX ADMIN — Medication 3 UNIT(S): at 17:17

## 2021-07-11 RX ADMIN — Medication 2000 UNIT(S): at 09:25

## 2021-07-11 RX ADMIN — PANTOPRAZOLE SODIUM 40 MILLIGRAM(S): 20 TABLET, DELAYED RELEASE ORAL at 05:48

## 2021-07-11 RX ADMIN — Medication 3: at 12:37

## 2021-07-11 RX ADMIN — Medication 10: at 17:18

## 2021-07-11 RX ADMIN — INSULIN GLARGINE 35 UNIT(S): 100 INJECTION, SOLUTION SUBCUTANEOUS at 21:28

## 2021-07-11 RX ADMIN — TRAMADOL HYDROCHLORIDE 50 MILLIGRAM(S): 50 TABLET ORAL at 09:24

## 2021-07-11 RX ADMIN — TRAMADOL HYDROCHLORIDE 50 MILLIGRAM(S): 50 TABLET ORAL at 10:30

## 2021-07-11 RX ADMIN — APIXABAN 5 MILLIGRAM(S): 2.5 TABLET, FILM COATED ORAL at 09:25

## 2021-07-11 RX ADMIN — PIPERACILLIN AND TAZOBACTAM 25 GRAM(S): 4; .5 INJECTION, POWDER, LYOPHILIZED, FOR SOLUTION INTRAVENOUS at 21:28

## 2021-07-11 NOTE — PROGRESS NOTE ADULT - SUBJECTIVE AND OBJECTIVE BOX
88 y/o M presented from [facility] for decreased urinary output and a temperature of 104F. The patient reports having significant abdominal pain and "pain all over." He is a poor historian and unable to provide much of the history. The patient was previously seen in the ER at [facility] on 7/3/2021 and diagnosed with a uteropelvic junction stone with moderate left hydronephrosis and pyelonephritis and he was discharged on Keflex.     INTERVAL HPI: c/o constipation, s/p cystoscopy, left ureteral stent placement on 7/5/21. Pseudomonas bacteremia and UTI with Enterococcus ; overall improvement but because not a candidate for chemo was ref. to Hospice  Other ROS reviewed and neg     Vital Signs Last 24 Hrs  T(C): 36.8 (11 Jul 2021 08:14), Max: 36.8 (11 Jul 2021 08:14)  T(F): 98.2 (11 Jul 2021 08:14), Max: 98.2 (11 Jul 2021 08:14)  HR: 63 (11 Jul 2021 12:00) (62 - 69)  BP: 116/44 (11 Jul 2021 12:00) (108/55 - 147/54)  BP(mean): 62 (11 Jul 2021 12:00) (62 - 77)  RR: 21 (10 Jul 2021 20:00) (20 - 23)  SpO2: 95% (11 Jul 2021 12:00) (95% - 98%)  I&O's Detail    10 Jul 2021 07:01  -  11 Jul 2021 07:00  --------------------------------------------------------  IN:  Total IN: 0 mL    OUT:    Drain (mL): 1000 mL    Indwelling Catheter - Urethral (mL): 1250 mL    Voided (mL): 550 mL  Total OUT: 2800 mL    Total NET: -2800 mL    10 Jul 2021 07:08    139    |  109    |  24     ----------------------------<  210    3.6     |  23     |  1.24     Ca    7.9        10 Jul 2021 07:08    CAPILLARY BLOOD GLUCOSE    POCT Blood Glucose.: 273 mg/dL (11 Jul 2021 12:21)  POCT Blood Glucose.: 172 mg/dL (11 Jul 2021 08:11)  POCT Blood Glucose.: 239 mg/dL (10 Jul 2021 21:32)  POCT Blood Glucose.: 317 mg/dL (10 Jul 2021 16:20)    MEDICATIONS  (STANDING):  apixaban 5 milliGRAM(s) Oral every 12 hours  cholecalciferol Oral Tab/Cap - Peds 2000 Unit(s) Oral daily  dextrose 40% Gel 15 Gram(s) Oral once  dextrose 5%. 1000 milliLiter(s) (50 mL/Hr) IV Continuous <Continuous>  dextrose 5%. 1000 milliLiter(s) (100 mL/Hr) IV Continuous <Continuous>  dextrose 50% Injectable 25 Gram(s) IV Push once  dextrose 50% Injectable 12.5 Gram(s) IV Push once  dextrose 50% Injectable 25 Gram(s) IV Push once  finasteride 5 milliGRAM(s) Oral daily  glucagon  Injectable 1 milliGRAM(s) IntraMuscular once  insulin glargine Injectable (LANTUS) 35 Unit(s) SubCutaneous at bedtime  insulin lispro (ADMELOG) corrective regimen sliding scale   SubCutaneous three times a day before meals  melatonin 3 milliGRAM(s) Oral at bedtime  pantoprazole    Tablet 40 milliGRAM(s) Oral before breakfast  piperacillin/tazobactam IVPB.. 3.375 Gram(s) IV Intermittent every 8 hours  tamsulosin 0.4 milliGRAM(s) Oral at bedtime  traMADol 50 milliGRAM(s) Oral daily    MEDICATIONS  (PRN):  acetaminophen   Tablet .. 650 milliGRAM(s) Oral every 6 hours PRN Temp greater or equal to 38.5C (101.3F), Mild Pain (1 - 3)  ondansetron Injectable 4 milliGRAM(s) IV Push every 6 hours PRN Nausea    RADIOLOGY: personally visualized      PE:  GENERAL: elderly male in NAD  HEENT: No icterus , EOMI, No epistaxis  NECK: supple no JVD  Chest: CTA BL, pleurx in place  CVS: s1s2 reg  Abdomen: Soft, Non tender, No guarding, No distension  Back: No CVAT b/l  Extremity: No calf tenderness   Genitalia Male: Kaminski with yellow urine  Skin: No jaundice, No lesions, No swelling

## 2021-07-12 LAB
ANION GAP SERPL CALC-SCNC: 6 MMOL/L — SIGNIFICANT CHANGE UP (ref 5–17)
BUN SERPL-MCNC: 18 MG/DL — SIGNIFICANT CHANGE UP (ref 7–23)
CALCIUM SERPL-MCNC: 8 MG/DL — LOW (ref 8.5–10.1)
CHLORIDE SERPL-SCNC: 110 MMOL/L — HIGH (ref 96–108)
CO2 SERPL-SCNC: 27 MMOL/L — SIGNIFICANT CHANGE UP (ref 22–31)
CREAT SERPL-MCNC: 1.1 MG/DL — SIGNIFICANT CHANGE UP (ref 0.5–1.3)
GLUCOSE SERPL-MCNC: 149 MG/DL — HIGH (ref 70–99)
HCT VFR BLD CALC: 26.6 % — LOW (ref 39–50)
HGB BLD-MCNC: 8.5 G/DL — LOW (ref 13–17)
MAGNESIUM SERPL-MCNC: 2.2 MG/DL — SIGNIFICANT CHANGE UP (ref 1.6–2.6)
MCHC RBC-ENTMCNC: 28.8 PG — SIGNIFICANT CHANGE UP (ref 27–34)
MCHC RBC-ENTMCNC: 32 GM/DL — SIGNIFICANT CHANGE UP (ref 32–36)
MCV RBC AUTO: 90.2 FL — SIGNIFICANT CHANGE UP (ref 80–100)
PLATELET # BLD AUTO: 290 K/UL — SIGNIFICANT CHANGE UP (ref 150–400)
POTASSIUM SERPL-MCNC: 3.5 MMOL/L — SIGNIFICANT CHANGE UP (ref 3.5–5.3)
POTASSIUM SERPL-SCNC: 3.5 MMOL/L — SIGNIFICANT CHANGE UP (ref 3.5–5.3)
RBC # BLD: 2.95 M/UL — LOW (ref 4.2–5.8)
RBC # FLD: 15.2 % — HIGH (ref 10.3–14.5)
SODIUM SERPL-SCNC: 143 MMOL/L — SIGNIFICANT CHANGE UP (ref 135–145)
WBC # BLD: 9 K/UL — SIGNIFICANT CHANGE UP (ref 3.8–10.5)
WBC # FLD AUTO: 9 K/UL — SIGNIFICANT CHANGE UP (ref 3.8–10.5)

## 2021-07-12 PROCEDURE — 99233 SBSQ HOSP IP/OBS HIGH 50: CPT

## 2021-07-12 RX ORDER — SACCHAROMYCES BOULARDII 250 MG
1 POWDER IN PACKET (EA) ORAL
Qty: 0 | Refills: 0 | DISCHARGE
Start: 2021-07-12

## 2021-07-12 RX ORDER — SACCHAROMYCES BOULARDII 250 MG
250 POWDER IN PACKET (EA) ORAL
Refills: 0 | Status: DISCONTINUED | OUTPATIENT
Start: 2021-07-12 | End: 2021-07-15

## 2021-07-12 RX ORDER — CIPROFLOXACIN LACTATE 400MG/40ML
250 VIAL (ML) INTRAVENOUS
Refills: 0 | Status: DISCONTINUED | OUTPATIENT
Start: 2021-07-12 | End: 2021-07-15

## 2021-07-12 RX ORDER — CIPROFLOXACIN LACTATE 400MG/40ML
1 VIAL (ML) INTRAVENOUS
Qty: 0 | Refills: 0 | DISCHARGE
Start: 2021-07-12

## 2021-07-12 RX ORDER — TRAMADOL HYDROCHLORIDE 50 MG/1
50 TABLET ORAL DAILY
Refills: 0 | Status: DISCONTINUED | OUTPATIENT
Start: 2021-07-13 | End: 2021-07-15

## 2021-07-12 RX ADMIN — TRAMADOL HYDROCHLORIDE 50 MILLIGRAM(S): 50 TABLET ORAL at 11:30

## 2021-07-12 RX ADMIN — TRAMADOL HYDROCHLORIDE 50 MILLIGRAM(S): 50 TABLET ORAL at 09:34

## 2021-07-12 RX ADMIN — Medication 250 MILLIGRAM(S): at 21:54

## 2021-07-12 RX ADMIN — TAMSULOSIN HYDROCHLORIDE 0.4 MILLIGRAM(S): 0.4 CAPSULE ORAL at 21:54

## 2021-07-12 RX ADMIN — Medication 3 UNIT(S): at 17:21

## 2021-07-12 RX ADMIN — PIPERACILLIN AND TAZOBACTAM 25 GRAM(S): 4; .5 INJECTION, POWDER, LYOPHILIZED, FOR SOLUTION INTRAVENOUS at 05:29

## 2021-07-12 RX ADMIN — APIXABAN 5 MILLIGRAM(S): 2.5 TABLET, FILM COATED ORAL at 09:34

## 2021-07-12 RX ADMIN — PIPERACILLIN AND TAZOBACTAM 25 GRAM(S): 4; .5 INJECTION, POWDER, LYOPHILIZED, FOR SOLUTION INTRAVENOUS at 13:37

## 2021-07-12 RX ADMIN — Medication 3 MILLIGRAM(S): at 21:54

## 2021-07-12 RX ADMIN — Medication 4: at 12:14

## 2021-07-12 RX ADMIN — FINASTERIDE 5 MILLIGRAM(S): 5 TABLET, FILM COATED ORAL at 09:34

## 2021-07-12 RX ADMIN — Medication 2000 UNIT(S): at 09:34

## 2021-07-12 RX ADMIN — PANTOPRAZOLE SODIUM 40 MILLIGRAM(S): 20 TABLET, DELAYED RELEASE ORAL at 09:35

## 2021-07-12 RX ADMIN — APIXABAN 5 MILLIGRAM(S): 2.5 TABLET, FILM COATED ORAL at 21:54

## 2021-07-12 RX ADMIN — Medication 6: at 17:21

## 2021-07-12 RX ADMIN — Medication 3 UNIT(S): at 12:14

## 2021-07-12 RX ADMIN — Medication 250 MILLIGRAM(S): at 12:15

## 2021-07-12 NOTE — PROGRESS NOTE ADULT - SUBJECTIVE AND OBJECTIVE BOX
89 y/o M presented from [facility] for decreased urinary output and a temperature of 104F. The patient reports having significant abdominal pain and "pain all over." He is a poor historian and unable to provide much of the history. The patient was previously seen in the ER at [facility] on 7/3/2021 and diagnosed with a uteropelvic junction stone with moderate left hydronephrosis and pyelonephritis and he was discharged on Keflex.     INTERVAL HPI: now developed diarrhea,  s/p cystoscopy, left ureteral stent placement on 7/5/21. Pseudomonas bacteremia and UTI with Enterococcus ; overall improvement but because not a candidate for chemo was referred to Hospice  Other ROS reviewed and neg     Vital Signs Last 24 Hrs  T(C): 36.6 (12 Jul 2021 12:15), Max: 36.9 (12 Jul 2021 05:20)  T(F): 97.9 (12 Jul 2021 12:15), Max: 98.4 (12 Jul 2021 05:20)  HR: 74 (11 Jul 2021 16:00) (74 - 74)  BP: 132/51 (12 Jul 2021 08:00) (118/40 - 138/62)  BP(mean): 71 (12 Jul 2021 08:00) (59 - 77)  RR: --  SpO2: 93% (12 Jul 2021 04:00) (93% - 96%)    I&O's Detail    11 Jul 2021 07:01  -  12 Jul 2021 07:00  --------------------------------------------------------  IN:    IV PiggyBack: 200 mL    Oral Fluid: 200 mL  Total IN: 400 mL    OUT:    Indwelling Catheter - Urethral (mL): 1400 mL  Total OUT: 1400 mL    Total NET: -1000 mL      12 Jul 2021 07:01  -  12 Jul 2021 13:37  --------------------------------------------------------  IN:  Total IN: 0 mL    OUT:    Voided (mL): 425 mL  Total OUT: 425 mL    Total NET: -425 mL                       8.5    9.00  )-----------( 290      ( 12 Jul 2021 10:10 )             26.6     12 Jul 2021 10:10    143    |  110    |  18     ----------------------------<  149    3.5     |  27     |  1.10     Ca    8.0        12 Jul 2021 10:10  Mg     2.2       12 Jul 2021 10:10    CAPILLARY BLOOD GLUCOSE    POCT Blood Glucose.: 242 mg/dL (12 Jul 2021 12:09)  POCT Blood Glucose.: 126 mg/dL (12 Jul 2021 08:13)  POCT Blood Glucose.: 194 mg/dL (11 Jul 2021 21:27)  POCT Blood Glucose.: 366 mg/dL (11 Jul 2021 17:10)    MEDICATIONS  (STANDING):  apixaban 5 milliGRAM(s) Oral every 12 hours  cholecalciferol Oral Tab/Cap - Peds 2000 Unit(s) Oral daily  dextrose 40% Gel 15 Gram(s) Oral once  dextrose 5%. 1000 milliLiter(s) (50 mL/Hr) IV Continuous <Continuous>  dextrose 5%. 1000 milliLiter(s) (100 mL/Hr) IV Continuous <Continuous>  dextrose 50% Injectable 25 Gram(s) IV Push once  dextrose 50% Injectable 12.5 Gram(s) IV Push once  dextrose 50% Injectable 25 Gram(s) IV Push once  finasteride 5 milliGRAM(s) Oral daily  glucagon  Injectable 1 milliGRAM(s) IntraMuscular once  insulin glargine Injectable (LANTUS) 35 Unit(s) SubCutaneous at bedtime  insulin lispro (ADMELOG) corrective regimen sliding scale   SubCutaneous three times a day before meals  insulin lispro (ADMELOG) corrective regimen sliding scale   SubCutaneous at bedtime  insulin lispro Injectable (ADMELOG) 3 Unit(s) SubCutaneous three times a day before meals  melatonin 3 milliGRAM(s) Oral at bedtime  pantoprazole    Tablet 40 milliGRAM(s) Oral before breakfast  piperacillin/tazobactam IVPB.. 3.375 Gram(s) IV Intermittent every 8 hours  saccharomyces boulardii 250 milliGRAM(s) Oral two times a day  tamsulosin 0.4 milliGRAM(s) Oral at bedtime    MEDICATIONS  (PRN):  acetaminophen   Tablet .. 650 milliGRAM(s) Oral every 6 hours PRN Temp greater or equal to 38.5C (101.3F), Mild Pain (1 - 3)  ondansetron Injectable 4 milliGRAM(s) IV Push every 6 hours PRN Nausea      RADIOLOGY: personally visualized    PE:  GENERAL: elderly male in NAD  HEENT: No icterus , EOMI, No epistaxis  NECK: supple no JVD  Chest: CTA BL, pleurx in place  CVS: s1s2 reg  Abdomen: Soft, Non tender, No guarding, No distension  Back: No CVAT b/l  Extremity: No calf tenderness   Genitalia Male: Kaminski with yellow urine  Skin: No jaundice, No lesions, No swelling        89 y/o M presented from [facility] for decreased urinary output and a temperature of 104F. The patient reports having significant abdominal pain and "pain all over." He is a poor historian and unable to provide much of the history. The patient was previously seen in the ER at [facility] on 7/3/2021 and diagnosed with a uteropelvic junction stone with moderate left hydronephrosis and pyelonephritis and he was discharged on Keflex.     INTERVAL HPI: now developed diarrhea,  s/p cystoscopy, left ureteral stent placement on 7/5/21. Pseudomonas bacteremia and UTI with Enterococcus ; overall improvement but because not a candidate for chemo was referred to Hospice  Other ROS reviewed and neg     Vital Signs Last 24 Hrs  T(C): 36.6 (12 Jul 2021 12:15), Max: 36.9 (12 Jul 2021 05:20)  T(F): 97.9 (12 Jul 2021 12:15), Max: 98.4 (12 Jul 2021 05:20)  HR: 74 (11 Jul 2021 16:00) (74 - 74)  BP: 132/51 (12 Jul 2021 08:00) (118/40 - 138/62)  BP(mean): 71 (12 Jul 2021 08:00) (59 - 77)  RR: --  SpO2: 93% (12 Jul 2021 04:00) (93% - 96%)    I&O's Detail    11 Jul 2021 07:01  -  12 Jul 2021 07:00  --------------------------------------------------------  IN:    IV PiggyBack: 200 mL    Oral Fluid: 200 mL  Total IN: 400 mL    OUT:    Indwelling Catheter - Urethral (mL): 1400 mL  Total OUT: 1400 mL    Total NET: -1000 mL      12 Jul 2021 07:01  -  12 Jul 2021 13:37  --------------------------------------------------------  IN:  Total IN: 0 mL    OUT:    Voided (mL): 425 mL  Total OUT: 425 mL    Total NET: -425 mL                       8.5    9.00  )-----------( 290      ( 12 Jul 2021 10:10 )             26.6     12 Jul 2021 10:10    143    |  110    |  18     ----------------------------<  149    3.5     |  27     |  1.10     Ca    8.0        12 Jul 2021 10:10  Mg     2.2       12 Jul 2021 10:10    CAPILLARY BLOOD GLUCOSE    POCT Blood Glucose.: 242 mg/dL (12 Jul 2021 12:09)  POCT Blood Glucose.: 126 mg/dL (12 Jul 2021 08:13)  POCT Blood Glucose.: 194 mg/dL (11 Jul 2021 21:27)  POCT Blood Glucose.: 366 mg/dL (11 Jul 2021 17:10)    MEDICATIONS  (STANDING):  apixaban 5 milliGRAM(s) Oral every 12 hours  cholecalciferol Oral Tab/Cap - Peds 2000 Unit(s) Oral daily  dextrose 40% Gel 15 Gram(s) Oral once  dextrose 5%. 1000 milliLiter(s) (50 mL/Hr) IV Continuous <Continuous>  dextrose 5%. 1000 milliLiter(s) (100 mL/Hr) IV Continuous <Continuous>  dextrose 50% Injectable 25 Gram(s) IV Push once  dextrose 50% Injectable 12.5 Gram(s) IV Push once  dextrose 50% Injectable 25 Gram(s) IV Push once  finasteride 5 milliGRAM(s) Oral daily  glucagon  Injectable 1 milliGRAM(s) IntraMuscular once  insulin glargine Injectable (LANTUS) 35 Unit(s) SubCutaneous at bedtime  insulin lispro (ADMELOG) corrective regimen sliding scale   SubCutaneous three times a day before meals  insulin lispro (ADMELOG) corrective regimen sliding scale   SubCutaneous at bedtime  insulin lispro Injectable (ADMELOG) 3 Unit(s) SubCutaneous three times a day before meals  melatonin 3 milliGRAM(s) Oral at bedtime  pantoprazole    Tablet 40 milliGRAM(s) Oral before breakfast  piperacillin/tazobactam IVPB.. 3.375 Gram(s) IV Intermittent every 8 hours  saccharomyces boulardii 250 milliGRAM(s) Oral two times a day  tamsulosin 0.4 milliGRAM(s) Oral at bedtime    MEDICATIONS  (PRN):  acetaminophen   Tablet .. 650 milliGRAM(s) Oral every 6 hours PRN Temp greater or equal to 38.5C (101.3F), Mild Pain (1 - 3)  ondansetron Injectable 4 milliGRAM(s) IV Push every 6 hours PRN Nausea      RADIOLOGY: personally visualized    PE:  GENERAL: elderly male in NAD  HEENT: No icterus , EOMI, No epistaxis  NECK: supple no JVD  Chest: CTA BL, pleurx in place  CVS: s1s2 reg  Abdomen: Soft, Non tender, No guarding, No distension  Back: No CVAT b/l  Extremity: No calf tenderness , + BL pedal edema  Genitalia Male: Kaminski with yellow urine  Skin: No jaundice, No lesions, No swelling

## 2021-07-12 NOTE — PROGRESS NOTE ADULT - SUBJECTIVE AND OBJECTIVE BOX
HPI: Pt seen and examined this am in follow up for sx. Pt feeling well, no complaints, eating a donut. No issues as per staff.       PAIN: denies  DYSPNEA: denies      ROS:  All other systems reviewed and negative      PHYSICAL EXAM:    Vital Signs Last 24 Hrs  T(C): 36.6 (2021 09:10), Max: 36.9 (2021 05:20)  T(F): 97.9 (2021 09:10), Max: 98.4 (2021 05:20)  HR: 74 (2021 16:00) (63 - 74)  BP: 132/51 (2021 08:00) (116/44 - 138/62)  BP(mean): 71 (2021 08:00) (59 - 77)  SpO2: 93% (2021 04:00) (93% - 96%)  Daily Weight in k.9 (2021 05:20)    PPSV2:  30 %    General: Elderly male sitting up in bed watching television, feeding himself breakfast, friendly, NAD  Mental Status: AOx4  HEENT: mmm, +mild temporal wasting  Lungs: improved aeration, pleurX in place on L  Cardiac: + s1 s2 rrr  GI: soft nt nd +bs  : almaraz  MSK/skin: moves all extremities, some chronic venous stasis changes and old surgical scar on RLE, muscle and fat wasting noted in limbs  Neuro: no focal deficits    LABS: none today      Albumin:     Allergies    morphine (Other)    Intolerances      MEDICATIONS  (STANDING):  apixaban 5 milliGRAM(s) Oral every 12 hours  cholecalciferol Oral Tab/Cap - Peds 2000 Unit(s) Oral daily  dextrose 40% Gel 15 Gram(s) Oral once  dextrose 5%. 1000 milliLiter(s) (50 mL/Hr) IV Continuous <Continuous>  dextrose 5%. 1000 milliLiter(s) (100 mL/Hr) IV Continuous <Continuous>  dextrose 50% Injectable 25 Gram(s) IV Push once  dextrose 50% Injectable 12.5 Gram(s) IV Push once  dextrose 50% Injectable 25 Gram(s) IV Push once  finasteride 5 milliGRAM(s) Oral daily  glucagon  Injectable 1 milliGRAM(s) IntraMuscular once  insulin glargine Injectable (LANTUS) 35 Unit(s) SubCutaneous at bedtime  insulin lispro (ADMELOG) corrective regimen sliding scale   SubCutaneous three times a day before meals  insulin lispro (ADMELOG) corrective regimen sliding scale   SubCutaneous at bedtime  insulin lispro Injectable (ADMELOG) 3 Unit(s) SubCutaneous three times a day before meals  melatonin 3 milliGRAM(s) Oral at bedtime  pantoprazole    Tablet 40 milliGRAM(s) Oral before breakfast  piperacillin/tazobactam IVPB.. 3.375 Gram(s) IV Intermittent every 8 hours  saccharomyces boulardii 250 milliGRAM(s) Oral two times a day  tamsulosin 0.4 milliGRAM(s) Oral at bedtime    MEDICATIONS  (PRN):  acetaminophen   Tablet .. 650 milliGRAM(s) Oral every 6 hours PRN Temp greater or equal to 38.5C (101.3F), Mild Pain (1 - 3)  ondansetron Injectable 4 milliGRAM(s) IV Push every 6 hours PRN Nausea      RADIOLOGY:

## 2021-07-13 LAB
ANION GAP SERPL CALC-SCNC: 5 MMOL/L — SIGNIFICANT CHANGE UP (ref 5–17)
BUN SERPL-MCNC: 16 MG/DL — SIGNIFICANT CHANGE UP (ref 7–23)
CALCIUM SERPL-MCNC: 8.3 MG/DL — LOW (ref 8.5–10.1)
CHLORIDE SERPL-SCNC: 109 MMOL/L — HIGH (ref 96–108)
CO2 SERPL-SCNC: 27 MMOL/L — SIGNIFICANT CHANGE UP (ref 22–31)
CREAT SERPL-MCNC: 1.03 MG/DL — SIGNIFICANT CHANGE UP (ref 0.5–1.3)
GLUCOSE SERPL-MCNC: 88 MG/DL — SIGNIFICANT CHANGE UP (ref 70–99)
HCT VFR BLD CALC: 26.9 % — LOW (ref 39–50)
HGB BLD-MCNC: 8.6 G/DL — LOW (ref 13–17)
MCHC RBC-ENTMCNC: 29.3 PG — SIGNIFICANT CHANGE UP (ref 27–34)
MCHC RBC-ENTMCNC: 32 GM/DL — SIGNIFICANT CHANGE UP (ref 32–36)
MCV RBC AUTO: 91.5 FL — SIGNIFICANT CHANGE UP (ref 80–100)
PLATELET # BLD AUTO: 345 K/UL — SIGNIFICANT CHANGE UP (ref 150–400)
POTASSIUM SERPL-MCNC: 3.6 MMOL/L — SIGNIFICANT CHANGE UP (ref 3.5–5.3)
POTASSIUM SERPL-SCNC: 3.6 MMOL/L — SIGNIFICANT CHANGE UP (ref 3.5–5.3)
RBC # BLD: 2.94 M/UL — LOW (ref 4.2–5.8)
RBC # FLD: 15.5 % — HIGH (ref 10.3–14.5)
SODIUM SERPL-SCNC: 141 MMOL/L — SIGNIFICANT CHANGE UP (ref 135–145)
WBC # BLD: 9.64 K/UL — SIGNIFICANT CHANGE UP (ref 3.8–10.5)
WBC # FLD AUTO: 9.64 K/UL — SIGNIFICANT CHANGE UP (ref 3.8–10.5)

## 2021-07-13 PROCEDURE — 99232 SBSQ HOSP IP/OBS MODERATE 35: CPT

## 2021-07-13 RX ADMIN — APIXABAN 5 MILLIGRAM(S): 2.5 TABLET, FILM COATED ORAL at 21:10

## 2021-07-13 RX ADMIN — APIXABAN 5 MILLIGRAM(S): 2.5 TABLET, FILM COATED ORAL at 10:47

## 2021-07-13 RX ADMIN — Medication 3 UNIT(S): at 17:32

## 2021-07-13 RX ADMIN — TAMSULOSIN HYDROCHLORIDE 0.4 MILLIGRAM(S): 0.4 CAPSULE ORAL at 21:10

## 2021-07-13 RX ADMIN — Medication 8: at 17:32

## 2021-07-13 RX ADMIN — Medication 250 MILLIGRAM(S): at 10:47

## 2021-07-13 RX ADMIN — Medication 250 MILLIGRAM(S): at 21:10

## 2021-07-13 RX ADMIN — Medication 3 UNIT(S): at 12:26

## 2021-07-13 RX ADMIN — Medication 3 MILLIGRAM(S): at 22:33

## 2021-07-13 RX ADMIN — TRAMADOL HYDROCHLORIDE 50 MILLIGRAM(S): 50 TABLET ORAL at 10:47

## 2021-07-13 RX ADMIN — Medication 2000 UNIT(S): at 10:47

## 2021-07-13 RX ADMIN — FINASTERIDE 5 MILLIGRAM(S): 5 TABLET, FILM COATED ORAL at 10:47

## 2021-07-13 RX ADMIN — INSULIN GLARGINE 35 UNIT(S): 100 INJECTION, SOLUTION SUBCUTANEOUS at 21:10

## 2021-07-13 RX ADMIN — Medication 6: at 12:26

## 2021-07-13 RX ADMIN — TRAMADOL HYDROCHLORIDE 50 MILLIGRAM(S): 50 TABLET ORAL at 11:00

## 2021-07-13 RX ADMIN — PANTOPRAZOLE SODIUM 40 MILLIGRAM(S): 20 TABLET, DELAYED RELEASE ORAL at 06:40

## 2021-07-13 NOTE — CDI QUERY NOTE - NSCDIOTHERTXTBX_GEN_ALL_CORE_HH
Please document the relationship between the following conditions:  A) Sepsis is due to/associated with chronic almaraz  B) Sepsis is not due to/not associated with chronic almaraz  C) Unable to determine  D) Other ( Please specify)  E) Not clinically  significant    CHART DOCUMENTATION:     Emergency room triage nurse note on 7/4/2021:  · Chief Complaint  fever  · Chief Complaint Quote  Patient from nursing home for Tmax of 102.  A&Ox0.  Was here yesterday.  Almaraz in place on arrival.      Hospitalist progress note on 7/12/2021:  * Severe sepsis secondary to complicated UTI and left uteropelvic junction calculus   s/p stent with Pseudomonas bacteremia  - continue IV Zosyn to cover Enterococcus with plan to transition to po ciprofloxacin on DC   - resolving ANJANA, failed voiding trial - cont Almaraz catheter

## 2021-07-13 NOTE — CDI QUERY NOTE - NSCDIOTHERTXTBX2_GEN_ALL_CORE_FT
Please clarify if the below is clinically significant?  A) Clinically significant for metabolic encephalopathy  B) Not clinically significant  C) Other ( Please specify)  D) Unable to determine    CHART DOCUMENTATION:    PMH:   Afib   Anemia   Benign prostatic hyperplasia with urinary retention   BPH (benign prostatic hyperplasia)   DM (diabetes mellitus)   Follicular lymphoma   H/O chest tube placement   Hernia   History of DVT in adulthood   HLD (hyperlipidemia)   HTN (hypertension)   Kidney stone   Malignant pleural effusion   Protein calorie malnutrition   Stage 3 chronic kidney disease.     Emergency room triage nurse note on 7/4/2021:  · Chief Complaint Quote	Patient from nursing home for Tmax of 102.  A&Ox0.  Was here yesterday.  Kaminski in place on arrival.    Emergency room provider on 7/4/2021:  · NEURO LOC: follows commands  CONFUSED    History and physical progress note  on 7/5/2021:  88 y/o M presented from [facility] for decreased urinary output and a temperature of 104F. The patient reports having significant abdominal pain and "pain all over." He is a poor historian and unable to provide much of the history. The patient was previously seen in the ER at [facility] on 7/3/2021 and diagnosed with a uteropelvic junction stone with moderate left hydronephrosis and pyelonephritis and he was discharged on Keflex.     Severe sepsis secondary to complicated UTI and left uteropelvic junction calculus   Neurological: A+Ox2 (person, place). Confused on examination. Cranial nerves 2-12 intact. Normal speech. No facial droop. No focal neurological deficits.

## 2021-07-13 NOTE — PROGRESS NOTE ADULT - SUBJECTIVE AND OBJECTIVE BOX
88 y/o M [facility] for decreased urinary output and a temperature of 104F. The patient reports having significant abdominal pain and "pain all over." He is a poor historian and unable to provide much of the history. The patient was previously seen in the ER at [facility] on 7/3/2021 and diagnosed with a uteropelvic junction stone with moderate left hydronephrosis and pyelonephritis and he was discharged on Keflex.     INTERVAL HPI: 2 loos BMs today,  s/p cystoscopy, left ureteral stent placement on 7/5/21. Pseudomonas bacteremia and UTI with Enterococcus ; overall improvement but because not a candidate for chemo was referred to Hospice  Other ROS reviewed and neg     Vital Signs Last 24 Hrs  T(C): 36.8 (13 Jul 2021 15:56), Max: 37 (13 Jul 2021 08:28)  T(F): 98.2 (13 Jul 2021 15:56), Max: 98.6 (13 Jul 2021 08:28)  HR: 65 (13 Jul 2021 08:00) (65 - 77)  BP: 144/74 (13 Jul 2021 12:00) (119/41 - 144/74)  BP(mean): 82 (13 Jul 2021 12:00) (61 - 82)  RR: 20 (13 Jul 2021 08:00) (18 - 20)  SpO2: 96% (13 Jul 2021 12:00) (95% - 98%)  I&O's Detail    12 Jul 2021 07:01  -  13 Jul 2021 07:00  --------------------------------------------------------  IN:    Oral Fluid: 90 mL  Total IN: 90 mL    OUT:    Indwelling Catheter - Urethral (mL): 1200 mL    Voided (mL): 425 mL  Total OUT: 1625 mL    Total NET: -1535 mL                       8.6    9.64  )-----------( 345      ( 13 Jul 2021 07:43 )             26.9     13 Jul 2021 07:43    141    |  109    |  16     ----------------------------<  88     3.6     |  27     |  1.03     Ca    8.3        13 Jul 2021 07:43  Mg     2.2       12 Jul 2021 10:10    CAPILLARY BLOOD GLUCOSE    POCT Blood Glucose.: 269 mg/dL (13 Jul 2021 12:24)  POCT Blood Glucose.: 99 mg/dL (13 Jul 2021 08:13)  POCT Blood Glucose.: 119 mg/dL (12 Jul 2021 21:12)  POCT Blood Glucose.: 273 mg/dL (12 Jul 2021 17:05)    MEDICATIONS  (STANDING):  apixaban 5 milliGRAM(s) Oral every 12 hours  cholecalciferol Oral Tab/Cap - Peds 2000 Unit(s) Oral daily  ciprofloxacin     Tablet 250 milliGRAM(s) Oral two times a day  dextrose 40% Gel 15 Gram(s) Oral once  dextrose 5%. 1000 milliLiter(s) (100 mL/Hr) IV Continuous <Continuous>  dextrose 5%. 1000 milliLiter(s) (50 mL/Hr) IV Continuous <Continuous>  dextrose 50% Injectable 25 Gram(s) IV Push once  dextrose 50% Injectable 12.5 Gram(s) IV Push once  dextrose 50% Injectable 25 Gram(s) IV Push once  finasteride 5 milliGRAM(s) Oral daily  glucagon  Injectable 1 milliGRAM(s) IntraMuscular once  insulin glargine Injectable (LANTUS) 35 Unit(s) SubCutaneous at bedtime  insulin lispro (ADMELOG) corrective regimen sliding scale   SubCutaneous three times a day before meals  insulin lispro (ADMELOG) corrective regimen sliding scale   SubCutaneous at bedtime  insulin lispro Injectable (ADMELOG) 3 Unit(s) SubCutaneous three times a day before meals  melatonin 3 milliGRAM(s) Oral at bedtime  pantoprazole    Tablet 40 milliGRAM(s) Oral before breakfast  saccharomyces boulardii 250 milliGRAM(s) Oral two times a day  tamsulosin 0.4 milliGRAM(s) Oral at bedtime  traMADol 50 milliGRAM(s) Oral daily    MEDICATIONS  (PRN):  acetaminophen   Tablet .. 650 milliGRAM(s) Oral every 6 hours PRN Temp greater or equal to 38.5C (101.3F), Mild Pain (1 - 3)  ondansetron Injectable 4 milliGRAM(s) IV Push every 6 hours PRN Nausea    RADIOLOGY: personally visualized    PE:  GENERAL: elderly male in NAD  HEENT: No icterus , EOMI, No epistaxis  NECK: supple no JVD  Chest: CTA BL, pleurx in place  CVS: s1s2 reg  Abdomen: Soft, Non tender, No guarding, No distension  Back: No CVAT b/l  Extremity: No calf tenderness , + BL pedal edema  Genitalia Male: Kaminski with yellow urine  Skin: No jaundice, No lesions, No swelling

## 2021-07-14 PROCEDURE — 99232 SBSQ HOSP IP/OBS MODERATE 35: CPT

## 2021-07-14 RX ADMIN — INSULIN GLARGINE 35 UNIT(S): 100 INJECTION, SOLUTION SUBCUTANEOUS at 21:38

## 2021-07-14 RX ADMIN — Medication 3 MILLIGRAM(S): at 23:16

## 2021-07-14 RX ADMIN — Medication 2000 UNIT(S): at 10:18

## 2021-07-14 RX ADMIN — APIXABAN 5 MILLIGRAM(S): 2.5 TABLET, FILM COATED ORAL at 21:37

## 2021-07-14 RX ADMIN — Medication 250 MILLIGRAM(S): at 21:37

## 2021-07-14 RX ADMIN — Medication 250 MILLIGRAM(S): at 10:18

## 2021-07-14 RX ADMIN — APIXABAN 5 MILLIGRAM(S): 2.5 TABLET, FILM COATED ORAL at 10:18

## 2021-07-14 RX ADMIN — PANTOPRAZOLE SODIUM 40 MILLIGRAM(S): 20 TABLET, DELAYED RELEASE ORAL at 06:41

## 2021-07-14 RX ADMIN — Medication 3 UNIT(S): at 12:09

## 2021-07-14 RX ADMIN — TAMSULOSIN HYDROCHLORIDE 0.4 MILLIGRAM(S): 0.4 CAPSULE ORAL at 21:38

## 2021-07-14 RX ADMIN — Medication 4: at 12:09

## 2021-07-14 RX ADMIN — FINASTERIDE 5 MILLIGRAM(S): 5 TABLET, FILM COATED ORAL at 10:18

## 2021-07-14 RX ADMIN — TRAMADOL HYDROCHLORIDE 50 MILLIGRAM(S): 50 TABLET ORAL at 11:30

## 2021-07-14 RX ADMIN — TRAMADOL HYDROCHLORIDE 50 MILLIGRAM(S): 50 TABLET ORAL at 10:18

## 2021-07-14 RX ADMIN — Medication 2: at 18:04

## 2021-07-14 RX ADMIN — Medication 3 UNIT(S): at 18:04

## 2021-07-14 NOTE — PROGRESS NOTE ADULT - SUBJECTIVE AND OBJECTIVE BOX
HPI: Pt seen and examined this am in follow up for sx. Pt feeling well, denies any discomfort. No issues as per RN      PAIN: denies    DYSPNEA: denies      ROS:  All other systems reviewed and negative      PHYSICAL EXAM:    Vital Signs Last 24 Hrs  T(C): 36.7 (2021 05:21), Max: 37.6 (2021 19:45)  T(F): 98 (2021 05:21), Max: 99.7 (2021 19:45)  HR: 68 (2021 20:00) (66 - 68)  BP: 123/49 (2021 07:50) (123/49 - 149/54)  BP(mean): 69 (2021 07:50) (69 - 82)  SpO2: 95% (2021 07:50) (95% - 100%)  Daily Weight in k.3 (2021 02:28)    PPSV2:  30 %    General: Elderly male sitting up in bed watching television, NAD  Mental Status: AOx4  HEENT: mmm, +mild temporal wasting  Lungs: improved aeration, pleurX in place on L  Cardiac: + s1 s2 rrr  GI: soft nt nd +bs  : almaraz  MSK/skin: moves all extremities, some chronic venous stasis changes and old surgical scar on RLE, muscle and fat wasting noted in limbs  Neuro: no focal deficits    LABS:                        8.6    9.64  )-----------( 345      ( 2021 07:43 )             26.9     07-13    141  |  109<H>  |  16  ----------------------------<  88  3.6   |  27  |  1.03    Ca    8.3<L>      2021 07:43  Mg     2.2     07-12        Albumin:     Allergies    morphine (Other)    Intolerances      MEDICATIONS  (STANDING):  apixaban 5 milliGRAM(s) Oral every 12 hours  cholecalciferol Oral Tab/Cap - Peds 2000 Unit(s) Oral daily  ciprofloxacin     Tablet 250 milliGRAM(s) Oral two times a day  dextrose 40% Gel 15 Gram(s) Oral once  dextrose 5%. 1000 milliLiter(s) (50 mL/Hr) IV Continuous <Continuous>  dextrose 5%. 1000 milliLiter(s) (100 mL/Hr) IV Continuous <Continuous>  dextrose 50% Injectable 25 Gram(s) IV Push once  dextrose 50% Injectable 12.5 Gram(s) IV Push once  dextrose 50% Injectable 25 Gram(s) IV Push once  finasteride 5 milliGRAM(s) Oral daily  glucagon  Injectable 1 milliGRAM(s) IntraMuscular once  insulin glargine Injectable (LANTUS) 35 Unit(s) SubCutaneous at bedtime  insulin lispro (ADMELOG) corrective regimen sliding scale   SubCutaneous three times a day before meals  insulin lispro (ADMELOG) corrective regimen sliding scale   SubCutaneous at bedtime  insulin lispro Injectable (ADMELOG) 3 Unit(s) SubCutaneous three times a day before meals  melatonin 3 milliGRAM(s) Oral at bedtime  pantoprazole    Tablet 40 milliGRAM(s) Oral before breakfast  saccharomyces boulardii 250 milliGRAM(s) Oral two times a day  tamsulosin 0.4 milliGRAM(s) Oral at bedtime  traMADol 50 milliGRAM(s) Oral daily    MEDICATIONS  (PRN):  acetaminophen   Tablet .. 650 milliGRAM(s) Oral every 6 hours PRN Temp greater or equal to 38.5C (101.3F), Mild Pain (1 - 3)  ondansetron Injectable 4 milliGRAM(s) IV Push every 6 hours PRN Nausea

## 2021-07-14 NOTE — PROGRESS NOTE ADULT - SUBJECTIVE AND OBJECTIVE BOX
88 y/o M presented from [facility] for decreased urinary output and a temperature of 104F. The patient reports having significant abdominal pain and "pain all over." He is a poor historian and unable to provide much of the history. The patient was previously seen in the ER at [facility] on 7/3/2021 and diagnosed with a uteropelvic junction stone with moderate left hydronephrosis and pyelonephritis and he was discharged on Keflex.     INTERVAL HPI: no loose BMs today,  s/p cystoscopy, left ureteral stent placement on 7/5/21. Pseudomonas bacteremia and UTI with Enterococcus ; overall improvement but because not a candidate for chemo was referred to Hospice  Other ROS reviewed and neg     Vital Signs Last 24 Hrs  T(C): 36.8 (14 Jul 2021 08:01), Max: 37.6 (13 Jul 2021 19:45)  T(F): 98.3 (14 Jul 2021 08:01), Max: 99.7 (13 Jul 2021 19:45)  HR: 66 (14 Jul 2021 12:05) (66 - 68)  BP: 115/45 (14 Jul 2021 12:05) (115/45 - 149/54)  BP(mean): 63 (14 Jul 2021 12:05) (63 - 78)  RR: --  SpO2: 96% (14 Jul 2021 12:05) (95% - 100%)  I&O's Detail    13 Jul 2021 07:01  -  14 Jul 2021 07:00  --------------------------------------------------------  IN:    Oral Fluid: 300 mL  Total IN: 300 mL    OUT:    Drain (mL): 500 mL    Indwelling Catheter - Urethral (mL): 1650 mL  Total OUT: 2150 mL    Total NET: -1850 mL                        8.6    9.64  )-----------( 345      ( 13 Jul 2021 07:43 )             26.9     13 Jul 2021 07:43    141    |  109    |  16     ----------------------------<  88     3.6     |  27     |  1.03     Ca    8.3        13 Jul 2021 07:43    CAPILLARY BLOOD GLUCOSE    POCT Blood Glucose.: 232 mg/dL (14 Jul 2021 12:07)  POCT Blood Glucose.: 84 mg/dL (14 Jul 2021 07:42)  POCT Blood Glucose.: 191 mg/dL (13 Jul 2021 21:07)  POCT Blood Glucose.: 316 mg/dL (13 Jul 2021 17:24)    MEDICATIONS  (STANDING):  apixaban 5 milliGRAM(s) Oral every 12 hours  cholecalciferol Oral Tab/Cap - Peds 2000 Unit(s) Oral daily  ciprofloxacin     Tablet 250 milliGRAM(s) Oral two times a day  dextrose 40% Gel 15 Gram(s) Oral once  dextrose 5%. 1000 milliLiter(s) (50 mL/Hr) IV Continuous <Continuous>  dextrose 5%. 1000 milliLiter(s) (100 mL/Hr) IV Continuous <Continuous>  dextrose 50% Injectable 25 Gram(s) IV Push once  dextrose 50% Injectable 12.5 Gram(s) IV Push once  dextrose 50% Injectable 25 Gram(s) IV Push once  finasteride 5 milliGRAM(s) Oral daily  glucagon  Injectable 1 milliGRAM(s) IntraMuscular once  insulin glargine Injectable (LANTUS) 35 Unit(s) SubCutaneous at bedtime  insulin lispro (ADMELOG) corrective regimen sliding scale   SubCutaneous three times a day before meals  insulin lispro (ADMELOG) corrective regimen sliding scale   SubCutaneous at bedtime  insulin lispro Injectable (ADMELOG) 3 Unit(s) SubCutaneous three times a day before meals  melatonin 3 milliGRAM(s) Oral at bedtime  pantoprazole    Tablet 40 milliGRAM(s) Oral before breakfast  saccharomyces boulardii 250 milliGRAM(s) Oral two times a day  tamsulosin 0.4 milliGRAM(s) Oral at bedtime  traMADol 50 milliGRAM(s) Oral daily    MEDICATIONS  (PRN):  acetaminophen   Tablet .. 650 milliGRAM(s) Oral every 6 hours PRN Temp greater or equal to 38.5C (101.3F), Mild Pain (1 - 3)  ondansetron Injectable 4 milliGRAM(s) IV Push every 6 hours PRN Nausea    RADIOLOGY: personally visualized    PE:  GENERAL: elderly male in NAD  HEENT: No icterus , EOMI, No epistaxis  NECK: supple no JVD  Chest: CTA BL, pleurx in place  CVS: s1s2 reg  Abdomen: Soft, Non tender, No guarding, No distension  Back: No CVAT b/l  Extremity: No calf tenderness , + BL pedal edema  Genitalia Male: Kaminski with yellow urine  Skin: No jaundice, No lesions, No swelling

## 2021-07-15 VITALS — SYSTOLIC BLOOD PRESSURE: 132 MMHG | DIASTOLIC BLOOD PRESSURE: 59 MMHG

## 2021-07-15 PROCEDURE — 99239 HOSP IP/OBS DSCHRG MGMT >30: CPT

## 2021-07-15 RX ADMIN — TRAMADOL HYDROCHLORIDE 50 MILLIGRAM(S): 50 TABLET ORAL at 10:07

## 2021-07-15 RX ADMIN — Medication 650 MILLIGRAM(S): at 06:43

## 2021-07-15 RX ADMIN — APIXABAN 5 MILLIGRAM(S): 2.5 TABLET, FILM COATED ORAL at 09:28

## 2021-07-15 RX ADMIN — FINASTERIDE 5 MILLIGRAM(S): 5 TABLET, FILM COATED ORAL at 09:28

## 2021-07-15 RX ADMIN — PANTOPRAZOLE SODIUM 40 MILLIGRAM(S): 20 TABLET, DELAYED RELEASE ORAL at 06:35

## 2021-07-15 RX ADMIN — Medication 250 MILLIGRAM(S): at 09:28

## 2021-07-15 RX ADMIN — Medication 2000 UNIT(S): at 09:28

## 2021-07-15 RX ADMIN — TRAMADOL HYDROCHLORIDE 50 MILLIGRAM(S): 50 TABLET ORAL at 09:28

## 2021-07-15 RX ADMIN — Medication 3 UNIT(S): at 11:47

## 2021-07-15 RX ADMIN — Medication 6: at 11:47

## 2021-07-15 NOTE — PROGRESS NOTE ADULT - REASON FOR ADMISSION
Severe sepsis secondary to left uteropelvic calculus

## 2021-07-15 NOTE — PROGRESS NOTE ADULT - NSICDXPILOT_GEN_ALL_CORE
[place]

## 2021-07-15 NOTE — PROGRESS NOTE ADULT - SUBJECTIVE AND OBJECTIVE BOX
89 [facility] output and a temperature of 104F. The patient reports having significant abdominal pain and "pain all over." He is a poor historian and unable to provide much of the history. The patient was previously seen in the ER at [facility] on 7/3/2021 and diagnosed with a uteropelvic junction stone with moderate left hydronephrosis and pyelonephritis and he was discharged on Keflex.     INTERVAL HPI: no loose BMs today,  s/p cystoscopy, left ureteral stent placement on 7/5/21. Pseudomonas bacteremia and UTI with Enterococcus ; overall improvement but because not a candidate for chemo was referred to Hospice  Other ROS reviewed and neg     Vital Signs Last 24 Hrs  T(C): 37.1 (15 Jul 2021 08:22), Max: 37.3 (14 Jul 2021 19:51)  T(F): 98.7 (15 Jul 2021 08:22), Max: 99.2 (14 Jul 2021 19:51)  HR: 68 (15 Jul 2021 08:00) (62 - 106)  BP: 132/59 (15 Jul 2021 12:00) (122/44 - 144/67)  BP(mean): 78 (15 Jul 2021 12:00) (65 - 85)  RR: --  SpO2: 95% (15 Jul 2021 08:00) (93% - 97%)  I&O's Detail    14 Jul 2021 07:01  -  15 Jul 2021 07:00  --------------------------------------------------------  IN:  Total IN: 0 mL    OUT:    Indwelling Catheter - Urethral (mL): 1500 mL  Total OUT: 1500 mL    Total NET: -1500 mL    CAPILLARY BLOOD GLUCOSE    POCT Blood Glucose.: 267 mg/dL (15 Jul 2021 11:36)  POCT Blood Glucose.: 82 mg/dL (15 Jul 2021 07:14)  POCT Blood Glucose.: 190 mg/dL (14 Jul 2021 21:05)  POCT Blood Glucose.: 157 mg/dL (14 Jul 2021 18:01)    MEDICATIONS  (STANDING):  apixaban 5 milliGRAM(s) Oral every 12 hours  cholecalciferol Oral Tab/Cap - Peds 2000 Unit(s) Oral daily  ciprofloxacin     Tablet 250 milliGRAM(s) Oral two times a day  dextrose 40% Gel 15 Gram(s) Oral once  dextrose 5%. 1000 milliLiter(s) (50 mL/Hr) IV Continuous <Continuous>  dextrose 5%. 1000 milliLiter(s) (100 mL/Hr) IV Continuous <Continuous>  dextrose 50% Injectable 25 Gram(s) IV Push once  dextrose 50% Injectable 12.5 Gram(s) IV Push once  dextrose 50% Injectable 25 Gram(s) IV Push once  finasteride 5 milliGRAM(s) Oral daily  glucagon  Injectable 1 milliGRAM(s) IntraMuscular once  insulin glargine Injectable (LANTUS) 35 Unit(s) SubCutaneous at bedtime  insulin lispro (ADMELOG) corrective regimen sliding scale   SubCutaneous three times a day before meals  insulin lispro (ADMELOG) corrective regimen sliding scale   SubCutaneous at bedtime  insulin lispro Injectable (ADMELOG) 3 Unit(s) SubCutaneous three times a day before meals  melatonin 3 milliGRAM(s) Oral at bedtime  pantoprazole    Tablet 40 milliGRAM(s) Oral before breakfast  saccharomyces boulardii 250 milliGRAM(s) Oral two times a day  tamsulosin 0.4 milliGRAM(s) Oral at bedtime  traMADol 50 milliGRAM(s) Oral daily    MEDICATIONS  (PRN):  acetaminophen   Tablet .. 650 milliGRAM(s) Oral every 6 hours PRN Temp greater or equal to 38.5C (101.3F), Mild Pain (1 - 3)  ondansetron Injectable 4 milliGRAM(s) IV Push every 6 hours PRN Nausea    RADIOLOGY: personally visualized    PE:  GENERAL: elderly male in NAD  HEENT: No icterus , EOMI, No epistaxis  NECK: supple no JVD  Chest: CTA BL, pleurx in place  CVS: s1s2 reg  Abdomen: Soft, Non tender, No guarding, No distension  Back: No CVAT b/l  Extremity: No calf tenderness , + BL pedal edema  Genitalia Male: Kaminski with yellow urine  Skin: No jaundice, No lesions, No swelling

## 2021-07-19 ENCOUNTER — EMERGENCY (EMERGENCY)
Facility: HOSPITAL | Age: 86
LOS: 0 days | Discharge: HOME | End: 2021-07-19
Attending: STUDENT IN AN ORGANIZED HEALTH CARE EDUCATION/TRAINING PROGRAM
Payer: MEDICARE

## 2021-07-19 VITALS
TEMPERATURE: 99 F | DIASTOLIC BLOOD PRESSURE: 57 MMHG | HEART RATE: 70 BPM | OXYGEN SATURATION: 97 % | SYSTOLIC BLOOD PRESSURE: 145 MMHG | RESPIRATION RATE: 17 BRPM

## 2021-07-19 VITALS
HEIGHT: 72 IN | SYSTOLIC BLOOD PRESSURE: 120 MMHG | DIASTOLIC BLOOD PRESSURE: 77 MMHG | WEIGHT: 199.96 LBS | OXYGEN SATURATION: 96 % | RESPIRATION RATE: 15 BRPM | HEART RATE: 82 BPM | TEMPERATURE: 98 F

## 2021-07-19 DIAGNOSIS — N18.30 CHRONIC KIDNEY DISEASE, STAGE 3 UNSPECIFIED: ICD-10-CM

## 2021-07-19 DIAGNOSIS — R19.8 OTHER SPECIFIED SYMPTOMS AND SIGNS INVOLVING THE DIGESTIVE SYSTEM AND ABDOMEN: Chronic | ICD-10-CM

## 2021-07-19 DIAGNOSIS — R53.1 WEAKNESS: ICD-10-CM

## 2021-07-19 DIAGNOSIS — E78.5 HYPERLIPIDEMIA, UNSPECIFIED: ICD-10-CM

## 2021-07-19 DIAGNOSIS — Z86.718 PERSONAL HISTORY OF OTHER VENOUS THROMBOSIS AND EMBOLISM: ICD-10-CM

## 2021-07-19 DIAGNOSIS — N40.0 BENIGN PROSTATIC HYPERPLASIA WITHOUT LOWER URINARY TRACT SYMPTOMS: ICD-10-CM

## 2021-07-19 DIAGNOSIS — D63.1 ANEMIA IN CHRONIC KIDNEY DISEASE: ICD-10-CM

## 2021-07-19 DIAGNOSIS — E11.22 TYPE 2 DIABETES MELLITUS WITH DIABETIC CHRONIC KIDNEY DISEASE: ICD-10-CM

## 2021-07-19 DIAGNOSIS — Z88.6 ALLERGY STATUS TO ANALGESIC AGENT: ICD-10-CM

## 2021-07-19 DIAGNOSIS — Z87.81 PERSONAL HISTORY OF (HEALED) TRAUMATIC FRACTURE: Chronic | ICD-10-CM

## 2021-07-19 DIAGNOSIS — I48.91 UNSPECIFIED ATRIAL FIBRILLATION: ICD-10-CM

## 2021-07-19 DIAGNOSIS — Z98.890 OTHER SPECIFIED POSTPROCEDURAL STATES: Chronic | ICD-10-CM

## 2021-07-19 DIAGNOSIS — Z87.442 PERSONAL HISTORY OF URINARY CALCULI: ICD-10-CM

## 2021-07-19 DIAGNOSIS — Z20.822 CONTACT WITH AND (SUSPECTED) EXPOSURE TO COVID-19: ICD-10-CM

## 2021-07-19 DIAGNOSIS — I12.9 HYPERTENSIVE CHRONIC KIDNEY DISEASE WITH STAGE 1 THROUGH STAGE 4 CHRONIC KIDNEY DISEASE, OR UNSPECIFIED CHRONIC KIDNEY DISEASE: ICD-10-CM

## 2021-07-19 DIAGNOSIS — Z98.890 OTHER SPECIFIED POSTPROCEDURAL STATES: ICD-10-CM

## 2021-07-19 DIAGNOSIS — R55 SYNCOPE AND COLLAPSE: ICD-10-CM

## 2021-07-19 PROBLEM — J91.0 MALIGNANT PLEURAL EFFUSION: Chronic | Status: ACTIVE | Noted: 2021-07-05

## 2021-07-19 PROBLEM — N40.1 BENIGN PROSTATIC HYPERPLASIA WITH LOWER URINARY TRACT SYMPTOMS: Chronic | Status: ACTIVE | Noted: 2021-07-05

## 2021-07-19 PROBLEM — E46 UNSPECIFIED PROTEIN-CALORIE MALNUTRITION: Chronic | Status: ACTIVE | Noted: 2021-07-05

## 2021-07-19 PROBLEM — D64.9 ANEMIA, UNSPECIFIED: Chronic | Status: ACTIVE | Noted: 2021-07-05

## 2021-07-19 LAB
ADD ON TEST-SPECIMEN IN LAB: SIGNIFICANT CHANGE UP
ALBUMIN SERPL ELPH-MCNC: 1.9 G/DL — LOW (ref 3.3–5)
ALP SERPL-CCNC: 84 U/L — SIGNIFICANT CHANGE UP (ref 40–120)
ALT FLD-CCNC: 13 U/L — SIGNIFICANT CHANGE UP (ref 12–78)
ANION GAP SERPL CALC-SCNC: 4 MMOL/L — LOW (ref 5–17)
APPEARANCE UR: ABNORMAL
AST SERPL-CCNC: 13 U/L — LOW (ref 15–37)
BASOPHILS # BLD AUTO: 0.04 K/UL — SIGNIFICANT CHANGE UP (ref 0–0.2)
BASOPHILS NFR BLD AUTO: 0.5 % — SIGNIFICANT CHANGE UP (ref 0–2)
BILIRUB SERPL-MCNC: 0.3 MG/DL — SIGNIFICANT CHANGE UP (ref 0.2–1.2)
BILIRUB UR-MCNC: NEGATIVE — SIGNIFICANT CHANGE UP
BUN SERPL-MCNC: 17 MG/DL — SIGNIFICANT CHANGE UP (ref 7–23)
CALCIUM SERPL-MCNC: 8.2 MG/DL — LOW (ref 8.5–10.1)
CHLORIDE SERPL-SCNC: 106 MMOL/L — SIGNIFICANT CHANGE UP (ref 96–108)
CO2 SERPL-SCNC: 27 MMOL/L — SIGNIFICANT CHANGE UP (ref 22–31)
COLOR SPEC: YELLOW — SIGNIFICANT CHANGE UP
CREAT SERPL-MCNC: 1.24 MG/DL — SIGNIFICANT CHANGE UP (ref 0.5–1.3)
DIFF PNL FLD: ABNORMAL
EOSINOPHIL # BLD AUTO: 0.13 K/UL — SIGNIFICANT CHANGE UP (ref 0–0.5)
EOSINOPHIL NFR BLD AUTO: 1.6 % — SIGNIFICANT CHANGE UP (ref 0–6)
GLUCOSE SERPL-MCNC: 240 MG/DL — HIGH (ref 70–99)
GLUCOSE UR QL: 1000 MG/DL
HCT VFR BLD CALC: 28.6 % — LOW (ref 39–50)
HGB BLD-MCNC: 8.8 G/DL — LOW (ref 13–17)
IMM GRANULOCYTES NFR BLD AUTO: 0.5 % — SIGNIFICANT CHANGE UP (ref 0–1.5)
KETONES UR-MCNC: ABNORMAL
LEUKOCYTE ESTERASE UR-ACNC: ABNORMAL
LYMPHOCYTES # BLD AUTO: 0.56 K/UL — LOW (ref 1–3.3)
LYMPHOCYTES # BLD AUTO: 6.8 % — LOW (ref 13–44)
MCHC RBC-ENTMCNC: 28.6 PG — SIGNIFICANT CHANGE UP (ref 27–34)
MCHC RBC-ENTMCNC: 30.8 GM/DL — LOW (ref 32–36)
MCV RBC AUTO: 92.9 FL — SIGNIFICANT CHANGE UP (ref 80–100)
MONOCYTES # BLD AUTO: 0.78 K/UL — SIGNIFICANT CHANGE UP (ref 0–0.9)
MONOCYTES NFR BLD AUTO: 9.4 % — SIGNIFICANT CHANGE UP (ref 2–14)
NEUTROPHILS # BLD AUTO: 6.71 K/UL — SIGNIFICANT CHANGE UP (ref 1.8–7.4)
NEUTROPHILS NFR BLD AUTO: 81.2 % — HIGH (ref 43–77)
NITRITE UR-MCNC: NEGATIVE — SIGNIFICANT CHANGE UP
PH UR: 5 — SIGNIFICANT CHANGE UP (ref 5–8)
PLATELET # BLD AUTO: 427 K/UL — HIGH (ref 150–400)
POTASSIUM SERPL-MCNC: 4.3 MMOL/L — SIGNIFICANT CHANGE UP (ref 3.5–5.3)
POTASSIUM SERPL-SCNC: 4.3 MMOL/L — SIGNIFICANT CHANGE UP (ref 3.5–5.3)
PROT SERPL-MCNC: 6 GM/DL — SIGNIFICANT CHANGE UP (ref 6–8.3)
PROT UR-MCNC: 30 MG/DL
RBC # BLD: 3.08 M/UL — LOW (ref 4.2–5.8)
RBC # FLD: 15.8 % — HIGH (ref 10.3–14.5)
SARS-COV-2 RNA SPEC QL NAA+PROBE: SIGNIFICANT CHANGE UP
SODIUM SERPL-SCNC: 137 MMOL/L — SIGNIFICANT CHANGE UP (ref 135–145)
SP GR SPEC: 1.01 — SIGNIFICANT CHANGE UP (ref 1.01–1.02)
TROPONIN I SERPL-MCNC: <0.015 NG/ML — SIGNIFICANT CHANGE UP (ref 0.01–0.04)
UROBILINOGEN FLD QL: NEGATIVE MG/DL — SIGNIFICANT CHANGE UP
WBC # BLD: 8.26 K/UL — SIGNIFICANT CHANGE UP (ref 3.8–10.5)
WBC # FLD AUTO: 8.26 K/UL — SIGNIFICANT CHANGE UP (ref 3.8–10.5)

## 2021-07-19 PROCEDURE — 99285 EMERGENCY DEPT VISIT HI MDM: CPT

## 2021-07-19 PROCEDURE — 85025 COMPLETE CBC W/AUTO DIFF WBC: CPT

## 2021-07-19 PROCEDURE — 87086 URINE CULTURE/COLONY COUNT: CPT

## 2021-07-19 PROCEDURE — 82550 ASSAY OF CK (CPK): CPT

## 2021-07-19 PROCEDURE — 93010 ELECTROCARDIOGRAM REPORT: CPT

## 2021-07-19 PROCEDURE — 99284 EMERGENCY DEPT VISIT MOD MDM: CPT | Mod: 25

## 2021-07-19 PROCEDURE — 70450 CT HEAD/BRAIN W/O DYE: CPT | Mod: 26,MA

## 2021-07-19 PROCEDURE — U0003: CPT

## 2021-07-19 PROCEDURE — 82962 GLUCOSE BLOOD TEST: CPT

## 2021-07-19 PROCEDURE — 36415 COLL VENOUS BLD VENIPUNCTURE: CPT

## 2021-07-19 PROCEDURE — 93005 ELECTROCARDIOGRAM TRACING: CPT

## 2021-07-19 PROCEDURE — 72125 CT NECK SPINE W/O DYE: CPT

## 2021-07-19 PROCEDURE — 70450 CT HEAD/BRAIN W/O DYE: CPT

## 2021-07-19 PROCEDURE — U0005: CPT

## 2021-07-19 PROCEDURE — 81001 URINALYSIS AUTO W/SCOPE: CPT

## 2021-07-19 PROCEDURE — 72125 CT NECK SPINE W/O DYE: CPT | Mod: 26,MA

## 2021-07-19 PROCEDURE — 80053 COMPREHEN METABOLIC PANEL: CPT

## 2021-07-19 PROCEDURE — 84484 ASSAY OF TROPONIN QUANT: CPT

## 2021-07-19 NOTE — ED PROVIDER NOTE - CLINICAL SUMMARY MEDICAL DECISION MAKING FREE TEXT BOX
Eloy CALLAHAN: 89M hx alz dementia foll. lymphoma, CKD on home hospice [facility] presents with a cc of fall reports was attempting to get OOB this morning where fell weak and "legs gave out" no head trauma no LOC recalls entire event however limited historian denies CP SOB or OCHOA prior to fall, aside from diffuse weakness at this time has no other complaints exam vss non toxic PE as above ddx unclear etiology however likely mech fall given history, however plan to check labs ekg ct head cardiacs, reassess thereafter.

## 2021-07-19 NOTE — CHART NOTE - NSCHARTNOTEFT_GEN_A_CORE
Patient BIBA after unwitnessed fall at home - [facility]. Patient has been medically cleared to return home. Patient recently discharged from  on 7/15/21. Patient has private hire home health aide 8am-6pm, 7 days through [facility] [phone]. Patient receives Home hospice care through [facility], SOC was 7/16.  Per [facility] Wellness RN Pau, patient may return to [facility] when ready to discharge from ED. SW spoke with pt's dtr/HCP, Ally [phone], who reported patient should return to [facility], as he has a HHA during the day, and [facility] staff checks on patient at night, per her arrangement. [facility], reported HHA, Lorenza, will remain with until 6pm and return at 8am tomorrow. [facility] on call Ivon JALLOH, reported patient remains on service for home hospice. ED unit secretary arranged for amy back to [facility].   Of note, patient's dtr stated patient is confused at times, and does not really understand that he is on home hospice at this time.

## 2021-07-19 NOTE — ED PROVIDER NOTE - PATIENT PORTAL LINK FT
You can access the FollowMyHealth Patient Portal offered by [facility] by registering at the following website: [email or web address]. By joining [employer]’s FollowMyHealth portal, you will also be able to view your health information using other applications (apps) compatible with our system.

## 2021-07-19 NOTE — ED PROVIDER NOTE - PROGRESS NOTE DETAILS
Eloy CALLAHAN: discussed case w sw, discussed case w family stable for dc back to [facility], pt ambulatory in ED, noted urine however pt w almaraz likely chronic colonization, culture sent pain no fever, pt reports felt weak this morning while trying to get to bathroom, no cp sob or martin prior to collapse, labs and ct non actionable stable for dc. Eloy CALLAHAN: discussed case w sw, discussed case w family stable for dc back to [facility], pt ambulatory in ED, noted urine however pt w almaraz likely chronic colonization, culture sent, can follow as no pain no fever, pt reports felt weak this morning while trying to get to bathroom, no cp sob or martin prior to collapse, however ambulatory now in ED, labs and ct non actionable stable for dc, pt also on home hospice.

## 2021-07-19 NOTE — ED ADULT TRIAGE NOTE - CHIEF COMPLAINT QUOTE
patient fell to floor after his knees gave out while walking. neg loc but hit head on eliquis. neuro alert initiated.

## 2021-07-19 NOTE — ED PROVIDER NOTE - OBJECTIVE STATEMENT
Eloy CALLAHAN: 89M hx alz dementia foll. lymphoma, CKD on home hospice [facility] presents with a cc of fall reports was attempting to get OOB this morning where fell weak and "legs gave out" no head trauma no LOC recalls entire event however limited historian denies CP SOB or OCHOA prior to fall, aside from diffuse weakness at this time has no other complaints. Denies n/v/f/c/cp/sob. Denies headache, syncope, lightheadedness, dizziness. Denies chest palpitations, abdominal pain. Denies edema. Denies dysuria, hematuria, BRBPR, tarry stools, diarrhea, constipation.

## 2021-07-19 NOTE — ED PROVIDER NOTE - PHYSICAL EXAMINATION
Eloy CALLAHAN:  VITALS: Initial triage and subsequent vitals have been reviewed by me.  GEN APPEARANCE: WDWN, alert and cooperative, non-toxic appearing and in NAD, chronically ill appearing   HEAD: Atraumatic, normocephalic   EYES: PERRLa, EOMI, vision grossly intact.   EARS: Gross hearing intact.   NOSE: No nasal discharge, no external evidence of epistaxis.   NECK: Supple  CV: RRR, S1S2, no c/r/m/g. No cyanosis or pallor. Extremities warm, well perfused. Cap refill <2 seconds. No bruits.   LUNGS: CTAB. No wheezing. No rales. No rhonchi. No diminished breath sounds.   ABDOMEN: Soft, NTND. No guarding or rebound. No masses.   MSK/EXT: Spine appears normal, no spine point tenderness. No CVA ttp. Normal muscular development. Pelvis stable. No obvious joint or bony deformity, no peripheral edema.   NEURO: Alert, follows commands. Speech normal. Sensation and motor normal x4 extremities.   SKIN: Normal color for race, warm, dry and intact. No evidence of rash.  PSYCH: Normal mood and affect.

## 2021-07-19 NOTE — ED PROVIDER NOTE - NSFOLLOWUPINSTRUCTIONS_ED_ALL_ED_FT
Thank you for visiting our Emergency Department, it has been a pleasure taking part in your healthcare.    Your discharge diagnosis is: weakness and collapse  Please take all discharge medications as indicated below:  Please continue all medications as rx'd by your PMD.  Please follow up with your PMD within x48 hours.  A copy of resulted labs, imaging, and findings have been provided to you.   You have had a detailed discussion with your provider regarding your diagnosis, care management and discharge planning including, but not limited to: return precautions, follow up visits with existing or new providers, new prescriptions and/or medication changes, wound and/or splint/cast care or other care   aspects specific to your diagnosis and treatment. You have been given the opportunity to have your questions answered. At this time you have been deemed stable and fit for discharge.  Return precautions to the Emergency Department include but are not limited to: unrelenting nausea, vomiting, fever, chills, chest pain, shortness of breath, dizziness, chest or abdominal pain, worsening back pain, syncope, blood in urine or stool, headache that doesn't resolve, numbness or tingling, loss of sensation, loss of motor function, or any other concerning symptoms.

## 2021-07-19 NOTE — ED PROVIDER NOTE - PMH
Afib    Anemia    Benign prostatic hyperplasia with urinary retention    BPH (benign prostatic hyperplasia)    DM (diabetes mellitus)    Follicular lymphoma    H/O chest tube placement    Hernia    History of DVT in adulthood    HLD (hyperlipidemia)    HTN (hypertension)    Kidney stone    Malignant pleural effusion    Protein calorie malnutrition    Stage 3 chronic kidney disease

## 2021-07-20 LAB
CULTURE RESULTS: SIGNIFICANT CHANGE UP
SPECIMEN SOURCE: SIGNIFICANT CHANGE UP

## 2021-07-28 DIAGNOSIS — E46 UNSPECIFIED PROTEIN-CALORIE MALNUTRITION: ICD-10-CM

## 2021-07-28 DIAGNOSIS — D64.9 ANEMIA, UNSPECIFIED: ICD-10-CM

## 2021-07-28 DIAGNOSIS — A41.52 SEPSIS DUE TO PSEUDOMONAS: ICD-10-CM

## 2021-07-28 DIAGNOSIS — E11.22 TYPE 2 DIABETES MELLITUS WITH DIABETIC CHRONIC KIDNEY DISEASE: ICD-10-CM

## 2021-07-28 DIAGNOSIS — I48.91 UNSPECIFIED ATRIAL FIBRILLATION: ICD-10-CM

## 2021-07-28 DIAGNOSIS — C82.93 FOLLICULAR LYMPHOMA, UNSPECIFIED, INTRA-ABDOMINAL LYMPH NODES: ICD-10-CM

## 2021-07-28 DIAGNOSIS — R53.0 NEOPLASTIC (MALIGNANT) RELATED FATIGUE: ICD-10-CM

## 2021-07-28 DIAGNOSIS — R33.8 OTHER RETENTION OF URINE: ICD-10-CM

## 2021-07-28 DIAGNOSIS — Z51.5 ENCOUNTER FOR PALLIATIVE CARE: ICD-10-CM

## 2021-07-28 DIAGNOSIS — R65.20 SEVERE SEPSIS WITHOUT SEPTIC SHOCK: ICD-10-CM

## 2021-07-28 DIAGNOSIS — E78.5 HYPERLIPIDEMIA, UNSPECIFIED: ICD-10-CM

## 2021-07-28 DIAGNOSIS — Z79.01 LONG TERM (CURRENT) USE OF ANTICOAGULANTS: ICD-10-CM

## 2021-07-28 DIAGNOSIS — E11.65 TYPE 2 DIABETES MELLITUS WITH HYPERGLYCEMIA: ICD-10-CM

## 2021-07-28 DIAGNOSIS — N40.1 BENIGN PROSTATIC HYPERPLASIA WITH LOWER URINARY TRACT SYMPTOMS: ICD-10-CM

## 2021-07-28 DIAGNOSIS — N17.9 ACUTE KIDNEY FAILURE, UNSPECIFIED: ICD-10-CM

## 2021-07-28 DIAGNOSIS — Z86.718 PERSONAL HISTORY OF OTHER VENOUS THROMBOSIS AND EMBOLISM: ICD-10-CM

## 2021-07-28 DIAGNOSIS — N13.6 PYONEPHROSIS: ICD-10-CM

## 2021-07-28 DIAGNOSIS — J91.0 MALIGNANT PLEURAL EFFUSION: ICD-10-CM

## 2021-07-28 DIAGNOSIS — Z66 DO NOT RESUSCITATE: ICD-10-CM

## 2021-07-28 DIAGNOSIS — Z79.4 LONG TERM (CURRENT) USE OF INSULIN: ICD-10-CM

## 2021-07-28 DIAGNOSIS — N18.30 CHRONIC KIDNEY DISEASE, STAGE 3 UNSPECIFIED: ICD-10-CM

## 2021-07-28 DIAGNOSIS — K59.00 CONSTIPATION, UNSPECIFIED: ICD-10-CM

## 2021-07-28 DIAGNOSIS — Z88.5 ALLERGY STATUS TO NARCOTIC AGENT: ICD-10-CM

## 2021-07-28 DIAGNOSIS — I12.9 HYPERTENSIVE CHRONIC KIDNEY DISEASE WITH STAGE 1 THROUGH STAGE 4 CHRONIC KIDNEY DISEASE, OR UNSPECIFIED CHRONIC KIDNEY DISEASE: ICD-10-CM

## 2021-08-13 ENCOUNTER — APPOINTMENT (OUTPATIENT)
Dept: UROLOGY | Facility: CLINIC | Age: 86
End: 2021-08-13

## 2021-09-15 NOTE — PROGRESS NOTE ADULT - PROBLEM SELECTOR PROBLEM 2
Drink extra fluids over the next few days.  Someone from the syncope clinic will call you and arrange further work-up.  Your blood sugar is elevated today.  Follow that up with your primary care provider.  
Atrial flutter
Fall, initial encounter

## 2021-09-20 NOTE — ASU PATIENT PROFILE, ADULT - NS PRO PT RIGHT SUPPORT PERSON
same name as above Birth Control Pills Pregnancy And Lactation Text: This medication should be avoided if pregnant and for the first 30 days post-partum.

## 2021-09-29 NOTE — ED ADULT TRIAGE NOTE - TEMPERATURE IN CELSIUS (DEGREES C)
Patient states the last time he got his trajenta prescription he only had a $ 25 copay.  Now he has \" hit the wall\" with his prescription cost so insurance will only cover 75 % of cost - leaving him with a bill of $ 325 which he cannot afford. States he only has 5 days left of his trajenta and is asking for something cheaper to be sent to [facility].    OK to leave a detailed message.   36.3
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
## 2022-01-11 NOTE — ED ADULT NURSE NOTE - IS THE PATIENT ABLE TO BE SCREENED?
bilateral upper extremity Active ROM was WFL (within functional limits)/bilateral  lower extremity Active ROM was WFL (within functional limits)
Yes

## 2022-03-15 NOTE — ED ADULT NURSE NOTE - HIV OFFER
Previously Declined (within the last year) Muscle Hinge Flap Text: The defect edges were debeveled with a #15 scalpel blade.  Given the size, depth and location of the defect and the proximity to free margins a muscle hinge flap was deemed most appropriate.  Using a sterile surgical marker, an appropriate hinge flap was drawn incorporating the defect. The area thus outlined was incised with a #15 scalpel blade.  The skin margins were undermined to an appropriate distance in all directions utilizing iris scissors.

## 2022-03-18 NOTE — ED PROVIDER NOTE - PATIENT PORTAL LINK FT
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 baseline creatinine ~3.  Patient has a history of anemia of chronic renal insufficiency was on escalating doses of Aranesp up until December 5th.  Hg was mostly stable at the hospital while he was being treated for COVID 19.  Hg 8-11 during that stay. Discharged with Hg 8.8g/dl.  1/11/21 Hg 6.8g/dl requiring readmission for transfusion of 2 units of PRBC. \par \par Patient on aranesp 100mcg, HG had been stable at roughly 10+ for the previous year since the last follow up.  Hg started to decline more recently along with an increase in creatinine from 5 to 6.  Hg most recently 7.5g/dl at PCP office.  Dr. Vasquez had been in contact with me as patient was being sent here for evaluation. We had agreed to hold the Azathioprine for a short time then restart at 75mg. Following this HG 7.1g/dl don from 7.7 g/dl after transfusion. While still low, the rate of decline has slowed.  Recommended patient increase aranesp to 150mcg daily.  Sent prescription for the aranesp in 150mcg prefilled syringes.   You can access the FollowMyHealth Patient Portal offered by [facility] by registering at the following website: [email or web address]. By joining [facility]’s FollowMyHealth portal, you will also be able to view your health information using other applications (apps) compatible with our system.

## 2022-05-18 NOTE — DISCHARGE NOTE PROVIDER - HOSPITAL COURSE
For the callus right foot:  Put lotion on the callus twice daily for at least 1 week.  If improved, continue with this. If not improved, then I recommend purchase of salicylic acid patches or bandages (corn remover pads) and use these daily for 3 consecutive days, then allow the skin to slough off.    For the hammertoe left:  Wear extra depth toebox shoes with lace up or velcro straps.  Gentle straightening of the toe once daily - hold each stretch 30 seconds and repeat 3 times minimum daily.    [employer] - phone them and see if they can trim the callus to right foot.    You do not currently meet Medicare criteria for coverage of trimming of corns or calluses.  
Patient is a 89y old  Male who presents with a chief complaint of OCHOA (03 Jun 2021 08:14)  89 y.o. 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  Pt is forgetful, denies any complaints, + chronic LE edema.  Other ROS reviewed and neg     SUBJECTIVE / OVERNIGHT EVENTS:  6/3- s/e at bedside, no new c/o, breathing ok  CTS note reviewed, pleurx planned for 6/4, eliquis on hold  6/4- s/e, pt in bed, feels comfortable, breathing well, pain controlled. s/p pleurx today  d/w DR Moran regarding possible treatment,     6/5: Lying in bed, alert, comfortable at this time.  Pt straight cathed overnight with 900cc drained.   6/6: sitting in chair, has episodes of shark pain at pleurx site however comfortable at this moment.  Denies fever, chills, n, v.  6/7:Sitting in chair, pt met with palliative care today to discuss goals of care.  Pt has some back pain improved with oxy.  No fever, chills.  Plan for rituxan therapy.  6/8 - pt seen and examined, + gen weakness, lower back pain, denies cp, dyspnea, abdominal pain, afebrile, plan for Rituxan infusion  6/9 - pt seen and examined, + weakness, + back pain, tolerating po intake, afebrile, denies cp, dyspnea, cough, plan discussed   6/10 - pt seen and examined, denies cp, dyspnea, abdominal pain, + gen weakness, plan discussed     REVIEW OF SYSTEMS: All other review of systems is negative unless indicated above.  T(C): 36.4 (06-10-21 @ 09:11), Max: 36.9 (06-09-21 @ 21:26)  T(F): 97.5 (06-10-21 @ 09:11), Max: 98.4 (06-09-21 @ 21:26)  HR: 60 (06-10-21 @ 09:11) (60 - 78)  BP: 136/50 (06-10-21 @ 09:11) (105/86 - 136/50)  RR: 16 (06-10-21 @ 09:11) (16 - 18)  SpO2: 99% (06-10-21 @ 09:11) (97% - 99%)  Wt(kg): --    GENERAL: NAD,   HEAD:  Atraumatic, Normocephalic  EYES: EOMI, PERRLA, conjunctiva and sclera clear  NECK: Supple, No JVD, no LAD  CHEST/LUNG: cta b/l, resp unlabored  HEART: Regular rate and rhythm; No murmurs, rubs, or gallops  ABDOMEN: Soft, Nontender, Nondistended; Bowel sounds present, no HSM  EXTREMITIES:  2+ Peripheral Pulses, No clubbing, cyanosis, Chronic LE nonpitting  edema  PSYCH: AAOx3, normal behavior  NEUROLOGY: non-focal, sensory and cn 2-12 intact  SKIN: No visible rashes or lesions     Recurrent of  left sided exudative pleural effusion s/p Pleurx  - mild to moderate with minimal drop in SpO2 on exertion only to % -  exudative on last admit  suspect malignant with newly diagnosed lymphoma, HF had been ruled out on last admit (seen by cardio, echo wnl)   - CTS eval, s/p thoracentesis, now with pleurx drain , drain care-  MWF drain up to 1 L    - eliquis,  pt had TTE on last admit, wnl  -pulm following Dr. Abrams , check O2 on ambulation - no need for O2  Mesenteric mass bx confirmed  Follicular lymphoma  - per d/w Onc, plan to start pt on Rituxan today once transferred to   - oncology following d/w Dr. Zavala - ok to hold rituxan while in [facility] if patient needs [facility]   Urine retention s/p Almaraz   -almaraz placed after multiple episodes of retention, continue almaraz care, c/w flomax and finasteride   - urology consult - outpatient voiding trial   Dr. Quinn   DMII with A1C 8.7 - HISS + lantus   - monitor FS, titrate insulin as needed  CKD3- appears to be at baseline  - monitor labs stable  Chronic LE edema with Hx of DVTs in the past on DOACs eliquis  GERD - PPI  atrial flutter as per cardiology on last admission - in SR now  anemia: Stable  Generalized weakness  Vitamin D deficiency  - start vitamin D  2000   - check vB12, folatewnl , PT daily, d/c planning   Dispo: lives in assisted living, will need to be able to take care of Almaraz, most likely will go to [facility] , COVID neg 6/9   daughter Tanya updated 6/10/21  Disposition - medically optimized to be discharged home with close follow up with PCP, oncologist within 1 week   return to ED if fever, abdominal pain, nausea, vomiting, chest pain, dyspnea  Discharge plan discussed with patient, RN  Patient advised to follow up with PCP within 3-7 days  time spend 40 min  Discharge note faxed to PCP with my contact information to call me back   PCP  Dr. Dumas

## 2022-08-22 NOTE — ED STATDOCS - NSCAREINITIATED _GEN_ER
Detail Level: Detailed Quality 226: Preventive Care And Screening: Tobacco Use: Screening And Cessation Intervention: Patient screened for tobacco use and is an ex/non-smoker Quality 110: Preventive Care And Screening: Influenza Immunization: Influenza Immunization Administered during Influenza season Quality 431: Preventive Care And Screening: Unhealthy Alcohol Use - Screening: Patient not identified as an unhealthy alcohol user when screened for unhealthy alcohol use using a systematic screening method Katelyn Perez)

## 2022-09-06 NOTE — DISCHARGE NOTE PROVIDER - NSDCADMDATE_GEN_ALL_CORE_FT
02-Jun-2021 15:17 Methotrexate Counseling:  Patient counseled regarding adverse effects of methotrexate including but not limited to nausea, vomiting, abnormalities in liver function tests. Patients may develop mouth sores, rash, diarrhea, and abnormalities in blood counts. The patient understands that monitoring is required including LFT's and blood counts.  There is a rare possibility of scarring of the liver and lung problems that can occur when taking methotrexate. Persistent nausea, loss of appetite, pale stools, dark urine, cough, and shortness of breath should be reported immediately. Patient advised to discontinue methotrexate treatment at least three months before attempting to become pregnant.  I discussed the need for folate supplements while taking methotrexate.  These supplements can decrease side effects during methotrexate treatment. The patient verbalized understanding of the proper use and possible adverse effects of methotrexate.  All of the patient's questions and concerns were addressed.

## 2022-10-18 NOTE — ED ADULT NURSE NOTE - NS ED NOTE ABUSE SUSPICION NEGLECT YN
PHYSICAL THERAPY - DAILY TREATMENT NOTE     Patient Name: Pete Palencia        Date: 10/18/2022  : 1967   YES Patient  Verified  Visit #:      12  Insurance: Payor: Onofre Brown / Plan: Kennedy Gil / Product Type: HMO /      In time: 4:07 pm Out time: 4:53    Total Treatment Time: 46     Medicare/BCBS Time Tracking (below)   Total Timed Codes (min):  36 1:1 Treatment Time:  23     TREATMENT AREA =  Other low back pain [M54.59]    SUBJECTIVE    Pain Level (on 0 to 10 scale):  3  / 10-low back   Medication Changes/New allergies or changes in medical history, any new surgeries or procedures?     NO    If yes, update Summary List   Subjective Functional Status/Changes:  []  No changes reported     No radicular sx x 1 week         OBJECTIVE  Modalities Rationale:     decrease edema, decrease inflammation, decrease pain, and increase tissue extensibility to improve patient's ability to perform lifting with less pain   min [] Estim, type/location:                                      []  att     []  unatt     []  w/US     []  w/ice    []  w/heat    min []  Mechanical Traction: type/lbs                   []  pro   []  sup   []  int   []  cont    []  before manual    []  after manual    min []  Ultrasound, settings/location:      min []  Iontophoresis w/ dexamethasone, location:                                               []  take home patch       []  in clinic   10 min [x]  Ice     []  Heat    location/position: Prone low back    min []  Vasopneumatic Device, press/temp:    If using vaso (only need to measure limb vaso being performed on)      pre-treatment girth :       post-treatment girth :       measured at (landmark location) :      min []  Other:    [x] Skin assessment post-treatment (if applicable):    [x]  intact    []  redness- no adverse reaction                  []redness - adverse reaction:      14 min Therapeutic Exercise:  [x]  See flow sheet   Rationale:      increase ROM, increase strength, improve coordination, and increase proprioception to improve the patients ability to perform lifting with less pain     12 min Therapeutic Activity: [x]  Floor to waist lifting body mechanics; BET sit to stand, hip hinge, use of towel roll for neutral spine posture   Rationale:      increase ROM, increase strength, improve coordination, and increase proprioception to improve the patients ability to  perform lifting with less pain     10 min Neuromuscular Re-ed: [x]  See flow sheet   Rationale:      increase ROM, increase strength, improve coordination, and increase proprioception to improve the patients ability to perform lifting with less pain          Billed With/As:   [] TE   [] TA   [] Neuro   [] Self Care Patient Education: [x] Review HEP    [] Progressed/Changed HEP based on:   [] positioning   [] body mechanics   [] transfers   [] heat/ice application    [] other:        Other Objective/Functional Measures:    Advanced neutral spine core stabilization per flow sheet; review body mechanics for posture and lifting techniques     Post Treatment Pain Level (on 0 to 10) scale:   1  / 10     ASSESSMENT    Assessment/Changes in Function:     Improving use of hip hinge and LE in partal to full squat for lifting body mechanics;   Good sx relief with use of extension based positioning and exercise  Stiffness in low back with post hip stretch     []  See Progress Note/Recertification   Patient will continue to benefit from skilled PT services to modify and progress therapeutic interventions, address functional mobility deficits, address ROM deficits, address strength deficits, analyze and address soft tissue restrictions, analyze and cue movement patterns, analyze and modify body mechanics/ergonomics, assess and modify postural abnormalities, and instruct in home and community integration to attain remaining goals.       Progress toward goals / Updated goals:    Good Progress to    [x] STG    [] LTG  2 as shown by no radicular sx in L LE ~1 week       PLAN    [x]  Upgrade activities as tolerated YES Continue plan of care   []  Discharge due to :    []  Other:      Therapist: Meri Day PTA    Date: 10/18/2022 Time:  4:53 PM     Future Appointments   Date Time Provider Lisa Means   10/18/2022  4:40 PM Donell Jeffrey PTA [facility]   10/20/2022  4:40 PM Donell Jeffrey PTA [facility] No

## 2023-01-01 NOTE — PROGRESS NOTE ADULT - SUBJECTIVE AND OBJECTIVE BOX
Date of service: 04-28-21 @ 08:46    Lying in bed in NAD  Weak looking  No SOB at rest  Has left side chest tube    ROS: no fever or chills; denies dizziness, no HA, no abdominal pain, no diarrhea or constipation; no dysuria, no legs pain, no rashes    MEDICATIONS  (STANDING):  apixaban 5 milliGRAM(s) Oral every 12 hours  dextrose 40% Gel 15 Gram(s) Oral once  dextrose 5%. 1000 milliLiter(s) (50 mL/Hr) IV Continuous <Continuous>  dextrose 5%. 1000 milliLiter(s) (100 mL/Hr) IV Continuous <Continuous>  dextrose 50% Injectable 25 Gram(s) IV Push once  dextrose 50% Injectable 12.5 Gram(s) IV Push once  dextrose 50% Injectable 25 Gram(s) IV Push once  finasteride 5 milliGRAM(s) Oral daily  glucagon  Injectable 1 milliGRAM(s) IntraMuscular once  insulin glargine Injectable (LANTUS) 13 Unit(s) SubCutaneous at bedtime  insulin lispro (ADMELOG) corrective regimen sliding scale   SubCutaneous three times a day before meals  insulin lispro (ADMELOG) corrective regimen sliding scale   SubCutaneous at bedtime  insulin lispro Injectable (ADMELOG) 2 Unit(s) SubCutaneous three times a day before meals  lidocaine   Patch 2 Patch Transdermal every 24 hours  metoprolol succinate ER 25 milliGRAM(s) Oral daily  pantoprazole    Tablet 40 milliGRAM(s) Oral before breakfast  tamsulosin 0.4 milliGRAM(s) Oral at bedtime  valsartan 160 milliGRAM(s) Oral daily    Vital Signs Last 24 Hrs  T(C): 36.7 (27 Apr 2021 21:12), Max: 36.8 (27 Apr 2021 09:16)  T(F): 98 (27 Apr 2021 21:12), Max: 98.3 (27 Apr 2021 09:16)  HR: 74 (27 Apr 2021 21:12) (65 - 81)  BP: 122/47 (27 Apr 2021 21:12) (112/56 - 124/52)  BP(mean): --  RR: 18 (27 Apr 2021 21:12) (18 - 18)  SpO2: 97% (27 Apr 2021 21:12) (96% - 99%)     Physical exam:    Constitutional:  No acute distress  HEENT: NC/AT, EOMI, PERRLA, conjunctivae clear; ears and nose atraumatic; pharynx benign  Neck: supple; thyroid not palpable  Back: no tenderness  Respiratory: respiratory effort normal; crackles at left base  left side chest tube  Cardiovascular: S1S2 regular, no murmurs  Abdomen: soft, not tender, not distended, positive BS; no liver or spleen organomegaly  Genitourinary: no suprapubic tenderness  Lymphatic: no LN palpable  Musculoskeletal: no muscle tenderness, no joint swelling or tenderness  Extremities: + 2 peripheral pulses, 2+ edema   Neurological/ Psychiatric: AxOx3, judgement and insight normal; moving all extremities  Skin: Superficial skin laceration and excoriation of L elbow area (from recent fall), no bruises of the trunk, back or L rib area         Labs: all available labs reviewed                        10.3   5.22  )-----------( 198      ( 27 Apr 2021 07:44 )             31.7     04-27    142  |  111<H>  |  25<H>  ----------------------------<  134<H>  4.0   |  25  |  1.23    Ca    8.6      27 Apr 2021 07:44    TPro  5.7<L>  /  Alb  2.3<L>  /  TBili  0.3  /  DBili  x   /  AST  15  /  ALT  14  /  AlkPhos  72  04-26     LIVER FUNCTIONS - ( 26 Apr 2021 06:33 )  Alb: 2.3 g/dL / Pro: 5.7 gm/dL / ALK PHOS: 72 U/L / ALT: 14 U/L / AST: 15 U/L / GGT: x             Culture - Fungal, Body Fluid (collected 26 Apr 2021 14:30)  Source: .Body Fluid Pleural Fluid  Preliminary Report (27 Apr 2021 07:57):    Testing in progress    Culture - Body Fluid with Gram Stain (collected 26 Apr 2021 14:30)  Source: .Body Fluid Pleural Fluid  Gram Stain (27 Apr 2021 02:16):    polymorphonuclear leukocytes seen    No organisms seen    by cytocentrifuge  Preliminary Report (27 Apr 2021 17:28):    No growth    Culture - Blood (collected 25 Apr 2021 20:53)  Source: .Blood None  Gram Stain (27 Apr 2021 07:49):    Growth in anaerobic bottle: Gram Positive Cocci in Clusters  Preliminary Report (27 Apr 2021 07:49):    Growth in anaerobic bottle: Gram Positive Cocci in Clusters  Organism: Blood Culture PCR (27 Apr 2021 09:55)      -  Coagulase negative Staphylococcus, Methicillin resistant: Detec      Method Type: PCR    Culture - Blood (collected 25 Apr 2021 20:53)  Source: .Blood None  Preliminary Report (26 Apr 2021 23:01):    No growth to date.    Culture - Urine (collected 23 Apr 2021 19:19)  Source: .Urine None  Final Report (24 Apr 2021 22:38):    <10,000 CFU/mL Normal Urogenital Gerda        [facility] @ 20:53[facility]    Radiology: all available radiological tests reviewed    < from: Xray Chest 1 View- PORTABLE-Urgent (Xray Chest 1 View- PORTABLE-Urgent .) (04.26.21 @ 15:23) >  IMPRESSION:  Smaller left effusion.  < end of copied text >      < from: CT Chest No Cont (04.25.21 @ 22:20) >  FINDINGS:  LUNGS AND AIRWAYS: Patent central airways.  Opacity at the posterior aspect of the left lower lobe along the major fissure, likely atelectasis and atelectasis at the posterior aspect of the right lower lobe. Also see below.  PLEURA: Moderate right pleural effusion, slightly decreased in size since4/23/2021 with compressive atelectasis in the left lower lobe.  MEDIASTINUM AND MARIA ELENA: No lymphadenopathy. Moderate hiatal hernia.  VESSELS: Thoracic aorta normal in caliber with mild calcified plaque. Coronary artery calcifications.  HEART: Heart size is normal. No pericardial effusion. Small amount of pericardial fluid.  CHEST WALL AND LOWER NECK: No enlarged axillary lymph nodes. 1.6 x 0.9 cm left thyroid lobe nodule.  VISUALIZED UPPER ABDOMEN: Cholecystectomy. Nonobstructing calculi in the left kidney. Fatty infiltration of the pancreas.  BONES: Compression fracture of L2 of indeterminate age, unchanged since 4/23/2021. Old left 10th rib fracture.  IMPRESSION:  Moderate left pleural effusion, slightly decreased in size since 4/23/2021 with compressive atelectasis in the left lower lobe.  < end of copied text >      Advanced directives addressed: full resuscitation Screen#: [id number]  Screen Date: 2023  Screen Comment: N/A

## 2023-08-17 NOTE — ED ADULT NURSE NOTE - PRO INTERPRETER NEED 2
English Area L Indication Text: Tumors in this location are included in Area L (trunk and extremities).  Mohs surgery is indicated for larger tumors, or tumors with aggressive histologic features, in these anatomic locations.

## 2023-09-28 NOTE — ED PROVIDER NOTE - SCRIBE NAME
Domonique Claire
Detail Level: Detailed
General Sunscreen Counseling: I recommended a broad spectrum sunscreen with a SPF of 30 or higher. I explained that SPF 30 sunscreens block approximately 97 percent of the sun's harmful rays. Sunscreens should be applied at least 15 minutes prior to expected sun exposure and then every 2 hours after that as long as sun exposure continues. If swimming or exercising sunscreen should be reapplied every 45 minutes to an hour after getting wet or sweating. One ounce, or the equivalent of a shot glass full of sunscreen, is adequate to protect the skin not covered by a bathing suit. I also recommended a lip balm with a sunscreen as well. Sun protective clothing can be used in lieu of sunscreen but must be worn the entire time you are exposed to the sun's rays.

## 2024-01-24 NOTE — ED ADULT NURSE NOTE - NS ED NOTE ABUSE RESPONSE YN
Goal Outcome Evaluation:  Plan of Care Reviewed With: patient  Patient Agreement with Plan of Care: agrees     Progress: improving  Outcome Evaluation: Patient calm and cooperative. Rates anxiety and depression both a 3. Denies SI/HI or AVH. Patient denies any withdrawal symptoms or cravings.                                Yes

## 2024-04-08 NOTE — ED ADULT TRIAGE NOTE - SPO2 (%)
Abdomen , soft, nontender, nondistended , no guarding or rigidity , no masses palpable , normal bowel sounds , Liver and Spleen:  no hepatosplenomegaly
98

## 2024-05-04 NOTE — ED ADULT TRIAGE NOTE - IDEAL BODY WEIGHT(KG)
[FreeTextEntry1] : Mr. 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 This was controlled with running suture at bedside. Because he bled again despite sclerotherapy and unna boot compression, I performed LLE stab phlebectomy, L GSV ligation, stripping and excision, open LLE subfascial  vein ligation (4/19/24). He presents for follow up. No major complaints. The stitches in the distal medial calf and ankle were removed. Has small superficial wound at medial malleolar region. Otherwise no bleeding.  C/w elevation and compression. Bacitracin and gauze for small L medial malleolar wound. Will follow up in 1 week.  I, Dr. Blaze Emanuel, personally performed the evaluation and management (E/M) services for this established patient who presents today with (an) existing condition(s).  That E/M includes conducting the examination, assessing all conditions, and (re)establishing/reinforcing a plan of care.  Today, my ACP, Ml DAVENPORT, was here to observe my evaluation and management services for this condition to be followed going forward.   
78

## 2024-06-04 NOTE — ED PROVIDER NOTE - CARE PLAN
[Normal] : normal rate, regular rhythm, normal S1 and S2 and no murmur heard [Pedal Pulses Present] : the pedal pulses are present [No Edema] : there was no peripheral edema [No Extremity Clubbing/Cyanosis] : no extremity clubbing/cyanosis [Soft] : abdomen soft [Non Tender] : non-tender [Non-distended] : non-distended [Normal Posterior Cervical Nodes] : no posterior cervical lymphadenopathy [Normal Anterior Cervical Nodes] : no anterior cervical lymphadenopathy [No CVA Tenderness] : no CVA  tenderness [No Joint Swelling] : no joint swelling [No Rash] : no rash [No Focal Deficits] : no focal deficits [Normal Affect] : the affect was normal [Normal Mood] : the mood was normal Principal Discharge DX:	Kidney stone  Secondary Diagnosis:	Left lower quadrant pain

## 2024-08-13 NOTE — ED ADULT NURSE NOTE - PRO INTERPRETER NEED 2
Body Location Override (Optional - Billing Will Still Be Based On Selected Body Map Location If Applicable): right upper back Detail Level: Detailed Size Of Lesion In Cm (Optional): 0 English
